# Patient Record
Sex: FEMALE | Employment: FULL TIME | URBAN - METROPOLITAN AREA
[De-identification: names, ages, dates, MRNs, and addresses within clinical notes are randomized per-mention and may not be internally consistent; named-entity substitution may affect disease eponyms.]

---

## 2017-01-04 ENCOUNTER — OFFICE VISIT (OUTPATIENT)
Dept: ORTHOPEDICS | Facility: CLINIC | Age: 42
End: 2017-01-04
Payer: COMMERCIAL

## 2017-01-04 ENCOUNTER — TELEPHONE (OUTPATIENT)
Dept: ORTHOPEDICS | Facility: CLINIC | Age: 42
End: 2017-01-04

## 2017-01-04 VITALS — BODY MASS INDEX: 30.51 KG/M2 | WEIGHT: 155.4 LBS | HEIGHT: 60 IN | RESPIRATION RATE: 16 BRPM

## 2017-01-04 DIAGNOSIS — Z98.890 S/P CARPAL TUNNEL RELEASE: Primary | ICD-10-CM

## 2017-01-04 PROCEDURE — 99024 POSTOP FOLLOW-UP VISIT: CPT | Performed by: ORTHOPAEDIC SURGERY

## 2017-01-04 ASSESSMENT — PAIN SCALES - GENERAL: PAINLEVEL: NO PAIN (1)

## 2017-01-04 NOTE — PATIENT INSTRUCTIONS
Please remember to call and schedule a follow up appointment if one was recommended at your earliest convenience.  Orthopedics CLINIC HOURS TELEPHONE NUMBER   Dr. Naman Alvarez  Certified Medical Assistant   Monday & Wednesday   8am - 5pm  Thursday 1pm - 5pm  Friday 8am -11:30am Specialty schedulers:   [phone] to schedule your surgery.  Main Clinic:   [phone] to make an appointment with any provider.    Urgent Care locations:    [facility] Monday-Friday Closed  Saturday-Sunday 9am-5pm      Monday-Friday 12pm - 8pm  Saturday-Sunday 9am-5pm [phone]     If SURGERY has been recommended, please call our Specialty Schedulers at [phone] to schedule your procedure.    If you need a medication refill, please contact your pharmacy. Please allow 3 business days for your refill to be completed.    If an MRI or CT scan has been recommended, please call [facility] Schedulers at [phone] to schedule your appointment.  Use BUILD (secure e-mail communication and access to your chart) to send a message or to make an appointment. Please ask how you can sign up for BUILD.  Your care team's suggested websites for health information:   [email or web address] : Up to date and easily searchable information on multiple topics.   [email or web address] : [place] dept of heat, public health issues in [place], N1N1

## 2017-01-04 NOTE — PROGRESS NOTES
Chief Complaint   Patient presents with     Surgical Followup     Left CTR. DOS 12/23/16, 2 week PO. Patient states her wrist/hand is feeling better. Still has a little funny feeling but no pain. Has a little N/T still in the fingers. She has remained in the splint.      SURGERY: right wrist carpal tunnel release. ( [facility] )  DATE OF SURGERY: 11/29/2016    SURGERY: left wrist carpal tunnel release. ( [facility] )  DATE OF SURGERY: 12/23/2016    HPI: Aleena Escamilla is a 41 year old female , right -hand dominant, who presents for post-surgical follow-up of a right and left wrist carpal tunnel release. It has now been 5 weeks since surgery on right, 2 weeks on the left. Overall improved since surgery with pain and numbness and tingling. Pain is rated a 1/10. She remains in the splint. There is mild tenderness over the incision. She is already pleased. Not as much swelling, pain on left side as on the right side. preop symptoms bilateral improved since surgery.    PAST MEDICAL HISTORY:   Past Medical History   Diagnosis Date     Migraine headaches      Chronic pelvic pain in female      Patient Active Problem List    Diagnosis Date Noted     Carpal tunnel syndrome 11/23/2016     Priority: Medium     BRCA gene positive 10/21/2016     Priority: Medium     Chemotherapy-induced neutropenia (H) 06/24/2016     Priority: Medium     Malignant neoplasm of overlapping sites of right female breast (H) 12/18/2015     Priority: Medium     Major depressive disorder, recurrent episode, mild (H) 07/31/2015     Priority: Medium     Generalized anxiety disorder 07/31/2015     Priority: Medium     Diagnosis updated by automated process. Provider to review and confirm.       Arthritis, multiple joint involvement 07/31/2015     Priority: Medium     Primary osteoarthritis of both hands 07/31/2015     Priority: Medium     Fibromyalgia 07/31/2015     Priority: Medium     Chronic pelvic pain in female 01/31/2011      Priority: Medium     Chronic salpingitis and oophoritis 01/31/2011     Priority: Medium     CARDIOVASCULAR SCREENING; LDL GOAL LESS THAN 160 10/31/2010     Priority: Medium         PAST SURGICAL HISTORY:   Past Surgical History   Procedure Laterality Date     Pelvis laparoscopy,dx  12/28/1998     Gyn surgery  2-     bilateral salpingectomy, left oophrectomy     Lumpectomy breast with sentinel node, combined Right 1/19/2016     Procedure: COMBINED LUMPECTOMY BREAST WITH SENTINEL NODE;  Surgeon: Adelina Demarco MD;  Location: [facility] OR     Biopsy breast needle localization Right 1/19/2016     Procedure: BIOPSY BREAST NEEDLE LOCALIZATION;  Surgeon: Adelina Demarco MD;  Location: [facility] OR     Laparoscopic salpingo-oophorectomy Right 10/26/2016     Procedure: LAPAROSCOPIC SALPINGO-OOPHORECTOMY;  Surgeon: Bouchra Mayo MD;  [facility]: [facility] OR     Release carpal tunnel Left 12/23/2016     Procedure: RELEASE CARPAL TUNNEL;  Surgeon: Sam Florence MD;  Location: [facility] OR       MEDICATIONS:    Current Outpatient Prescriptions   Medication Sig Dispense Refill     ibuprofen (ADVIL/MOTRIN) 600 MG tablet Take 1 tablet (600 mg) by mouth every 6 hours as needed for pain (mild) 60 tablet 1     letrozole (FEMARA) 2.5 MG tablet Take 1 tablet (2.5 mg) by mouth daily 90 tablet 3     venlafaxine (EFFEXOR-XR) 75 MG 24 hr capsule Take 1 capsule (75 mg) by mouth daily 30 capsule 2     acetaminophen (TYLENOL) 325 MG tablet Take 2 tablets (650 mg) by mouth every 4 hours as needed for other (mild pain) 100 tablet 0        ALLERGIES:   Allergies   Allergen Reactions     Latex Rash     PHYSICAL EXAM  GENERAL APPEARANCE: healthy, alert, no distress.   SKIN: no suspicious lesions or rashes  RESPIRATORY: No increased work of breathing.  NEURO: Normal strength and tone, mentation intact and speech normal  PSYCH:  mentation appears normal and affect normal/bright. Not anxious.    Resp 16  Ht 1.524 m (5')  Wt 70.489 kg (155 lb  6.4 oz)  BMI 30.35 kg/m2  LMP 03/01/2016     RIGHT HAND / WRIST EXAM:    Volar incision healed well  No erythema. No drainage.    There is minimal swelling in the fingers.  There is minimaltenderness in the diffusely over the wrist and hand.  There is no ecchymosis.  There is no erythema of the surrounding skin.  There is no maceration of the skin.  There is no deformity in the area.  Range of motion: full fist.    Intact sensation to light touch in the distribution of the median, radial, and ulnar nerves of the hand. Intact sensation of the radial and ulnar digital nerves of the fingers.  Brisk capillary refill to all fingers, warm and well-perfused.   Palpable radial pulse.    LEFT HAND / WRIST EXAM:    The splint was removed.  Volar incision healing well with skin edges well approximated and everted.  Sutures in place.  No erythema. No drainage.    There is mild swelling in the fingers, wrist.  There is mild tenderness in the incisional  area  There is moderate resolving ecchymosis.  There is no erythema of the surrounding skin.  There is no maceration of the skin.  There is no deformity in the area.  Range of motion: full fist, finger range of motion.    Intact sensation to light touch in the distribution of the median, radial, and ulnar nerves of the hand. Intact sensation of the radial and ulnar digital nerves of the fingers.  Brisk capillary refill to all fingers, warm and well-perfused.   Palpable radial pulse.    ASSESSMENT: 41 year old female, 5 weeks status post right carpal tunnel release, 2 weeks status post left carpal tunnel release, doing well on both accounts.      PLAN: routine postoperative of carpal tunnel release.    * Remove sutures today, soft dressing applied  * May wash hands, no aggressive scrubbing for another week  * Continue with hand and wrist exercises for motion.  * Vitamin E scar massage and desensitization discussed and demonstrated for the right. Wait another 2 weeks before  starting scar massage on the left.  * Gradual return to light use of right hands for ADLs. No aggressive hand use of left hand for another 4 weeks.  * Return to clinic as needed.  * Workability update: has been off work per post operation recommendation from 12/27/2016 to 1/6/2017. Will return to work per comfort 1/9/2017.  * It may take 6 months or longer for symptoms to resolve, and in cases of severe carpal tunnel syndrome, symptoms may not completely improve.    This document serves as a record of the services and decisions personally performed and made by Sam Florence MD. It was created on his behalf by Aliza Rothman, a trained medical scribe. The creation of this document is based the provider's statements to the medical scribe.    Scribe Aliza Rothman 11:26 AM 1/4/2017       Sam Florence M.D., M.S.  Dept. of Orthopaedic Surgery  [facility]

## 2017-01-04 NOTE — TELEPHONE ENCOUNTER
Called and confirmed bilateral carpal tunnel release, right on 11/29/16, left on 12/23/16. She thanked me for calling.  Kim Durant Certified Medical Assistant

## 2017-01-04 NOTE — TELEPHONE ENCOUNTER
Reason for Call:  Other call back    Detailed comments: Marion like to know if patient had carpal tunnel surgery on both hands. Please call.     Phone Number Patient can be reached at: Other phone number:  [phone]    Best Time: any    Can we leave a detailed message on this number? YES    Call taken on 1/4/2017 at 11:54 AM by Rain Peralta

## 2017-01-04 NOTE — NURSING NOTE
Chief Complaint   Patient presents with     Surgical Followup     Left CTR. DOS 12/23/16, 2 week PO. Patient states her wrist/hand is feeling better. Still has a little funny feeling but no pain. Has a little N/T still in the fingers. She has remained in the splint.        Initial Resp 16  Ht 1.524 m (5')  Wt 70.489 kg (155 lb 6.4 oz)  BMI 30.35 kg/m2  LMP 03/01/2016 Estimated body mass index is 30.35 kg/(m^2) as calculated from the following:    Height as of this encounter: 1.524 m (5').    Weight as of this encounter: 70.489 kg (155 lb 6.4 oz).  BP completed using cuff size: NA (Not Taken)  Kim Durant Certified Medical Assistant

## 2017-01-04 NOTE — Clinical Note
[facility]  [address]  [place] [address]  [phone]  WORKABILITY    [facility], Mikayla Leija M.D., [place]        1/4/2017      RE: Aleena Escamilla    [address] [address]        To whom it may concern:     Aleena Escamilla is under my care for s/p left carpal tunnel release.     Date of surgery: 12/23/16    Return to work date: 1/8/17   Patient was seen today for a post operative follow up. She was off work from 12/27/16-1/6/17 per our post operative recommendations. Plan return to work on 1/8/17 without restrictions.       Next appointment: As needed        Electronically Signed (as below)  Dr. Sam Florence M.D.

## 2017-01-05 ENCOUNTER — OFFICE VISIT (OUTPATIENT)
Dept: SURGERY | Facility: CLINIC | Age: 42
End: 2017-01-05
Payer: COMMERCIAL

## 2017-01-05 VITALS — HEART RATE: 75 BPM | SYSTOLIC BLOOD PRESSURE: 104 MMHG | DIASTOLIC BLOOD PRESSURE: 71 MMHG

## 2017-01-05 DIAGNOSIS — C50.411 BREAST CANCER OF UPPER-OUTER QUADRANT OF RIGHT FEMALE BREAST (H): Primary | ICD-10-CM

## 2017-01-05 PROCEDURE — 99213 OFFICE O/P EST LOW 20 MIN: CPT | Performed by: SURGERY

## 2017-01-05 NOTE — PROGRESS NOTES
CHIEF COMPLAINT:  Chief Complaint   Patient presents with     RECHECK     6 month follow up, right breast tenderness       HISTORY OF PRESENT ILLNESS:  Aleena Escamilla is a 41 year old female who is seen in follow up regarding right breast cancer for which she had a right breast partial mastectomy and right axillary SLN biopsy.  Following surgery, She had chemotherapy due to the finding of a positive lymph node, intermediate risk Oncotype and large tumor size.  After finishing chemotherapy, Aleena had right breast radiation which she completed in 9/2016.  She then began Letrozole in November 2016.  Aleena had oophorectomy in 10/2016 and had a benign serous cystadenoma identified.  No malignancy was found.  She recently had carpal tunnel released on both hands as well.  Aleena states that she is tolerating the Letrozole reasonably well though she is having some leg pain and some numbness and tingling in her feet.  She has noted no breast changes of concern.  On mammogram from 12/19/16, she was noted to have calcifications at the lumpectomy site which were thought to likely be benign.  A 6 month follow up mammogram was recommended.    REVIEW OF SYSTEMS:  Constitutional:  Negative for chills, fatigue, fever and weight change.  Eyes:  Negative for blurred vision, eye pain and photophobia.  ENT:  Negative for hearing problems, ENT pain, congestion, rhinorrhea, epistaxis, hoarseness and dental problems.  Cardiovascular:  Negative for chest pain, palpitations, tachycardia, orthopnea and edema.  Respiratory:  Negative for cough, dyspnea and hemoptysis.  Gastrointestinal:  Negative for abdominal pain, heartburn, constipation, diarrhea and stool changes.  Musculoskeletal:  Negative for arthralgias, back pain and myalgias.  Integumentary/Breast:  See HPI.    Past Medical History   Diagnosis Date     Migraine headaches      Chronic pelvic pain in female      Malignant neoplasm of right breast, stage 2 (H) 1/2016 1/4 lymph  nodes positive, Oncotype DX = 25.  Chemotherapy and radiation. Letrozole.       Past Surgical History   Procedure Laterality Date     Pelvis laparoscopy,dx  12/28/1998     Gyn surgery  2-     bilateral salpingectomy, left oophrectomy     Biopsy breast needle localization Right 1/19/2016     Procedure: BIOPSY BREAST NEEDLE LOCALIZATION;  Surgeon: Adelina Demarco MD;  Location: [facility]     Laparoscopic salpingo-oophorectomy Right 10/26/2016     Procedure: LAPAROSCOPIC SALPINGO-OOPHORECTOMY;  Surgeon: Bouchra Mayo MD;  Location: [facility] OR     Release carpal tunnel Left 12/23/2016     Procedure: RELEASE CARPAL TUNNEL;  Surgeon: Sam Florence MD;  Location: [facility] OR     Lumpectomy breast with sentinel node, combined Right 1/19/2016     Procedure: COMBINED LUMPECTOMY BREAST WITH SENTINEL NODE;  Surgeon: Adelina Demarco MD;  Location: [facility] OR       Family History   Problem Relation Age of Onset     DIABETES Mother      CANCER Maternal Grandmother      cervical CA     Hypertension Mother      Cardiovascular Father      MI     CANCER Maternal Aunt      cervical cancer       Social History   Substance Use Topics     Smoking status: Never Smoker      Smokeless tobacco: Never Used     Alcohol Use: Yes      Comment: 1 drink per week.       Patient Active Problem List   Diagnosis     CARDIOVASCULAR SCREENING; LDL GOAL LESS THAN 160     Chronic pelvic pain in female     Chronic salpingitis and oophoritis     Major depressive disorder, recurrent episode, mild (H)     Generalized anxiety disorder     Arthritis, multiple joint involvement     Primary osteoarthritis of both hands     Fibromyalgia     Malignant neoplasm of overlapping sites of right female breast (H)     Chemotherapy-induced neutropenia (H)     BRCA gene positive     Carpal tunnel syndrome     Allergies   Allergen Reactions     Latex Rash     Current Outpatient Prescriptions   Medication Sig Dispense Refill     letrozole (FEMARA) 2.5 MG tablet Take 1  tablet (2.5 mg) by mouth daily 90 tablet 3     venlafaxine (EFFEXOR-XR) 75 MG 24 hr capsule Take 1 capsule (75 mg) by mouth daily 30 capsule 2     acetaminophen (TYLENOL) 325 MG tablet Take 2 tablets (650 mg) by mouth every 4 hours as needed for other (mild pain) 100 tablet 0     Vitals: /71 mmHg  Pulse 75  LMP 03/01/2016  BMI= There is no weight on file to calculate BMI.    EXAM:  GENERAL: healthy, alert and no distress   BREAST:  The right breast is smaller than the left.  A well healed scar is seen on the upper outer right breast.  A little skin darkening and thickening from radiation is seen on the right.  The nipples are normal bilaterally.    No mass is appreciated in either breast. The tissue is a little firmer in the area of the lumpectomy.  The breast tissue is generally fairly soft and without abnormality.  There is no axillary or supraclavicular lymphadenopathy.  CARDIOVASCULAR:  No edema or JVD.  RESPIRATORY: negative findings: no chest deformities noted, no chest wall tenderness, unlabored breathing.  NECK: Neck supple. No adenopathy.  Carotids without bruits.  SKIN: No suspicious lesions or rashes  LYMPH: Normal cervical lymph nodes    ASSESSMENT:  Aleena Escamilla is doing well.  I found nothing of concern on exam.  I see the area of calcification in the right breast on mammogram and it certainly could be related to surgery and radiation and be associated with fat necrosis.  I told Aleena that I thought that a 6 month follow up mammogram was appropriate.  We also talked about the Letrozole and her leg pain.  I encouraged her to stay on it and take ibuprofen if needed.  If after another couple of months she still has the pain or it has worsened, she should discuss it with Dr. Tate and consider switching to another aromatase inhibitor.    PLAN:  Aleena will follow up with me in 1 year.  She will have the follow up right mammogram in June and will certainly follow up with me then if there is  progressive abnormality.  If the calcifications are stable, she will have annual screening mammograms in December 2017.    Adelina Demarco MD    Please route or send letter to:  Primary Care Provider (PCP)      HPI      SENG      Physical Exam

## 2017-01-05 NOTE — NURSING NOTE
Aleena Escamilla's goals for this visit include: 6 month follow up, right breast tenderness  She requests these members of her care team be copied on today's visit information: no    PCP: Concepcion Bocanegra    Referring Provider:  No referring provider defined for this encounter.    Chief Complaint   Patient presents with     RECHECK     6 month follow up, right breast tenderness       Initial LMP 03/01/2016 Estimated body mass index is 30.35 kg/(m^2) as calculated from the following:    Height as of 1/4/17: 1.524 m (5').    Weight as of 1/4/17: 70.489 kg (155 lb 6.4 oz).  BP completed using cuff size: regular    Do you need any medication refills at today's visit? No    Parul Morin LPN

## 2017-01-06 ENCOUNTER — TELEPHONE (OUTPATIENT)
Dept: ORTHOPEDICS | Facility: CLINIC | Age: 42
End: 2017-01-06

## 2017-01-06 NOTE — TELEPHONE ENCOUNTER
Reason for Call:  Other     Detailed comments: Patient request extension on her return to work dated for Monday 01/16/2017. Patient would like a call when ready for     Phone Number Patient can be reached at: Home number on file [phone] (home)    Best Time: anytime    Can we leave a detailed message on this number? YES    Call taken on 1/6/2017 at 9:36 AM by Umm Marks

## 2017-01-06 NOTE — TELEPHONE ENCOUNTER
I spoke to Aleena and she is still quite sore and uncomfortable. I wrote a new note for her to  at [facility] that she can return to work on 1/16/17 with no restrictions.    Toñito Phillips PA-C, [employer] (Ortho)  Supervising Physician: Sam Florence M.D., M.S.  Dept. of Orthopaedic Surgery  [facility]

## 2017-01-06 NOTE — Clinical Note
[facility]  [address]  [place] [address]  [phone]      WORKABILITY    [facility], Dr. Sam Florence M.D., DRAGAN [place], [place]        1/6/2017      RE: Aleena Escamilla    [address] [address]        To whom it may concern:     Aleena Escamilla is under my professional care for bilateral carpal tunnel release.     Date of Surgery: 11/29/16 and 12/23/16.    Return to work date: 1/16/17   Ms. Escamilla can return to work on 1/16/17 with no restrictions. She has been off work due to our post-operative recommendations.     Next appointment: As needed        Toñito Phillips PA-C, CAQ (Ortho)  Supervising Physician: Sam Florence M.D., M.S.  Dept. of Orthopaedic Surgery  [facility]

## 2017-01-09 ENCOUNTER — OFFICE VISIT (OUTPATIENT)
Dept: FAMILY MEDICINE | Facility: CLINIC | Age: 42
End: 2017-01-09
Payer: COMMERCIAL

## 2017-01-09 VITALS
OXYGEN SATURATION: 97 % | BODY MASS INDEX: 30.31 KG/M2 | DIASTOLIC BLOOD PRESSURE: 73 MMHG | TEMPERATURE: 98.6 F | HEIGHT: 60 IN | SYSTOLIC BLOOD PRESSURE: 107 MMHG | HEART RATE: 76 BPM | WEIGHT: 154.4 LBS

## 2017-01-09 DIAGNOSIS — F33.0 MAJOR DEPRESSIVE DISORDER, RECURRENT EPISODE, MILD (H): ICD-10-CM

## 2017-01-09 DIAGNOSIS — S29.012A RHOMBOID MUSCLE STRAIN, INITIAL ENCOUNTER: Primary | ICD-10-CM

## 2017-01-09 DIAGNOSIS — E66.1 NON MORBID DRUG-INDUCED OBESITY: ICD-10-CM

## 2017-01-09 DIAGNOSIS — M79.7 FIBROMYALGIA: ICD-10-CM

## 2017-01-09 PROCEDURE — 99214 OFFICE O/P EST MOD 30 MIN: CPT | Performed by: FAMILY MEDICINE

## 2017-01-09 ASSESSMENT — PAIN SCALES - GENERAL: PAINLEVEL: SEVERE PAIN (6)

## 2017-01-09 NOTE — MR AVS SNAPSHOT
After Visit Summary   1/9/2017    Aleena Escamilla    MRN: [id number]           Patient Information     Date Of Birth          1975        Visit Information        Provider Department      1/9/2017 11:40 AM Concepcion Bocanegra MD [facility]        Today's Diagnoses     Need for prophylactic vaccination and inoculation against influenza    -  1       Care Instructions    How to contact your care team: [phone] Pharmacy [phone]   DRAGAN DOZIER MD KATYA GEORGIEV, PA-C CHRIS JONES, PA-C NAM HO, MD JONATHAN BATES, MD ARVIN VOCAL, MD    Clinic hours M-Th 7am-7pm Fri 7am-5pm.   Urgent care M-F 11am-9pm  Sat/Sun 9am-5pm.   Pharmacy   Mon-Th:  8:00am-8pm   Fri:  8:00am-6:00pm  Sat/Sun  8:00am-5:00 pm       Muscle Spasm  A muscle spasm is a sudden tightening of the muscle you can t control. This may be caused by strain, overworking the muscle, or injury. It can also be caused by dehydration, electrolyte imbalance, diabetes, alcohol use, and certain medicines. If it goes on long enough the muscle spasm causes pain. Common areas for muscle spasm are the legs, neck, and back.  Home care    Heat, massage, and stretching will help relax muscle spasm.    When the spasm is in your arm or leg, stretch the muscle passively. To do this, have someone bend or straighten the joint above or below the muscle until you feel the stretch on the sore muscle. You can stretch the muscle actively by moving the affected body part. This will stretch the muscle that is in spasm. For example, if the spasm is in your calf, bend the ankle so your toes point upward toward your knee. This will stretch your calf muscle.    You may use over-the-counter pain medicine to control pain, unless another medicine was prescribed. If you have chronic liver or kidney disease or ever had a stomach ulcer or GI bleeding, talk with your healthcare provider before using these  medicines.  Follow-up care  Follow up with your healthcare provider, or as advised.    When to seek medical advice  Call your healthcare provider right away if any of the following occur:    Fingers or toes become swollen, cold, blue, numb, or tingly    You develop weakness in the affected arm or leg    Pain increases and is not controlled by the above measures    7265-5996 The [employer]. [address] [place] [address]. All rights reserved. This information is not intended as a substitute for professional medical care. Always follow your healthcare professional's instructions.              Follow-ups after your visit        Your next 10 appointments already scheduled     Mar 16, 2017 10:15 AM   LAB with [facility] ON[facility] ([facility])    [id number] [place] [id number] [phone]           Patient must bring picture ID.  Patient should be prepared to give a urine specimen  Please do not eat 10-12 hours before your appointment if you are coming in fasting for labs on lipids, cholesterol, or glucose (sugar).  Pregnant women should follow their Care Team instructions. Water with medications is okay. Do not drink coffee or other fluids.   If you have concerns about taking  your medications, please ask at office or if scheduling via [employer], send a message by clicking on Secure Messaging, Message Your Care Team.            Mar 16, 2017 11:00 AM   Return Visit with Bessy Tate MD   [facility] ([facility])    [address] [place] [id number] [phone]              Who to contact     If you have questions or need follow up information about today's clinic visit or your schedule please contact [facility] directly at [phone].  Normal or non-critical lab and imaging results will be communicated to you by MyChart, letter or phone within 4 business days after  the clinic has received the results. If you do not hear from us within 7 days, please contact the clinic through [employer] or phone. If you have a critical or abnormal lab result, we will notify you by phone as soon as possible.  Submit refill requests through [employer] or call your pharmacy and they will forward the refill request to us. Please allow 3 business days for your refill to be completed.          Additional Information About Your Visit        [employer] Information     [employer] gives you secure access to your electronic health record. If you see a primary care provider, you can also send messages to your care team and make appointments. If you have questions, please call your primary care clinic.  If you do not have a primary care provider, please call [phone] and they will assist you.        Care EveryWhere ID     This is your Care EveryWhere ID. This could be used by other organizations to access your [facility] medical records  [id number]        Your Vitals Were     Pulse Temperature Height BMI (Body Mass Index) Pulse Oximetry Last Period    76 98.6  F (37  C) (Oral) 5' (1.524 m) 30.15 kg/m2 97% 03/01/2016    Breastfeeding?                   No            Blood Pressure from Last 3 Encounters:   01/09/17 107/73   01/05/17 104/71   12/23/16 118/75    Weight from Last 3 Encounters:   01/09/17 154 lb 6.4 oz (70.035 kg)   01/04/17 155 lb 6.4 oz (70.489 kg)   12/19/16 151 lb 12.8 oz (68.856 kg)              Today, you had the following     No orders found for display       Primary Care Provider Office Phone # Fax #    Concepcion Bocanegra [phone]       [facility] [address]  [facility] [address]        Thank you!     Thank you for choosing [facility]  for your care. Our goal is always to provide you with excellent care. Hearing back from our patients is one way we can continue to improve our services. Please take a few minutes to complete the  written survey that you may receive in the mail after your visit with us. Thank you!             Your Updated Medication List - Protect others around you: Learn how to safely use, store and throw away your medicines at www.disposemymeds.org.          This list is accurate as of: 1/9/17 12:40 PM.  Always use your most recent med list.                   Brand Name Dispense Instructions for use    acetaminophen 325 MG tablet    TYLENOL    100 tablet    Take 2 tablets (650 mg) by mouth every 4 hours as needed for other (mild pain)       letrozole 2.5 MG tablet    FEMARA    90 tablet    Take 1 tablet (2.5 mg) by mouth daily       venlafaxine 75 MG 24 hr capsule    EFFEXOR-XR    30 capsule    Take 1 capsule (75 mg) by mouth daily

## 2017-01-09 NOTE — NURSING NOTE
Chief Complaint   Patient presents with     Shoulder Pain     Right shoulder and scapula pain       Initial /73 mmHg  Pulse 76  Temp(Src) 98.6  F (37  C) (Oral)  Ht 5' (1.524 m)  Wt 154 lb 6.4 oz (70.035 kg)  BMI 30.15 kg/m2  SpO2 97%  LMP 03/01/2016  Breastfeeding? No Estimated body mass index is 30.15 kg/(m^2) as calculated from the following:    Height as of this encounter: 5' (1.524 m).    Weight as of this encounter: 154 lb 6.4 oz (70.035 kg).  BP completed using cuff size: regular    Fernando Almaraz CMA

## 2017-01-09 NOTE — PATIENT INSTRUCTIONS
How to contact your care team: [phone] Pharmacy [phone]   DRAGAN DOZIER MD KATYA GEORGIEV, PA-C CHRIS JONES, PA-C NAM HO, MD JONATHAN BATES, MD ARVIN VOCAL, MD    Clinic hours M-Th 7am-7pm Fri 7am-5pm.   Urgent care M-F 11am-9pm  Sat/Sun 9am-5pm.   Pharmacy   Mon-Th:  8:00am-8pm   Fri:  8:00am-6:00pm  Sat/Sun  8:00am-5:00 pm       Muscle Spasm  A muscle spasm is a sudden tightening of the muscle you can t control. This may be caused by strain, overworking the muscle, or injury. It can also be caused by dehydration, electrolyte imbalance, diabetes, alcohol use, and certain medicines. If it goes on long enough the muscle spasm causes pain. Common areas for muscle spasm are the legs, neck, and back.  Home care    Heat, massage, and stretching will help relax muscle spasm.    When the spasm is in your arm or leg, stretch the muscle passively. To do this, have someone bend or straighten the joint above or below the muscle until you feel the stretch on the sore muscle. You can stretch the muscle actively by moving the affected body part. This will stretch the muscle that is in spasm. For example, if the spasm is in your calf, bend the ankle so your toes point upward toward your knee. This will stretch your calf muscle.    You may use over-the-counter pain medicine to control pain, unless another medicine was prescribed. If you have chronic liver or kidney disease or ever had a stomach ulcer or GI bleeding, talk with your healthcare provider before using these medicines.  Follow-up care  Follow up with your healthcare provider, or as advised.    When to seek medical advice  Call your healthcare provider right away if any of the following occur:    Fingers or toes become swollen, cold, blue, numb, or tingly    You develop weakness in the affected arm or leg    Pain increases and is not controlled by the above measures    6130-4663 The [employer]. [address] [place] [address],  [place] [address]. All rights reserved. This information is not intended as a substitute for professional medical care. Always follow your healthcare professional's instructions.

## 2017-01-09 NOTE — PROGRESS NOTES
SUBJECTIVE:                                                    Aleena Escamilla is a 41 year old female who presents to clinic today for the following health issues:    Joint Pain Follow-up under shoulder blade     Onset: Ongoing before right breast cancer    Description:   Location:Right shoulder and back shoulder  Character: Dull ache    Intensity: moderate, 6/10    Progression of Symptoms: waxing and waning    Accompanying Signs & Symptoms:  Other symptoms: none   History:   Previous similar pain: YES    Precipitating factors:   Trauma or overuse: no     Alleviating factors:  Improved by: Tylenol       Therapies Tried and outcome: Tylenol helps, stretching and massage makes worse        Depression and Anxiety Follow-Up    Status since last visit: No change    Other associated symptoms:None    Complicating factors:     Significant life event: Yes-  Diagnosed with breast cancer.      Current substance abuse: None    PHQ-9 SCORE 1/14/2016 11/3/2016 12/20/2016   Total Score - - -   Total Score 9 4 4     VENKAT-7 SCORE 8/3/2015 11/3/2016 12/20/2016   Total Score 7 - -   Total Score - 4 5        PHQ-9  English      PHQ-9   Any Language     GAD7         Problem list and histories reviewed & adjusted, as indicated.  Additional history: as documented    Patient Active Problem List   Diagnosis     CARDIOVASCULAR SCREENING; LDL GOAL LESS THAN 160     Chronic pelvic pain in female     Chronic salpingitis and oophoritis     Major depressive disorder, recurrent episode, mild (H)     Generalized anxiety disorder     Arthritis, multiple joint involvement     Primary osteoarthritis of both hands     Fibromyalgia     Malignant neoplasm of overlapping sites of right female breast (H)     Chemotherapy-induced neutropenia (H)     BRCA gene positive     Carpal tunnel syndrome     Past Surgical History   Procedure Laterality Date     Pelvis laparoscopy,dx  12/28/1998     Gyn surgery  2-     bilateral salpingectomy, left  oophrectomy     Biopsy breast needle localization Right 1/19/2016     Procedure: BIOPSY BREAST NEEDLE LOCALIZATION;  Surgeon: Adelina Demarco MD;  Location: [facility] OR     Laparoscopic salpingo-oophorectomy Right 10/26/2016     Procedure: LAPAROSCOPIC SALPINGO-OOPHORECTOMY;  Surgeon: Bouchra Mayo MD;  Location: [facility] OR     Release carpal tunnel Left 12/23/2016     Procedure: RELEASE CARPAL TUNNEL;  Surgeon: Sam Florence MD;  Location: [facility] OR     Lumpectomy breast with sentinel node, combined Right 1/19/2016     Procedure: COMBINED LUMPECTOMY BREAST WITH SENTINEL NODE;  Surgeon: Adelina Demarco MD;  Location: [facility] OR       Social History   Substance Use Topics     Smoking status: Never Smoker      Smokeless tobacco: Never Used     Alcohol Use: Yes      Comment: 1 drink per week.     Family History   Problem Relation Age of Onset     DIABETES Mother      CANCER Maternal Grandmother      cervical CA     Hypertension Mother      Cardiovascular Father      MI     CANCER Maternal Aunt      cervical cancer         Current Outpatient Prescriptions   Medication Sig Dispense Refill     letrozole (FEMARA) 2.5 MG tablet Take 1 tablet (2.5 mg) by mouth daily 90 tablet 3     venlafaxine (EFFEXOR-XR) 75 MG 24 hr capsule Take 1 capsule (75 mg) by mouth daily 30 capsule 2     acetaminophen (TYLENOL) 325 MG tablet Take 2 tablets (650 mg) by mouth every 4 hours as needed for other (mild pain) 100 tablet 0     Allergies   Allergen Reactions     Latex Rash     Recent Labs   Lab Test  12/19/16   1021  12/13/16   0914  11/08/16   1552  10/26/16   0753  09/27/16   1212  07/21/16   1049   11/07/11   1226  10/17/11   0809  01/07/11   0834   05/19/09   1202   A1C  5.7   --    --    --    --    --    --    --    --    --    --    --    LDL  82   --    --    --    --    --    --    --   86  116   < >   --    HDL  30*   --    --    --    --    --    --    --   33*  36*   < >   --    TRIG  301*   --    --    --    --    --    --     --   212*  127   < >   --    ALT   --   49  71*   --   70*  115*   < >   --    --    --    --    --    CR   --    --    --    --   0.55  0.54   < >   --    --    --    --    --    GFRESTIMATED   --    --    --    --   >90  Non  GFR Calc    >90  Non  GFR Calc     < >   --    --    --    --    --    GFRESTBLACK   --    --    --    --   >90   GFR Calc    >90   GFR Calc     < >   --    --    --    --    --    POTASSIUM   --    --    --   3.4  3.7  3.8   < >   --    --    --    --    --    TSH   --    --    --    --    --    --    --   0.83   --    --    --   0.90    < > = values in this interval not displayed.      BP Readings from Last 3 Encounters:   01/09/17 107/73   01/05/17 104/71   12/23/16 118/75    Wt Readings from Last 3 Encounters:   01/09/17 154 lb 6.4 oz (70.035 kg)   01/04/17 155 lb 6.4 oz (70.489 kg)   12/19/16 151 lb 12.8 oz (68.856 kg)                  Labs reviewed in EPIC  Problem list, Medication list, Allergies, and Medical/Social/Surgical histories reviewed in [facility] and updated as appropriate.    ROS:  Constitutional, HEENT, cardiovascular, pulmonary, gi and gu systems are negative, except as otherwise noted.    OBJECTIVE:                                                    /73 mmHg  Pulse 76  Temp(Src) 98.6  F (37  C) (Oral)  Ht 5' (1.524 m)  Wt 154 lb 6.4 oz (70.035 kg)  BMI 30.15 kg/m2  SpO2 97%  LMP 03/01/2016  Breastfeeding? No  Body mass index is 30.15 kg/(m^2).  GENERAL: healthy, alert, well nourished, well hydrated, no distress, obese  HENT: ear canals- normal; TMs- normal; Nose- normal; Mouth- no ulcers, no lesions, throat is clear with no erythema or exudate.   NECK: no tenderness, no adenopathy, no asymmetry, no masses, no stiffness; thyroid- normal to palpation  RESP: lungs clear to auscultation - no rales, no rhonchi, no wheezes  CV: regular rates and rhythm, normal S1 S2, no S3 or S4 and no murmur, no click or  rub -  ABDOMEN: soft, no tenderness, no  hepatosplenomegaly, no masses, normal bowel sounds  MS: extremities- no gross deformities noted, no edema, tender with spasm over right rhomboid muscle.   SKIN: no suspicious lesions, no rashes  NEURO: strength and tone- normal, sensory exam- grossly normal, mentation- intact, speech- normal, reflexes- symmetric  BACK: no CVA tenderness, no paralumbar tenderness  PSYCH: Alert and oriented times 3; speech- coherent , normal rate and volume; able to articulate logical thoughts, able to abstract reason, no tangential thoughts, no hallucinations or delusions, affect- normal     Diagnostic Test Results:  none      ASSESSMENT/PLAN:                                                        BMI:   Estimated body mass index is 30.15 kg/(m^2) as calculated from the following:    Height as of this encounter: 5' (1.524 m).    Weight as of this encounter: 154 lb 6.4 oz (70.035 kg).   Weight management plan: Discussed healthy diet and exercise guidelines and patient will follow up in 3 months in clinic to re-evaluate.        ICD-10-CM    1. Rhomboid muscle strain, initial encounter S29.012A Discussed using heat and massage for treatment, good posture and lifting.    2. Fibromyalgia M79.7 History of chronic musculoskeletal pain   3. Major depressive disorder, recurrent episode, mild (H) F33.0 Stable   4. Non morbid drug-induced obesity E66.1 Some of the chemotherapy agents have been causing weight gain per oncologist.        FUTURE APPOINTMENTS:       - Follow-up visit in 3 months or sooner if any questions or concerns.   Work on weight loss  Regular exercise  See Patient Instructions    Concepcion Bocanegra MD  [facility]

## 2017-01-10 ENCOUNTER — TELEPHONE (OUTPATIENT)
Dept: ORTHOPEDICS | Facility: CLINIC | Age: 42
End: 2017-01-10

## 2017-01-10 NOTE — TELEPHONE ENCOUNTER
Reason for Call:  Other call back    Detailed comments: [employer] calling to get a return to work date. Please call and let know.     Phone Number Patient can be reached at: Other phone number:  Number above     Best Time: any    Can we leave a detailed message on this number? YES    Call taken on 1/10/2017 at 11:29 AM by Nova Ortiz

## 2017-01-11 NOTE — TELEPHONE ENCOUNTER
Called and spoke to Marion giving her the patient's return to work date, 1/16/17. She thanked me for calling.  Kim Durant Certified Medical Assistant

## 2017-02-06 ENCOUNTER — OFFICE VISIT (OUTPATIENT)
Dept: FAMILY MEDICINE | Facility: CLINIC | Age: 42
End: 2017-02-06
Payer: COMMERCIAL

## 2017-02-06 VITALS
TEMPERATURE: 98 F | HEART RATE: 70 BPM | OXYGEN SATURATION: 96 % | WEIGHT: 153 LBS | DIASTOLIC BLOOD PRESSURE: 74 MMHG | HEIGHT: 60 IN | SYSTOLIC BLOOD PRESSURE: 103 MMHG | BODY MASS INDEX: 30.04 KG/M2

## 2017-02-06 DIAGNOSIS — C50.811 MALIGNANT NEOPLASM OF OVERLAPPING SITES OF RIGHT FEMALE BREAST (H): ICD-10-CM

## 2017-02-06 DIAGNOSIS — M79.18 RHOMBOID MUSCLE PAIN: Primary | ICD-10-CM

## 2017-02-06 DIAGNOSIS — E66.1 NON MORBID DRUG-INDUCED OBESITY: ICD-10-CM

## 2017-02-06 DIAGNOSIS — F41.1 GENERALIZED ANXIETY DISORDER: ICD-10-CM

## 2017-02-06 DIAGNOSIS — F33.0 MAJOR DEPRESSIVE DISORDER, RECURRENT EPISODE, MILD (H): ICD-10-CM

## 2017-02-06 DIAGNOSIS — Z15.09 BRCA GENE POSITIVE: ICD-10-CM

## 2017-02-06 DIAGNOSIS — M79.7 FIBROMYALGIA: ICD-10-CM

## 2017-02-06 DIAGNOSIS — Z15.01 BRCA GENE POSITIVE: ICD-10-CM

## 2017-02-06 PROCEDURE — 99214 OFFICE O/P EST MOD 30 MIN: CPT | Performed by: FAMILY MEDICINE

## 2017-02-06 ASSESSMENT — ANXIETY QUESTIONNAIRES
3. WORRYING TOO MUCH ABOUT DIFFERENT THINGS: SEVERAL DAYS
1. FEELING NERVOUS, ANXIOUS, OR ON EDGE: NOT AT ALL
6. BECOMING EASILY ANNOYED OR IRRITABLE: SEVERAL DAYS
2. NOT BEING ABLE TO STOP OR CONTROL WORRYING: SEVERAL DAYS
GAD7 TOTAL SCORE: 5
IF YOU CHECKED OFF ANY PROBLEMS ON THIS QUESTIONNAIRE, HOW DIFFICULT HAVE THESE PROBLEMS MADE IT FOR YOU TO DO YOUR WORK, TAKE CARE OF THINGS AT HOME, OR GET ALONG WITH OTHER PEOPLE: NOT DIFFICULT AT ALL
7. FEELING AFRAID AS IF SOMETHING AWFUL MIGHT HAPPEN: SEVERAL DAYS
5. BEING SO RESTLESS THAT IT IS HARD TO SIT STILL: NOT AT ALL

## 2017-02-06 ASSESSMENT — PAIN SCALES - GENERAL: PAINLEVEL: EXTREME PAIN (8)

## 2017-02-06 ASSESSMENT — PATIENT HEALTH QUESTIONNAIRE - PHQ9: 5. POOR APPETITE OR OVEREATING: SEVERAL DAYS

## 2017-02-06 NOTE — NURSING NOTE
Chief Complaint   Patient presents with     Shoulder Pain     Right rhomboid muscle pain recheck, pain same     Breast Problem     Burning left breast       Initial /74 mmHg  Pulse 70  Temp(Src) 98  F (36.7  C) (Oral)  Ht 5' (1.524 m)  Wt 153 lb (69.4 kg)  BMI 29.88 kg/m2  SpO2 96%  LMP 03/01/2016  Breastfeeding? No Estimated body mass index is 29.88 kg/(m^2) as calculated from the following:    Height as of this encounter: 5' (1.524 m).    Weight as of this encounter: 153 lb (69.4 kg).  Medication Reconciliation: complete     Fernando Almaraz CMA

## 2017-02-06 NOTE — PROGRESS NOTES
SUBJECTIVE:                                                    Aleena Escamilla is a 41 year old female who presents to clinic today for the following health issues:    Joint Pain - Patient requesting referral for chiropractor or physical therapy      Onset: Ongoing since last visit    Description:   Location: right rhomboid  Character: Dull ache    Intensity: moderate, currently 8/10    Progression of Symptoms: same    Accompanying Signs & Symptoms:  Other symptoms: none   History:   Previous similar pain: YES      Precipitating factors:   Trauma or overuse: no     Alleviating factors:  Improved by: massage, stretching and acetaminophen       Therapies Tried and outcome: Same. When came home from last visit, patient's  gave her a massage and the area felt like a bump or has swelling but after the massage it gave her relief for a while.    Concern - Left breast. Last mammogram completed 12/19/16 and seen by breast surgeon with normal exams.     Onset: 1/3/17     Description:   Left breast burning and pain in axilla    Intensity: moderate, currently 6/10    Progression of Symptoms:  same and intermittent    Accompanying Signs & Symptoms:  Radiation to left axillary       Previous history of similar problem:   yes    Precipitating factors:   Worsened by: Pressure    Alleviating factors:  Improved by: None       Therapies Tried and outcome: Ice does not help, Tylenol helps a little bit, heat pack helps a little bit        Depression and Anxiety Follow-Up    Status since last visit: No change    Other associated symptoms:None    Complicating factors:     Significant life event: Yes-  Breast cancer     Current substance abuse: None    PHQ-9 SCORE 11/3/2016 12/20/2016 2/6/2017   Total Score - - -   Total Score 4 4 2     VENKAT-7 SCORE 11/3/2016 12/20/2016 2/6/2017   Total Score - - -   Total Score 4 5 5        PHQ-9  English      PHQ-9   Any Language     GAD7         Problem list and histories reviewed & adjusted, as  indicated.  Additional history: as documented    Patient Active Problem List   Diagnosis     CARDIOVASCULAR SCREENING; LDL GOAL LESS THAN 160     Chronic pelvic pain in female     Chronic salpingitis and oophoritis     Major depressive disorder, recurrent episode, mild (H)     Generalized anxiety disorder     Arthritis, multiple joint involvement     Primary osteoarthritis of both hands     Fibromyalgia     Malignant neoplasm of overlapping sites of right female breast (H)     Chemotherapy-induced neutropenia (H)     BRCA gene positive     Carpal tunnel syndrome     Non morbid drug-induced obesity     Past Surgical History   Procedure Laterality Date     Pelvis laparoscopy,dx  12/28/1998     Gyn surgery  2-     bilateral salpingectomy, left oophrectomy     Biopsy breast needle localization Right 1/19/2016     Procedure: BIOPSY BREAST NEEDLE LOCALIZATION;  Surgeon: Adelina Demarco MD;  Location: [facility]     Laparoscopic salpingo-oophorectomy Right 10/26/2016     Procedure: LAPAROSCOPIC SALPINGO-OOPHORECTOMY;  Surgeon: Bouchra Mayo MD;  Location: [facility]     Release carpal tunnel Left 12/23/2016     Procedure: RELEASE CARPAL TUNNEL;  Surgeon: Sam Florence MD;  Location: [facility]     Lumpectomy breast with sentinel node, combined Right 1/19/2016     Procedure: COMBINED LUMPECTOMY BREAST WITH SENTINEL NODE;  Surgeon: Adelina Demarco MD;  Location: [facility] OR       Social History   Substance Use Topics     Smoking status: Never Smoker      Smokeless tobacco: Never Used     Alcohol Use: Yes      Comment: 1 drink per week.     Family History   Problem Relation Age of Onset     DIABETES Mother      CANCER Maternal Grandmother      cervical CA     Hypertension Mother      Cardiovascular Father      MI     CANCER Maternal Aunt      cervical cancer         Current Outpatient Prescriptions   Medication Sig Dispense Refill     tiZANidine (ZANAFLEX) 4 MG tablet Take 1-2 tablets (4-8 mg) by mouth 3 times daily  as needed for muscle spasms 30 tablet 1     letrozole (FEMARA) 2.5 MG tablet Take 1 tablet (2.5 mg) by mouth daily 90 tablet 3     venlafaxine (EFFEXOR-XR) 75 MG 24 hr capsule Take 1 capsule (75 mg) by mouth daily 30 capsule 2     acetaminophen (TYLENOL) 325 MG tablet Take 2 tablets (650 mg) by mouth every 4 hours as needed for other (mild pain) 100 tablet 0     Allergies   Allergen Reactions     Latex Rash     Recent Labs   Lab Test  12/19/16   1021  12/13/16   0914  11/08/16   1552  10/26/16   0753  09/27/16   1212  07/21/16   1049   11/07/11   1226  10/17/11   0809  01/07/11   0834   05/19/09   1202   A1C  5.7   --    --    --    --    --    --    --    --    --    --    --    LDL  82   --    --    --    --    --    --    --   86  116   < >   --    HDL  30*   --    --    --    --    --    --    --   33*  36*   < >   --    TRIG  301*   --    --    --    --    --    --    --   212*  127   < >   --    ALT   --   49  71*   --   70*  115*   < >   --    --    --    --    --    CR   --    --    --    --   0.55  0.54   < >   --    --    --    --    --    GFRESTIMATED   --    --    --    --   >90  Non  GFR Calc    >90  Non  GFR Calc     < >   --    --    --    --    --    GFRESTBLACK   --    --    --    --   >90   GFR Calc    >90   GFR Calc     < >   --    --    --    --    --    POTASSIUM   --    --    --   3.4  3.7  3.8   < >   --    --    --    --    --    TSH   --    --    --    --    --    --    --   0.83   --    --    --   0.90    < > = values in this interval not displayed.      BP Readings from Last 3 Encounters:   02/06/17 103/74   01/09/17 107/73   01/05/17 104/71    Wt Readings from Last 3 Encounters:   02/06/17 153 lb (69.4 kg)   01/09/17 154 lb 6.4 oz (70.035 kg)   01/04/17 155 lb 6.4 oz (70.489 kg)                  Labs reviewed in EPIC  Problem list, Medication list, Allergies, and Medical/Social/Surgical histories reviewed in EPIC and updated  as appropriate.    ROS:  Constitutional, HEENT, cardiovascular, pulmonary, gi and gu systems are negative, except as otherwise noted.    OBJECTIVE:                                                    /74 mmHg  Pulse 70  Temp(Src) 98  F (36.7  C) (Oral)  Ht 5' (1.524 m)  Wt 153 lb (69.4 kg)  BMI 29.88 kg/m2  SpO2 96%  LMP 03/01/2016  Breastfeeding? No  Body mass index is 29.88 kg/(m^2).  GENERAL: healthy, alert, well nourished, well hydrated, no distress, obese  HENT: ear canals- normal; TMs- normal; Nose- normal; Mouth- no ulcers, no lesions, throat is clear with no erythema or exudate.   NECK: no tenderness, no adenopathy, no asymmetry, no masses, no stiffness; thyroid- normal to palpation  RESP: lungs clear to auscultation - no rales, no rhonchi, no wheezes  CV: regular rates and rhythm, normal S1 S2, no S3 or S4 and no murmur, no click or rub   BREAST: Normal appearance left with no retractions, no palpable lesions or masses, normal nipples with no discharge, no axillary adenopathy, surgical changes on right.    ABDOMEN: soft, no tenderness, no  hepatosplenomegaly, no masses, normal bowel sounds  MS: extremities- no gross deformities noted, no edema  SKIN: no suspicious lesions, no rashes  NEURO: strength and tone- normal, sensory exam- grossly normal, mentation- intact, speech- normal, reflexes- symmetric  BACK: no CVA tenderness, no paralumbar tenderness, tenderness with spasm over right rhomboids.   PSYCH: Alert and oriented times 3; speech- coherent , normal rate and volume; able to articulate logical thoughts, able to abstract reason, no tangential thoughts, no hallucinations or delusions, affect- normal     Diagnostic Test Results:  Results for orders placed or performed in visit on 12/19/16   [facility] Bilateral w/Carla    Narrative    EXAMINATION: [facility] DIAGNOSTIC BILATERAL W/ CARLA, 12/19/2016 1:15 PM     COMPARISON: 11/25/2015, 12/12/2015, breast MRI 12/18/2015, wire  localization  1/19/2016    History: History of treated palpable right breast invasive lobular  carcinoma with lumpectomy, Chemotherapy,  Radiation therapy completed  in September 2016. Chemoprevention. Variant of uncertain significance  in BRCA2 gene.    BREAST DENSITY: Scattered fibroglandular densities    Findings: The right breast is smaller with diffuse skin thickening and  trabecular thickening related to post treatment changes. The surgical  scar in the superior breast is marked.    In the area of the lumpectomy site, there are groups of amorphous  calcifications. These are probably benign and may represent  posttreatment changes or developing vascular calcifications.    No significant interval change in the left breast.      Impression    IMPRESSION: BI-RADS CATEGORY: 3 - Probably Benign Finding-Short  Interval Follow-Up Suggested.    RECOMMENDED FOLLOW-UP: Short-term follow-up of right breast  calcifications in 6 months..    This finding and recommendation was discussed with the patient at time  of her evaluation.    The patient was given the results of the examination.    AN MD CARTER        ASSESSMENT/PLAN:                                                        BMI:   Estimated body mass index is 29.88 kg/(m^2) as calculated from the following:    Height as of this encounter: 5' (1.524 m).    Weight as of this encounter: 153 lb (69.4 kg).   Weight management plan: Discussed healthy diet and exercise guidelines and patient will follow up in 3 months in clinic to re-evaluate.        ICD-10-CM    1. Rhomboid muscle pain M79.1 [facility] PT, HAND, AND CHIROPRACTIC REFERRAL     tiZANidine (ZANAFLEX) 4 MG tablet- three times a day as needed but focus on taking at night.    2. Fibromyalgia M79.7 Chronic symptoms    3. Malignant neoplasm of overlapping sites of right female breast (H) C50.811 Burning in left breast area seems more like a neuropathy. Evaluated by surgery and follow up mammogram ordered in 6 months.    4. Major  depressive disorder, recurrent episode, mild (H) F33.0 Stable   5. Non morbid drug-induced obesity E66.1 Weight loss counseling done    6. BRCA gene positive Z15.01 At risk for recurrence. oophorectomy done.     Z15.02    7. Generalized anxiety disorder F41.1 Generally concerns about health and recurrent pains.        CONSULTATION/REFERRAL to physical therapy and orthopedics if not improving  FUTURE LABS:       - Schedule fasting labs in 3 months  FUTURE APPOINTMENTS:       - Follow-up visit in 3 months or sooner if any questions or concerns.   Work on weight loss  Regular exercise      Concepcion Bocanegra MD  [facility]

## 2017-02-06 NOTE — PATIENT INSTRUCTIONS
How to contact your care team: [phone] Pharmacy [phone]   DRAGAN DOZIER MD KATYA GEORGIEV, PA-C CHRIS JONES, PA-C NAM HO, MD JONATHAN BATES, MD ARVIN VOCAL, MD    Clinic hours M-Th 7am-7pm Fri 7am-5pm.   Urgent care M-F 11am-9pm  Sat/Sun 9am-5pm.   Pharmacy   Mon-Th:  8:00am-8pm   Fri:  8:00am-6:00pm  Sat/Sun  8:00am-5:00 pm       Peripheral Neuropathy  Peripheral neuropathy is a condition that affects the nerves of the arms or legs. It causes a change in physical feeling. Sometimes it causes weakness in the muscles. You may feel tingling, numbness or shooting pains. Symptoms may be more common at night). Skin may be extra sensitive to light touch or temperature changes.  Neuropathy may be a complication of a chronic disease such as diabetes. A ruptured disk with pressure on the spinal nerve may also lead to the problem. Certain vitamin deficiencies may lead to it. It may also be caused by exposure to certain drugs or chemicals   Home care    Tell the healthcare provider about all medicines you take. This includes prescription and over-the-counter medicines, vitamins, and herbs. Ask if any of the medicines may be causing your problems. Do not make any changes to prescription medicines without talking to your healthcare provider first.     You may be prescribed medicines to help relieve the tingling feeling or for pain. Take all medicines as directed.    A numb hand or foot may be more prone to injury. To help protect it:    Always use oven mitts.    Test water with an unaffected hand or foot.     Use caution when trimming nails. File sharp areas.    Wear shoes that fit well to avoid pressure points, blisters, and ulcers.    Inspect your hands and feet carefully (including the soles of your feet and between your toes) at least once a week. If you see red areas, sores, or other problems, tell your healthcare provider.  Follow-up care  Follow up with your doctor or as advised  by our staff. You may need further testing or evaluation.  When to seek medical advice  Call your healthcare provider right away if any of the following occur:    Redness, swelling, cracking, or ulcer on any numb area, especially the feet    New symptoms of numbness or muscle weakness numbness    Loss of bowel or bladder control     Slurred speech, confusion, or trouble speaking, walking, or seeing    8600-7276 [employer]. [address] [place] [address], [place] [address]. All rights reserved. This information is not intended as a substitute for professional medical care. Always follow your healthcare professional's instructions.        Muscle Spasm  A muscle spasm is a sudden tightening of the muscle you can t control. This may be caused by strain, overworking the muscle, or injury. It can also be caused by dehydration, electrolyte imbalance, diabetes, alcohol use, and certain medicines. If it goes on long enough the muscle spasm causes pain. Common areas for muscle spasm are the legs, neck, and back.  Home care    Heat, massage, and stretching will help relax muscle spasm.    When the spasm is in your arm or leg, stretch the muscle passively. To do this, have someone bend or straighten the joint above or below the muscle until you feel the stretch on the sore muscle. You can stretch the muscle actively by moving the affected body part. This will stretch the muscle that is in spasm. For example, if the spasm is in your calf, bend the ankle so your toes point upward toward your knee. This will stretch your calf muscle.    You may use over-the-counter pain medicine to control pain, unless another medicine was prescribed. If you have chronic liver or kidney disease or ever had a stomach ulcer or GI bleeding, talk with your healthcare provider before using these medicines.  Follow-up care  Follow up with your healthcare provider, or as advised.    When to seek medical advice  Call your healthcare provider right  away if any of the following occur:    Fingers or toes become swollen, cold, blue, numb, or tingly    You develop weakness in the affected arm or leg    Pain increases and is not controlled by the above measures    1255-4474 [employer]. [address] [place] [address], [place] [address]. All rights reserved. This information is not intended as a substitute for professional medical care. Always follow your healthcare professional's instructions.

## 2017-02-06 NOTE — MR AVS SNAPSHOT
After Visit Summary   2/6/2017    Aleena Escamilla    MRN: [id number]           Patient Information     Date Of Birth          1975        Visit Information        Provider Department      2/6/2017 11:20 AM Concepcion Bocanegra MD [facility]        Today's Diagnoses     Rhomboid muscle pain    -  1     Fibromyalgia           Care Instructions    How to contact your care team: [phone] Pharmacy [phone]   DRAGAN DOZIER MD KATYA GEORGIEV, PA-C CHRIS JONES, PA-C NAM HO, MD JONATHAN BATES, MD ARVIN VOCAL, MD    Clinic hours M-Th 7am-7pm Fri 7am-5pm.   Urgent care M-F 11am-9pm  Sat/Sun 9am-5pm.   Pharmacy   Mon-Th:  8:00am-8pm   Fri:  8:00am-6:00pm  Sat/Sun  8:00am-5:00 pm       Peripheral Neuropathy  Peripheral neuropathy is a condition that affects the nerves of the arms or legs. It causes a change in physical feeling. Sometimes it causes weakness in the muscles. You may feel tingling, numbness or shooting pains. Symptoms may be more common at night). Skin may be extra sensitive to light touch or temperature changes.  Neuropathy may be a complication of a chronic disease such as diabetes. A ruptured disk with pressure on the spinal nerve may also lead to the problem. Certain vitamin deficiencies may lead to it. It may also be caused by exposure to certain drugs or chemicals   Home care    Tell the healthcare provider about all medicines you take. This includes prescription and over-the-counter medicines, vitamins, and herbs. Ask if any of the medicines may be causing your problems. Do not make any changes to prescription medicines without talking to your healthcare provider first.     You may be prescribed medicines to help relieve the tingling feeling or for pain. Take all medicines as directed.    A numb hand or foot may be more prone to injury. To help protect it:    Always use oven mitts.    Test water with an unaffected hand or  foot.     Use caution when trimming nails. File sharp areas.    Wear shoes that fit well to avoid pressure points, blisters, and ulcers.    Inspect your hands and feet carefully (including the soles of your feet and between your toes) at least once a week. If you see red areas, sores, or other problems, tell your healthcare provider.  Follow-up care  Follow up with your doctor or as advised by our staff. You may need further testing or evaluation.  When to seek medical advice  Call your healthcare provider right away if any of the following occur:    Redness, swelling, cracking, or ulcer on any numb area, especially the feet    New symptoms of numbness or muscle weakness numbness    Loss of bowel or bladder control     Slurred speech, confusion, or trouble speaking, walking, or seeing    2328-1331 [employer]. [address] [place] [address]. All rights reserved. This information is not intended as a substitute for professional medical care. Always follow your healthcare professional's instructions.        Muscle Spasm  A muscle spasm is a sudden tightening of the muscle you can t control. This may be caused by strain, overworking the muscle, or injury. It can also be caused by dehydration, electrolyte imbalance, diabetes, alcohol use, and certain medicines. If it goes on long enough the muscle spasm causes pain. Common areas for muscle spasm are the legs, neck, and back.  Home care    Heat, massage, and stretching will help relax muscle spasm.    When the spasm is in your arm or leg, stretch the muscle passively. To do this, have someone bend or straighten the joint above or below the muscle until you feel the stretch on the sore muscle. You can stretch the muscle actively by moving the affected body part. This will stretch the muscle that is in spasm. For example, if the spasm is in your calf, bend the ankle so your toes point upward toward your knee. This will stretch your calf  muscle.    You may use over-the-counter pain medicine to control pain, unless another medicine was prescribed. If you have chronic liver or kidney disease or ever had a stomach ulcer or GI bleeding, talk with your healthcare provider before using these medicines.  Follow-up care  Follow up with your healthcare provider, or as advised.    When to seek medical advice  Call your healthcare provider right away if any of the following occur:    Fingers or toes become swollen, cold, blue, numb, or tingly    You develop weakness in the affected arm or leg    Pain increases and is not controlled by the above measures    1166-8843 [employer]. [address] [place] [address]. All rights reserved. This information is not intended as a substitute for professional medical care. Always follow your healthcare professional's instructions.              Follow-ups after your visit        Additional Services     [facility] PT, HAND, AND CHIROPRACTIC REFERRAL       **This order will print in the [facility] Scheduling Office**    Physical Therapy, Hand Therapy and Chiropractic Care are available through:    *[facility]  *[facility]  *[facility]    Call one number to schedule at any of the above locations: [phone].    Your provider has referred you to: Physical Therapy at [facility] or [facility]    Indication/Reason for Referral: Shoulder Pain  Onset of Illness: 6 months intermittent but worse in past 2 months  Therapy Orders: Evaluate and Treat  Special Programs: None  Special Request: Nancy Staley      Additional Comments for the Therapist or Chiropractor: may have started after radiation treatment for left breast cancer.    Please be aware that coverage of these services is subject to the terms and limitations of your health insurance plan.  Call member services at your health plan with any benefit or coverage questions.      Please bring the following to your  appointment:    *Your personal calendar for scheduling future appointments  *Comfortable clothing                  Your next 10 appointments already scheduled     Mar 16, 2017 10:15 AM   LAB with LAB ONC [facility] ([facility])    [address] [place] [address] [phone]           Patient must bring picture ID.  Patient should be prepared to give a urine specimen  Please do not eat 10-12 hours before your appointment if you are coming in fasting for labs on lipids, cholesterol, or glucose (sugar).  Pregnant women should follow their Care Team instructions. Water with medications is okay. Do not drink coffee or other fluids.   If you have concerns about taking  your medications, please ask at office or if scheduling via [employer], send a message by clicking on Secure Messaging, Message Your Care Team.            Mar 16, 2017 11:00 AM   Return Visit with Bessy Tate MD   [facility]    [address] [place] [address] [phone]              Who to contact     If you have questions or need follow up information about today's clinic visit or your schedule please contact [facility] directly at [phone].  Normal or non-critical lab and imaging results will be communicated to you by MyChart, letter or phone within 4 business days after the clinic has received the results. If you do not hear from us within 7 days, please contact the clinic through [employer] or phone. If you have a critical or abnormal lab result, we will notify you by phone as soon as possible.  Submit refill requests through [employer] or call your pharmacy and they will forward the refill request to us. Please allow 3 business days for your refill to be completed.          Additional Information About Your Visit        [employer] Information     [employer] gives you secure access to your electronic health  record. If you see a primary care provider, you can also send messages to your care team and make appointments. If you have questions, please call your primary care clinic.  If you do not have a primary care provider, please call [phone] and they will assist you.        Care EveryWhere ID     This is your Care EveryWhere ID. This could be used by other organizations to access your [facility] medical records  [id number]        Your Vitals Were     Pulse Temperature Height BMI (Body Mass Index) Pulse Oximetry Last Period    70 98  F (36.7  C) (Oral) 5' (1.524 m) 29.88 kg/m2 96% 03/01/2016    Breastfeeding?                   No            Blood Pressure from Last 3 Encounters:   02/06/17 103/74   01/09/17 107/73   01/05/17 104/71    Weight from Last 3 Encounters:   02/06/17 153 lb (69.4 kg)   01/09/17 154 lb 6.4 oz (70.035 kg)   01/04/17 155 lb 6.4 oz (70.489 kg)              We Performed the Following     [facility] PT, HAND, AND CHIROPRACTIC REFERRAL          Today's Medication Changes          These changes are accurate as of: 2/6/17 12:15 PM.  If you have any questions, ask your nurse or doctor.               Start taking these medicines.        Dose/Directions    tiZANidine 4 MG tablet   Commonly known as:  ZANAFLEX   Used for:  Rhomboid muscle pain   Started by:  Concepcion Bocanegra MD        Dose:  4-8 mg   Take 1-2 tablets (4-8 mg) by mouth 3 times daily as needed for muscle spasms   Quantity:  30 tablet   Refills:  1            Where to get your medicines      These medications were sent to [facility] - [place] - [address] [facility] [address]  [address], [employer] [place] [address]    Hours:  24-hours Phone:  [phone]    - tiZANidine 4 MG tablet             Primary Care Provider Office Phone # Fax #    Concepcion Bocanegra -[phone]       [place] [address]  [facility] [address]        Thank you!     Thank  you for choosing [facility]  for your care. Our goal is always to provide you with excellent care. Hearing back from our patients is one way we can continue to improve our services. Please take a few minutes to complete the written survey that you may receive in the mail after your visit with us. Thank you!             Your Updated Medication List - Protect others around you: Learn how to safely use, store and throw away your medicines at www.disposemymeds.org.          This list is accurate as of: 2/6/17 12:15 PM.  Always use your most recent med list.                   Brand Name Dispense Instructions for use    acetaminophen 325 MG tablet    TYLENOL    100 tablet    Take 2 tablets (650 mg) by mouth every 4 hours as needed for other (mild pain)       letrozole 2.5 MG tablet    FEMARA    90 tablet    Take 1 tablet (2.5 mg) by mouth daily       tiZANidine 4 MG tablet    ZANAFLEX    30 tablet    Take 1-2 tablets (4-8 mg) by mouth 3 times daily as needed for muscle spasms       venlafaxine 75 MG 24 hr capsule    EFFEXOR-XR    30 capsule    Take 1 capsule (75 mg) by mouth daily

## 2017-02-07 ASSESSMENT — PATIENT HEALTH QUESTIONNAIRE - PHQ9: SUM OF ALL RESPONSES TO PHQ QUESTIONS 1-9: 2

## 2017-02-07 ASSESSMENT — ANXIETY QUESTIONNAIRES: GAD7 TOTAL SCORE: 5

## 2017-02-13 ENCOUNTER — OFFICE VISIT (OUTPATIENT)
Dept: FAMILY MEDICINE | Facility: CLINIC | Age: 42
End: 2017-02-13
Payer: COMMERCIAL

## 2017-02-13 VITALS
SYSTOLIC BLOOD PRESSURE: 102 MMHG | HEART RATE: 79 BPM | TEMPERATURE: 98.5 F | WEIGHT: 153 LBS | HEIGHT: 60 IN | BODY MASS INDEX: 30.04 KG/M2 | DIASTOLIC BLOOD PRESSURE: 68 MMHG | OXYGEN SATURATION: 96 %

## 2017-02-13 DIAGNOSIS — F41.1 GENERALIZED ANXIETY DISORDER: ICD-10-CM

## 2017-02-13 DIAGNOSIS — Z15.09 BRCA GENE POSITIVE: ICD-10-CM

## 2017-02-13 DIAGNOSIS — Z15.01 BRCA GENE POSITIVE: ICD-10-CM

## 2017-02-13 DIAGNOSIS — C50.811 MALIGNANT NEOPLASM OF OVERLAPPING SITES OF RIGHT FEMALE BREAST (H): ICD-10-CM

## 2017-02-13 DIAGNOSIS — N61.1 ABSCESS OF BREAST: Primary | ICD-10-CM

## 2017-02-13 PROCEDURE — 99213 OFFICE O/P EST LOW 20 MIN: CPT | Performed by: FAMILY MEDICINE

## 2017-02-13 RX ORDER — CEPHALEXIN 500 MG/1
500 CAPSULE ORAL 3 TIMES DAILY
Qty: 30 CAPSULE | Refills: 0 | Status: SHIPPED | OUTPATIENT
Start: 2017-02-13 | End: 2017-04-11

## 2017-02-13 ASSESSMENT — PAIN SCALES - GENERAL: PAINLEVEL: MODERATE PAIN (4)

## 2017-02-13 NOTE — MR AVS SNAPSHOT
After Visit Summary   2/13/2017    Aleena Escamilla    MRN: [id number]           Patient Information     Date Of Birth          1975        Visit Information        Provider Department      2/13/2017 11:20 AM Concepcion Bocanegra MD [facility]        Today's Diagnoses     Abscess of breast    -  1      Care Instructions    How to contact your care team: [phone] Pharmacy [phone]   DRAGAN DOZIER MD KATYA GEORGIEV, PA-C CHRIS JONES, PA-C NAM HO, MD JONATHAN BATES, MD ARVIN VOCAL, MD    Clinic hours M-Th 7am-7pm Fri 7am-5pm.   Urgent care M-F 11am-9pm  Sat/Sun 9am-5pm.   Pharmacy   Mon-Th:  8:00am-8pm   Fri:  8:00am-6:00pm  Sat/Sun  8:00am-5:00 pm       * ABSCESS [Antibiotic treatment only]  An abscess (sometimes called a  boil ) occurs when bacteria get trapped under the skin and begin to grow. Pus forms inside the abscess as the body responds to the bacteria. An abscess can occur with an insect bite, ingrown hair, blocked oil gland, pimple, cyst, or puncture wound.  In the early stages, redness and tenderness are the only symptoms. Sometimes, this stage can be treated with antibiotics alone. If the abscess does not respond to antibiotic treatment, it will need to be drained with a small cut, under local anesthesia.  HOME CARE:    Soak the wound in hot water or apply hot packs (small towel soaked in hot water) to the area for 20 minutes at a time. Do this three to four times a day.    Apply antibiotic cream or ointment such as Bacitracin or Polysporin onto the skin 3-4 times a day, unless something else was prescribed.  Neosporin Plus  includes an antibiotic plus a local pain reliever.    Take all of the antibiotics until they are gone.    You may use acetaminophen (Tylenol) or ibuprofen (Motrin, Advil) to control pain, unless another pain medicine was prescribed. [ NOTE : If you have chronic liver or kidney disease or ever had a stomach  ulcer or GI bleeding, talk with your doctor before using these any of these.]  FOLLOW UP as advised by our staff. Look at your wound each day for the signs of worsening infection listed below.  GET PROMPT MEDICAL ATTENTION if any of the following occur:    An increase in redness or swelling    Red streaks in the skin leading away from the abscess    An increase in local pain or swelling    Fever of 100.4 F (38 C) or higher, or as directed by your healthcare provider    Pus or fluid coming from the abscess    2306-2401 [facility], [address] [place] [address]. All rights reserved. This information is not intended as a substitute for professional medical care. Always follow your healthcare professional's instructions.    Discharge Instructions for Cellulitis  You have been diagnosed with cellulitis. This is an infection in the deepest layer of the skin. In some cases, the infection also affects the muscle. Cellulitis is caused by bacteria. The bacteria can enter the body through broken skin. This can happen with a cut, scratch, animal bite, or an insect bite that has been scratched. You may have been treated in the hospital with antibiotics and fluids. You will likely be given a prescription for antibiotics to take at home. This sheet will help you take care of yourself at home.  Home Care  When you are home:    Take the prescribed antibiotic medication you are given as directed until it is gone. Take it even if you feel better. It treats the infection and stops it from returning. Not taking all of the medication can make future infections hard to treat.    Keep the infected area clean.    When possible, raise the infected area above the level of your heart. This helps keep swelling down.    Talk to your doctor if you are in pain. Ask what kind of over-the-counter medication you can take for pain.    Apply clean bandages as advised.    Take your temperature once a day for a week.    Wash your hands  often to prevent spreading the infection.  In the future, wash your hands before and after you touch cuts, scratches, or bandages. This will help prevent infection.   When to Call Your Doctor  Call your doctor immediately if you have any of the following:    Vomiting    Fever of100.4 F (38 C) or higher, or as directed by your health care provider    Shaking chills    Redness that gets worse in or around the infected area    Swelling of the infected area    Pain that gets worse in or around the infected area    Difficulty or pain when moving the joints above or below the infected area    Discharge or pus draining from the area     1104-6169 [employer]. [address] [place] [address]. All rights reserved. This information is not intended as a substitute for professional medical care. Always follow your healthcare professional's instructions.              Follow-ups after your visit        Your next 10 appointments already scheduled     Mar 16, 2017 10:15 AM CDT   LAB with LAB ONC [facility] ([facility])    [address] [place] [address] [phone]           Patient must bring picture ID.  Patient should be prepared to give a urine specimen  Please do not eat 10-12 hours before your appointment if you are coming in fasting for labs on lipids, cholesterol, or glucose (sugar).  Pregnant women should follow their Care Team instructions. Water with medications is okay. Do not drink coffee or other fluids.   If you have concerns about taking  your medications, please ask at office or if scheduling via [employer], send a message by clicking on Secure Messaging, Message Your Care Team.            Mar 16, 2017 11:00 AM CDT   Return Visit with Bessy Tate MD   [facility])    [address] [place] [address] [phone]              Who to contact     If you have questions  or need follow up information about today's clinic visit or your schedule please contact [facility] directly at [phone].  Normal or non-critical lab and imaging results will be communicated to you by [employer], letter or phone within 4 business days after the clinic has received the results. If you do not hear from us within 7 days, please contact the clinic through [employer] or phone. If you have a critical or abnormal lab result, we will notify you by phone as soon as possible.  Submit refill requests through [employer] or call your pharmacy and they will forward the refill request to us. Please allow 3 business days for your refill to be completed.          Additional Information About Your Visit        [employer] Information     [employer] gives you secure access to your electronic health record. If you see a primary care provider, you can also send messages to your care team and make appointments. If you have questions, please call your primary care clinic.  If you do not have a primary care provider, please call [phone] and they will assist you.        Care EveryWhere ID     This is your Care EveryWhere ID. This could be used by other organizations to access your [facility] medical records  [id number]        Your Vitals Were     Pulse Temperature Height Last Period Pulse Oximetry Breastfeeding?    79 98.5  F (36.9  C) (Oral) 5' (1.524 m) 03/01/2016 96% No    BMI (Body Mass Index)                   29.88 kg/m2            Blood Pressure from Last 3 Encounters:   02/13/17 102/68   02/06/17 103/74   01/09/17 107/73    Weight from Last 3 Encounters:   02/13/17 153 lb (69.4 kg)   02/06/17 153 lb (69.4 kg)   01/09/17 154 lb 6.4 oz (70 kg)              Today, you had the following     No orders found for display         Today's Medication Changes          These changes are accurate as of: 2/13/17 11:49 AM.  If you have any questions, ask your nurse or doctor.               Start taking these  medicines.        Dose/Directions    cephALEXin 500 MG capsule   Commonly known as:  KEFLEX   Used for:  Abscess of breast   Started by:  Concepcion Bocanegra MD        Dose:  500 mg   Take 1 capsule (500 mg) by mouth 3 times daily   Quantity:  30 capsule   Refills:  0            Where to get your medicines      These medications were sent to [facility] - [place], [place] - [address]  AT [facility] [address]  [address] [facility], [place] [address]    Hours:  24-hours Phone:  [phone]     cephALEXin 500 MG capsule                Primary Care Provider Office Phone # Fax #    Concepcion Bocanegra -[phone]       [facility] [address] [facility] [address]        Thank you!     Thank you for choosing [facility]  for your care. Our goal is always to provide you with excellent care. Hearing back from our patients is one way we can continue to improve our services. Please take a few minutes to complete the written survey that you may receive in the mail after your visit with us. Thank you!             Your Updated Medication List - Protect others around you: Learn how to safely use, store and throw away your medicines at www.disposemymeds.org.          This list is accurate as of: 2/13/17 11:49 AM.  Always use your most recent med list.                   Brand Name Dispense Instructions for use    acetaminophen 325 MG tablet    TYLENOL    100 tablet    Take 2 tablets (650 mg) by mouth every 4 hours as needed for other (mild pain)       cephALEXin 500 MG capsule    KEFLEX    30 capsule    Take 1 capsule (500 mg) by mouth 3 times daily       letrozole 2.5 MG tablet    FEMARA    90 tablet    Take 1 tablet (2.5 mg) by mouth daily       tiZANidine 4 MG tablet    ZANAFLEX    30 tablet    Take 1-2 tablets (4-8 mg) by mouth 3 times daily as needed for muscle spasms       venlafaxine 75 MG 24 hr capsule    EFFEXOR-XR    30  capsule    Take 1 capsule (75 mg) by mouth daily

## 2017-02-13 NOTE — NURSING NOTE
Chief Complaint   Patient presents with     Mass     New left breat lump       Initial /68 (BP Location: Left arm, Patient Position: Chair, Cuff Size: Adult Regular)  Pulse 79  Temp 98.5  F (36.9  C) (Oral)  Ht 5' (1.524 m)  Wt 153 lb (69.4 kg)  LMP 03/01/2016  SpO2 96%  Breastfeeding? No  BMI 29.88 kg/m2 Estimated body mass index is 29.88 kg/(m^2) as calculated from the following:    Height as of this encounter: 5' (1.524 m).    Weight as of this encounter: 153 lb (69.4 kg).  Medication Reconciliation: complete     Fernando Almaraz CMA

## 2017-02-13 NOTE — PATIENT INSTRUCTIONS
How to contact your care team: [phone] Pharmacy [phone]   DRAGAN DOZIER MD KATYA GEORGIEV, PA-C CHRIS JONES, PA-C NAM HO, MD JONATHAN BATES, MD ARVIN VOCAL, MD    Clinic hours M-Th 7am-7pm Fri 7am-5pm.   Urgent care M-F 11am-9pm  Sat/Sun 9am-5pm.   Pharmacy   Mon-Th:  8:00am-8pm   Fri:  8:00am-6:00pm  Sat/Sun  8:00am-5:00 pm       * ABSCESS [Antibiotic treatment only]  An abscess (sometimes called a  boil ) occurs when bacteria get trapped under the skin and begin to grow. Pus forms inside the abscess as the body responds to the bacteria. An abscess can occur with an insect bite, ingrown hair, blocked oil gland, pimple, cyst, or puncture wound.  In the early stages, redness and tenderness are the only symptoms. Sometimes, this stage can be treated with antibiotics alone. If the abscess does not respond to antibiotic treatment, it will need to be drained with a small cut, under local anesthesia.  HOME CARE:    Soak the wound in hot water or apply hot packs (small towel soaked in hot water) to the area for 20 minutes at a time. Do this three to four times a day.    Apply antibiotic cream or ointment such as Bacitracin or Polysporin onto the skin 3-4 times a day, unless something else was prescribed.  Neosporin Plus  includes an antibiotic plus a local pain reliever.    Take all of the antibiotics until they are gone.    You may use acetaminophen (Tylenol) or ibuprofen (Motrin, Advil) to control pain, unless another pain medicine was prescribed. [ NOTE : If you have chronic liver or kidney disease or ever had a stomach ulcer or GI bleeding, talk with your doctor before using these any of these.]  FOLLOW UP as advised by our staff. Look at your wound each day for the signs of worsening infection listed below.  GET PROMPT MEDICAL ATTENTION if any of the following occur:    An increase in redness or swelling    Red streaks in the skin leading away from the abscess    An increase  in local pain or swelling    Fever of 100.4 F (38 C) or higher, or as directed by your healthcare provider    Pus or fluid coming from the abscess    3935-0558 [facility], [address] [place] [address], [place] [address]. All rights reserved. This information is not intended as a substitute for professional medical care. Always follow your healthcare professional's instructions.    Discharge Instructions for Cellulitis  You have been diagnosed with cellulitis. This is an infection in the deepest layer of the skin. In some cases, the infection also affects the muscle. Cellulitis is caused by bacteria. The bacteria can enter the body through broken skin. This can happen with a cut, scratch, animal bite, or an insect bite that has been scratched. You may have been treated in the hospital with antibiotics and fluids. You will likely be given a prescription for antibiotics to take at home. This sheet will help you take care of yourself at home.  Home Care  When you are home:    Take the prescribed antibiotic medication you are given as directed until it is gone. Take it even if you feel better. It treats the infection and stops it from returning. Not taking all of the medication can make future infections hard to treat.    Keep the infected area clean.    When possible, raise the infected area above the level of your heart. This helps keep swelling down.    Talk to your doctor if you are in pain. Ask what kind of over-the-counter medication you can take for pain.    Apply clean bandages as advised.    Take your temperature once a day for a week.    Wash your hands often to prevent spreading the infection.  In the future, wash your hands before and after you touch cuts, scratches, or bandages. This will help prevent infection.   When to Call Your Doctor  Call your doctor immediately if you have any of the following:    Vomiting    Fever of100.4 F (38 C) or higher, or as directed by your health care provider    Shaking  chills    Redness that gets worse in or around the infected area    Swelling of the infected area    Pain that gets worse in or around the infected area    Difficulty or pain when moving the joints above or below the infected area    Discharge or pus draining from the area     4088-3388 [employer]. [address] [place] [address]. All rights reserved. This information is not intended as a substitute for professional medical care. Always follow your healthcare professional's instructions.

## 2017-02-13 NOTE — PROGRESS NOTES
SUBJECTIVE:                                                    Aleena Escamilla is a 41 year old female who presents to clinic today for the following health issues:    Concern - Left breast lump     Onset: Noticed 2/9/17    Description:   New left breast lump by nipple, noticed after last seen in the office. Patient states it looks like a pimple but wants it checked to make sure is not a concern.    Intensity: mild    Progression of Symptoms:  Improving pain    Accompanying Signs & Symptoms:  Lump red, sometimes hot to the touch, tenderness       Previous history of similar problem:   Yes    Precipitating factors:   Worsened by: Pressure    Alleviating factors:  Improved by: None       Therapies Tried and outcome: Tylenol and warm towel       Anxiety Follow-Up    Status since last visit: No change    Other associated symptoms:None    Complicating factors:   Significant life event: Yes-  Breast cancer diagnosis   Current substance abuse: None  Depression symptoms: No  VENKAT-7 SCORE 11/3/2016 12/20/2016 2/6/2017   Total Score - - -   Total Score 4 5 5        GAD7     Breast cancer with positive BRCA gene and ovarian cancer with oophorectomy. Recent mammogram normal but still has breast symptoms. Follow up with Dr. Tate next month.     Problem list and histories reviewed & adjusted, as indicated.  Additional history: as documented    Patient Active Problem List   Diagnosis     CARDIOVASCULAR SCREENING; LDL GOAL LESS THAN 160     Chronic pelvic pain in female     Chronic salpingitis and oophoritis     Major depressive disorder, recurrent episode, mild (H)     Generalized anxiety disorder     Arthritis, multiple joint involvement     Primary osteoarthritis of both hands     Fibromyalgia     Malignant neoplasm of overlapping sites of right female breast (H)     Chemotherapy-induced neutropenia (H)     BRCA gene positive     Carpal tunnel syndrome     Non morbid drug-induced obesity     Past Surgical History   Procedure  Laterality Date     Pelvis laparoscopy,dx  12/28/1998     Gyn surgery  2-     bilateral salpingectomy, left oophrectomy     Biopsy breast needle localization Right 1/19/2016     Procedure: BIOPSY BREAST NEEDLE LOCALIZATION;  Surgeon: Adelina Demarco MD;  Location: [facility] OR     Laparoscopic salpingo-oophorectomy Right 10/26/2016     Procedure: LAPAROSCOPIC SALPINGO-OOPHORECTOMY;  Surgeon: Bouchra Mayo MD;  Location: [facility] OR     Release carpal tunnel Left 12/23/2016     Procedure: RELEASE CARPAL TUNNEL;  Surgeon: Sam Florence MD;  Location: [facility] OR     Lumpectomy breast with sentinel node, combined Right 1/19/2016     Procedure: COMBINED LUMPECTOMY BREAST WITH SENTINEL NODE;  Surgeon: Adelina Demarco MD;  Location: [facility] OR       Social History   Substance Use Topics     Smoking status: Never Smoker     Smokeless tobacco: Never Used     Alcohol use Yes      Comment: 1 drink per week.     Family History   Problem Relation Age of Onset     DIABETES Mother      Hypertension Mother      CANCER Maternal Grandmother      cervical CA     Cardiovascular Father      MI     CANCER Maternal Aunt      cervical cancer         Current Outpatient Prescriptions   Medication Sig Dispense Refill     cephALEXin (KEFLEX) 500 MG capsule Take 1 capsule (500 mg) by mouth 3 times daily 30 capsule 0     tiZANidine (ZANAFLEX) 4 MG tablet Take 1-2 tablets (4-8 mg) by mouth 3 times daily as needed for muscle spasms 30 tablet 1     letrozole (FEMARA) 2.5 MG tablet Take 1 tablet (2.5 mg) by mouth daily 90 tablet 3     venlafaxine (EFFEXOR-XR) 75 MG 24 hr capsule Take 1 capsule (75 mg) by mouth daily 30 capsule 2     acetaminophen (TYLENOL) 325 MG tablet Take 2 tablets (650 mg) by mouth every 4 hours as needed for other (mild pain) 100 tablet 0     Allergies   Allergen Reactions     Latex Rash     Recent Labs   Lab Test  12/19/16   1021  12/13/16   0914  11/08/16   1552  10/26/16   0753  09/27/16   1212  07/21/16   1049    11/07/11   1226  10/17/11   0809  01/07/11   0834   05/19/09   1202   A1C  5.7   --    --    --    --    --    --    --    --    --    --    --    LDL  82   --    --    --    --    --    --    --   86  116   < >   --    HDL  30*   --    --    --    --    --    --    --   33*  36*   < >   --    TRIG  301*   --    --    --    --    --    --    --   212*  127   < >   --    ALT   --   49  71*   --   70*  115*   < >   --    --    --    --    --    CR   --    --    --    --   0.55  0.54   < >   --    --    --    --    --    GFRESTIMATED   --    --    --    --   >90  Non  GFR Calc    >90  Non  GFR Calc     < >   --    --    --    --    --    GFRESTBLACK   --    --    --    --   >90   GFR Calc    >90   GFR Calc     < >   --    --    --    --    --    POTASSIUM   --    --    --   3.4  3.7  3.8   < >   --    --    --    --    --    TSH   --    --    --    --    --    --    --   0.83   --    --    --   0.90    < > = values in this interval not displayed.      BP Readings from Last 3 Encounters:   02/13/17 102/68   02/06/17 103/74   01/09/17 107/73    Wt Readings from Last 3 Encounters:   02/13/17 153 lb (69.4 kg)   02/06/17 153 lb (69.4 kg)   01/09/17 154 lb 6.4 oz (70 kg)                  Labs reviewed in EPIC  Problem list, Medication list, Allergies, and Medical/Social/Surgical histories reviewed in Re[facility] and updated as appropriate.    ROS:  Constitutional, HEENT, cardiovascular, pulmonary, gi and gu systems are negative, except as otherwise noted.    OBJECTIVE:                                                    /68 (BP Location: Left arm, Patient Position: Chair, Cuff Size: Adult Regular)  Pulse 79  Temp 98.5  F (36.9  C) (Oral)  Ht 5' (1.524 m)  Wt 153 lb (69.4 kg)  LMP 03/01/2016  SpO2 96%  Breastfeeding? No  BMI 29.88 kg/m2  Body mass index is 29.88 kg/(m^2).  GENERAL: healthy, alert and no distress  EYES: Eyes grossly normal to inspection,  conjunctivae and sclerae normal  MS: no gross musculoskeletal defects noted, no edema  SKIN: no suspicious lesions or rashes  NEURO: Normal strength and tone, gait and speech normal  PSYCH: mentation appears normal, affect normal/bright   BREAST: Normal appearance on left except a 2 cm area of erythema with a central pustule consistent with abscess and cellulitis. Area is firm under this area and needs to be rechecked after infection has cleared.     Diagnostic Test Results:  none      ASSESSMENT/PLAN:                                                              ICD-10-CM    1. Abscess of breast N61.1 cephALEXin (KEFLEX) 500 MG capsule   2. Malignant neoplasm of overlapping sites of right female breast (H) C50.811    3. BRCA gene positive Z15.01     Z15.02    4. Generalized anxiety disorder F41.1        CONSULTATION/REFERRAL to oncology as scheduled.   FUTURE APPOINTMENTS:       - Follow-up visit in 2-3 weeks or sooner if any questions or concerns. Recheck breast exam. Referral to surgery for biopsy if any palpable lesion persists.   Work on weight loss  Regular exercise  See Patient Instructions    Concepcion Bocanegra MD  [facility]

## 2017-03-03 ENCOUNTER — TELEPHONE (OUTPATIENT)
Dept: ONCOLOGY | Facility: CLINIC | Age: 42
End: 2017-03-03

## 2017-03-03 NOTE — TELEPHONE ENCOUNTER
Patient called into clinic this afternoon, requesting appointment in clinic next week for evaluation of her breast.  Patient states that she has recently been seeing her PCP regarding some breast changes (possible abscess, per patient), and was given a prescription for some antibiotics.  Patient states that she is taking Tylenol for the discomfort, and is still having quite a bit of discomfort.  She states that she is very concerned about her breast changes and symptoms, and would like to have someone with her Oncology team evaluate her.  Patient scheduled with provider Marisol Solitario CNP, on Monday 3/6/17 for evaluation.    William Fang, RN, BSN, OCN  Care Coordinator  [facility]  [phone]

## 2017-03-06 ENCOUNTER — ONCOLOGY VISIT (OUTPATIENT)
Dept: ONCOLOGY | Facility: CLINIC | Age: 42
End: 2017-03-06
Payer: COMMERCIAL

## 2017-03-06 ENCOUNTER — THERAPY VISIT (OUTPATIENT)
Dept: PHYSICAL THERAPY | Facility: CLINIC | Age: 42
End: 2017-03-06
Payer: COMMERCIAL

## 2017-03-06 VITALS
HEART RATE: 72 BPM | TEMPERATURE: 98.1 F | SYSTOLIC BLOOD PRESSURE: 111 MMHG | RESPIRATION RATE: 15 BRPM | WEIGHT: 154 LBS | HEIGHT: 60 IN | BODY MASS INDEX: 30.23 KG/M2 | OXYGEN SATURATION: 99 % | DIASTOLIC BLOOD PRESSURE: 75 MMHG

## 2017-03-06 DIAGNOSIS — M25.511 CHRONIC RIGHT SHOULDER PAIN: Primary | ICD-10-CM

## 2017-03-06 DIAGNOSIS — N61.0 BREAST INFECTION IN FEMALE: ICD-10-CM

## 2017-03-06 DIAGNOSIS — C50.811 MALIGNANT NEOPLASM OF OVERLAPPING SITES OF RIGHT FEMALE BREAST (H): ICD-10-CM

## 2017-03-06 DIAGNOSIS — R92.8 ABNORMAL MAMMOGRAM: Primary | ICD-10-CM

## 2017-03-06 DIAGNOSIS — G89.29 CHRONIC RIGHT SHOULDER PAIN: Primary | ICD-10-CM

## 2017-03-06 PROCEDURE — 97162 PT EVAL MOD COMPLEX 30 MIN: CPT | Mod: GP | Performed by: PHYSICAL THERAPIST

## 2017-03-06 PROCEDURE — 99213 OFFICE O/P EST LOW 20 MIN: CPT | Performed by: NURSE PRACTITIONER

## 2017-03-06 PROCEDURE — 97110 THERAPEUTIC EXERCISES: CPT | Mod: GP | Performed by: PHYSICAL THERAPIST

## 2017-03-06 ASSESSMENT — PAIN SCALES - GENERAL: PAINLEVEL: NO PAIN (0)

## 2017-03-06 NOTE — LETTER
[address] [address]  [phone]      3/6/2017    Re: Aleena Escamilla      TO WHOM IT MAY CONCERN:    Aleena Escamilla  was seen on 3/6/2017.  Please excuse her today due to follow up to her health needs.    Cordially    Marisol Solitario, KYA, APRN CNP

## 2017-03-06 NOTE — MR AVS SNAPSHOT
After Visit Summary   3/6/2017    Aleena Escamilla    MRN: [id number]           Patient Information     Date Of Birth          1975        Visit Information        Provider Department      3/6/2017 11:00 AM Michelle Peace PT [facility]        Today's Diagnoses     Chronic right shoulder pain    -  1       Follow-ups after your visit        Your next 10 appointments already scheduled     Mar 06, 2017  1:15 PM CST   Return Visit with PIA Hankins CNP   [facility])    [address] [place] [id number] [phone]            Mar 13, 2017 11:00 AM CDT   JOE Extremity with Michelle Peace PT   [facility] ([facility]  )    [address] [facility] [id number] [phone]            Mar 16, 2017 10:15 AM CDT   LAB with LAB ONC [facility] ([facility])    [address] [place] [id number] [phone]           Patient must bring picture ID.  Patient should be prepared to give a urine specimen  Please do not eat 10-12 hours before your appointment if you are coming in fasting for labs on lipids, cholesterol, or glucose (sugar).  Pregnant women should follow their Care Team instructions. Water with medications is okay. Do not drink coffee or other fluids.   If you have concerns about taking  your medications, please ask at office or if scheduling via [facility], send a message by clicking on Secure Messaging, Message Your Care Team.            Mar 16, 2017 11:00 AM CDT   Return Visit with Bessy Tate MD   [facility])    [address] [place] [id number] [phone]            Mar 20, 2017 11:00 AM CDT   JOE Extremity with Michelle Peace PT   [facility] ([facility] Mikayla Ramirez  )     [address]  [place] [address]   [phone]            Mar 28, 2017 10:10 AM CDT   JOE Extremity with Michelle Peace, PT   [facility] ([facility] [place]  )    [address]  [place] [id number] [phone]              Who to contact     If you have questions or need follow up information about today's clinic visit or your schedule please contact [facility] directly at [phone].  Normal or non-critical lab and imaging results will be communicated to you by [employer], letter or phone within 4 business days after the clinic has received the results. If you do not hear from us within 7 days, please contact the clinic through [employer] or phone. If you have a critical or abnormal lab result, we will notify you by phone as soon as possible.  Submit refill requests through eblizz or call your pharmacy and they will forward the refill request to us. Please allow 3 business days for your refill to be completed.          Additional Information About Your Visit        MyChart Information     [employer]izz gives you secure access to your electronic health record. If you see a primary care provider, you can also send messages to your care team and make appointments. If you have questions, please call your primary care clinic.  If you do not have a primary care provider, please call [phone] and they will assist you.        Care EveryWhere ID     This is your Care EveryWhere ID. This could be used by other organizations to access your [place] medical records  [id number]        Your Vitals Were     Last Period                   03/01/2016            Blood Pressure from Last 3 Encounters:   02/13/17 102/68   02/06/17 103/74   01/09/17 107/73    Weight from Last 3 Encounters:   02/13/17 69.4 kg (153 lb)   02/06/17 69.4 kg (153 lb)   01/09/17 70 kg (154 lb 6.4 oz)              We Performed the Following     HC PT EVAL, MODERATE  COMPLEXITY     JOE INITIAL EVAL REPORT     THERAPEUTIC EXERCISES        Primary Care Provider Office Phone # Fax #    Concepcion Tana Bocanegra [phone]       [facility] [address]  [facility] [address]        Thank you!     Thank you for choosing [facility]  for your care. Our goal is always to provide you with excellent care. Hearing back from our patients is one way we can continue to improve our services. Please take a few minutes to complete the written survey that you may receive in the mail after your visit with us. Thank you!             Your Updated Medication List - Protect others around you: Learn how to safely use, store and throw away your medicines at www.disposemymeds.org.          This list is accurate as of: 3/6/17 11:56 AM.  Always use your most recent med list.                   Brand Name Dispense Instructions for use    acetaminophen 325 MG tablet    TYLENOL    100 tablet    Take 2 tablets (650 mg) by mouth every 4 hours as needed for other (mild pain)       cephALEXin 500 MG capsule    KEFLEX    30 capsule    Take 1 capsule (500 mg) by mouth 3 times daily       letrozole 2.5 MG tablet    FEMARA    90 tablet    Take 1 tablet (2.5 mg) by mouth daily       tiZANidine 4 MG tablet    ZANAFLEX    30 tablet    Take 1-2 tablets (4-8 mg) by mouth 3 times daily as needed for muscle spasms       venlafaxine 75 MG 24 hr capsule    EFFEXOR-XR    30 capsule    Take 1 capsule (75 mg) by mouth daily

## 2017-03-06 NOTE — NURSING NOTE
Aleena Escamilla is a 41 year old female who presents for:  Chief Complaint   Patient presents with     Oncology Clinic Visit     breast symptoms        Initial Vitals:  /75  Pulse 72  Temp 98.1  F (36.7  C)  Resp 15  Ht 1.524 m (5')  Wt 69.9 kg (154 lb)  LMP 03/01/2016  SpO2 99%  BMI 30.08 kg/m2 Estimated body mass index is 30.08 kg/(m^2) as calculated from the following:    Height as of this encounter: 1.524 m (5').    Weight as of this encounter: 69.9 kg (154 lb).. Body surface area is 1.72 meters squared. BP completed using cuff size: regular  No Pain (0) Patient's last menstrual period was 03/01/2016. Allergies and medications reviewed.     Medications: Medication refills not needed today.  Pharmacy name entered into EPIC:    [facility] - [id number] [facility] - [place], [place] - [address] [facility] [place] - [place], [place] - [address]    Comments:     5 minutes for nursing intake (face to face time)   Sandra Tello LPN

## 2017-03-06 NOTE — PROGRESS NOTES
Oncology Follow Up Visit: March 6, 2017     Oncologist: Dr. Bessy Tate  PCP: Dr Concepcion Bocanegra    Diagnosis: Stage II Right Breast Cancer here today with c/o breast pain after treatment for breast infection to left breast  Aleena Escamilla is a 40 yo female that felt she has mass to right beast in 2014 but was told she was not eligible for mammogram but in 11/2015 felt breast was swollen and received imaging that did show a 1.2 x 1.3 x 0.7 cm mass at 12 oclock to breast and later found a secondary mass behind first mass. Biopsy proved invasive lobular carcinoma at 12 oclock and 9 oclock positions. Final pathology from partial mastectomy showed multifocal invasive lobular carcinoma, grade 2 with foci, the first one 36 x 16 x 8 mm and the second one 21 x 14 x 11 mm. LCIS classical type present, lymphovascular invasion not identified. Margins negative, ER/PA positive by IHC and HER-2 not amplified performed on prior core biopsy.[id number]  Oncotype Dx score =25.  Genetics - told pt she has variant of of uncertain significance in the BRCA 2 gene.  Treatment-   1/19/2016 Partial Right  Mastectomy  2/24/2016 - 6/2016 Weekly Taxol x 12 weeks followed by Dose Dense AC x4 cycles  8-9/2016 completed right breast XRT  10/2016 began Femara  10/26/2016 right laparoscopic salpingo-oophorectomy    Interval History: Ms Escamilla comes to clinic for c/o left breast pain. Pt shares she had been found to have left breast infection with surgical follow up 2 weeks previous. She was given Keflex x 10 days which she did complete. She denies fever and feel the redness to the 7 oclock level of the left breast just outside the areola. She is stating the area is still noted with pain and has some tingling. Denies any trauma to the breast. No discharge or size changes. See also previous 12/2016 abnormal mammogram report to right breast. He confirms she continues to take her AI.   Rest of complete and comprehensive ROS is reviewed and is  negative.   Past Medical History   Diagnosis Date     Chronic pelvic pain in female      Malignant neoplasm of right breast, stage 2 (H) 1/2016     1/4 lymph nodes positive, Oncotype DX = 25.  Chemotherapy and radiation. Letrozole.     Migraine headaches      Current Outpatient Prescriptions   Medication     letrozole (FEMARA) 2.5 MG tablet     venlafaxine (EFFEXOR-XR) 75 MG 24 hr capsule     acetaminophen (TYLENOL) 325 MG tablet     cephALEXin (KEFLEX) 500 MG capsule     tiZANidine (ZANAFLEX) 4 MG tablet     No current facility-administered medications for this visit.      Allergies   Allergen Reactions     Latex Rash     Physical Exam:  /75  Pulse 72  Temp 98.1  F (36.7  C)  Resp 15  Ht 1.524 m (5')  Wt 69.9 kg (154 lb)  LMP 03/01/2016  SpO2 99%  BMI 30.08 kg/m2   Constitutional: Alert and cooperative. Moving well and appears in no pain.   Cardiac: Heart rate and rhythm is regular and strong without murmur  Respiratory: Breathing easy. Lung sounds clear to auscultation  Beast: Left breast with quarter size area of very mild pink area at 7 oclock but no masses or significant tenderness.  Mild tenderness to axilla as well. RIght breast with minimal size decrease from left but minimal tenderness and no masses.   MS: Muscle tone normal, extremities normal with no edema.    Psych: Mentation appears normal and affect normal with easy smile.    Laboratory Results:   Results for orders placed or performed in visit on 12/19/16   MA Diagnostic Bilateral w/Carla    Narrative    EXAMINATION: MA DIAGNOSTIC BILATERAL W/ CARLA, 12/19/2016 1:15 PM     COMPARISON: 11/25/2015, 12/12/2015, breast MRI 12/18/2015, wire  localization 1/19/2016    History: History of treated palpable right breast invasive lobular  carcinoma with lumpectomy, Chemotherapy,  Radiation therapy completed  in September 2016. Chemoprevention. Variant of uncertain significance  in BRCA2 gene.    BREAST DENSITY: Scattered fibroglandular  densities    Findings: The right breast is smaller with diffuse skin thickening and  trabecular thickening related to post treatment changes. The surgical  scar in the superior breast is marked.    In the area of the lumpectomy site, there are groups of amorphous  calcifications. These are probably benign and may represent  posttreatment changes or developing vascular calcifications.    No significant interval change in the left breast.      Impression    IMPRESSION: BI-RADS CATEGORY: 3 - Probably Benign Finding-Short  Interval Follow-Up Suggested.    RECOMMENDED FOLLOW-UP: Short-term follow-up of right breast  calcifications in 6 months..    This finding and recommendation was discussed with the patient at time  of her evaluation.    The patient was given the results of the examination.    AN MD CARTER     Assessment and Plan:   Left breast pain- pt is post infection and though completed 10 days of Keflex and redness is minimal though pain continues. Pt is due for follow up imaging of the breasts due to calcifications- will move up follow up mammogram with US of the breasts to within 1 week prior to scheduled follow up with Dr Tate. Question if further infection or other abnormality of the breast.   Abnormal right breast mammogram- will repeat mammogram and US to right breast due to calcifications noted in 12/2016 mammogram and due for short interval follow up.   Right breast cancer- stage II- Pt has completed treatment with right partial mastectomy, Taxol weekly ×12, AC ×4 cycles, and XRT. She more recently had laparoscopic salpingo-oophorectomy is now ready to start adjuvant hormonal therapy with Femara.   She has follow up visit with Dr Tate next week. Should have imaging completed prior to this visit for follow up.   This was a 20 min visit with > 50% in education and management of concerns.    Marisol Solitario,CNP

## 2017-03-06 NOTE — PROGRESS NOTES
[facility] Initial Evaluation      Subjective:    Aleena Escamilla is a 41 year old female with a right shoulder condition.  Condition occurred with:  Unknown cause.  Condition occurred: for unknown reasons.  This is a chronic condition  Pt notes that she started having her R shoulder and arm pain in 2015, before she was diagnosed with the R breast cancer.  She has completed the radiation and chemotherapy in Sept 2016, and she has continued to have R shoulder and arm pain.  She did consult w/her PCP on 2/6/17 and was referred to PT..    Patient reports pain:  Posterior and lateral.  Radiates to:  Upper arm and elbow.  Pain is described as aching and sharp and is constant and reported as 5/10 (9/10 at worst).  Associated symptoms:  Loss of strength, edema and loss of motion/stiffness. Pain is worse in the A.M..  Symptoms are exacerbated by using arm overhead, using arm at shoulder level, carrying, lifting, using arm behind back and lying on extremity and relieved by heat and rest.  Since onset symptoms are gradually worsening.        General health as reported by patient is good.  Pertinent medical history includes:  Cancer, fibromyalgia and osteoarthritis.  Medical allergies: yes (latex).  Other surgeries include:  Cancer surgery and other (R breast and ovaries).  Current medications:  Muscle relaxants.  Current occupation is , full-time.    Primary job tasks include:  Prolonged sitting, prolonged standing, lifting, repetitive tasks and other (pushing/pulling, computer work).    Barriers include:  None as reported by the patient.    Red flags:  None as reported by the patient.                      Objective:    Standing Alignment:    Cervical/Thoracic:  Forward head (mild to moderate)  Shoulder/UE:  Rounded shoulders (moderate)                                       Shoulder Evaluation:  ROM:  AROM:    Flexion:  Right:  133 deg    Abduction:  Right:  WNL, pain      External  Rotation:  Right:  WNL, pain            Extension/Internal Rotation:  Left:  Thumb to T9    Right:  Thumb to L1    PROM:    Flexion:  Right: 170 deg w/pain+                                Strength:    Flexion: Left:4/5    Pain: -/+    Right: 4-/5      Pain:  +    Abduction:  Left: 4-/5   Pain:+    Right: 4/5      Pain:-/+      External Rotation:   Left:5-/5      Pain:-   Right:5-/5      Pain:-        Elbow Flexion:  Left:4-/5      Pain:+    Right:4-/5      Pain:++      Special Tests:      Right shoulder positive for the following special tests:Impingement  Right shoulder negative for the following special tests:Labral; Bursal; Rotator cuff tear and Acrimioclavicular  Palpation:      Right shoulder tenderness present at: Biceps; Infraspinatus and Subscapularis  Right shoulder tenderness not present at:Acrimioclavicular; Supraspinatus; Deltoid; Levator or Upper Trap                                     General     ROS    Assessment/Plan:      Patient is a 41 year old female with right side shoulder complaints.    Patient has the following significant findings with corresponding treatment plan.                Diagnosis 1:  R shoulder impingement  Pain -  hot/cold therapy, US, self management, education and home program  Decreased ROM/flexibility - manual therapy and therapeutic exercise  Decreased strength - therapeutic exercise and therapeutic activities  Impaired muscle performance - neuro re-education  Decreased function - therapeutic activities  Impaired posture - neuro re-education and therapeutic activities    Therapy Evaluation Codes:   1) History comprised of:   Personal factors that impact the plan of care:      Past/current experiences and Social history/culture.    Comorbidity factors that impact the plan of care are:      Cancer and Fibromyalgia.     Medications impacting care: Muscle relaxant.  2) Examination of Body Systems comprised of:   Body structures and functions that impact the plan of care:       Shoulder.   Activity limitations that impact the plan of care are:      Bathing, Dressing, Lifting, Laying down and reaching.  3) Clinical presentation characteristics are:   Evolving/Changing.  4) Decision-Making    Moderate complexity using standardized patient assessment instrument and/or measureable assessment of functional outcome.  Cumulative Therapy Evaluation is: Moderate complexity.    Previous and current functional limitations:  (See Goal Flow Sheet for this information)    Short term and Long term goals: (See Goal Flow Sheet for this information)     Communication ability:  Patient appears to be able to clearly communicate and understand verbal and written communication and follow directions correctly.  Treatment Explanation - The following has been discussed with the patient:   RX ordered/plan of care  Anticipated outcomes  Possible risks and side effects  This patient would benefit from PT intervention to resume normal activities.   Rehab potential is good.    Frequency:  1 X week, once daily  Duration:  for 6 weeks  Discharge Plan:  Achieve all LTG.  Independent in home treatment program.  Reach maximal therapeutic benefit.    Please refer to the daily flowsheet for treatment today, total treatment time and time spent performing 1:1 timed codes.

## 2017-03-06 NOTE — MR AVS SNAPSHOT
After Visit Summary   3/6/2017    Aleena Escamilla    MRN: [id number]           Patient Information     Date Of Birth          1975        Visit Information        Provider Department      3/6/2017 1:15 PM Marisol Solitario APRN [facility]        Today's Diagnoses     Abnormal mammogram    -  1    Breast infection in female        Malignant neoplasm of overlapping sites of right female breast (H)           Follow-ups after your visit        Your next 10 appointments already scheduled     Mar 08, 2017  1:00 PM CST   MA DIAGNOSTIC DIGITAL BILATERAL with [email or web address], MG [facility] ([facility])    [address] [place] [id number] [phone]           Do not use any powder, lotion or deodorant under your arms or on your breast. If you do, we will ask you to remove it before your exam.  Wear comfortable, two-piece clothing.  If you have any allergies, tell your care team.  Bring any previous mammograms from other facilities or have them mailed to the breast center.            Mar 08, 2017  1:40 PM CST   US BREAST LEFT LIMITED 1-3 QUAD with [email or web address], [facility] ([facility])    [address] [place] [id number] [phone]           Please bring a list of your medicines (including vitamins, minerals and over-the-counter drugs). Also, tell your doctor about any allergies you may have. Wear comfortable clothes and leave your valuables at home.  You do not need to do anything special to prepare for your exam.  Please call the Imaging Department at your exam site with any questions.            Mar 13, 2017 11:00 AM CDT   JOE Extremity with Michelle Peace PT   [facility] ([facility]  )    [address]  [place] [address]   [phone]            Mar 16, 2017 10:15 AM CDT   LAB with LAB ONC [facility]  [facility] ([facility])    [address] [place] [id number] [phone]           Patient must bring picture ID.  Patient should be prepared to give a urine specimen  Please do not eat 10-12 hours before your appointment if you are coming in fasting for labs on lipids, cholesterol, or glucose (sugar).  Pregnant women should follow their Care Team instructions. Water with medications is okay. Do not drink coffee or other fluids.   If you have concerns about taking  your medications, please ask at office or if scheduling via [employer], send a message by clicking on Secure Messaging, Message Your Care Team.            Mar 16, 2017 11:00 AM CDT   Return Visit with Bessy Tate MD   [facility] ([facility])    [address] [place] [id number] [phone]            Mar 20, 2017 11:00 AM CDT   JOE Extremity with Michelle Peace, PT   [facility] [place] ([facility]  )    [address] [facility] [address]   [phone]            Mar 28, 2017 10:10 AM CDT   JOE Extremity with Michelle Peace, PT   [facility] ([facility]  )    [address]  [place] [address]   [phone]              Future tests that were ordered for you today     Open Future Orders        Priority Expected Expires Ordered    Hepatic panel Routine  3/6/2018 3/6/2017    *CBC with platelets differential Routine  3/6/2018 3/6/2017    US Breast Unilateral Left Routine  10/2/2017 3/6/2017    MA Diagnostic Digital Bilateral Routine  3/6/2018 3/6/2017            Who to contact     If you have questions or need follow up information about today's clinic visit or your schedule please contact [facility] directly at [phone].  Normal or non-critical lab and imaging results will be communicated to you by MyChart, letter or phone within 4  business days after the clinic has received the results. If you do not hear from us within 7 days, please contact the clinic through [email or web address] or phone. If you have a critical or abnormal lab result, we will notify you by phone as soon as possible.  Submit refill requests through [email or web address] or call your pharmacy and they will forward the refill request to us. Please allow 3 business days for your refill to be completed.          Additional Information About Your Visit        [email or web address] Information     [email or web address] gives you secure access to your electronic health record. If you see a primary care provider, you can also send messages to your care team and make appointments. If you have questions, please call your primary care clinic.  If you do not have a primary care provider, please call [phone] and they will assist you.      [email or web address] is an electronic gateway that provides easy, online access to your medical records. With [email or web address], you can request a clinic appointment, read your test results, renew a prescription or communicate with your care team.     To access your existing account, please contact your [facility] or call [phone] for assistance.        Care EveryWhere ID     This is your Care EveryWhere ID. This could be used by other organizations to access your [facility] medical records  [id number]        Your Vitals Were     Pulse Temperature Respirations Height Last Period Pulse Oximetry    72 98.1  F (36.7  C) 15 1.524 m (5') 03/01/2016 99%    BMI (Body Mass Index)                   30.08 kg/m2            Blood Pressure from Last 3 Encounters:   03/06/17 111/75   02/13/17 102/68   02/06/17 103/74    Weight from Last 3 Encounters:   03/06/17 69.9 kg (154 lb)   02/13/17 69.4 kg (153 lb)   02/06/17 69.4 kg (153 lb)               Primary Care Provider Office Phone # Fax #    Concepcion Bocanegra [phone]       [facility] [address]  [facility]  [place] [address]        Thank you!     Thank you for choosing [facility]  for your care. Our goal is always to provide you with excellent care. Hearing back from our patients is one way we can continue to improve our services. Please take a few minutes to complete the written survey that you may receive in the mail after your visit with us. Thank you!             Your Updated Medication List - Protect others around you: Learn how to safely use, store and throw away your medicines at www.disposemymeds.org.          This list is accurate as of: 3/6/17  1:59 PM.  Always use your most recent med list.                   Brand Name Dispense Instructions for use    acetaminophen 325 MG tablet    TYLENOL    100 tablet    Take 2 tablets (650 mg) by mouth every 4 hours as needed for other (mild pain)       cephALEXin 500 MG capsule    KEFLEX    30 capsule    Take 1 capsule (500 mg) by mouth 3 times daily       letrozole 2.5 MG tablet    FEMARA    90 tablet    Take 1 tablet (2.5 mg) by mouth daily       tiZANidine 4 MG tablet    ZANAFLEX    30 tablet    Take 1-2 tablets (4-8 mg) by mouth 3 times daily as needed for muscle spasms       venlafaxine 75 MG 24 hr capsule    EFFEXOR-XR    30 capsule    Take 1 capsule (75 mg) by mouth daily

## 2017-03-08 ENCOUNTER — RADIANT APPOINTMENT (OUTPATIENT)
Dept: MAMMOGRAPHY | Facility: CLINIC | Age: 42
End: 2017-03-08
Attending: NURSE PRACTITIONER
Payer: COMMERCIAL

## 2017-03-08 ENCOUNTER — RADIANT APPOINTMENT (OUTPATIENT)
Dept: ULTRASOUND IMAGING | Facility: CLINIC | Age: 42
End: 2017-03-08
Attending: NURSE PRACTITIONER
Payer: COMMERCIAL

## 2017-03-08 DIAGNOSIS — R92.8 ABNORMAL MAMMOGRAM: ICD-10-CM

## 2017-03-08 DIAGNOSIS — N61.0 BREAST INFECTION IN FEMALE: ICD-10-CM

## 2017-03-08 PROCEDURE — 76642 ULTRASOUND BREAST LIMITED: CPT | Mod: LT

## 2017-03-08 PROCEDURE — 77061 BREAST TOMOSYNTHESIS UNI: CPT

## 2017-03-08 PROCEDURE — G0206 DX MAMMO INCL CAD UNI: HCPCS | Mod: LT

## 2017-03-16 ENCOUNTER — ONCOLOGY VISIT (OUTPATIENT)
Dept: ONCOLOGY | Facility: CLINIC | Age: 42
End: 2017-03-16
Payer: COMMERCIAL

## 2017-03-16 VITALS
BODY MASS INDEX: 30.43 KG/M2 | DIASTOLIC BLOOD PRESSURE: 75 MMHG | SYSTOLIC BLOOD PRESSURE: 110 MMHG | TEMPERATURE: 98 F | WEIGHT: 155 LBS | HEART RATE: 86 BPM | HEIGHT: 60 IN | RESPIRATION RATE: 15 BRPM

## 2017-03-16 DIAGNOSIS — R91.1 LUNG NODULE: Primary | ICD-10-CM

## 2017-03-16 DIAGNOSIS — C50.811 MALIGNANT NEOPLASM OF OVERLAPPING SITES OF RIGHT FEMALE BREAST (H): ICD-10-CM

## 2017-03-16 DIAGNOSIS — N61.0 BREAST INFECTION IN FEMALE: ICD-10-CM

## 2017-03-16 LAB
ALBUMIN SERPL-MCNC: 3.9 G/DL (ref 3.4–5)
ALP SERPL-CCNC: 123 U/L (ref 40–150)
ALT SERPL W P-5'-P-CCNC: 76 U/L (ref 0–50)
AST SERPL W P-5'-P-CCNC: 32 U/L (ref 0–45)
BASOPHILS # BLD AUTO: 0 10E9/L (ref 0–0.2)
BASOPHILS NFR BLD AUTO: 0.4 %
BILIRUB DIRECT SERPL-MCNC: <0.1 MG/DL (ref 0–0.2)
BILIRUB SERPL-MCNC: 0.2 MG/DL (ref 0.2–1.3)
DIFFERENTIAL METHOD BLD: NORMAL
EOSINOPHIL # BLD AUTO: 0.1 10E9/L (ref 0–0.7)
EOSINOPHIL NFR BLD AUTO: 2.9 %
ERYTHROCYTE [DISTWIDTH] IN BLOOD BY AUTOMATED COUNT: 13.7 % (ref 10–15)
HCT VFR BLD AUTO: 41.5 % (ref 35–47)
HGB BLD-MCNC: 14.5 G/DL (ref 11.7–15.7)
LYMPHOCYTES # BLD AUTO: 1.5 10E9/L (ref 0.8–5.3)
LYMPHOCYTES NFR BLD AUTO: 31 %
MCH RBC QN AUTO: 30 PG (ref 26.5–33)
MCHC RBC AUTO-ENTMCNC: 34.9 G/DL (ref 31.5–36.5)
MCV RBC AUTO: 86 FL (ref 78–100)
MONOCYTES # BLD AUTO: 0.4 10E9/L (ref 0–1.3)
MONOCYTES NFR BLD AUTO: 8.8 %
NEUTROPHILS # BLD AUTO: 2.8 10E9/L (ref 1.6–8.3)
NEUTROPHILS NFR BLD AUTO: 56.9 %
PLATELET # BLD AUTO: 282 10E9/L (ref 150–450)
PROT SERPL-MCNC: 7.7 G/DL (ref 6.8–8.8)
RBC # BLD AUTO: 4.84 10E12/L (ref 3.8–5.2)
WBC # BLD AUTO: 4.9 10E9/L (ref 4–11)

## 2017-03-16 PROCEDURE — 99214 OFFICE O/P EST MOD 30 MIN: CPT | Performed by: INTERNAL MEDICINE

## 2017-03-16 PROCEDURE — 85025 COMPLETE CBC W/AUTO DIFF WBC: CPT | Performed by: NURSE PRACTITIONER

## 2017-03-16 PROCEDURE — 36415 COLL VENOUS BLD VENIPUNCTURE: CPT | Performed by: NURSE PRACTITIONER

## 2017-03-16 PROCEDURE — 80076 HEPATIC FUNCTION PANEL: CPT | Performed by: NURSE PRACTITIONER

## 2017-03-16 RX ORDER — VENLAFAXINE HYDROCHLORIDE 150 MG/1
150 CAPSULE, EXTENDED RELEASE ORAL DAILY
Qty: 30 CAPSULE | Refills: 2 | Status: SHIPPED | OUTPATIENT
Start: 2017-03-16 | End: 2017-07-13

## 2017-03-16 ASSESSMENT — PAIN SCALES - GENERAL: PAINLEVEL: NO PAIN (0)

## 2017-03-16 NOTE — MR AVS SNAPSHOT
After Visit Summary   3/16/2017    Aleena Escamilla    MRN: [id number]           Patient Information     Date Of Birth          1975        Visit Information        Provider Department      3/16/2017 11:00 AM Bessy Tate MD [facility]        Today's Diagnoses     Lung nodule    -  1    Malignant neoplasm of overlapping sites of right female breast (H)           Follow-ups after your visit        Your next 10 appointments already scheduled     Mar 28, 2017 10:10 AM CDT   JOE Extremity with Michelle Peace PT   [facility] ([facility]  )    [address]  [facility] [address]   [phone]            Jun 22, 2017  8:00 AM CDT   MA DIAGNOSTIC DIGITAL BILATERAL with MGMA2, MG [facility] ([facility])    [address] [place] [id number] [phone]           Do not use any powder, lotion or deodorant under your arms or on your breast. If you do, we will ask you to remove it before your exam.  Wear comfortable, two-piece clothing.  If you have any allergies, tell your care team.  Bring any previous mammograms from other facilities or have them mailed to the breast center.            Jun 22, 2017  8:20 AM CDT   US BREAST MANDEEP CMPL 4 QUAD with MGMUS1, MG [facility] ([facility])    [address] [place] [id number] [phone]           Please bring a list of your medicines (including vitamins, minerals and over-the-counter drugs). Also, tell your doctor about any allergies you may have. Wear comfortable clothes and leave your valuables at home.  You do not need to do anything special to prepare for your exam.  Please call the Imaging Department at your exam site with any questions.            Jun 22, 2017  9:00 AM CDT   CT CHEST W/O & W CONTRAST with MGCT1   [facility]  [facility])    [address] [address] [place] [id number] [phone]           Please bring any scans or X-rays taken at other hospitals, if similar tests were done. Also bring a list of your medicines, including vitamins, minerals and over-the-counter drugs. It is safest to leave personal items at home.  Be sure to tell your doctor:   If you have any allergies.   If there s any chance you are pregnant.   If you are breastfeeding.   If you have any special needs.  You will have contrast for this exam. To prepare:   Do not eat or drink for 2 hours before your exam. If you need to take medicine, you may take it with small sips of water. (We may ask you to take liquid medicine as well.)   The day before your exam, drink extra fluids at least six 8-ounce glasses (unless your doctor tells you to restrict your fluids).  Patients over 70 or patients with diabetes or kidney problems:   If you haven t had a blood test (creatinine test) within the last 30 days, go to your clinic or Diagnostic Imaging Department for this test.  If you have diabetes:   If your kidney function is normal, continue taking your metformin (Avandamet, Glucophage, Glucovance, Metaglip) on the day of your exam.   If your kidney function is abnormal, wait 48 hours before restarting this medicine.  Please wear loose clothing, such as a sweat suit or jogging clothes. Avoid snaps, zippers and other metal. We may ask you to undress and put on a hospital gown.  If you have any questions, please call the Imaging Department where you will have your exam.            Jun 22, 2017  9:40 AM CDT   LAB with LAB FIRST FLOOR [facility] ([facility])    [address] [address] [place] [id number] [phone]           Patient must bring picture ID.  Patient should be prepared to give a urine specimen  Please do not eat 10-12 hours before your appointment if you are coming in fasting for labs on lipids,  cholesterol, or glucose (sugar).  Pregnant women should follow their Care Team instructions. Water with medications is okay. Do not drink coffee or other fluids.   If you have concerns about taking  your medications, please ask at office or if scheduling via Nekst, send a message by clicking on Secure Messaging, Message Your Care Team.            Jun 27, 2017 11:00 AM CDT   Return Visit with Bessy Tate MD   [facility] ([facility])    [address] [place] [id number] [phone]              Future tests that were ordered for you today     Open Future Orders        Priority Expected Expires Ordered    US Breast Bilateral Complete 4 Quadrants Routine  3/23/2018 3/23/2017            Who to contact     If you have questions or need follow up information about today's clinic visit or your schedule please contact [facility] directly at [phone].  Normal or non-critical lab and imaging results will be communicated to you by Immediatelyhart, letter or phone within 4 business days after the clinic has received the results. If you do not hear from us within 7 days, please contact the clinic through Synchrot or phone. If you have a critical or abnormal lab result, we will notify you by phone as soon as possible.  Submit refill requests through Nekst or call your pharmacy and they will forward the refill request to us. Please allow 3 business days for your refill to be completed.          Additional Information About Your Visit        Nekst Information     Nekst gives you secure access to your electronic health record. If you see a primary care provider, you can also send messages to your care team and make appointments. If you have questions, please call your primary care clinic.  If you do not have a primary care provider, please call [phone] and they will assist you.      Nekst is an electronic gateway that provides easy, online access  to your medical records. With [employer], you can request a clinic appointment, read your test results, renew a prescription or communicate with your care team.     To access your existing account, please contact your [facility] or call [phone] for assistance.        Care EveryWhere ID     This is your Care EveryWhere ID. This could be used by other organizations to access your [facility] medical records  [id number]        Your Vitals Were     Pulse Temperature Respirations Height Last Period BMI (Body Mass Index)    86 98  F (36.7  C) 15 1.524 m (5') 03/01/2016 30.27 kg/m2       Blood Pressure from Last 3 Encounters:   03/16/17 110/75   03/06/17 111/75   02/13/17 102/68    Weight from Last 3 Encounters:   03/16/17 70.3 kg (155 lb)   03/06/17 69.9 kg (154 lb)   02/13/17 69.4 kg (153 lb)                 Today's Medication Changes          These changes are accurate as of: 3/16/17 11:59 PM.  If you have any questions, ask your nurse or doctor.               These medicines have changed or have updated prescriptions.        Dose/Directions    venlafaxine 150 MG 24 hr capsule   Commonly known as:  EFFEXOR-XR   This may have changed:    - medication strength  - how much to take   Used for:  Malignant neoplasm of overlapping sites of right female breast (H)   Changed by:  Bessy Tate MD        Dose:  150 mg   Take 1 capsule (150 mg) by mouth daily   Quantity:  30 capsule   Refills:  2            Where to get your medicines      These medications were sent to [id number] - [place], [place] - [address]  AT [facility]  [address] , [place] [address]    Hours:  24-hours Phone:  [phone]     venlafaxine 150 MG 24 hr capsule                Primary Care Provider Office Phone # Fax #    Concepcion Bocanegra [phone]       [facility] [address] [facility] [address]        Thank you!      Thank you for choosing [facility]  for your care. Our goal is always to provide you with excellent care. Hearing back from our patients is one way we can continue to improve our services. Please take a few minutes to complete the written survey that you may receive in the mail after your visit with us. Thank you!             Your Updated Medication List - Protect others around you: Learn how to safely use, store and throw away your medicines at www.disposemymeds.org.          This list is accurate as of: 3/16/17 11:59 PM.  Always use your most recent med list.                   Brand Name Dispense Instructions for use    acetaminophen 325 MG tablet    TYLENOL    100 tablet    Take 2 tablets (650 mg) by mouth every 4 hours as needed for other (mild pain)       cephALEXin 500 MG capsule    KEFLEX    30 capsule    Take 1 capsule (500 mg) by mouth 3 times daily       letrozole 2.5 MG tablet    FEMARA    90 tablet    Take 1 tablet (2.5 mg) by mouth daily       tiZANidine 4 MG tablet    ZANAFLEX    30 tablet    Take 1-2 tablets (4-8 mg) by mouth 3 times daily as needed for muscle spasms       venlafaxine 150 MG 24 hr capsule    EFFEXOR-XR    30 capsule    Take 1 capsule (150 mg) by mouth daily

## 2017-03-16 NOTE — PROGRESS NOTES
ONCOLOGY DIAGNOSIS:   1. January 2016: Diagnosed with stage II multifocal invasive lobular carcinoma of the right breast. Final pathology showed a grade 2 with the first foci measuring 36 x 16 x 8 mm and the second one 21 x 14 x 11 mm. LCIS classical type present, lymphovascular invasion not identified. Margins negative, estrogen, progesterone receptor positive by IHC and HER-2 not amplified performed on a core biopsy. 1/4 lymph nodes positive with the greatest dimension being 10 mm. No extranodal extension. Oncotype DX recurrence score 25.   2. Genetics: Variant of Uncertain Significance in BRCA2 gene.       THERAPY TO DATE:   1. January 19, 2016: Right breast partial mastectomy with wire localization and right axillary sentinel node biopsy.   2. February 2016 to June 2016. Weekly Taxol followed by dose-dense AC.    3.  August 2016 to September 2016. Radiotherapy.    4. October 26, 2016: Laparoscopic right oophorectomy, biopsy of pelvic nodule, removal of a portion of the right fallopian tube. Pathology: right pelvic nodule showed fibrous nodule with calcifications, endosalpinosis. Right ovary with serous cystadenoma, benign epithelial inclusion cyst, negative for malignancy. A portion of the right fallopian tube with no significant histologic abnormality.   5. May 2016 to Present. Letrozole.      INTERVAL HISTORY:  Aleena Escamilla is a 41-year-old female diagnosed with pT2 N1a MX multifocal invasive lobular carcinoma of the right breast in 01/2016 after self-palpating a right breast mass.  Patient presents to clinic for followup of malignancy.  She was actually seen in our clinic last week by my nurse practitioner, Marisol Solitario, for left breast pain.  Patient did undergo mammogram and ultrasound which were negative.  Patient states that she has these pains usually premenstrual and then resolve.  She does not have any pain today.  Does note some swelling in her breasts during that time as well.  She is also now  noticing myalgias with arthralgias.  She did not have this when she initially started the Femara, but is now noticing it.  It is most noticeable in the morning when she gets up and starts walking, but then improves and does not have any right now.  Her hot flashes she feels have gotten worse.  She has it every day about 3 times a day.  Currently taking Effexor 75 mg a day.  Otherwise, denies any fevers, chills, nausea, vomiting, chest pain, shortness of breath, cough.  No abdominal discomfort.  No new palpable masses and the remainder of a comprehensive review of systems is negative.      PAST MEDICAL HISTORY: (No new medical history since last visit per patient.)\  1. Multifocal right breast cancer.   2. History of migraines.   3. History of chronic pelvic pain.   4. Bilateral carpal tunnel       PAST SURGICAL HISTORY: (No new surgical history since last visit per patient.)  1. Right breast lumpectomy 01/2016.   2. Pelvic laparoscopy.   3. Bilateral salpingectomy, left oophorectomy 02/2001 for pelvic pain, uterus intact.   4. 10/2016, right oophorectomy.   5. 11/2016, right carpal tunnel release.      ALLERGIES: No known drug allergies.       SOCIAL HISTORY:  , comes in alone today.  Has 2 children.  No current tobacco use.  Continues to work in  for [employer].      MEDICATIONS:   1.  Tylenol p.r.n.   2.  Femara.   3.  Effexor.      PHYSICAL EXAMINATION:   VITAL SIGNS:  Blood pressure 110/75, pulse 86, respirations 15, temp 98, pulse ox not taken, weight 155 pounds, BMI is 30.27.   GENERAL:  Comfortable, no acute distress, pleasant.   HEENT:  Atraumatic, normocephalic.  Pupils equal, round, reactive.  Sclerae anicteric.  Oropharynx:  Moist mucous membranes.  No lesions, ulcers or exudate.   NECK:  Supple, full range of motion.  Trachea midline.   HEART:  Regular rate and rhythm, normal S1, S2, no murmurs, gallop.  No edema.   LUNGS:  Clear to auscultation bilaterally.  No crackles or wheezes.   Normal respiratory effort.   ABDOMEN:  Positive bowel sounds.  Soft, nontender, nondistended.  No hepatosplenomegaly.   EXTREMITIES:  No cyanosis, warm.   MUSCULOSKELETAL:  No point tenderness.   LYMPHATICS:  No cervical, supraclavicular, axillary nodes palpable.   SKIN:  No petechiae or rashes.   NEUROLOGIC:  Alert and oriented.   BREASTS:  Examined in the upright and supine position, no dominant masses or tenderness.      LABORATORY DATA:  Alk phos 123, ALT 76, AST 32, WBC 4.9, hemoglobin 14.5, hematocrit 41.5, platelets 282, ANC is 2.8.      Mammogram from 12/2016, in right breast, there is diffuse skin thickening with trabecular thickening related to post-treatment changes.  In the area of lumpectomy, there is a group of amorphous calcifications, probably benign, may represent post-treatment changes, with short interval followup recommended.      Left breast diagnostic mammogram from 03/08/2017, as well as ultrasound, showed 3 cm from nipple a small 7 x 2 x 5 mm interdermal area of hypoechogenicity most compatible with recent inflamed sebaceous cyst, no suspicious finding.      DEXA scan from 11/2016 showed normal bone mineral density.      ASSESSMENT AND PLAN:   1.  Stage II, pT2 N1a MX multifocal invasive lobular carcinoma, no evidence of recurrence by history, physical or most recent imagings.  Continue Femara.  Return to clinic in 3 months with LFTs.  Should have bilateral mammogram prior to her next visit.   2.  Linear nodular density in the right pulmonary apex.  CT chest prior to next visit.   3.  Hot flashes.  Worsened.  Increase Effexor to 150 mg a day.   4.  BRCA2 variant, undetermined significance.  Continue to follow with genetics once a year.  Both ovaries have been removed.   5.  Left breast pain  Appears to be premenstrual.  Recommend avoiding caffeine, alcohol.  Trial of Evening Oil of Primrose.   6.  Myalgias, arthralgias.  Secondary to Femara.  Recommend yoga, stretching exercises.  If  problematic, can consider switching to another aromatase inhibitor.         ALBERTO RENEE MD             D: 2017 11:39   T: 2017 15:27   MT: TS      Name:     KEYUR GALVAN   MRN:      [id number]        Account:      [id number]   :      1975           Visit Date:   2017      Document: [id number]       cc: Concepcion Mayo MD

## 2017-03-17 ENCOUNTER — TELEPHONE (OUTPATIENT)
Dept: FAMILY MEDICINE | Facility: CLINIC | Age: 42
End: 2017-03-17

## 2017-03-17 NOTE — TELEPHONE ENCOUNTER
Louie with [facility] [employer] management tried to contact pt but was unable to connect. Verified pt's address and phone number and they were the same as ours.  Please relay msg if possible so they can talk to pt.  Thanks.    What is the best number to contact you? Work [phone] ext [phone]  What time works best to contact you? Anytime. [facility]

## 2017-03-17 NOTE — TELEPHONE ENCOUNTER
Fernando Almaraz contacted Aleena on 03/17/17 and left a message. If patient calls back please relay message below to patient.  Fernando Almaraz CMA

## 2017-04-11 ENCOUNTER — OFFICE VISIT (OUTPATIENT)
Dept: FAMILY MEDICINE | Facility: CLINIC | Age: 42
End: 2017-04-11
Payer: COMMERCIAL

## 2017-04-11 VITALS
BODY MASS INDEX: 30.04 KG/M2 | HEIGHT: 60 IN | RESPIRATION RATE: 17 BRPM | SYSTOLIC BLOOD PRESSURE: 113 MMHG | HEART RATE: 86 BPM | DIASTOLIC BLOOD PRESSURE: 76 MMHG | WEIGHT: 153 LBS | OXYGEN SATURATION: 98 % | TEMPERATURE: 98.3 F

## 2017-04-11 DIAGNOSIS — R07.0 THROAT PAIN: Primary | ICD-10-CM

## 2017-04-11 DIAGNOSIS — J11.1 INFLUENZA-LIKE ILLNESS: ICD-10-CM

## 2017-04-11 LAB
DEPRECATED S PYO AG THROAT QL EIA: NORMAL
FLUAV+FLUBV AG SPEC QL: NEGATIVE
FLUAV+FLUBV AG SPEC QL: NORMAL
MICRO REPORT STATUS: NORMAL
SPECIMEN SOURCE: NORMAL
SPECIMEN SOURCE: NORMAL

## 2017-04-11 PROCEDURE — 99213 OFFICE O/P EST LOW 20 MIN: CPT | Performed by: FAMILY MEDICINE

## 2017-04-11 PROCEDURE — 87081 CULTURE SCREEN ONLY: CPT | Performed by: FAMILY MEDICINE

## 2017-04-11 PROCEDURE — 87880 STREP A ASSAY W/OPTIC: CPT | Performed by: FAMILY MEDICINE

## 2017-04-11 PROCEDURE — 87804 INFLUENZA ASSAY W/OPTIC: CPT | Performed by: FAMILY MEDICINE

## 2017-04-11 ASSESSMENT — ANXIETY QUESTIONNAIRES
IF YOU CHECKED OFF ANY PROBLEMS ON THIS QUESTIONNAIRE, HOW DIFFICULT HAVE THESE PROBLEMS MADE IT FOR YOU TO DO YOUR WORK, TAKE CARE OF THINGS AT HOME, OR GET ALONG WITH OTHER PEOPLE: NOT DIFFICULT AT ALL
GAD7 TOTAL SCORE: 0
1. FEELING NERVOUS, ANXIOUS, OR ON EDGE: NOT AT ALL
6. BECOMING EASILY ANNOYED OR IRRITABLE: NOT AT ALL
3. WORRYING TOO MUCH ABOUT DIFFERENT THINGS: NOT AT ALL
5. BEING SO RESTLESS THAT IT IS HARD TO SIT STILL: NOT AT ALL
2. NOT BEING ABLE TO STOP OR CONTROL WORRYING: NOT AT ALL
7. FEELING AFRAID AS IF SOMETHING AWFUL MIGHT HAPPEN: NOT AT ALL

## 2017-04-11 ASSESSMENT — PAIN SCALES - GENERAL: PAINLEVEL: EXTREME PAIN (8)

## 2017-04-11 ASSESSMENT — PATIENT HEALTH QUESTIONNAIRE - PHQ9: 5. POOR APPETITE OR OVEREATING: NOT AT ALL

## 2017-04-11 NOTE — MR AVS SNAPSHOT
After Visit Summary   4/11/2017    Aleena Escamilla    MRN: [id number]           Patient Information     Date Of Birth          1975        Visit Information        Provider Department      4/11/2017 10:00 AM Concepcion Bocanegra MD [facility]        Today's Diagnoses     Throat pain    -  1    Influenza-like illness          Care Instructions    How to contact your care team: [phone] Pharmacy [phone]   DRAGAN DOZIER MD KATYA GEORGIEV, PA-C CHRIS JONES, PA-C NAM HO, MD JONATHAN BATES, MD ARVIN VOCAL, MD    Clinic hours M-Th 7am-7pm Fri 7am-5pm.   Urgent care M-F 11am-9pm  Sat/Sun 9am-5pm.   Pharmacy   Mon-Th:  8:00am-8pm   Fri:  8:00am-6:00pm  Sat/Sun  8:00am-5:00 pm       Influenza  Influenza ( the flu ) is an infection that affects your respiratory tract (the mouth, nose, and lungs, and the passages between them). Unlike a cold, the flu can make you very ill. And it can lead to pneumonia, a serious lung infection. For some people, especially older adults, young children, and people with certain chronic conditions, the flu can have serious complications and even be fatal.  What Are the Risk Factors for the Flu?     Viruses that cause influenza spread through the air in droplets when someone who has the flu coughs, sneezes, laughs, or talks.   Anyone can get the flu. But you re more likely to become infected if you:    Have a weakened immune system.    Work in a health care setting where you may be exposed to flu germs.    Live or work with someone who has the flu.    Haven t received an annual flu shot.  How Does the Flu Spread?  The flu is caused by viruses. The viruses spread through the air in droplets when someone who has the flu coughs, sneezes, laughs, or talks. You can become infected when you inhale these viruses directly. You can also become infected when you touch a surface on which the droplets have landed and then transfer  the germs to your eyes, nose, or mouth. Touching used tissues, or sharing utensils, drinking glasses, or a toothbrush with an infected person can expose you to flu viruses, too.  What Are the Symptoms of the Flu?  Flu symptoms tend to come on quickly and may last a few days to a few weeks. They include:    Fever usually higher than 101 F  (38.3 C) and chills    Sore throat and headache    Dry cough    Runny nose    Tiredness and weakness    Muscle aches  Factors That Can Make Flu Worse  For some people, the flu can be very serious. The risk of complications is greater for:    Children under age 5.    Adults 65 years of age and older.    People with a chronic illness, such as diabetes or heart, kidney, or lung disease.    People who live in a nursing home or long-term care facility.   How Is the Flu Treated?  Influenza usually improves after 7 days or so. In some cases, your health care provider may prescribe an antiviral medication. This may help you get well sooner. For the medication to help, you need to take it as soon as possible (ideally within 48 hours) after your symptoms start. If you develop pneumonia or other serious illness, hospital care may be needed.  Easing Flu Symptoms    Drink lots of fluids such as water, juice, and warm soup. A good rule is to drink enough so that you urinate your normal amount.    Get plenty of rest.    Ask your health care provider what to take for fever and pain.    Call your provider if your fever rises over 101 F (38.3 C) or you become dizzy, lightheaded, or short of breath.  Taking Steps to Protect Others    Wash your hands often, especially after coughing or sneezing. Or, clean your hands with an alcohol-based hand  containing at least 60 percent alcohol.    Cough or sneeze into a tissue. Then throw the tissue away and wash your hands. If you don t have a tissue, cough and sneeze into the crook of your elbow.    Stay home until at least 24 hours after you no longer  have a fever or chills. Be sure the fever isn t being hidden by fever-reducing medication.    Don t share food, utensils, drinking glasses, or a toothbrush with others.    Ask your health care provider if others in your household should receive antiviral medication to help them avoid infection.  How Can the Flu Be Prevented?    One of the best ways to avoid the flu is to get a flu vaccination each year. Viruses that cause the flu change from year to year. For that reason, doctors recommend getting the flu vaccine each year, as soon as it's available in your area. The vaccine may be given as a shot or as a nasal spray. Your health care provider can tell you which vaccine is right for you.    Wash your hands often. Frequent handwashing is a proven way to help prevent infection.    Carry an alcohol-based hand gel containing at least 60 percent alcohol. Use it when you don t have access to soap and water. Then wash your hands as soon as you can.    Avoid touching your eyes, nose, and mouth.    At home and work, clean phones, computer keyboards, and toys often with disinfectant wipes.    If possible, avoid close contact with others who have the flu or symptoms of the flu.  Handwashing Tips  Handwashing is one of the best ways to prevent many common infections. If you re caring for or visiting someone with the flu, wash your hands each time you enter and leave the room. Follow these steps:    Use warm water and plenty of soap. Rub your hands together well.    Clean the whole hand, under your nails, between your fingers, and up the wrists.    Wash for at least 15 seconds.    Rinse, letting the water run down your fingers, not up your wrists.    Dry your hands well. Use a paper towel to turn off the faucet and open the door.  Using Alcohol-Based Hand   Alcohol-based hand  are also a good choice. Use them when you don t have access to soap and water. Follow these steps:    Squeeze about a tablespoon of gel  into the palm of one hand.    Rub your hands together briskly, cleaning the backs of your hands, the palms, between your fingers, and up the wrists.    Rub until the gel is gone and your hands are completely dry.  Preventing Influenza in Healthcare Settings  The flu is a special concern for people in hospitals and long-term care facilities. To help prevent the spread of flu, many hospitals and nursing homes take these steps:    Health care providers wash their hands or use an alcohol-based hand  before and after treating each patient.    People with the flu have private rooms and bathrooms or share a room with someone with the same infection.    High-risk patients who don t have the flu are encouraged to get the flu and pneumonia vaccines.    All health care workers are encouraged or required to get flu shots.        1107-9234 [employer]. [address] [place] [address]. All rights reserved. This information is not intended as a substitute for professional medical care. Always follow your healthcare professional's instructions.              Follow-ups after your visit        Follow-up notes from your care team     Return if symptoms worsen or fail to improve.      Your next 10 appointments already scheduled     Jun 22, 2017  8:00 AM CDT   MA DIAGNOSTIC DIGITAL BILATERAL with MGMA2, MG MA [employer]   [facility]    [address] [place] [id number] [phone]           Do not use any powder, lotion or deodorant under your arms or on your breast. If you do, we will ask you to remove it before your exam.  Wear comfortable, two-piece clothing.  If you have any allergies, tell your care team.  Bring any previous mammograms from other facilities or have them mailed to the breast center.            Jun 22, 2017  8:20 AM CDT   US BREAST MANDEEP CMPL 4 QUAD with MGMUS1, MG [employer]   [facility] ([facility]    [address] [place] [id number] [phone]           Please bring a list of your medicines (including vitamins, minerals and over-the-counter drugs). Also, tell your doctor about any allergies you may have. Wear comfortable clothes and leave your valuables at home.  You do not need to do anything special to prepare for your exam.  Please call the Imaging Department at your exam site with any questions.            Jun 22, 2017  9:00 AM CDT   CT CHEST W/O & W CONTRAST with MGCT1   [facility] ([facility])    [address] [place] [id number] [phone]           Please bring any scans or X-rays taken at other hospitals, if similar tests were done. Also bring a list of your medicines, including vitamins, minerals and over-the-counter drugs. It is safest to leave personal items at home.  Be sure to tell your doctor:   If you have any allergies.   If there s any chance you are pregnant.   If you are breastfeeding.   If you have any special needs.  You will have contrast for this exam. To prepare:   Do not eat or drink for 2 hours before your exam. If you need to take medicine, you may take it with small sips of water. (We may ask you to take liquid medicine as well.)   The day before your exam, drink extra fluids at least six 8-ounce glasses (unless your doctor tells you to restrict your fluids).  Patients over 70 or patients with diabetes or kidney problems:   If you haven t had a blood test (creatinine test) within the last 30 days, go to your clinic or Diagnostic Imaging Department for this test.  If you have diabetes:   If your kidney function is normal, continue taking your metformin (Avandamet, Glucophage, Glucovance, Metaglip) on the day of your exam.   If your kidney function is abnormal, wait 48 hours before restarting this medicine.  Please wear loose clothing, such as a sweat suit or jogging clothes. Avoid snaps, zippers and  other metal. We may ask you to undress and put on a hospital gown.  If you have any questions, please call the Imaging Department where you will have your exam.            Jun 22, 2017  9:40 AM CDT   LAB with LAB FIRST FLOOR [facility] ([facility])    [address] [place] [id number] [phone]           Patient must bring picture ID.  Patient should be prepared to give a urine specimen  Please do not eat 10-12 hours before your appointment if you are coming in fasting for labs on lipids, cholesterol, or glucose (sugar).  Pregnant women should follow their Care Team instructions. Water with medications is okay. Do not drink coffee or other fluids.   If you have concerns about taking  your medications, please ask at office or if scheduling via [employer] send a message by clicking on Secure Messaging, Message Your Care Team.            Jun 27, 2017 11:00 AM CDT   Return Visit with Bessy Tate MD   [facility] ([facility])    [address] [place] [id number] [phone]              Who to contact     If you have questions or need follow up information about today's clinic visit or your schedule please contact [facility] directly at [phone].  Normal or non-critical lab and imaging results will be communicated to you by [employer], letter or phone within 4 business days after the clinic has received the results. If you do not hear from us within 7 days, please contact the clinic through MyChart or phone. If you have a critical or abnormal lab result, we will notify you by phone as soon as possible.  Submit refill requests through [employer] or call your pharmacy and they will forward the refill request to us. Please allow 3 business days for your refill to be completed.          Additional Information About Your Visit        [employer] Information     [employer] gives you secure access to  your electronic health record. If you see a primary care provider, you can also send messages to your care team and make appointments. If you have questions, please call your primary care clinic.  If you do not have a primary care provider, please call [phone] and they will assist you.        Care EveryWhere ID     This is your Care EveryWhere ID. This could be used by other organizations to access your [facility] medical records  [id number]        Your Vitals Were     Pulse Temperature Respirations Height Last Period Pulse Oximetry    86 98.3  F (36.8  C) (Oral) 17 5' (1.524 m) 03/01/2016 98%    Breastfeeding? BMI (Body Mass Index)                No 29.88 kg/m2           Blood Pressure from Last 3 Encounters:   04/11/17 113/76   03/16/17 110/75   03/06/17 111/75    Weight from Last 3 Encounters:   04/11/17 153 lb (69.4 kg)   03/16/17 155 lb (70.3 kg)   03/06/17 154 lb (69.9 kg)              We Performed the Following     Beta strep group A culture     Influenza A/B antigen     Strep, Rapid Screen        Primary Care Provider Office Phone # Fax #    Concepcion Tana Bocanegra [phone]       [facility] [address]  [facility] [address]        Thank you!     Thank you for choosing [facility]  for your care. Our goal is always to provide you with excellent care. Hearing back from our patients is one way we can continue to improve our services. Please take a few minutes to complete the written survey that you may receive in the mail after your visit with us. Thank you!             Your Updated Medication List - Protect others around you: Learn how to safely use, store and throw away your medicines at www.disposemymeds.org.          This list is accurate as of: 4/11/17 10:36 AM.  Always use your most recent med list.                   Brand Name Dispense Instructions for use    acetaminophen 325 MG tablet    TYLENOL    100 tablet    Take 2 tablets (650 mg) by  mouth every 4 hours as needed for other (mild pain)       letrozole 2.5 MG tablet    FEMARA    90 tablet    Take 1 tablet (2.5 mg) by mouth daily       tiZANidine 4 MG tablet    ZANAFLEX    30 tablet    Take 1-2 tablets (4-8 mg) by mouth 3 times daily as needed for muscle spasms       venlafaxine 150 MG 24 hr capsule    EFFEXOR-XR    30 capsule    Take 1 capsule (150 mg) by mouth daily

## 2017-04-11 NOTE — PATIENT INSTRUCTIONS
How to contact your care team: [phone] Pharmacy [phone]   DRAGAN DOZIER MD KATYA GEORGIEV, PA-C CHRIS JONES, PA-C NAM HO, MD JONATHAN BATES, MD ARVIN VOCAL, MD    Clinic hours M-Th 7am-7pm Fri 7am-5pm.   Urgent care M-F 11am-9pm  Sat/Sun 9am-5pm.   Pharmacy   Mon-Th:  8:00am-8pm   Fri:  8:00am-6:00pm  Sat/Sun  8:00am-5:00 pm       Influenza  Influenza ( the flu ) is an infection that affects your respiratory tract (the mouth, nose, and lungs, and the passages between them). Unlike a cold, the flu can make you very ill. And it can lead to pneumonia, a serious lung infection. For some people, especially older adults, young children, and people with certain chronic conditions, the flu can have serious complications and even be fatal.  What Are the Risk Factors for the Flu?     Viruses that cause influenza spread through the air in droplets when someone who has the flu coughs, sneezes, laughs, or talks.   Anyone can get the flu. But you re more likely to become infected if you:    Have a weakened immune system.    Work in a health care setting where you may be exposed to flu germs.    Live or work with someone who has the flu.    Haven t received an annual flu shot.  How Does the Flu Spread?  The flu is caused by viruses. The viruses spread through the air in droplets when someone who has the flu coughs, sneezes, laughs, or talks. You can become infected when you inhale these viruses directly. You can also become infected when you touch a surface on which the droplets have landed and then transfer the germs to your eyes, nose, or mouth. Touching used tissues, or sharing utensils, drinking glasses, or a toothbrush with an infected person can expose you to flu viruses, too.  What Are the Symptoms of the Flu?  Flu symptoms tend to come on quickly and may last a few days to a few weeks. They include:    Fever usually higher than 101 F  (38.3 C) and chills    Sore throat and  headache    Dry cough    Runny nose    Tiredness and weakness    Muscle aches  Factors That Can Make Flu Worse  For some people, the flu can be very serious. The risk of complications is greater for:    Children under age 5.    Adults 65 years of age and older.    People with a chronic illness, such as diabetes or heart, kidney, or lung disease.    People who live in a nursing home or long-term care facility.   How Is the Flu Treated?  Influenza usually improves after 7 days or so. In some cases, your health care provider may prescribe an antiviral medication. This may help you get well sooner. For the medication to help, you need to take it as soon as possible (ideally within 48 hours) after your symptoms start. If you develop pneumonia or other serious illness, hospital care may be needed.  Easing Flu Symptoms    Drink lots of fluids such as water, juice, and warm soup. A good rule is to drink enough so that you urinate your normal amount.    Get plenty of rest.    Ask your health care provider what to take for fever and pain.    Call your provider if your fever rises over 101 F (38.3 C) or you become dizzy, lightheaded, or short of breath.  Taking Steps to Protect Others    Wash your hands often, especially after coughing or sneezing. Or, clean your hands with an alcohol-based hand  containing at least 60 percent alcohol.    Cough or sneeze into a tissue. Then throw the tissue away and wash your hands. If you don t have a tissue, cough and sneeze into the crook of your elbow.    Stay home until at least 24 hours after you no longer have a fever or chills. Be sure the fever isn t being hidden by fever-reducing medication.    Don t share food, utensils, drinking glasses, or a toothbrush with others.    Ask your health care provider if others in your household should receive antiviral medication to help them avoid infection.  How Can the Flu Be Prevented?    One of the best ways to avoid the flu is to get a  flu vaccination each year. Viruses that cause the flu change from year to year. For that reason, doctors recommend getting the flu vaccine each year, as soon as it's available in your area. The vaccine may be given as a shot or as a nasal spray. Your health care provider can tell you which vaccine is right for you.    Wash your hands often. Frequent handwashing is a proven way to help prevent infection.    Carry an alcohol-based hand gel containing at least 60 percent alcohol. Use it when you don t have access to soap and water. Then wash your hands as soon as you can.    Avoid touching your eyes, nose, and mouth.    At home and work, clean phones, computer keyboards, and toys often with disinfectant wipes.    If possible, avoid close contact with others who have the flu or symptoms of the flu.  Handwashing Tips  Handwashing is one of the best ways to prevent many common infections. If you re caring for or visiting someone with the flu, wash your hands each time you enter and leave the room. Follow these steps:    Use warm water and plenty of soap. Rub your hands together well.    Clean the whole hand, under your nails, between your fingers, and up the wrists.    Wash for at least 15 seconds.    Rinse, letting the water run down your fingers, not up your wrists.    Dry your hands well. Use a paper towel to turn off the faucet and open the door.  Using Alcohol-Based Hand   Alcohol-based hand  are also a good choice. Use them when you don t have access to soap and water. Follow these steps:    Squeeze about a tablespoon of gel into the palm of one hand.    Rub your hands together briskly, cleaning the backs of your hands, the palms, between your fingers, and up the wrists.    Rub until the gel is gone and your hands are completely dry.  Preventing Influenza in Healthcare Settings  The flu is a special concern for people in hospitals and long-term care facilities. To help prevent the spread of flu, many  hospitals and nursing homes take these steps:    Health care providers wash their hands or use an alcohol-based hand  before and after treating each patient.    People with the flu have private rooms and bathrooms or share a room with someone with the same infection.    High-risk patients who don t have the flu are encouraged to get the flu and pneumonia vaccines.    All health care workers are encouraged or required to get flu shots.        [id number] [employer]. [address] [place] [address], [place] [id number]. All rights reserved. This information is not intended as a substitute for professional medical care. Always follow your healthcare professional's instructions.

## 2017-04-11 NOTE — NURSING NOTE
Chief Complaint   Patient presents with     Pharyngitis       Initial /76 (BP Location: Left arm, Patient Position: Chair, Cuff Size: Adult Regular)  Pulse 86  Temp 98.3  F (36.8  C) (Oral)  Resp 17  Ht 5' (1.524 m)  Wt 153 lb (69.4 kg)  LMP 03/01/2016  SpO2 98%  Breastfeeding? No  BMI 29.88 kg/m2 Estimated body mass index is 29.88 kg/(m^2) as calculated from the following:    Height as of this encounter: 5' (1.524 m).    Weight as of this encounter: 153 lb (69.4 kg).  Medication Reconciliation: complete       Cally Orozco MA

## 2017-04-11 NOTE — PROGRESS NOTES
SUBJECTIVE:                                                    Aleena Escamilla is a 41 year old female who presents to clinic today for the following health issues:      Acute Illness   Acute illness concerns: sore throat   Onset: last night    Fever: YES    Chills/Sweats: YES    Headache (location?): YES    Sinus Pressure:no    Conjunctivitis:  no    Ear Pain: no    Rhinorrhea: YES    Congestion: no     Sore Throat: YES     Cough: no    Wheeze: no    Decreased Appetite: YES    Nausea: no    Vomiting: no    Diarrhea:  no    Dysuria/Freq.: no    Fatigue/Achiness: YES    Sick/Strep Exposure: no     Therapies Tried and outcome: tylenol    Needs note for work for todays visit          Problem list and histories reviewed & adjusted, as indicated.  Additional history: as documented    Patient Active Problem List   Diagnosis     CARDIOVASCULAR SCREENING; LDL GOAL LESS THAN 160     Chronic pelvic pain in female     Chronic salpingitis and oophoritis     Major depressive disorder, recurrent episode, mild (H)     Generalized anxiety disorder     Arthritis, multiple joint involvement     Primary osteoarthritis of both hands     Fibromyalgia     Malignant neoplasm of overlapping sites of right female breast (H)     Chemotherapy-induced neutropenia (H)     BRCA gene positive     Carpal tunnel syndrome     Non morbid drug-induced obesity     Chronic right shoulder pain     Past Surgical History:   Procedure Laterality Date     BIOPSY BREAST NEEDLE LOCALIZATION Right 1/19/2016    Procedure: BIOPSY BREAST NEEDLE LOCALIZATION;  Surgeon: Adelina Demarco MD;  Location:  OR     GYN SURGERY  2-    bilateral salpingectomy, left oophrectomy     LAPAROSCOPIC SALPINGO-OOPHORECTOMY Right 10/26/2016    Procedure: LAPAROSCOPIC SALPINGO-OOPHORECTOMY;  Surgeon: Bouchra Mayo MD;  Location: [facility] OR     LUMPECTOMY BREAST WITH SENTINEL NODE, COMBINED Right 1/19/2016    Procedure: COMBINED LUMPECTOMY BREAST WITH SENTINEL NODE;   Surgeon: Adelina Demarco MD;  Location: [facility] OR     PELVIS LAPAROSCOPY,DX  12/28/1998     RELEASE CARPAL TUNNEL Left 12/23/2016    Procedure: RELEASE CARPAL TUNNEL;  Surgeon: Sam Florence MD;  Location: [facility] OR       Social History   Substance Use Topics     Smoking status: Never Smoker     Smokeless tobacco: Never Used     Alcohol use Yes      Comment: 1 drink per week.     Family History   Problem Relation Age of Onset     DIABETES Mother      Hypertension Mother      CANCER Maternal Grandmother      cervical CA     Cardiovascular Father      MI     CANCER Maternal Aunt      cervical cancer         Current Outpatient Prescriptions   Medication Sig Dispense Refill     venlafaxine (EFFEXOR-XR) 150 MG 24 hr capsule Take 1 capsule (150 mg) by mouth daily 30 capsule 2     tiZANidine (ZANAFLEX) 4 MG tablet Take 1-2 tablets (4-8 mg) by mouth 3 times daily as needed for muscle spasms 30 tablet 1     letrozole (FEMARA) 2.5 MG tablet Take 1 tablet (2.5 mg) by mouth daily 90 tablet 3     acetaminophen (TYLENOL) 325 MG tablet Take 2 tablets (650 mg) by mouth every 4 hours as needed for other (mild pain) 100 tablet 0     Allergies   Allergen Reactions     Latex Rash     Recent Labs   Lab Test  03/16/17   1030  12/19/16   1021  12/13/16   0914  11/08/16   1552  10/26/16   0753  09/27/16   1212  07/21/16   1049   11/07/11   1226  10/17/11   0809  01/07/11   0834   05/19/09   1202   A1C   --   5.7   --    --    --    --    --    --    --    --    --    --    --    LDL   --   82   --    --    --    --    --    --    --   86  116   < >   --    HDL   --   30*   --    --    --    --    --    --    --   33*  36*   < >   --    TRIG   --   301*   --    --    --    --    --    --    --   212*  127   < >   --    ALT  76*   --   49  71*   --   70*  115*   < >   --    --    --    --    --    CR   --    --    --    --    --   0.55  0.54   < >   --    --    --    --    --    GFRESTIMATED   --    --    --    --    --   >90  Non   American GFR Calc    >90  Non  GFR Calc     < >   --    --    --    --    --    GFRESTBLACK   --    --    --    --    --   >90   GFR Calc    >90   GFR Calc     < >   --    --    --    --    --    POTASSIUM   --    --    --    --   3.4  3.7  3.8   < >   --    --    --    --    --    TSH   --    --    --    --    --    --    --    --   0.83   --    --    --   0.90    < > = values in this interval not displayed.      BP Readings from Last 3 Encounters:   04/11/17 113/76   03/16/17 110/75   03/06/17 111/75    Wt Readings from Last 3 Encounters:   04/11/17 153 lb (69.4 kg)   03/16/17 155 lb (70.3 kg)   03/06/17 154 lb (69.9 kg)                  Labs reviewed in EPIC    Reviewed and updated as needed this visit by clinical staff       Reviewed and updated as needed this visit by Provider         ROS:  Constitutional, HEENT, cardiovascular, pulmonary, gi and gu systems are negative, except as otherwise noted.    OBJECTIVE:                                                    /76 (BP Location: Left arm, Patient Position: Chair, Cuff Size: Adult Regular)  Pulse 86  Temp 98.3  F (36.8  C) (Oral)  Resp 17  Ht 5' (1.524 m)  Wt 153 lb (69.4 kg)  LMP 03/01/2016  SpO2 98%  Breastfeeding? No  BMI 29.88 kg/m2  Body mass index is 29.88 kg/(m^2).  GENERAL: mild acutely ill, alert and no distress  EYES: Eyes grossly normal to inspection, PERRL and conjunctivae and sclerae normal  HENT: ear canals and TM's normal, nose and mouth without ulcers or lesions, throat is erythematous without exudate.   NECK: anterior cervical adenopathy, no asymmetry, masses, or scars and thyroid normal to palpation  RESP: lungs clear to auscultation - no rales, rhonchi or wheezes  CV: regular rate and rhythm, normal S1 S2, no S3 or S4, no murmur, click or rub, no peripheral edema and peripheral pulses strong  ABDOMEN: soft, nontender, no hepatosplenomegaly, no masses and bowel sounds normal  MS: no  gross musculoskeletal defects noted, no edema  SKIN: no suspicious lesions or rashes  NEURO: Normal strength and tone, mentation intact and speech normal  PSYCH: mentation appears normal, affect normal/bright     Diagnostic Test Results:  Results for orders placed or performed in visit on 04/11/17 (from the past 24 hour(s))   Strep, Rapid Screen   Result Value Ref Range    Specimen Description Throat     Rapid Strep A Screen       NEGATIVE: No Group A streptococcal antigen detected by immunoassay, await   culture report.      Micro Report Status FINAL 04/11/2017         ASSESSMENT/PLAN:                                                        BMI:   Estimated body mass index is 29.88 kg/(m^2) as calculated from the following:    Height as of this encounter: 5' (1.524 m).    Weight as of this encounter: 153 lb (69.4 kg).   Weight management plan: Discussed healthy diet and exercise guidelines and patient will follow up in 6 months in clinic to re-evaluate.        ICD-10-CM    1. Throat pain R07.0 Strep, Rapid Screen- negative      Beta strep group A culture   2. Influenza-like illness R69 Influenza A/B antigen pending. Symptomatic treatment with rest, fluids and ibuprofen or tylenol as needed. No OTC cold medications recommended. Call if symptoms worse or do not improve for further evaluation and treatment. Off work for 2 days then back to work if feeling better and screening tests negative.       See Patient Instructions    Concepcion Bocanegra MD  [facility]

## 2017-04-11 NOTE — LETTER
[address] [address]  Phone: [phone]    April 11, 2017        Aleena Escamilla  [address] [facility] [address]          To whom it may concern:    RE: Aleena Escamilla    Patient may return to work 4- if feeling better, with the following:  No working or lifting restrictions. Seen for an acute infection and is contagious at this time.    Please contact me for questions or concerns.      Sincerely,        Concepcion Bocanegra MD

## 2017-04-12 ASSESSMENT — PATIENT HEALTH QUESTIONNAIRE - PHQ9: SUM OF ALL RESPONSES TO PHQ QUESTIONS 1-9: 3

## 2017-04-12 ASSESSMENT — ANXIETY QUESTIONNAIRES: GAD7 TOTAL SCORE: 0

## 2017-04-12 NOTE — PROGRESS NOTES
Dear Aleena    Your test results are attached. I am happy to let you know that they are stable.    The influenza tests were negative. Strep culture for confirmation of negative strep screen is pending.     Please contact me by [employer] if you have any questions about your labs or management.    Concepcion Bocanegra MD

## 2017-04-13 LAB
BACTERIA SPEC CULT: NORMAL
MICRO REPORT STATUS: NORMAL
SPECIMEN SOURCE: NORMAL

## 2017-05-08 ENCOUNTER — OFFICE VISIT (OUTPATIENT)
Dept: FAMILY MEDICINE | Facility: CLINIC | Age: 42
End: 2017-05-08
Payer: COMMERCIAL

## 2017-05-08 VITALS
TEMPERATURE: 98.4 F | HEART RATE: 76 BPM | SYSTOLIC BLOOD PRESSURE: 112 MMHG | OXYGEN SATURATION: 97 % | WEIGHT: 156 LBS | BODY MASS INDEX: 30.63 KG/M2 | HEIGHT: 60 IN | DIASTOLIC BLOOD PRESSURE: 79 MMHG

## 2017-05-08 DIAGNOSIS — M21.611 BUNION, RIGHT: ICD-10-CM

## 2017-05-08 DIAGNOSIS — F33.0 MAJOR DEPRESSIVE DISORDER, RECURRENT EPISODE, MILD (H): ICD-10-CM

## 2017-05-08 DIAGNOSIS — Z85.3 PERSONAL HISTORY OF MALIGNANT NEOPLASM OF BREAST: ICD-10-CM

## 2017-05-08 DIAGNOSIS — Z01.818 PREOP GENERAL PHYSICAL EXAM: Primary | ICD-10-CM

## 2017-05-08 DIAGNOSIS — H69.91 DYSFUNCTION OF EUSTACHIAN TUBE, RIGHT: ICD-10-CM

## 2017-05-08 PROCEDURE — 99214 OFFICE O/P EST MOD 30 MIN: CPT | Performed by: FAMILY MEDICINE

## 2017-05-08 PROCEDURE — 93000 ELECTROCARDIOGRAM COMPLETE: CPT | Performed by: FAMILY MEDICINE

## 2017-05-08 ASSESSMENT — PAIN SCALES - GENERAL: PAINLEVEL: MILD PAIN (3)

## 2017-05-08 NOTE — PROGRESS NOTES
[address] [address]  [phone]  Dept: [phone]    PRE-OP EVALUATION:  Today's date: 2017    Aleena Escamilla (: 1975) presents for pre-operative evaluation assessment as requested by Dr. Chele Jennings.  She requires evaluation and anesthesia risk assessment prior to undergoing surgery/procedure for treatment of Right foot .  Proposed procedure: Bunion Correction Right Foot    Date of Surgery/ Procedure: 17  Time of Surgery/ Procedure: 7:15[facility] [place]/Surgical Facility: [facility]  Fax number for surgical facility: [phone]  Primary Physician: Concepcion Bocanegra  Type of Anesthesia Anticipated: unknown    Patient has a Health Care Directive or Living Will:  NO    1. NO - Do you have a history of heart attack, stroke, stent, bypass or surgery on an artery in the head, neck, heart or legs?  2. NO - Do you ever have any pain or discomfort in your chest?  3. NO - Do you have a history of  Heart Failure?  4. NO - Are you troubled by shortness of breath when: walking on the level, up a slight hill or at night?  5. YES - Do you currently have a cold, bronchitis or other respiratory infection? Right ear pain since last cold.   6. NO - Do you have a cough, shortness of breath or wheezing?  7. NO - Do you sometimes get pains in the calves of your legs when you walk?  8. NO - Do you or anyone in your family have previous history of blood clots?  9. NO - Do you or does anyone in your family have a serious bleeding problem such as prolonged bleeding following surgeries or cuts?  10. NO - Have you ever had problems with anemia or been told to take iron pills?  11. NO - Have you had any abnormal blood loss such as black, tarry or bloody stools, or abnormal vaginal bleeding?  12. NO - Have you ever had a blood transfusion?  13. NO - Have you or any of your relatives ever had problems with anesthesia?  14. NO - Do you  have sleep apnea, excessive snoring or daytime drowsiness?  15. NO - Do you have any prosthetic heart valves?  16. NO - Do you have prosthetic joints?  17. NO - Is there any chance that you may be pregnant?      HPI:                                                      Brief HPI related to upcoming procedure: right foot bunion with pain worse after going through chemotherapy.       DEPRESSION - Patient has a long history of Depression of moderate severity requiring medication for control with recent symptoms being unchanged..Current symptoms of depression include none.                                                                                                                                                                                    .    MEDICAL HISTORY:                                                      Patient Active Problem List    Diagnosis Date Noted     Chronic right shoulder pain 03/06/2017     Priority: Medium     Non morbid drug-induced obesity 01/09/2017     Priority: Medium     Carpal tunnel syndrome 11/23/2016     Priority: Medium     BRCA gene positive 10/21/2016     Priority: Medium     Chemotherapy-induced neutropenia (H) 06/24/2016     Priority: Medium     Malignant neoplasm of overlapping sites of right female breast (H) 12/18/2015     Priority: Medium     Major depressive disorder, recurrent episode, mild (H) 07/31/2015     Priority: Medium     Generalized anxiety disorder 07/31/2015     Priority: Medium     Diagnosis updated by automated process. Provider to review and confirm.       Arthritis, multiple joint involvement 07/31/2015     Priority: Medium     Primary osteoarthritis of both hands 07/31/2015     Priority: Medium     Fibromyalgia 07/31/2015     Priority: Medium     Chronic pelvic pain in female 01/31/2011     Priority: Medium     Chronic salpingitis and oophoritis 01/31/2011     Priority: Medium     CARDIOVASCULAR SCREENING; LDL GOAL LESS THAN 160 10/31/2010     Priority:  Medium      Past Medical History:   Diagnosis Date     Chronic pelvic pain in female      Malignant neoplasm of right breast, stage 2 (H) 1/2016    1/4 lymph nodes positive, Oncotype DX = 25.  Chemotherapy and radiation. Letrozole.     Migraine headaches      Past Surgical History:   Procedure Laterality Date     BIOPSY BREAST NEEDLE LOCALIZATION Right 1/19/2016    Procedure: BIOPSY BREAST NEEDLE LOCALIZATION;  Surgeon: Adelina Demarco MD;  Location: [facility] OR     GYN SURGERY  2-    bilateral salpingectomy, left oophrectomy     LAPAROSCOPIC SALPINGO-OOPHORECTOMY Right 10/26/2016    Procedure: LAPAROSCOPIC SALPINGO-OOPHORECTOMY;  Surgeon: Bouchra Mayo MD;  Location: [facility] OR     LUMPECTOMY BREAST WITH SENTINEL NODE, COMBINED Right 1/19/2016    Procedure: COMBINED LUMPECTOMY BREAST WITH SENTINEL NODE;  Surgeon: Adelina Demarco MD;  Location: [facility] OR     PELVIS LAPAROSCOPY,DX  12/28/1998     RELEASE CARPAL TUNNEL Left 12/23/2016    Procedure: RELEASE CARPAL TUNNEL;  Surgeon: Sam Florence MD;  Location: [facility] OR     Current Outpatient Prescriptions   Medication Sig Dispense Refill     venlafaxine (EFFEXOR-XR) 150 MG 24 hr capsule Take 1 capsule (150 mg) by mouth daily 30 capsule 2     letrozole (FEMARA) 2.5 MG tablet Take 1 tablet (2.5 mg) by mouth daily 90 tablet 3     acetaminophen (TYLENOL) 325 MG tablet Take 2 tablets (650 mg) by mouth every 4 hours as needed for other (mild pain) 100 tablet 0     OTC products: None, except as noted above    Allergies   Allergen Reactions     Latex Rash      Latex Allergy: YES: Precautions to take: No latex, causes rash    Social History   Substance Use Topics     Smoking status: Never Smoker     Smokeless tobacco: Never Used     Alcohol use Yes      Comment: 1 drink per week.     History   Drug Use No       REVIEW OF SYSTEMS:                                                    Constitutional, neuro, ENT, endocrine, pulmonary, cardiac, gastrointestinal,  genitourinary, musculoskeletal, integument and psychiatric systems are negative, except as otherwise noted.    EXAM:                                                    /79 (BP Location: Left arm, Patient Position: Chair, Cuff Size: Adult Regular)  Pulse 76  Temp 98.4  F (36.9  C) (Oral)  Ht 5' (1.524 m)  Wt 156 lb (70.8 kg)  LMP 03/01/2016  SpO2 97%  Breastfeeding? No  BMI 30.47 kg/m2    GENERAL APPEARANCE: healthy, alert and no distress     EYES: EOMI, PERRL     HENT: ear canals and TM's normal and nose and mouth without ulcers or lesions     NECK: no adenopathy, no asymmetry, masses, or scars and thyroid normal to palpation     RESP: lungs clear to auscultation - no rales, rhonchi or wheezes     CV: regular rates and rhythm, normal S1 S2, no S3 or S4 and no murmur, click or rub     ABDOMEN:  soft, nontender, no HSM or masses and bowel sounds normal     MS: extremities normal- no gross deformities noted, no evidence of inflammation in joints, FROM in all extremities.     SKIN: no suspicious lesions or rashes     NEURO: Normal strength and tone, sensory exam grossly normal, mentation intact and speech normal     PSYCH: mentation appears normal. and affect normal/bright     LYMPHATICS: No axillary, cervical, or supraclavicular nodes    DIAGNOSTICS:                                                    EKG: appears normal, NSR, normal axis, normal intervals, no acute ST/T changes c/w ischemia, no LVH by voltage criteria, unchanged from previous tracings    Recent Labs   Lab Test  03/16/17   1030  12/19/16   1021  10/26/16   0753  09/27/16   1212  07/21/16   1049   HGB  14.5   --   13.8  13.7  11.8   PLT  282   --    --   290  443   NA   --    --    --   139  139   POTASSIUM   --    --   3.4  3.7  3.8   CR   --    --    --   0.55  0.54   A1C   --   5.7   --    --    --         IMPRESSION:                                                    Reason for surgery/procedure: Bilateral Bunions with worse on right  foot/ Bunionectomy Right Foot  Diagnosis/reason for consult: cardiac and anesthesia risk assessment     The proposed surgical procedure is considered INTERMEDIATE risk.    REVISED CARDIAC RISK INDEX  The patient has the following serious cardiovascular risks for perioperative complications such as (MI, PE, VFib and 3  AV Block):  No serious cardiac risks  INTERPRETATION: 0 risks: Class I (very low risk - 0.4% complication rate)    The patient has the following additional risks for perioperative complications:  No identified additional risks      ICD-10-CM    1. Preop general physical exam Z01.818 EKG 12-lead complete w/read - Clinics   2. Bunion, right M21.611 Approved for surgery and anesthesia   3. Personal history of malignant neoplasm of breast Z85.3 Recovered and follow up every 6 months   4. Major depressive disorder, recurrent episode, mild (H) F33.0 Stable        RECOMMENDATIONS:                                                          --Patient is to take all scheduled medications on the day of surgery EXCEPT for modifications listed below.    APPROVAL GIVEN to proceed with proposed procedure, without further diagnostic evaluation       Signed Electronically by: Concepcion Bocanegra MD    Copy of this evaluation report is provided to requesting physician.    [facility] Preop Guidelines

## 2017-05-08 NOTE — MR AVS SNAPSHOT
After Visit Summary   5/8/2017    Aleena Escamilla    MRN: [id number]           Patient Information     Date Of Birth          1975        Visit Information        Provider Department      5/8/2017 9:40 AM Concepcion Bocanegra MD [facility]        Today's Diagnoses     Preop general physical exam    -  1    Bunion, right        Personal history of malignant neoplasm of breast        Major depressive disorder, recurrent episode, mild (H)        Dysfunction of eustachian tube, right          Care Instructions      Before Your Surgery      Call your surgeon if there is any change in your health. This includes signs of a cold or flu (such as a sore throat, runny nose, cough, rash or fever).    Do not smoke, drink alcohol or take over the counter medicine (unless your surgeon or primary care doctor tells you to) for the 24 hours before and after surgery.    If you take prescribed drugs: Follow your doctor s orders about which medicines to take and which to stop until after surgery.    Eating and drinking prior to surgery: follow the instructions from your surgeon    Take a shower or bath the night before surgery. Use the soap your surgeon gave you to gently clean your skin. If you do not have soap from your surgeon, use your regular soap. Do not shave or scrub the surgery site.  Wear clean pajamas and have clean sheets on your bed.         Follow-ups after your visit        Additional Services     OTOLARYNGOLOGY REFERRAL       Your provider has referred you to: [facility]: [facility] [phone]   [email or web address]    Please be aware that coverage of these services is subject to the terms and limitations of your health insurance plan.  Call member services at your health plan with any benefit or coverage questions.      Please bring the following with you to your appointment:    (1) Any X-Rays, CTs or MRIs which have been  performed.  Contact the facility where they were done to arrange for  prior to your scheduled appointment.   (2) List of current medications  (3) This referral request   (4) Any documents/labs given to you for this referral                  Follow-up notes from your care team     Return in about 6 months (around 11/8/2017) for Routine Visit.      Your next 10 appointments already scheduled     May 19, 2017  9:40 AM CDT   Clau Donahue with Concepcion Bocanegra MD   [facility] ([facility])    [address] [place] [id number] [phone]            Jun 22, 2017  8:00 AM CDT   MA DIAGNOSTIC DIGITAL BILATERAL with MGMA2, MG [facility])    [address] [place] [id number] [phone]           Do not use any powder, lotion or deodorant under your arms or on your breast. If you do, we will ask you to remove it before your exam.  Wear comfortable, two-piece clothing.  If you have any allergies, tell your care team.  Bring any previous mammograms from other facilities or have them mailed to the breast center.            Jun 22, 2017  8:20 AM CDT   US BREAST MANDEEP CMPL 4 QUAD with MGMUS1, MG [facility] ([facility])    [id number] [place] [id number] [phone]           Please bring a list of your medicines (including vitamins, minerals and over-the-counter drugs). Also, tell your doctor about any allergies you may have. Wear comfortable clothes and leave your valuables at home.  You do not need to do anything special to prepare for your exam.  Please call the Imaging Department at your exam site with any questions.            Jun 22, 2017  9:00 AM CDT   CT CHEST W/O & W CONTRAST with MGCT1   [facility])    [address] [address] [place] [id number] [phone]            Please bring any scans or X-rays taken at other hospitals, if similar tests were done. Also bring a list of your medicines, including vitamins, minerals and over-the-counter drugs. It is safest to leave personal items at home.  Be sure to tell your doctor:   If you have any allergies.   If there s any chance you are pregnant.   If you are breastfeeding.   If you have any special needs.  You will have contrast for this exam. To prepare:   Do not eat or drink for 2 hours before your exam. If you need to take medicine, you may take it with small sips of water. (We may ask you to take liquid medicine as well.)   The day before your exam, drink extra fluids at least six 8-ounce glasses (unless your doctor tells you to restrict your fluids).  Patients over 70 or patients with diabetes or kidney problems:   If you haven t had a blood test (creatinine test) within the last 30 days, go to your clinic or Diagnostic Imaging Department for this test.  If you have diabetes:   If your kidney function is normal, continue taking your metformin (Avandamet, Glucophage, Glucovance, Metaglip) on the day of your exam.   If your kidney function is abnormal, wait 48 hours before restarting this medicine.  Please wear loose clothing, such as a sweat suit or jogging clothes. Avoid snaps, zippers and other metal. We may ask you to undress and put on a hospital gown.  If you have any questions, please call the Imaging Department where you will have your exam.            Jun 22, 2017  9:40 AM CDT   LAB with LAB FIRST FLOOR [facility] ([facility])    [id number] [place] [id number] [phone]           Patient must bring picture ID.  Patient should be prepared to give a urine specimen  Please do not eat 10-12 hours before your appointment if you are coming in fasting for labs on lipids, cholesterol, or glucose (sugar).  Pregnant women should follow their Care Team instructions.  Water with medications is okay. Do not drink coffee or other fluids.   If you have concerns about taking  your medications, please ask at office or if scheduling via WearYouWant, send a message by clicking on Secure Messaging, Message Your Care Team.            Jun 27, 2017 11:00 AM CDT   Return Visit with Bessy Tate MD   [facility] ([facility])    [address] [place] [id number] [phone]            Jul 21, 2017  9:40 AM CDT   MyChart Long with Concepcion Bocanegra MD   [facility] ([facility])    [address] [facility] [address] [phone]              Who to contact     If you have questions or need follow up information about today's clinic visit or your schedule please contact [facility] directly at [phone].  Normal or non-critical lab and imaging results will be communicated to you by IDbyMEhart, letter or phone within 4 business days after the clinic has received the results. If you do not hear from us within 7 days, please contact the clinic through SpiderOakt or phone. If you have a critical or abnormal lab result, we will notify you by phone as soon as possible.  Submit refill requests through WearYouWant or call your pharmacy and they will forward the refill request to us. Please allow 3 business days for your refill to be completed.          Additional Information About Your Visit        IDbyMEharMiroi Information     WearYouWant gives you secure access to your electronic health record. If you see a primary care provider, you can also send messages to your care team and make appointments. If you have questions, please call your primary care clinic.  If you do not have a primary care provider, please call [phone] and they will assist you.        Care EveryWhere ID     This is your Care EveryWhere ID. This could be used by other organizations to access your [facility]  records  [id number]        Your Vitals Were     Pulse Temperature Height Last Period Pulse Oximetry Breastfeeding?    76 98.4  F (36.9  C) (Oral) 5' (1.524 m) 03/01/2016 97% No    BMI (Body Mass Index)                   30.47 kg/m2            Blood Pressure from Last 3 Encounters:   05/08/17 112/79   04/11/17 113/76   03/16/17 110/75    Weight from Last 3 Encounters:   05/08/17 156 lb (70.8 kg)   04/11/17 153 lb (69.4 kg)   03/16/17 155 lb (70.3 kg)              We Performed the Following     EKG 12-lead complete w/read - Clinics     OTOLARYNGOLOGY REFERRAL        Primary Care Provider Office Phone # Fax #    Concepcion Bocanegra [phone]       [facility] [address]  [facility] [address]        Thank you!     Thank you for choosing [facility]  for your care. Our goal is always to provide you with excellent care. Hearing back from our patients is one way we can continue to improve our services. Please take a few minutes to complete the written survey that you may receive in the mail after your visit with us. Thank you!             Your Updated Medication List - Protect others around you: Learn how to safely use, store and throw away your medicines at www.disposemymeds.org.          This list is accurate as of: 5/8/17 10:35 AM.  Always use your most recent med list.                   Brand Name Dispense Instructions for use    acetaminophen 325 MG tablet    TYLENOL    100 tablet    Take 2 tablets (650 mg) by mouth every 4 hours as needed for other (mild pain)       letrozole 2.5 MG tablet    FEMARA    90 tablet    Take 1 tablet (2.5 mg) by mouth daily       venlafaxine 150 MG 24 hr capsule    EFFEXOR-XR    30 capsule    Take 1 capsule (150 mg) by mouth daily

## 2017-05-08 NOTE — NURSING NOTE
Chief Complaint   Patient presents with     Pre-Op Exam       Initial /79 (BP Location: Left arm, Patient Position: Chair, Cuff Size: Adult Regular)  Pulse 76  Temp 98.4  F (36.9  C) (Oral)  Ht 5' (1.524 m)  Wt 156 lb (70.8 kg)  LMP 03/01/2016  SpO2 97%  Breastfeeding? No  BMI 30.47 kg/m2 Estimated body mass index is 30.47 kg/(m^2) as calculated from the following:    Height as of this encounter: 5' (1.524 m).    Weight as of this encounter: 156 lb (70.8 kg).  Medication Reconciliation: complete     Fernando Almaraz CMA

## 2017-06-22 ENCOUNTER — RADIANT APPOINTMENT (OUTPATIENT)
Dept: CT IMAGING | Facility: CLINIC | Age: 42
End: 2017-06-22
Attending: INTERNAL MEDICINE
Payer: COMMERCIAL

## 2017-06-22 ENCOUNTER — RADIANT APPOINTMENT (OUTPATIENT)
Dept: MAMMOGRAPHY | Facility: CLINIC | Age: 42
End: 2017-06-22
Attending: INTERNAL MEDICINE
Payer: COMMERCIAL

## 2017-06-22 DIAGNOSIS — C50.811 MALIGNANT NEOPLASM OF OVERLAPPING SITES OF RIGHT FEMALE BREAST (H): ICD-10-CM

## 2017-06-22 DIAGNOSIS — R91.1 LUNG NODULE: ICD-10-CM

## 2017-06-22 LAB
ALBUMIN SERPL-MCNC: 3.6 G/DL (ref 3.4–5)
ALP SERPL-CCNC: 100 U/L (ref 40–150)
ALT SERPL W P-5'-P-CCNC: 62 U/L (ref 0–50)
AST SERPL W P-5'-P-CCNC: 28 U/L (ref 0–45)
BILIRUB DIRECT SERPL-MCNC: <0.1 MG/DL (ref 0–0.2)
BILIRUB SERPL-MCNC: 0.3 MG/DL (ref 0.2–1.3)
PROT SERPL-MCNC: 7.1 G/DL (ref 6.8–8.8)

## 2017-06-22 PROCEDURE — G0206 DX MAMMO INCL CAD UNI: HCPCS | Mod: RT | Performed by: RADIOLOGY

## 2017-06-22 PROCEDURE — 71260 CT THORAX DX C+: CPT | Performed by: RADIOLOGY

## 2017-06-22 PROCEDURE — 36415 COLL VENOUS BLD VENIPUNCTURE: CPT | Performed by: INTERNAL MEDICINE

## 2017-06-22 PROCEDURE — 80076 HEPATIC FUNCTION PANEL: CPT | Performed by: INTERNAL MEDICINE

## 2017-06-22 RX ORDER — IOPAMIDOL 755 MG/ML
77 INJECTION, SOLUTION INTRAVASCULAR ONCE
Status: COMPLETED | OUTPATIENT
Start: 2017-06-22 | End: 2017-06-22

## 2017-06-22 RX ADMIN — IOPAMIDOL 77 ML: 755 INJECTION, SOLUTION INTRAVASCULAR at 09:12

## 2017-07-13 ENCOUNTER — ONCOLOGY VISIT (OUTPATIENT)
Dept: ONCOLOGY | Facility: CLINIC | Age: 42
End: 2017-07-13
Payer: COMMERCIAL

## 2017-07-13 VITALS
WEIGHT: 156 LBS | DIASTOLIC BLOOD PRESSURE: 70 MMHG | OXYGEN SATURATION: 97 % | BODY MASS INDEX: 30.63 KG/M2 | HEART RATE: 83 BPM | RESPIRATION RATE: 15 BRPM | SYSTOLIC BLOOD PRESSURE: 96 MMHG | TEMPERATURE: 97.4 F | HEIGHT: 60 IN

## 2017-07-13 DIAGNOSIS — C50.811 MALIGNANT NEOPLASM OF OVERLAPPING SITES OF RIGHT BREAST IN FEMALE, ESTROGEN RECEPTOR POSITIVE (H): Primary | ICD-10-CM

## 2017-07-13 DIAGNOSIS — Z17.0 MALIGNANT NEOPLASM OF OVERLAPPING SITES OF RIGHT BREAST IN FEMALE, ESTROGEN RECEPTOR POSITIVE (H): Primary | ICD-10-CM

## 2017-07-13 DIAGNOSIS — R91.1 LUNG NODULE: ICD-10-CM

## 2017-07-13 DIAGNOSIS — N95.1 MENOPAUSAL SYNDROME (HOT FLASHES): ICD-10-CM

## 2017-07-13 PROCEDURE — 99214 OFFICE O/P EST MOD 30 MIN: CPT | Performed by: INTERNAL MEDICINE

## 2017-07-13 RX ORDER — VENLAFAXINE HYDROCHLORIDE 150 MG/1
150 CAPSULE, EXTENDED RELEASE ORAL DAILY
Qty: 90 CAPSULE | Refills: 1 | Status: SHIPPED | OUTPATIENT
Start: 2017-07-13 | End: 2017-11-16

## 2017-07-13 ASSESSMENT — PAIN SCALES - GENERAL: PAINLEVEL: NO PAIN (0)

## 2017-07-13 NOTE — PROGRESS NOTES
ONCOLOGY DIAGNOSIS:   1. January 2016: Diagnosed with stage II multifocal invasive lobular carcinoma of the right breast. Final pathology showed a grade 2 with the first foci measuring 36 x 16 x 8 mm and the second one 21 x 14 x 11 mm. LCIS classical type present, lymphovascular invasion not identified. Margins negative, estrogen, progesterone receptor positive by IHC and HER-2 not amplified performed on a core biopsy. 1/4 lymph nodes positive with the greatest dimension being 10 mm. No extranodal extension. Oncotype DX recurrence score 25.   2. Genetics: Variant of Uncertain Significance in BRCA2 gene.       THERAPY TO DATE:   1. January 19, 2016: Right breast partial mastectomy with wire localization and right axillary sentinel node biopsy.   2. February 2016 to June 2016. Weekly Taxol followed by dose-dense AC.    3.  August 2016 to September 2016. Radiotherapy.    4. October 26, 2016: Laparoscopic right oophorectomy, biopsy of pelvic nodule, removal of a portion of the right fallopian tube. Pathology: right pelvic nodule showed fibrous nodule with calcifications, endosalpinosis. Right ovary with serous cystadenoma, benign epithelial inclusion cyst, negative for malignancy. A portion of the right fallopian tube with no significant histologic abnormality.   5. May 2016 to Present. Letrozole.      INTERVAL HISTORY:  Aleena Escamilla is a 41-year-old female diagnosed with pT2 N1a MX multifocal invasive lobular carcinoma of the right breast in 01/2016 after self-palpating a right breast mass.  Patient presents to clinic for followup of malignancy. Since I last saw patient she had right foot bunion removed in June. Continues to have left breast pain. Evaluated in past with imaging which was negative. States pain is persistent. Nothing makes it worse or takes anything for it. Hot flashes improved with Effexor at 150 mg a day. No myalgias or arthralgias. Did have mammogram in June which showed coarse calcifications.  Recommended short interval mammogram.   Denies any fevers, chills, nausea, vomiting, chest pain, shortness of breath, cough.  No abdominal discomfort.  No new palpable masses and the remainder of a comprehensive review of systems is negative.      PAST MEDICAL HISTORY: (No new medical history since last visit per patient.)    1. Multifocal right breast cancer.   2. History of migraines.   3. History of chronic pelvic pain.   4. Bilateral carpal tunnel       PAST SURGICAL HISTORY: (Updated)  1. Right breast lumpectomy 01/2016.   2. Pelvic laparoscopy.   3. Bilateral salpingectomy, left oophorectomy 02/2001 for pelvic pain, uterus intact.   4. 10/2016, right oophorectomy.   5. 11/2016, right carpal tunnel release.   6. Right foot bunion removed.     ALLERGIES: No known drug allergies.         SOCIAL HISTORY:  , comes in alone today.  Has 2 children.  No current tobacco use.      PHYSICAL EXAMINATION:     VITAL SIGNS:  BP 96/70  Pulse 83  Temp 97.4  F (36.3  C)  Resp 15  Ht 1.524 m (5')  Wt 70.8 kg (156 lb)  LMP 03/01/2016  SpO2 97%  BMI 30.47 kg/m2   GENERAL:  Comfortable, no acute distress, pleasant.   HEENT:  Atraumatic, normocephalic.  Pupils equal, round, reactive.  Sclerae anicteric.  Oropharynx:  Moist mucous membranes.  No lesions, ulcers or exudate.   NECK:  Supple, full range of motion.  Trachea midline.   HEART:  Regular rate and rhythm, normal S1, S2, no murmurs, gallop.  No edema.   LUNGS:  Clear to auscultation bilaterally.  No crackles or wheezes.  Normal respiratory effort.   ABDOMEN:  Positive bowel sounds.  Soft, nontender, nondistended.  No hepatosplenomegaly.   EXTREMITIES:  No cyanosis, warm.   MUSCULOSKELETAL:  No point tenderness.   LYMPHATICS:  No cervical, supraclavicular, axillary nodes palpable.   SKIN:  No petechiae or rashes.   NEUROLOGIC:  Alert and oriented.   BREASTS:  Examined in the upright and supine position, no dominant masses or tenderness.        LABORATORY DATA:      6/22/2017   Albumin 3.6   Protein Total 7.1   Bilirubin Total 0.3   AlkPhos 100   ALT 62 (H)   AST 28       Mammogram from 12/2016, in right breast, there is diffuse skin thickening with trabecular thickening related to post-treatment changes.  In the area of lumpectomy, there is a group of amorphous calcifications, probably benign, may represent post-treatment changes, with short interval followup recommended.      Left breast diagnostic mammogram from 03/08/2017, as well as ultrasound, showed 3 cm from nipple a small 7 x 2 x 5 mm interdermal area of hypoechogenicity most compatible with recent inflamed sebaceous cyst, no suspicious finding.      Mammogram (6/22/17): There are grouped coarse heterogeneous calcifications in the right breast 10:00 position 7 cm from the nipple in the posterior aspect of the lumpectomy bed. The largest of these groups measures 3 mm.    No increase in size compared to prior study.  Calcifications have developed slightly more coarse appearance in the interval, supporting benign etiology such as fat necrosis.   Probably Benign Finding-Short Interval Follow-Up Suggested    DEXA scan from 11/2016 showed normal bone mineral density.   CT C/A/P (6/22/17): Progression of right apical reticulation, interlobular septal thickening and nodularity since prior may represent post radiation changes. Recommend follow-up. Significant hepatic steatosis. Asymmetric right breast cutaneous thickening, likely related to radiation therapy.     ASSESSMENT AND PLAN:     1.  Stage II, pT2 N1a MX multifocal invasive lobular carcinoma. Most recent scan shows progression of right apical reticulation thought to be related to radiation.  Most recent mammogram shows grouped course calcifications- recommend short interval follow up. Continue Femara.  Return to clinic in 4 months with LFTs. Repeat mammogram 6 months.   2.  Right Apical Reticulation. Reviewed with Dr. Cordero from Pulmonary. May be related to  radiation. Recommend repeat CT chest in 4 months.    3.  Hot flashes. Improved with higher Effexor dose of 150 mg a day.  4.  BRCA2 variant, undetermined significance.  Continue to follow with genetics once a year.  Both ovaries have been removed. (Not addressed on today's visit.)  5.  Left breast pain  Persistent. Imaging negative. Referral to Dr. Demarco. Recommend avoiding caffeine, alcohol.  Trial of Evening Oil of Primrose.    6.  Myalgias, arthralgias. Improved.      ALBERTO RENEE MD        cc: Concepcion Mayo MD

## 2017-07-13 NOTE — MR AVS SNAPSHOT
After Visit Summary   7/13/2017    Aleena Escamilla    MRN: [id number]           Patient Information     Date Of Birth          1975        Visit Information        Provider Department      7/13/2017 8:30 AM Bessy Tate MD [facility]        Today's Diagnoses     Malignant neoplasm of overlapping sites of right breast in female, estrogen receptor positive (H)    -  1    Lung nodule        Menopausal syndrome (hot flashes)           Follow-ups after your visit        Your next 10 appointments already scheduled     Jul 20, 2017 10:40 AM CDT   Pre-Op physical with Concepcion Bocanegra MD   [facility])    [address] [facility] [address]   [phone]            Nov 09, 2017 10:00 AM CST   Return Visit with Adelina Demarco MD   [facility] ([facility])    [address] [place] [id number] [phone]            Nov 14, 2017 10:45 AM CST   CT CHEST W/O & W CONTRAST with MGCT1   [facility] ([facility])    [address] [place] [address] [phone]           Please bring any scans or X-rays taken at other hospitals, if similar tests were done. Also bring a list of your medicines, including vitamins, minerals and over-the-counter drugs. It is safest to leave personal items at home.  Be sure to tell your doctor:   If you have any allergies.   If there s any chance you are pregnant.   If you are breastfeeding.   If you have any special needs.  You will have contrast for this exam. To prepare:   Do not eat or drink for 2 hours before your exam. If you need to take medicine, you may take it with small sips of water. (We may ask you to take liquid medicine as well.)   The day before your exam, drink extra fluids at least six 8-ounce glasses (unless your doctor tells you to restrict your fluids).  Patients  over 70 or patients with diabetes or kidney problems:   If you haven t had a blood test (creatinine test) within the last 30 days, go to your clinic or Diagnostic Imaging Department for this test.  If you have diabetes:   If your kidney function is normal, continue taking your metformin (Avandamet, Glucophage, Glucovance, Metaglip) on the day of your exam.   If your kidney function is abnormal, wait 48 hours before restarting this medicine.  Please wear loose clothing, such as a sweat suit or jogging clothes. Avoid snaps, zippers and other metal. We may ask you to undress and put on a hospital gown.  If you have any questions, please call the Imaging Department where you will have your exam.            Nov 16, 2017  9:45 AM CST   LAB with LAB ONC [facility])    [address] [place] [address] [phone]           Patient must bring picture ID.  Patient should be prepared to give a urine specimen  Please do not eat 10-12 hours before your appointment if you are coming in fasting for labs on lipids, cholesterol, or glucose (sugar).  Pregnant women should follow their Care Team instructions. Water with medications is okay. Do not drink coffee or other fluids.   If you have concerns about taking  your medications, please ask at office or if scheduling via One-Song, send a message by clicking on Secure Messaging, Message Your Care Team.            Nov 16, 2017 10:30 AM CST   Return Visit with Bessy Tate MD   [facility])    [address] [facility] [address] [phone]            Jan 16, 2018 10:00 AM CST   MA DIAGNOSTIC RIGHT CARLA with MGMA2, MG [facility])    [address] [facility] [address] [phone]           Do not use any powder, lotion or deodorant under your arms or on your breast. If you do, we  "will ask you to remove it before your exam.  Wear comfortable, two-piece clothing.  If you have any allergies, tell your care team.  Bring any previous mammograms from other facilities or have them mailed to the breast center.  Three-dimensional (3D) mammograms are available at [place] locations in [facility] and [place]. Benefits of 3D mammograms include: - Improved rate of cancer detection - Decreases your chance of having to go back for more tests, which means fewer: - \"False-positive\" results (This means that there is an abnormal area but it isn't cancer.) - Invasive testing procedures, such as a biopsy or surgery - Can provide clearer images of the breast if you have dense breast tissue. 3D mammography is an optional exam that anyone can have with a 2D mammogram. It doesn't replace or take the place of a 2D mammogram. 2D mammograms remain an effective screening test for all women.  Not all insurance companies cover the cost of a 3D mammogram. Check with your insurance.            Jan 16, 2018 10:20 AM CST   US BREAST MANDEEP CMPL 4 QUAD with MGMUS1, MG  [place]   [facility] ([facility])    [address] [place] [address] [phone]           Please bring a list of your medicines (including vitamins, minerals and over-the-counter drugs). Also, tell your doctor about any allergies you may have. Wear comfortable clothes and leave your valuables at home.  You do not need to do anything special to prepare for your exam.  Please call the Imaging Department at your exam site with any questions.              Future tests that were ordered for you today     Open Future Orders        Priority Expected Expires Ordered    US Breast Bilateral Complete 4 Quadrants Routine  7/13/2018 7/13/2017    MA Diagnostic Right w/Danial Routine  7/13/2018 7/13/2017    CT Chest w & w/o contrast Routine  7/13/2018 7/13/2017            Who to contact     If you have " questions or need follow up information about today's clinic visit or your schedule please contact [facility] directly at [phone].  Normal or non-critical lab and imaging results will be communicated to you by [employer]art, letter or phone within 4 business days after the clinic has received the results. If you do not hear from us within 7 days, please contact the clinic through [employer] or phone. If you have a critical or abnormal lab result, we will notify you by phone as soon as possible.  Submit refill requests through Voxeet or call your pharmacy and they will forward the refill request to us. Please allow 3 business days for your refill to be completed.          Additional Information About Your Visit        Voxeet Information     Voxeet gives you secure access to your electronic health record. If you see a primary care provider, you can also send messages to your care team and make appointments. If you have questions, please call your primary care clinic.  If you do not have a primary care provider, please call [phone] and they will assist you.      Voxeet is an electronic gateway that provides easy, online access to your medical records. With Voxeet, you can request a clinic appointment, read your test results, renew a prescription or communicate with your care team.     To access your existing account, please contact your [facility] or call [phone] for assistance.        Care EveryWhere ID     This is your Care EveryWhere ID. This could be used by other organizations to access your [place] medical records  [id number]        Your Vitals Were     Pulse Temperature Respirations Height Last Period Pulse Oximetry    83 97.4  F (36.3  C) 15 1.524 m (5') 03/01/2016 97%    BMI (Body Mass Index)                   30.47 kg/m2            Blood Pressure from Last 3 Encounters:   07/13/17 96/70   05/08/17 112/79   04/11/17 113/76    Weight from Last 3  Encounters:   07/13/17 70.8 kg (156 lb)   05/08/17 70.8 kg (156 lb)   04/11/17 69.4 kg (153 lb)                 Where to get your medicines      These medications were sent to [id number] - [place], [place] [facility] [address] [facility] [address]  [address] [facility] [address]  [address], [place] [address]    Hours:  24-hours Phone:  [phone]     venlafaxine 150 MG 24 hr capsule          Primary Care Provider Office Phone # Fax #    Concepcion Tana Bocanegra [phone]       [facility] [address]  [place] [address]        Equal Access to Services     DESHAWN BERG : Hadii jame dorsey hadasho Soomaali, waaxda luqadaha, qaybta kaalmada adeegyada, waxay idiin hayclint chirinos . So [facility] [phone].    ATENCIÓN: Si habla español, tiene a wiley disposición servicios gratuitos de asistencia lingüística. [facility] [phone].    We comply with applicable federal civil rights laws and [place] laws. We do not discriminate on the basis of race, color, national origin, age, disability sex, sexual orientation or gender identity.            Thank you!     Thank you for choosing [facility]  for your care. Our goal is always to provide you with excellent care. Hearing back from our patients is one way we can continue to improve our services. Please take a few minutes to complete the written survey that you may receive in the mail after your visit with us. Thank you!             Your Updated Medication List - Protect others around you: Learn how to safely use, store and throw away your medicines at www.disposemymeds.org.          This list is accurate as of: 7/13/17  9:15 AM.  Always use your most recent med list.                   Brand Name Dispense Instructions for use Diagnosis    acetaminophen 325 MG tablet    TYLENOL    100 tablet    Take 2 tablets (650 mg) by mouth every 4 hours as needed for other (mild pain)    Malignant neoplasm of  overlapping sites of right female breast (H)       letrozole 2.5 MG tablet    FEMARA    90 tablet    Take 1 tablet (2.5 mg) by mouth daily    Malignant neoplasm of overlapping sites of right female breast (H), Use of aromatase inhibitors       venlafaxine 150 MG 24 hr capsule    EFFEXOR-XR    90 capsule    Take 1 capsule (150 mg) by mouth daily    Malignant neoplasm of overlapping sites of right breast in female, estrogen receptor positive (H)

## 2017-09-22 ENCOUNTER — OFFICE VISIT (OUTPATIENT)
Dept: FAMILY MEDICINE | Facility: CLINIC | Age: 42
End: 2017-09-22
Payer: COMMERCIAL

## 2017-09-22 VITALS
HEIGHT: 60 IN | WEIGHT: 157 LBS | HEART RATE: 76 BPM | DIASTOLIC BLOOD PRESSURE: 76 MMHG | SYSTOLIC BLOOD PRESSURE: 109 MMHG | BODY MASS INDEX: 30.82 KG/M2 | TEMPERATURE: 98.6 F | OXYGEN SATURATION: 96 %

## 2017-09-22 DIAGNOSIS — M21.611 BUNION, RIGHT: ICD-10-CM

## 2017-09-22 DIAGNOSIS — E55.9 VITAMIN D DEFICIENCY: ICD-10-CM

## 2017-09-22 DIAGNOSIS — Z17.0 MALIGNANT NEOPLASM OF OVERLAPPING SITES OF RIGHT BREAST IN FEMALE, ESTROGEN RECEPTOR POSITIVE (H): ICD-10-CM

## 2017-09-22 DIAGNOSIS — G89.29 CHRONIC RIGHT SHOULDER PAIN: ICD-10-CM

## 2017-09-22 DIAGNOSIS — M25.511 CHRONIC RIGHT SHOULDER PAIN: ICD-10-CM

## 2017-09-22 DIAGNOSIS — F41.1 GENERALIZED ANXIETY DISORDER: ICD-10-CM

## 2017-09-22 DIAGNOSIS — C50.811 MALIGNANT NEOPLASM OF OVERLAPPING SITES OF RIGHT BREAST IN FEMALE, ESTROGEN RECEPTOR POSITIVE (H): ICD-10-CM

## 2017-09-22 DIAGNOSIS — B35.1 ONYCHOMYCOSIS: Primary | ICD-10-CM

## 2017-09-22 LAB
ALT SERPL W P-5'-P-CCNC: 121 U/L (ref 0–50)
AST SERPL W P-5'-P-CCNC: 68 U/L (ref 0–45)

## 2017-09-22 PROCEDURE — 84450 TRANSFERASE (AST) (SGOT): CPT | Performed by: FAMILY MEDICINE

## 2017-09-22 PROCEDURE — 99214 OFFICE O/P EST MOD 30 MIN: CPT | Performed by: FAMILY MEDICINE

## 2017-09-22 PROCEDURE — 82306 VITAMIN D 25 HYDROXY: CPT | Performed by: FAMILY MEDICINE

## 2017-09-22 PROCEDURE — 84460 ALANINE AMINO (ALT) (SGPT): CPT | Performed by: FAMILY MEDICINE

## 2017-09-22 PROCEDURE — 36415 COLL VENOUS BLD VENIPUNCTURE: CPT | Performed by: FAMILY MEDICINE

## 2017-09-22 RX ORDER — TERBINAFINE HYDROCHLORIDE 250 MG/1
250 TABLET ORAL DAILY
Qty: 90 TABLET | Refills: 0 | Status: SHIPPED | OUTPATIENT
Start: 2017-09-22 | End: 2017-11-16

## 2017-09-22 RX ORDER — CHOLECALCIFEROL (VITAMIN D3) 50 MCG
2000 TABLET ORAL DAILY
Qty: 100 TABLET | Refills: 3 | Status: SHIPPED | OUTPATIENT
Start: 2017-09-22 | End: 2018-11-08

## 2017-09-22 ASSESSMENT — PAIN SCALES - GENERAL: PAINLEVEL: NO PAIN (0)

## 2017-09-22 NOTE — MR AVS SNAPSHOT
After Visit Summary   9/22/2017    Aleena Escamilla    MRN: [id number]           Patient Information     Date Of Birth          1975        Visit Information        Provider Department      9/22/2017 1:20 PM Concepcion Bocanegra MD [facility]        Today's Diagnoses     Onychomycosis    -  1    Vitamin D deficiency        Malignant neoplasm of overlapping sites of right breast in female, estrogen receptor positive (H)        Generalized anxiety disorder        Chronic right shoulder pain        Bunion, right          Care Instructions    How to contact your care team: [phone] Pharmacy [phone]   MD JAZMYNE KNUTSON, PADRAGAN DRAPER MD JONATHAN BATES, MD ARVIN VOCAL, MD    Clinic hours M-Th 7am-7pm Fri 7am-5pm.   Urgent care M-F 11am-9pm  Sat/Sun 9am-5pm.   Pharmacy   Mon-Th:  8:00am-8pm   Fri:  8:00am-6:00pm  Sat/Sun  8:00am-5:00 pm               Follow-ups after your visit        Follow-up notes from your care team     Return in about 3 months (around 12/22/2017) for medication follow up, Weight check.      Your next 10 appointments already scheduled     Nov 09, 2017 10:00 AM CST   Return Visit with Adelina Demarco MD   [facility])    [address] [place] [address] [phone]            Nov 14, 2017 10:45 AM CST   CT CHEST W/O & W CONTRAST with MGCT1   [facility])    [address] [place] [address] [phone]           Please bring any scans or X-rays taken at other hospitals, if similar tests were done. Also bring a list of your medicines, including vitamins, minerals and over-the-counter drugs. It is safest to leave personal items at home.  Be sure to tell your doctor:   If you have any allergies.   If there s any chance you are pregnant.   If you are breastfeeding.   If you have any special  needs.  You will have contrast for this exam. To prepare:   Do not eat or drink for 2 hours before your exam. If you need to take medicine, you may take it with small sips of water. (We may ask you to take liquid medicine as well.)   The day before your exam, drink extra fluids at least six 8-ounce glasses (unless your doctor tells you to restrict your fluids).  Patients over 70 or patients with diabetes or kidney problems:   If you haven t had a blood test (creatinine test) within the last 30 days, go to your clinic or Diagnostic Imaging Department for this test.  If you have diabetes:   If your kidney function is normal, continue taking your metformin (Avandamet, Glucophage, Glucovance, Metaglip) on the day of your exam.   If your kidney function is abnormal, wait 48 hours before restarting this medicine.  Please wear loose clothing, such as a sweat suit or jogging clothes. Avoid snaps, zippers and other metal. We may ask you to undress and put on a hospital gown.  If you have any questions, please call the Imaging Department where you will have your exam.            Nov 16, 2017  9:45 AM CST   LAB with LAB ONC [facility] ([facility])    [address] [place] [address] [phone]           Patient must bring picture ID. Patient should be prepared to give a urine specimen  Please do not eat 10-12 hours before your appointment if you are coming in fasting for labs on lipids, cholesterol, or glucose (sugar). Pregnant women should follow their Care Team instructions. Water with medications is okay. Do not drink coffee or other fluids. If you have concerns about taking  your medications, please ask at office or if scheduling via [email or web address] send a message by clicking on Secure Messaging, Message Your Care Team.            Nov 16, 2017 10:30 AM CST   Return Visit with Bessy Tate MD   [facility]    [address]  [address] [address]   [phone]            Jan 16, 2018 10:00 AM WADE MONTENEGRO DIAGNOSTIC RIGHT CARLA with MGMA2, MG [facility])    [address] [place] [id number] [phone]           Do not use any powder, lotion or deodorant under your arms or on your breast. If you do, we will ask you to remove it before your exam.  Wear comfortable, two-piece clothing.  If you have any allergies, tell your care team.  Bring any previous mammograms from other facilities or have them mailed to the breast center.  Three-dimensional (3D) mammograms are available at [place] locations in [facility][1], and [place]. [facility] locations include [facility] in [place]. Benefits of 3D mammograms include: - Improved rate of cancer detection - Decreases your chance of having to go back for more tests, which means fewer: - \"False-positive\" results (This means that there is an abnormal area but it isn't cancer.) - Invasive testing procedures, such as a biopsy or surgery - Can provide clearer images of the breast if you have dense breast tissue. 3D mammography is an optional exam that anyone can have with a 2D mammogram. It doesn't replace or take the place of a 2D mammogram. 2D mammograms remain an effective screening test for all women.  Not all insurance companies cover the cost of a 3D mammogram. Check with your insurance.            Jan 16, 2018 10:20 AM WADE   US BREAST AMNDEEP CMPL 4 QUAD with MGMUS1, MG [facility] ([facility])    [address] [place] [id number] [phone]           Please bring a list of your medicines (including vitamins, minerals and over-the-counter drugs). Also, tell your doctor about any allergies you may have. Wear comfortable clothes and leave your valuables at home.  " You do not need to do anything special to prepare for your exam.  Please call the Imaging Department at your exam site with any questions.              Who to contact     If you have questions or need follow up information about today's clinic visit or your schedule please contact [facility] directly at [phone].  Normal or non-critical lab and imaging results will be communicated to you by MyChart, letter or phone within 4 business days after the clinic has received the results. If you do not hear from us within 7 days, please contact the clinic through AnTuT[employer] or phone. If you have a critical or abnormal lab result, we will notify you by phone as soon as possible.  Submit refill requests through [employer] or call your pharmacy and they will forward the refill request to us. Please allow 3 business days for your refill to be completed.          Additional Information About Your Visit        MyChart Information     W[employer] gives you secure access to your electronic health record. If you see a primary care provider, you can also send messages to your care team and make appointments. If you have questions, please call your primary care clinic.  If you do not have a primary care provider, please call [phone] and they will assist you.        Care EveryWhere ID     This is your Care EveryWhere ID. This could be used by other organizations to access your [place] medical records  [id number]        Your Vitals Were     Pulse Temperature Height Last Period Pulse Oximetry Breastfeeding?    76 98.6  F (37  C) (Oral) 5' (1.524 m) 03/01/2016 96% No    BMI (Body Mass Index)                   30.66 kg/m2            Blood Pressure from Last 3 Encounters:   09/22/17 109/76   07/13/17 96/70   05/08/17 112/79    Weight from Last 3 Encounters:   09/22/17 157 lb (71.2 kg)   07/13/17 156 lb (70.8 kg)   05/08/17 156 lb (70.8 kg)              We Performed the Following     ALT     AST     Vitamin D Deficiency           Today's Medication Changes          These changes are accurate as of: 9/22/17  4:03 PM.  If you have any questions, ask your nurse or doctor.               Start taking these medicines.        Dose/Directions    terbinafine 250 MG tablet   Commonly known as:  lamISIL   Used for:  Onychomycosis   Started by:  Concepcion Bocanegra MD        Dose:  250 mg   Take 1 tablet (250 mg) by mouth daily   Quantity:  90 tablet   Refills:  0       vitamin D 2000 UNITS tablet   Used for:  Vitamin D deficiency   Started by:  Concepcion Bocanegra MD        Dose:  2000 Units   Take 2,000 Units by mouth daily   Quantity:  100 tablet   Refills:  3            Where to get your medicines      These medications were sent to [facility] [address] [place] [address] [facility]  [address] [facility] [address]    Hours:  24-hours Phone:  [phone]     terbinafine 250 MG tablet    vitamin D 2000 UNITS tablet                Primary Care Provider Office Phone # Fax #    Concepcion Bocanegra -[phone]       [address]  [place] [address]        Equal Access to Services     [facility]aLETITIA AH: Hadii aad ku hadasho Soomaali, waaxda luqadaha, qaybta kaalmada adeegyada, waxay idiin hayaan adeeg kharagali la'clint ah. So [facility] [phone].    ATENCIÓN: Si habla español, tiene a wiley disposición servicios gratuitos de asistencia lingüística. [facility] [phone].    We comply with applicable federal civil rights laws and [place] laws. We do not discriminate on the basis of race, color, national origin, age, disability sex, sexual orientation or gender identity.            Thank you!     Thank you for choosing [facility]  for your care. Our goal is always to provide you with excellent care. Hearing back from our patients is one way we can continue to improve our services. Please take a few minutes to complete the written survey that you  may receive in the mail after your visit with us. Thank you!             Your Updated Medication List - Protect others around you: Learn how to safely use, store and throw away your medicines at www.disposemymeds.org.          This list is accurate as of: 9/22/17  4:03 PM.  Always use your most recent med list.                   Brand Name Dispense Instructions for use Diagnosis    acetaminophen 325 MG tablet    TYLENOL    100 tablet    Take 2 tablets (650 mg) by mouth every 4 hours as needed for other (mild pain)    Malignant neoplasm of overlapping sites of right female breast (H)       letrozole 2.5 MG tablet    FEMARA    90 tablet    Take 1 tablet (2.5 mg) by mouth daily    Malignant neoplasm of overlapping sites of right female breast (H), Use of aromatase inhibitors       terbinafine 250 MG tablet    lamISIL    90 tablet    Take 1 tablet (250 mg) by mouth daily    Onychomycosis       venlafaxine 150 MG 24 hr capsule    EFFEXOR-XR    90 capsule    Take 1 capsule (150 mg) by mouth daily    Malignant neoplasm of overlapping sites of right breast in female, estrogen receptor positive (H)       vitamin D 2000 UNITS tablet     100 tablet    Take 2,000 Units by mouth daily    Vitamin D deficiency

## 2017-09-22 NOTE — PATIENT INSTRUCTIONS
How to contact your care team: [phone] Pharmacy [phone]   MD JAZMYNE KNUTSON PA-C CHRIS JONES, PA-C NAM HO, MD JONATHAN BATES, MD ARVIN VOCAL, MD    Clinic hours M-Th 7am-7pm Fri 7am-5pm.   Urgent care M-F 11am-9pm  Sat/Sun 9am-5pm.   Pharmacy   Mon-Th:  8:00am-8pm   Fri:  8:00am-6:00pm  Sat/Sun  8:00am-5:00 pm

## 2017-09-22 NOTE — Clinical Note
Aleena's podiatrist has started her on Lamisil 250 mg once a day for 3 months to treat onychomycosis. Will this be acceptable with her current breast cancer medication? Concepcion Bocanegra MD

## 2017-09-22 NOTE — NURSING NOTE
Chief Complaint   Patient presents with     Toenail     Fungus       Initial /76 (BP Location: Right arm, Patient Position: Chair, Cuff Size: Adult Regular)  Pulse 76  Temp 98.6  F (37  C) (Oral)  Ht 5' (1.524 m)  Wt 157 lb (71.2 kg)  LMP 03/01/2016  SpO2 96%  Breastfeeding? No  BMI 30.66 kg/m2 Estimated body mass index is 30.66 kg/(m^2) as calculated from the following:    Height as of this encounter: 5' (1.524 m).    Weight as of this encounter: 157 lb (71.2 kg).  Medication Reconciliation: complete     Fernando Almaraz CMA

## 2017-09-22 NOTE — PROGRESS NOTES
SUBJECTIVE:   Aleena Escamilla is a 41 year old female who presents to clinic today for the following health issues:    Concern - Toenail FungusOnset: 1year    Description:   Patient complains of toenail fungus since foot surgery. Patient seeing a foot doctor and was prescribed Lamisil but recommends patient to further discuss with PCP prior to starting, along with bloodwork check.    Intensity: severe    Progression of Symptoms:  worsening    Accompanying Signs & Symptoms:  Fungus    Previous history of similar problem:   yes    Precipitating factors:   Worsened by: None    Alleviating factors:  Improved by: None    Therapies Tried and outcome: Clarus kit      Other concerns:  Lung problem - Patient wants to further discuss possible side effects of radiation causing lung problems. Discuss further workup such as imaging. Pain increased after starting radiation right shoulder. Seen chiropractor. CT scan completed 6-  IMPRESSION:   1. Progression of right apical reticulation, interlobular septal  thickening and nodularity since prior may represent post radiation  changes. Recommend follow-up.  2. Significant hepatic steatosis.  3. Asymmetric right breast cutaneous thickening, likely related to  radiation therapy.    Had a chronic cough during chemotherapy and pain in right upper back. Denies shortness of breath or chest pain now. No cough or lung symptoms. Chiropractor feels pain is in the scapular stabilizing muscles and has been there a long time.     Bunion surgery and is now walking OK.     Seen by oncologist last month and is doing well but with post radiation changes in breasts and lungs.     Depression and Anxiety Follow-Up    Status since last visit: No change    Other associated symptoms:None    Complicating factors:     Significant life event: No     Current substance abuse: None    PHQ-9 SCORE 12/20/2016 2/6/2017 4/11/2017   Total Score - - -   Total Score 4 2 3     VENKAT-7 SCORE 12/20/2016 2/6/2017  4/11/2017   Total Score - - -   Total Score 5 5 0       PHQ-9  English  PHQ-9   Any Language  GAD7        Problem list and histories reviewed & adjusted, as indicated.  Additional history: as documented    Patient Active Problem List   Diagnosis     CARDIOVASCULAR SCREENING; LDL GOAL LESS THAN 160     Chronic pelvic pain in female     Chronic salpingitis and oophoritis     Major depressive disorder, recurrent episode, mild (H)     Generalized anxiety disorder     Arthritis, multiple joint involvement     Primary osteoarthritis of both hands     Fibromyalgia     Malignant neoplasm of overlapping sites of right female breast (H)     BRCA gene positive     Carpal tunnel syndrome     Non morbid drug-induced obesity     Chronic right shoulder pain     Bunion, right     Onychomycosis     Past Surgical History:   Procedure Laterality Date     BIOPSY BREAST NEEDLE LOCALIZATION Right 1/19/2016    Procedure: BIOPSY BREAST NEEDLE LOCALIZATION;  Surgeon: Adelina Demarco MD;  Location:  OR     GYN SURGERY  2-    bilateral salpingectomy, left oophrectomy     LAPAROSCOPIC SALPINGO-OOPHORECTOMY Right 10/26/2016    Procedure: LAPAROSCOPIC SALPINGO-OOPHORECTOMY;  Surgeon: Bouchra Mayo MD;  Location: [facility] OR     LUMPECTOMY BREAST WITH SENTINEL NODE, COMBINED Right 1/19/2016    Procedure: COMBINED LUMPECTOMY BREAST WITH SENTINEL NODE;  Surgeon: Adelina Demarco MD;  Location:  OR     PELVIS LAPAROSCOPY,DX  12/28/1998     RELEASE CARPAL TUNNEL Left 12/23/2016    Procedure: RELEASE CARPAL TUNNEL;  Surgeon: Sam Florence MD;  Location:  OR       Social History   Substance Use Topics     Smoking status: Never Smoker     Smokeless tobacco: Never Used     Alcohol use Yes      Comment: 1 drink per week.     Family History   Problem Relation Age of Onset     DIABETES Mother      Hypertension Mother      CANCER Maternal Grandmother      cervical CA     Cardiovascular Father      MI     CANCER Maternal Aunt       cervical cancer         Current Outpatient Prescriptions   Medication Sig Dispense Refill     terbinafine (LAMISIL) 250 MG tablet Take 1 tablet (250 mg) by mouth daily 90 tablet 0     Cholecalciferol (VITAMIN D) 2000 UNITS tablet Take 2,000 Units by mouth daily 100 tablet 3     venlafaxine (EFFEXOR-XR) 150 MG 24 hr capsule Take 1 capsule (150 mg) by mouth daily 90 capsule 1     letrozole (FEMARA) 2.5 MG tablet Take 1 tablet (2.5 mg) by mouth daily 90 tablet 3     acetaminophen (TYLENOL) 325 MG tablet Take 2 tablets (650 mg) by mouth every 4 hours as needed for other (mild pain) 100 tablet 0     Allergies   Allergen Reactions     Latex Rash     Recent Labs   Lab Test  06/22/17   0918  03/16/17   1030  12/19/16   1021  12/13/16   0914   10/26/16   0753  09/27/16   1212  07/21/16   1049   11/07/11   1226  10/17/11   0809  01/07/11   0834   A1C   --    --   5.7   --    --    --    --    --    --    --    --    --    LDL   --    --   82   --    --    --    --    --    --    --   86  116   HDL   --    --   30*   --    --    --    --    --    --    --   33*  36*   TRIG   --    --   301*   --    --    --    --    --    --    --   212*  127   ALT  62*  76*   --   49   < >   --   70*  115*   < >   --    --    --    CR   --    --    --    --    --    --   0.55  0.54   < >   --    --    --    GFRESTIMATED   --    --    --    --    --    --   >90  Non  GFR Calc    >90  Non  GFR Calc     < >   --    --    --    GFRESTBLACK   --    --    --    --    --    --   >90   GFR Calc    >90   GFR Calc     < >   --    --    --    POTASSIUM   --    --    --    --    --   3.4  3.7  3.8   < >   --    --    --    TSH   --    --    --    --    --    --    --    --    --   0.83   --    --     < > = values in this interval not displayed.      BP Readings from Last 3 Encounters:   09/22/17 109/76   07/13/17 96/70   05/08/17 112/79    Wt Readings from Last 3 Encounters:   09/22/17 157  lb (71.2 kg)   07/13/17 156 lb (70.8 kg)   05/08/17 156 lb (70.8 kg)                  Labs reviewed in EPIC          Reviewed and updated as needed this visit by clinical staffTobacco  Allergies  Meds       Reviewed and updated as needed this visit by Provider         ROS:  Constitutional, HEENT, cardiovascular, pulmonary, gi and gu systems are negative, except as otherwise noted.      OBJECTIVE:   /76 (BP Location: Right arm, Patient Position: Chair, Cuff Size: Adult Regular)  Pulse 76  Temp 98.6  F (37  C) (Oral)  Ht 5' (1.524 m)  Wt 157 lb (71.2 kg)  LMP 03/01/2016  SpO2 96%  Breastfeeding? No  BMI 30.66 kg/m2  Body mass index is 30.66 kg/(m^2).  GENERAL: healthy, alert, well nourished, well hydrated, no distress, obese  HENT: ear canals- normal; TMs- normal; Nose- normal; Mouth- no ulcers, no lesions, throat is clear with no erythema or exudate.   NECK: no tenderness, no adenopathy, no asymmetry, no masses, no stiffness; thyroid- normal to palpation  RESP: lungs clear to auscultation - no rales, no rhonchi, no wheezes  CV: regular rates and rhythm, normal S1 S2, no S3 or S4 and no murmur, no click or rub -  ABDOMEN: soft, no tenderness, no  hepatosplenomegaly, no masses, normal bowel sounds  MS: extremities- no gross deformities noted, no edema, tender in upper back over left rhomboid muscles with spasm.   SKIN: no suspicious lesions, no rashes  NEURO: strength and tone- normal, sensory exam- grossly normal, mentation- intact, speech- normal, reflexes- symmetric  BACK: no CVA tenderness, no paralumbar tenderness  PSYCH: Alert and oriented times 3; speech- coherent , normal rate and volume; able to articulate logical thoughts, able to abstract reason, no tangential thoughts, no hallucinations or delusions, affect- normal     Diagnostic Test Results:  No results found for this or any previous visit (from the past 24 hour(s)).    ASSESSMENT/PLAN:         Tobacco Cessation:   reports that she has  never smoked. She has never used smokeless tobacco.      BMI:   Estimated body mass index is 30.66 kg/(m^2) as calculated from the following:    Height as of this encounter: 5' (1.524 m).    Weight as of this encounter: 157 lb (71.2 kg).   Weight management plan: Discussed healthy diet and exercise guidelines and patient will follow up in 6 months in clinic to re-evaluate.            ICD-10-CM    1. Onychomycosis B35.1 AST     ALT     terbinafine (LAMISIL) 250 MG tablet daily for 3 months per podiatry. Will send to oncology to make sure that this is compatible with her current medication.    2. Vitamin D deficiency E55.9 Vitamin D Deficiency     Cholecalciferol (VITAMIN D) 2000 UNITS tablet   3. Malignant neoplasm of overlapping sites of right breast in female, estrogen receptor positive (H) C50.811 History of chemotherapy and radiation therapy.     Z17.0    4. Generalized anxiety disorder F41.1 Stable but very concerned about radiation changes in the lung. We discussed symptoms that require follow up like shortness of breath, cp and cough.    5. Chronic right shoulder pain M25.511 Seems more related to a rhomboid strain at this point. Follow up as scheduled.     G89.29    6. Bunion, right M21.611 Walking on foot better now and less pain.        CONSULTATION/REFERRAL to podiatry and oncology for follow up as scheduled.   FUTURE LABS:       - Schedule fasting labs in 3 months  FUTURE APPOINTMENTS:       - Follow-up visit in 3 months or sooner if any questions or concerns.   Work on weight loss  Regular exercise  See Patient Instructions    Concepcion Bocanegra MD  [facility]

## 2017-09-24 NOTE — PROGRESS NOTES
Dear Aleena    Your test results are attached.     The liver tests are a little high and you may not be able to tolerate the medication for toenail fungus. I would recommend not taking it at this time.     Please contact me by [email or web address] if you have any questions about your labs or management.    Concepcion Bocanegra MD

## 2017-09-25 ENCOUNTER — TELEPHONE (OUTPATIENT)
Dept: FAMILY MEDICINE | Facility: CLINIC | Age: 42
End: 2017-09-25

## 2017-09-25 LAB — DEPRECATED CALCIDIOL+CALCIFEROL SERPL-MC: 22 UG/L (ref 20–75)

## 2017-09-25 NOTE — TELEPHONE ENCOUNTER
Prescription is on hold due to elevated liver function tests. Will check with podiatry and oncologist to see if she should be taking this or not.  Concepcion Bocanegra MD

## 2017-09-25 NOTE — TELEPHONE ENCOUNTER
Message for provider from [employer]:    Insurance limits is 84 tablets for terbinafine (LAMISIL) 250 MG tablet.    Please let us know to change to 84 tablets to 84 days.    Thanks

## 2017-09-25 NOTE — TELEPHONE ENCOUNTER
Per Dr. Bocanegra, fax pt's labs dated 9/22/17 for ALT, AST, DX:B35.1 [facility] [phone].  Their office phone is [phone].  Asher Chandler,  [facility]    Labs faxed.  Asher Chandler,  [facility]

## 2017-09-26 NOTE — TELEPHONE ENCOUNTER
Verbally per Dr Bocanegra, Okay to cancel Friday's appointment.  Fernando Almaraz contacted [facility] on 09/26/17 and left a detailed message. If patient calls back please contact care team.    Fernando Almaraz CMA

## 2017-09-26 NOTE — TELEPHONE ENCOUNTER
Patient contacted and informed. She states that this was a new Rx and has never taken it before. Because of the insurance issue, has not picked it up either.  She talked to a nurse recently and recommended to follow up with Dr Bocanegra. She scheduled her appointment for this Friday 9/29/17 with Dr Bocanegra.  Okay to leave a detailed message on patient's voicemail.    Please advise if needs to keep appointment or okay to cancel.  Fernando Almaraz CMA

## 2017-09-28 NOTE — PROGRESS NOTES
Dear Aleena    Your test results are attached.     The vitamin D is on the low side, so I think the 2,000 units a day will help you stay healthier.     Please contact me by [facility] if you have any questions about your labs or management.    Concepcion Bocanegra MD

## 2017-11-16 ENCOUNTER — OFFICE VISIT (OUTPATIENT)
Dept: SURGERY | Facility: CLINIC | Age: 42
End: 2017-11-16
Payer: COMMERCIAL

## 2017-11-16 ENCOUNTER — ONCOLOGY VISIT (OUTPATIENT)
Dept: ONCOLOGY | Facility: CLINIC | Age: 42
End: 2017-11-16
Payer: COMMERCIAL

## 2017-11-16 VITALS
WEIGHT: 159 LBS | OXYGEN SATURATION: 97 % | BODY MASS INDEX: 31.22 KG/M2 | HEART RATE: 64 BPM | TEMPERATURE: 98.5 F | RESPIRATION RATE: 20 BRPM | HEIGHT: 60 IN | DIASTOLIC BLOOD PRESSURE: 74 MMHG | SYSTOLIC BLOOD PRESSURE: 109 MMHG

## 2017-11-16 VITALS — SYSTOLIC BLOOD PRESSURE: 113 MMHG | HEART RATE: 81 BPM | DIASTOLIC BLOOD PRESSURE: 77 MMHG

## 2017-11-16 DIAGNOSIS — N95.1 MENOPAUSAL SYNDROME (HOT FLASHES): Primary | ICD-10-CM

## 2017-11-16 DIAGNOSIS — R79.89 ELEVATED LFTS: ICD-10-CM

## 2017-11-16 DIAGNOSIS — C50.411 MALIGNANT NEOPLASM OF UPPER-OUTER QUADRANT OF RIGHT BREAST IN FEMALE, ESTROGEN RECEPTOR POSITIVE (H): Primary | ICD-10-CM

## 2017-11-16 DIAGNOSIS — Z17.0 MALIGNANT NEOPLASM OF OVERLAPPING SITES OF RIGHT BREAST IN FEMALE, ESTROGEN RECEPTOR POSITIVE (H): ICD-10-CM

## 2017-11-16 DIAGNOSIS — Z17.0 MALIGNANT NEOPLASM OF UPPER-OUTER QUADRANT OF RIGHT BREAST IN FEMALE, ESTROGEN RECEPTOR POSITIVE (H): Primary | ICD-10-CM

## 2017-11-16 DIAGNOSIS — C50.811 MALIGNANT NEOPLASM OF OVERLAPPING SITES OF RIGHT FEMALE BREAST (H): ICD-10-CM

## 2017-11-16 DIAGNOSIS — C50.811 MALIGNANT NEOPLASM OF OVERLAPPING SITES OF RIGHT BREAST IN FEMALE, ESTROGEN RECEPTOR POSITIVE (H): ICD-10-CM

## 2017-11-16 LAB
ALBUMIN SERPL-MCNC: 4.1 G/DL (ref 3.4–5)
ALP SERPL-CCNC: 183 U/L (ref 40–150)
ALT SERPL W P-5'-P-CCNC: 103 U/L (ref 0–50)
AST SERPL W P-5'-P-CCNC: 50 U/L (ref 0–45)
BILIRUB DIRECT SERPL-MCNC: <0.1 MG/DL (ref 0–0.2)
BILIRUB SERPL-MCNC: 0.2 MG/DL (ref 0.2–1.3)
PROT SERPL-MCNC: 7.7 G/DL (ref 6.8–8.8)

## 2017-11-16 PROCEDURE — 99213 OFFICE O/P EST LOW 20 MIN: CPT | Performed by: SURGERY

## 2017-11-16 PROCEDURE — 36415 COLL VENOUS BLD VENIPUNCTURE: CPT | Performed by: INTERNAL MEDICINE

## 2017-11-16 PROCEDURE — 80076 HEPATIC FUNCTION PANEL: CPT | Performed by: INTERNAL MEDICINE

## 2017-11-16 PROCEDURE — 99214 OFFICE O/P EST MOD 30 MIN: CPT | Performed by: INTERNAL MEDICINE

## 2017-11-16 RX ORDER — VENLAFAXINE HYDROCHLORIDE 150 MG/1
150 CAPSULE, EXTENDED RELEASE ORAL DAILY
Qty: 90 CAPSULE | Refills: 1 | Status: SHIPPED | OUTPATIENT
Start: 2017-11-16 | End: 2018-04-10

## 2017-11-16 ASSESSMENT — PAIN SCALES - GENERAL: PAINLEVEL: NO PAIN (0)

## 2017-11-16 NOTE — NURSING NOTE
Aleena Escamilla's goals for this visit include: return    She requests these members of her care team be copied on today's visit information:     PCP: Concepcion Bocanegra    Referring Provider:  ESTABLISHED PATIENT  No address on file    Chief Complaint   Patient presents with     RECHECK       Initial /77 (BP Location: Left arm, Patient Position: Chair, Cuff Size: Adult Regular)  Pulse 81  LMP 03/01/2016 Estimated body mass index is 30.66 kg/(m^2) as calculated from the following:    Height as of 9/22/17: 1.524 m (5').    Weight as of 9/22/17: 71.2 kg (157 lb).  Medication Reconciliation: complete    Do you need any medication refills at today's visit? N/a.Estefania Celis RN

## 2017-11-16 NOTE — PROGRESS NOTES
CHIEF COMPLAINT:  Chief Complaint   Patient presents with     RECHECK       HISTORY OF PRESENT ILLNESS:  Aleena Escamilla is a 42 year old female who is seen in follow up regarding right breast cancer for which she had a right breast partial mastectomy and right axillary SLN biopsy.  Following surgery, She had chemotherapy due to the finding of a positive lymph node, intermediate risk Oncotype and large tumor size.  After finishing chemotherapy, Alenea had right breast radiation which she completed in 9/2016.  She then began Letrozole in November 2016.  She had some breast pain on the left for which she has had negative imaging.  Calcifications were noted in the right breast on screening mammogram and she had a short term follow up right mammogram in June which showed the calcifications to be stable.  Aleena states that she is feeling very well and continues to tolerate the Letrozole.    REVIEW OF SYSTEMS:  Constitutional:  Negative for chills, fatigue, fever and weight change.  Eyes:  Negative for blurred vision, eye pain and photophobia.  ENT:  Negative for hearing problems, ENT pain, congestion, rhinorrhea, epistaxis, hoarseness and dental problems.  Cardiovascular:  Negative for chest pain, palpitations, tachycardia, orthopnea and edema.  Respiratory:  Negative for cough, dyspnea and hemoptysis.  Gastrointestinal:  Negative for abdominal pain, heartburn, constipation, diarrhea and stool changes.  Musculoskeletal:  Negative for arthralgias, back pain and myalgias.  Integumentary/Breast:  See HPI.    Past Medical History:   Diagnosis Date     Chronic pelvic pain in female      Malignant neoplasm of right breast, stage 2 (H) 1/2016    1/4 lymph nodes positive, Oncotype DX = 25.  Chemotherapy and radiation. Letrozole.     Migraine headaches        Past Surgical History:   Procedure Laterality Date     BIOPSY BREAST NEEDLE LOCALIZATION Right 1/19/2016    Procedure: BIOPSY BREAST NEEDLE LOCALIZATION;  Surgeon: Dav  Adelina GUADARRAMA MD;  Location: [facility] OR     GYN SURGERY  2-    bilateral salpingectomy, left oophrectomy     LAPAROSCOPIC SALPINGO-OOPHORECTOMY Right 10/26/2016    Procedure: LAPAROSCOPIC SALPINGO-OOPHORECTOMY;  Surgeon: Bouchra Mayo MD;  Location: [facility] OR     LUMPECTOMY BREAST WITH SENTINEL NODE, COMBINED Right 1/19/2016    Procedure: COMBINED LUMPECTOMY BREAST WITH SENTINEL NODE;  Surgeon: Adelina Demarco MD;  Location: [facility] OR     PELVIS LAPAROSCOPY,DX  12/28/1998     RELEASE CARPAL TUNNEL Left 12/23/2016    Procedure: RELEASE CARPAL TUNNEL;  Surgeon: Sam Florence MD;  Location: [facility] OR       Family History   Problem Relation Age of Onset     DIABETES Mother      Hypertension Mother      CANCER Maternal Grandmother      cervical CA     Cardiovascular Father      MI     CANCER Maternal Aunt      cervical cancer       Social History   Substance Use Topics     Smoking status: Never Smoker     Smokeless tobacco: Never Used     Alcohol use Yes      Comment: 1 drink per week.       Patient Active Problem List   Diagnosis     CARDIOVASCULAR SCREENING; LDL GOAL LESS THAN 160     Chronic pelvic pain in female     Chronic salpingitis and oophoritis     Major depressive disorder, recurrent episode, mild (H)     Generalized anxiety disorder     Arthritis, multiple joint involvement     Primary osteoarthritis of both hands     Fibromyalgia     Malignant neoplasm of overlapping sites of right female breast (H)     BRCA gene positive     Carpal tunnel syndrome     Non morbid drug-induced obesity     Chronic right shoulder pain     Bunion, right     Onychomycosis     Allergies   Allergen Reactions     Latex Rash     Current Outpatient Prescriptions   Medication Sig Dispense Refill     Cholecalciferol (VITAMIN D) 2000 UNITS tablet Take 2,000 Units by mouth daily 100 tablet 3     venlafaxine (EFFEXOR-XR) 150 MG 24 hr capsule Take 1 capsule (150 mg) by mouth daily 90 capsule 1     letrozole (FEMARA) 2.5 MG tablet Take  1 tablet (2.5 mg) by mouth daily 90 tablet 3     acetaminophen (TYLENOL) 325 MG tablet Take 2 tablets (650 mg) by mouth every 4 hours as needed for other (mild pain) 100 tablet 0     Vitals: /77 (BP Location: Left arm, Patient Position: Chair, Cuff Size: Adult Regular)  Pulse 81  LMP 03/01/2016  BMI= There is no height or weight on file to calculate BMI.    EXAM:  GENERAL: healthy, alert and no distress   BREAST:  The left breast is larger than the right with no overlying skin changes.  The nipples are normal bilaterally.  There is no dimpling or thickening of the skin.  Some radiation change is seen on the right.  The right upper outer quadrant scar is well healed.  No mass is appreciated in either breast.  The breast tissue is generally soft with some fibrosis at the lumpectomy site.  No abnormality is identified on palpation.  There is no axillary or supraclavicular lymphadenopathy.  CARDIOVASCULAR:  No edema or JVD.  RESPIRATORY: negative findings: no chest deformities noted, no chest wall tenderness, breathing is unlabored.  NECK: Neck supple. No adenopathy.   SKIN: No suspicious lesions or rashes  LYMPH: Normal cervical lymph nodes    ASSESSMENT:  Aleena Escamilla appears to be doing very well.  She continues to tolerate the Letrozole and has no evidence of breast cancer recurrence on clinical breast exam.  She is scheduled for her bilateral screening mammograms in early January.  The left breast is larger than the right after treatment and she is considering a left breast reduction at some point.  The left breast pain is likely completely benign and probably hormonal or mechanical.  I suggested that she could try taking daily fish oil to help with the pain.  Aleena was reassured that breast pain like she is having is almost never an indication of breast cancer.    PLAN:  Aleena will continue to follow up with Dr. Tate as instructed.  She will follow up here as needed.    Adelina Demarco,  MD    Please route or send letter to:  Primary Care Provider (PCP)

## 2017-11-16 NOTE — MR AVS SNAPSHOT
After Visit Summary   11/16/2017    Aleena Escamilla    MRN: [id number]           Patient Information     Date Of Birth          1975        Visit Information        Provider Department      11/16/2017 10:30 AM Bessy Tate MD [facility]        Today's Diagnoses     Menopausal syndrome (hot flashes)    -  1    Malignant neoplasm of overlapping sites of right breast in female, estrogen receptor positive (H)        Elevated LFTs           Follow-ups after your visit        Your next 10 appointments already scheduled     Nov 21, 2017 10:00 AM CST   CT CHEST ABDOMEN PELVIS W/O & W CONTRAST with MGCT1   [facility] ([facility])    [address] [place] [id number] [phone]           Please bring any scans or X-rays taken at other hospitals, if similar tests were done. Also bring a list of your medicines, including vitamins, minerals and over-the-counter drugs. It is safest to leave personal items at home.  Be sure to tell your doctor:   If you have any allergies.   If there s any chance you are pregnant.   If you are breastfeeding.   If you have any special needs.  You may have contrast for this exam. To prepare:   Do not eat or drink for 2 hours before your exam. If you need to take medicine, you may take it with small sips of water. (We may ask you to take liquid medicine as well.)   The day before your exam, drink extra fluids at least six 8-ounce glasses (unless your doctor tells you to restrict your fluids).  Patients over 70 or patients with diabetes or kidney problems:   If you haven t had a blood test (creatinine test) within the last 30 days, go to your clinic or Diagnostic Imaging Department for this test.  If you have diabetes:   If your kidney function is normal, continue taking your metformin (Avandamet, Glucophage, Glucovance, Metaglip) on the day of your exam.   If your kidney function is abnormal, wait 48  "hours before restarting this medicine.  You will have oral contrast for this exam:   You will drink the contrast at home. Get this from your clinic or Diagnostic Imaging Department. Please follow the directions given.  Please wear loose clothing, such as a sweat suit or jogging clothes. Avoid snaps, zippers and other metal. We may ask you to undress and put on a hospital gown.  If you have any questions, please call the Imaging Department where you will have your exam.            Jan 16, 2018 10:00 AM CST   MA DIAGNOSTIC RIGHT CARLA with MGMA2, MG MA TECH   [facility] ([facility])    [address] [address] [place] [id number] [phone]           Do not use any powder, lotion or deodorant under your arms or on your breast. If you do, we will ask you to remove it before your exam.  Wear comfortable, two-piece clothing.  If you have any allergies, tell your care team.  Bring any previous mammograms from other facilities or have them mailed to the breast center.  Three-dimensional (3D) mammograms are available at [facility] locations in [facility], [facility], [facility], and [place]. [facility] locations include [place] [facility] in [place]. Benefits of 3D mammograms include: - Improved rate of cancer detection - Decreases your chance of having to go back for more tests, which means fewer: - \"False-positive\" results (This means that there is an abnormal area but it isn't cancer.) - Invasive testing procedures, such as a biopsy or surgery - Can provide clearer images of the breast if you have dense breast tissue. 3D mammography is an optional exam that anyone can have with a 2D mammogram. It doesn't replace or take the place of a 2D mammogram. 2D mammograms remain an effective screening test for all women.  Not all insurance companies cover the cost of a 3D mammogram. Check with your insurance.            Jan 16, " 2018 10:20 AM CST   US BREAST MANDEEP CMPL 4 QUAD with MGMUS1, MG US TECH   [facility] ([facility])    [address] [place] [id number] [phone]           Please bring a list of your medicines (including vitamins, minerals and over-the-counter drugs). Also, tell your doctor about any allergies you may have. Wear comfortable clothes and leave your valuables at home.  You do not need to do anything special to prepare for your exam.  Please call the Imaging Department at your exam site with any questions.            Mar 15, 2018 10:15 AM CDT   LAB with LAB ONC [facility] ([facility])    [address] [place] [phone]           Please do not eat 10-12 hours before your appointment if you are coming in fasting for labs on lipids, cholesterol, or glucose (sugar). This does not apply to pregnant women. Water, hot tea and black coffee (with nothing added) are okay. Do not drink other fluids, diet soda or chew gum.            Mar 15, 2018 11:00 AM CDT   Return Visit with Bessy Tate MD   [facility] ([facility])    [address] [place] [phone]              Future tests that were ordered for you today     Open Future Orders        Priority Expected Expires Ordered    CT Chest abdomen pelvis w & w/o contrast Routine  11/16/2018 11/16/2017            Who to contact     If you have questions or need follow up information about today's clinic visit or your schedule please contact [facility] directly at [phone].  Normal or non-critical lab and imaging results will be communicated to you by MyChart, letter or phone within 4 business days after the clinic has received the results. If you do not hear from us within 7 days, please contact the clinic through MyChart or phone. If you have a critical or abnormal lab  result, we will notify you by phone as soon as possible.  Submit refill requests through [email or web address] or call your pharmacy and they will forward the refill request to us. Please allow 3 business days for your refill to be completed.          Additional Information About Your Visit        [employer] Information     [email or web address] gives you secure access to your electronic health record. If you see a primary care provider, you can also send messages to your care team and make appointments. If you have questions, please call your primary care clinic.  If you do not have a primary care provider, please call [phone] and they will assist you.      [email or web address] is an electronic gateway that provides easy, online access to your medical records. With [email or web address], you can request a clinic appointment, read your test results, renew a prescription or communicate with your care team.     To access your existing account, please contact your [facility] or call [phone] for assistance.        Care EveryWhere ID     This is your Care EveryWhere ID. This could be used by other organizations to access your [facility] medical records  [id number]        Your Vitals Were     Pulse Temperature Respirations Height Last Period Pulse Oximetry    64 98.5  F (36.9  C) (Oral) 20 1.524 m (5') 03/01/2016 97%    BMI (Body Mass Index)                   31.05 kg/m2            Blood Pressure from Last 3 Encounters:   11/16/17 109/74   11/16/17 113/77   09/22/17 109/76    Weight from Last 3 Encounters:   11/16/17 72.1 kg (159 lb)   09/22/17 71.2 kg (157 lb)   07/13/17 70.8 kg (156 lb)                 Where to get your medicines      These medications were sent to [id number] - [place], [place] - [address] [address]  [address] [employer] [employer] [address]    Hours:  24-hours Phone:  [phone]     venlafaxine 150 MG 24 hr capsule          Primary Care Provider Office Phone #  Fax #    Concepcion Tana Bocanegra [phone]       [address]  [facility] [address]        Equal Access to Services     DESHAWN BERG : Hadzita aad ku hadramanagisel Evelin, waaxda luqadaha, qaybta kaalmada teo, azam street jose miguelvasile rodriguezgali bernal. So [facility] [phone].    ATENCIÓN: Si habla español, tiene a wiley disposición servicios gratuitos de asistencia lingüística. Llame al [phone].    We comply with applicable federal civil rights laws and [place] laws. We do not discriminate on the basis of race, color, national origin, age, disability, sex, sexual orientation, or gender identity.            Thank you!     Thank you for choosing [facility]  for your care. Our goal is always to provide you with excellent care. Hearing back from our patients is one way we can continue to improve our services. Please take a few minutes to complete the written survey that you may receive in the mail after your visit with us. Thank you!             Your Updated Medication List - Protect others around you: Learn how to safely use, store and throw away your medicines at www.disposemymeds.org.          This list is accurate as of: 11/16/17 11:00 AM.  Always use your most recent med list.                   Brand Name Dispense Instructions for use Diagnosis    acetaminophen 325 MG tablet    TYLENOL    100 tablet    Take 2 tablets (650 mg) by mouth every 4 hours as needed for other (mild pain)    Malignant neoplasm of overlapping sites of right female breast (H)       letrozole 2.5 MG tablet    FEMARA    90 tablet    Take 1 tablet (2.5 mg) by mouth daily    Malignant neoplasm of overlapping sites of right female breast (H), Use of aromatase inhibitors       venlafaxine 150 MG 24 hr capsule    EFFEXOR-XR    90 capsule    Take 1 capsule (150 mg) by mouth daily    Malignant neoplasm of overlapping sites of right breast in female, estrogen receptor positive (H)       vitamin D 2000 UNITS  tablet     100 tablet    Take 2,000 Units by mouth daily    Vitamin D deficiency

## 2017-11-16 NOTE — PROGRESS NOTES
ONCOLOGY DIAGNOSIS:   1. January 2016: Diagnosed with stage II multifocal invasive lobular carcinoma of the right breast. Final pathology showed a grade 2 with the first foci measuring 36 x 16 x 8 mm and the second one 21 x 14 x 11 mm. LCIS classical type present, lymphovascular invasion not identified. Margins negative, estrogen, progesterone receptor positive by IHC and HER-2 not amplified performed on a core biopsy. 1/4 lymph nodes positive with the greatest dimension being 10 mm. No extranodal extension. Oncotype DX recurrence score 25.   2. Genetics: Variant of Uncertain Significance in BRCA2 gene.       THERAPY TO DATE:   1. January 19, 2016: Right breast partial mastectomy with wire localization and right axillary sentinel node biopsy.   2. February 2016 to June 2016. Weekly Taxol followed by dose-dense AC.    3.  August 2016 to September 2016. Radiotherapy.    4. October 26, 2016: Laparoscopic right oophorectomy, biopsy of pelvic nodule, removal of a portion of the right fallopian tube. Pathology: right pelvic nodule showed fibrous nodule with calcifications, endosalpinosis. Right ovary with serous cystadenoma, benign epithelial inclusion cyst, negative for malignancy. A portion of the right fallopian tube with no significant histologic abnormality.   5. May 2016 to Present. Letrozole.      INTERVAL HISTORY:  Aleena Escamilla is a 42-year-old female diagnosed with pT2 N1a MX multifocal invasive lobular carcinoma of the right breast in 01/2016 after self-palpating a right breast mass.  Patient presents to clinic for followup of malignancy. Patient states she has been doing well. Her hot flashes were controlled with Effexor but then read somewhere it is hormonal therapy and stopped for the last few weeks. Her hot flashes recurred. She is tolerating Femara. Mylagias/arthrlagias improved. Continues to have left breast pain. Saw Dr. Demarco today. Imaging to date negative. Was to have CT chest yesterday but  forgot.  Denies any fevers, chills, nausea, vomiting, chest pain, shortness of breath, cough.  No abdominal discomfort.  No new palpable masses and the remainder of a comprehensive review of systems is negative.      PAST MEDICAL HISTORY:(Not addressed on today's visit.)  1. Multifocal right breast cancer.   2. History of migraines.   3. History of chronic pelvic pain.   4. Bilateral carpal tunnel       PAST SURGICAL HISTORY: (Not addressed on today's visit.)  1. Right breast lumpectomy 01/2016.   2. Pelvic laparoscopy.   3. Bilateral salpingectomy, left oophorectomy 02/2001 for pelvic pain, uterus intact.   4. 10/2016, right oophorectomy.   5. 11/2016, right carpal tunnel release.   6. Right foot bunion removed.     ALLERGIES: No known drug allergies.         SOCIAL HISTORY:  , comes in alone today.  Has 2 children.  No current tobacco use. Works for [employer]. Currently scheduled to work for [employer].     PHYSICAL EXAMINATION:     VITAL SIGNS:  /74  Pulse 64  Temp 98.5  F (36.9  C) (Oral)  Resp 20  Ht 1.524 m (5')  Wt 72.1 kg (159 lb)  LMP 03/01/2016  SpO2 97%  BMI 31.05 kg/m2   GENERAL:  Comfortable, no acute distress, pleasant.   HEENT:  Atraumatic, normocephalic.  Pupils equal, round, reactive.  Sclerae anicteric.  Oropharynx:  Moist mucous membranes.  No lesions, ulcers or exudate.   NECK:  Supple, full range of motion.  Trachea midline.   HEART:  Regular rate and rhythm, normal S1, S2, no murmurs, gallop.  No edema.   LUNGS:  Clear to auscultation bilaterally.  No crackles or wheezes.  Normal respiratory effort.   GASTROINTESTINAL:  Positive bowel sounds.  Soft, nontender, nondistended.  No hepatosplenomegaly.   EXTREMITIES:  No cyanosis, warm.   MUSCULOSKELETAL:  No point tenderness.   LYMPHATICS:  No cervical, supraclavicular, axillary nodes palpable.   SKIN:  No petechiae or rashes.   NEUROLOGIC:  Alert and oriented.   BREASTS:  Examined in the upright and supine position. Right  breast no dominant masses. Left breast no dominant masses. Diffuse tenderness on deep palpation.     LABORATORY DATA:     Most Recent Value  11/16/2016 - Today   Hemoglobin 11.7 - 15.7 g/dL 14.5  3/16/2017   Hematocrit 35.0 - 47.0 % 41.5  3/16/2017   Platelet Count 150 - 450 10e9/L 282  3/16/2017   Absolute Neutrophil 1.6 - 8.3 10e9/L 2.8  3/16/2017   Bilirubin Total 0.2 - 1.3 mg/dL 0.2  11/16/2017   ALT 0 - 50 U/L 103 (A)  11/16/2017   AST 0 - 45 U/L 50 (A)  11/16/2017   Alkaline Phosphatase 40 - 150 U/L 183 (A)  11/16/2017   Albumin 3.4 - 5.0 g/dL 4.1  11/16/2017   Protein Total 6.8 - 8.8 g/dL 7.7  11/16/2017   Triglycerides <150 mg/dL 301 (A)    WBC 4.0 - 11.0 10e9/L 4.9  3/16/2017      Mammogram from 12/2016, in right breast, there is diffuse skin thickening with trabecular thickening related to post-treatment changes.  In the area of lumpectomy, there is a group of amorphous calcifications, probably benign, may represent post-treatment changes, with short interval followup recommended.      Left breast diagnostic mammogram from 03/08/2017, as well as ultrasound, showed 3 cm from nipple a small 7 x 2 x 5 mm interdermal area of hypoechogenicity most compatible with recent inflamed sebaceous cyst, no suspicious finding.      Mammogram (6/22/17): There are grouped coarse heterogeneous calcifications in the right breast 10:00 position 7 cm from the nipple in the posterior aspect of the lumpectomy bed. The largest of these groups measures 3 mm.    No increase in size compared to prior study.  Calcifications have developed slightly more coarse appearance in the interval, supporting benign etiology such as fat necrosis.   Probably Benign Finding-Short Interval Follow-Up Suggested    DEXA scan from 11/2016 showed normal bone mineral density.     CT C/A/P (6/22/17): Progression of right apical reticulation, interlobular septal thickening and nodularity since prior may represent post radiation changes. Recommend  follow-up. Significant hepatic steatosis. Asymmetric right breast cutaneous thickening, likely related to radiation therapy.     ASSESSMENT AND PLAN:     1.  Stage II, pT2 N1a MX multifocal invasive lobular carcinoma. Most recent scan shows progression of right apical reticulation thought to be related to radiation.  Mammogram from 6/2017 showed grouped course calcifications. Repeat mammogram 12/17 with ultrasound if needed. RTC - recommend short interval follow up. Continue Femara.  Return to clinic in 4 months with labs. Continue Femara.  2.  Right Apical Reticulation. Reviewed with Dr. Cordero from Pulmonary. May be related to radiation. Recommend repeat CT chest in 4 months which patient forgot about. Repeat request placed.  3.  Hot flashes. Improved with higher Effexor dose of 150 mg a day however patient had temporarily stopped because of fear it was hormones. Reassured patient and she will restart since feels it does help with hot flashes.  4.  BRCA2 variant, undetermined significance.  Continue to follow with genetics once a year.  Both ovaries have been removed. (Not addressed on today's visit.)  5.  Left breast pain  Persistent. Imaging negative. Seen by Dr. Demarco. Recommend avoiding caffeine, alcohol. Is using Evening Oil of Primrose. Repeat mammogram in 12/17.  6.  Myalgias, arthralgias. Improved.   7. Elevated LFT. Check CT C/A/P. If negative will refer back to primary for work up.     ALBERTO RENEE MD        cc: Concepcion Mayo MD

## 2017-11-16 NOTE — LETTER
2017    Re: Aleena Escamilla - 1975    HISTORY OF PRESENT ILLNESS:  Aleena Escamilla is a 42 year old female who is seen in follow up regarding right breast cancer for which she had a right breast partial mastectomy and right axillary SLN biopsy.  Following surgery, She had chemotherapy due to the finding of a positive lymph node, intermediate risk Oncotype and large tumor size.  After finishing chemotherapy, Aleena had right breast radiation which she completed in 2016.  She then began Letrozole in 2016.  She had some breast pain on the left for which she has had negative imaging.  Calcifications were noted in the right breast on screening mammogram and she had a short term follow up right mammogram in  which showed the calcifications to be stable.  Aleena states that she is feeling very well and continues to tolerate the Letrozole.     REVIEW OF SYSTEMS:  Constitutional:  Negative for chills, fatigue, fever and weight change.  Eyes:  Negative for blurred vision, eye pain and photophobia.  ENT:  Negative for hearing problems, ENT pain, congestion, rhinorrhea, epistaxis, hoarseness and dental problems.  Cardiovascular:  Negative for chest pain, palpitations, tachycardia, orthopnea and edema.  Respiratory:  Negative for cough, dyspnea and hemoptysis.  Gastrointestinal:  Negative for abdominal pain, heartburn, constipation, diarrhea and stool changes.  Musculoskeletal:  Negative for arthralgias, back pain and myalgias.  Integumentary/Breast:  See HPI.      Vitals: /77 (BP Location: Left arm, Patient Position: Chair, Cuff Size: Adult Regular)  Pulse 81  LMP 2016  BMI= There is no height or weight on file to calculate BMI.     EXAM:  GENERAL: healthy, alert and no distress   BREAST:  The left breast is larger than the right with no overlying skin changes.  The nipples are normal bilaterally.  There is no dimpling or thickening of the skin.  Some radiation change is seen  on the right.  The right upper outer quadrant scar is well healed.  No mass is appreciated in either breast.  The breast tissue is generally soft with some fibrosis at the lumpectomy site.  No abnormality is identified on palpation.  There is no axillary or supraclavicular lymphadenopathy.  CARDIOVASCULAR:  No edema or JVD.  RESPIRATORY: negative findings: no chest deformities noted, no chest wall tenderness, breathing is unlabored.  NECK: Neck supple. No adenopathy.   SKIN: No suspicious lesions or rashes  LYMPH: Normal cervical lymph nodes     ASSESSMENT:  Aleena Escamilla appears to be doing very well.  She continues to tolerate the Letrozole and has no evidence of breast cancer recurrence on clinical breast exam.  She is scheduled for her bilateral screening mammograms in early January.  The left breast is larger than the right after treatment and she is considering a left breast reduction at some point.  The left breast pain is likely completely benign and probably hormonal or mechanical.  I suggested that she could try taking daily fish oil to help with the pain.  Aleena was reassured that breast pain like she is having is almost never an indication of breast cancer.     PLAN:  Aleena will continue to follow up with Dr. Tate as instructed.  She will follow up here as needed.     Adelina Demarco MD

## 2017-11-16 NOTE — LETTER
11/16/2017         RE: Aleena Escamilla  [address] [facility] [address]        Dear Colleague,    Thank you for referring your patient, Aleena Escamilla, to the [facility]. Please see a copy of my visit note below.    ONCOLOGY DIAGNOSIS:   1. January 2016: Diagnosed with stage II multifocal invasive lobular carcinoma of the right breast. Final pathology showed a grade 2 with the first foci measuring 36 x 16 x 8 mm and the second one 21 x 14 x 11 mm. LCIS classical type present, lymphovascular invasion not identified. Margins negative, estrogen, progesterone receptor positive by IHC and HER-2 not amplified performed on a core biopsy. 1/4 lymph nodes positive with the greatest dimension being 10 mm. No extranodal extension. Oncotype DX recurrence score 25.   2. Genetics: Variant of Uncertain Significance in BRCA2 gene.       THERAPY TO DATE:   1. January 19, 2016: Right breast partial mastectomy with wire localization and right axillary sentinel node biopsy.   2. February 2016 to June 2016. Weekly Taxol followed by dose-dense AC.    3.  August 2016 to September 2016. Radiotherapy.    4. October 26, 2016: Laparoscopic right oophorectomy, biopsy of pelvic nodule, removal of a portion of the right fallopian tube. Pathology: right pelvic nodule showed fibrous nodule with calcifications, endosalpinosis. Right ovary with serous cystadenoma, benign epithelial inclusion cyst, negative for malignancy. A portion of the right fallopian tube with no significant histologic abnormality.   5. May 2016 to Present. Letrozole.      INTERVAL HISTORY:  Aleena Escamilla is a 42-year-old female diagnosed with pT2 N1a MX multifocal invasive lobular carcinoma of the right breast in 01/2016 after self-palpating a right breast mass.  Patient presents to clinic for followup of malignancy. Patient states she has been doing well. Her hot flashes were controlled with Effexor but then read somewhere it is hormonal  therapy and stopped for the last few weeks. Her hot flashes recurred. She is tolerating Femara. Mylagias/arthrlagias improved. Continues to have left breast pain. Saw Dr. Demarco today. Imaging to date negative. Was to have CT chest yesterday but forgot.  Denies any fevers, chills, nausea, vomiting, chest pain, shortness of breath, cough.  No abdominal discomfort.  No new palpable masses and the remainder of a comprehensive review of systems is negative.      PAST MEDICAL HISTORY:(Not addressed on today's visit.)  1. Multifocal right breast cancer.   2. History of migraines.   3. History of chronic pelvic pain.   4. Bilateral carpal tunnel       PAST SURGICAL HISTORY: (Not addressed on today's visit.)  1. Right breast lumpectomy 01/2016.   2. Pelvic laparoscopy.   3. Bilateral salpingectomy, left oophorectomy 02/2001 for pelvic pain, uterus intact.   4. 10/2016, right oophorectomy.   5. 11/2016, right carpal tunnel release.   6. Right foot bunion removed.     ALLERGIES: No known drug allergies.         SOCIAL HISTORY:  , comes in alone today.  Has 2 children.  No current tobacco use. Works for [employer]. Currently scheduled to work for [employer].     PHYSICAL EXAMINATION:     VITAL SIGNS:  /74  Pulse 64  Temp 98.5  F (36.9  C) (Oral)  Resp 20  Ht 1.524 m (5')  Wt 72.1 kg (159 lb)  LMP 03/01/2016  SpO2 97%  BMI 31.05 kg/m2   GENERAL:  Comfortable, no acute distress, pleasant.   HEENT:  Atraumatic, normocephalic.  Pupils equal, round, reactive.  Sclerae anicteric.  Oropharynx:  Moist mucous membranes.  No lesions, ulcers or exudate.   NECK:  Supple, full range of motion.  Trachea midline.   HEART:  Regular rate and rhythm, normal S1, S2, no murmurs, gallop.  No edema.   LUNGS:  Clear to auscultation bilaterally.  No crackles or wheezes.  Normal respiratory effort.   GASTROINTESTINAL:  Positive bowel sounds.  Soft, nontender, nondistended.  No hepatosplenomegaly.   EXTREMITIES:  No cyanosis, warm.    MUSCULOSKELETAL:  No point tenderness.   LYMPHATICS:  No cervical, supraclavicular, axillary nodes palpable.   SKIN:  No petechiae or rashes.   NEUROLOGIC:  Alert and oriented.   BREASTS:  Examined in the upright and supine position. Right breast no dominant masses. Left breast no dominant masses. Diffuse tenderness on deep palpation.     LABORATORY DATA:     Most Recent Value  11/16/2016 - Today   Hemoglobin 11.7 - 15.7 g/dL 14.5  3/16/2017   Hematocrit 35.0 - 47.0 % 41.5  3/16/2017   Platelet Count 150 - 450 10e9/L 282  3/16/2017   Absolute Neutrophil 1.6 - 8.3 10e9/L 2.8  3/16/2017   Bilirubin Total 0.2 - 1.3 mg/dL 0.2  11/16/2017   ALT 0 - 50 U/L 103 (A)  11/16/2017   AST 0 - 45 U/L 50 (A)  11/16/2017   Alkaline Phosphatase 40 - 150 U/L 183 (A)  11/16/2017   Albumin 3.4 - 5.0 g/dL 4.1  11/16/2017   Protein Total 6.8 - 8.8 g/dL 7.7  11/16/2017   Triglycerides <150 mg/dL 301 (A)    WBC 4.0 - 11.0 10e9/L 4.9  3/16/2017      Mammogram from 12/2016, in right breast, there is diffuse skin thickening with trabecular thickening related to post-treatment changes.  In the area of lumpectomy, there is a group of amorphous calcifications, probably benign, may represent post-treatment changes, with short interval followup recommended.      Left breast diagnostic mammogram from 03/08/2017, as well as ultrasound, showed 3 cm from nipple a small 7 x 2 x 5 mm interdermal area of hypoechogenicity most compatible with recent inflamed sebaceous cyst, no suspicious finding.      Mammogram (6/22/17): There are grouped coarse heterogeneous calcifications in the right breast 10:00 position 7 cm from the nipple in the posterior aspect of the lumpectomy bed. The largest of these groups measures 3 mm.    No increase in size compared to prior study.  Calcifications have developed slightly more coarse appearance in the interval, supporting benign etiology such as fat necrosis.   Probably Benign Finding-Short Interval Follow-Up  Suggested    DEXA scan from 11/2016 showed normal bone mineral density.     CT C/A/P (6/22/17): Progression of right apical reticulation, interlobular septal thickening and nodularity since prior may represent post radiation changes. Recommend follow-up. Significant hepatic steatosis. Asymmetric right breast cutaneous thickening, likely related to radiation therapy.     ASSESSMENT AND PLAN:     1.  Stage II, pT2 N1a MX multifocal invasive lobular carcinoma. Most recent scan shows progression of right apical reticulation thought to be related to radiation.  Mammogram from 6/2017 showed grouped course calcifications. Repeat mammogram 12/17 with ultrasound if needed. RTC - recommend short interval follow up. Continue Femara.  Return to clinic in 4 months with labs. Continue Femara.  2.  Right Apical Reticulation. Reviewed with Dr. Cordero from Pulmonary. May be related to radiation. Recommend repeat CT chest in 4 months which patient forgot about. Repeat request placed.  3.  Hot flashes. Improved with higher Effexor dose of 150 mg a day however patient had temporarily stopped because of fear it was hormones. Reassured patient and she will restart since feels it does help with hot flashes.  4.  BRCA2 variant, undetermined significance.  Continue to follow with genetics once a year.  Both ovaries have been removed. (Not addressed on today's visit.)  5.  Left breast pain  Persistent. Imaging negative. Seen by Dr. Demarco. Recommend avoiding caffeine, alcohol. Is using Evening Oil of Primrose. Repeat mammogram in 12/17.  6.  Myalgias, arthralgias. Improved.   7. Elevated LFT. Check CT C/A/P. If negative will refer back to primary for work up.     ALBERTO TATE MD        cc: Concepcion Mayo MD       Again, thank you for allowing me to participate in the care of your patient.        Sincerely,        Alberto Tate MD

## 2017-11-16 NOTE — NURSING NOTE
Oncology Rooming Note    November 16, 2017 10:34 AM   Aleena Escamilla is a 42 year old female who presents for:    Chief Complaint   Patient presents with     Oncology Clinic Visit     4 mo f/u     Initial Vitals: LMP 03/01/2016 Estimated body mass index is 30.66 kg/(m^2) as calculated from the following:    Height as of 9/22/17: 1.524 m (5').    Weight as of 9/22/17: 71.2 kg (157 lb). There is no height or weight on file to calculate BSA.  Data Unavailable Comment: Data Unavailable   Patient's last menstrual period was 03/01/2016.  Allergies reviewed: Yes  Medications reviewed: Yes    Medications: Medication refills not needed today.  Pharmacy name entered into [facility]:    [facility] - [address] [id number] - [place], [place] - [address] AT [facility] - [place], [place] - [address], [id number]    Clinical concerns:     8 minutes for nursing intake (face to face time)     MESSI HERR LPN

## 2017-11-21 ENCOUNTER — RADIANT APPOINTMENT (OUTPATIENT)
Dept: CT IMAGING | Facility: CLINIC | Age: 42
End: 2017-11-21
Attending: INTERNAL MEDICINE
Payer: COMMERCIAL

## 2017-11-21 DIAGNOSIS — C50.811 MALIGNANT NEOPLASM OF OVERLAPPING SITES OF RIGHT BREAST IN FEMALE, ESTROGEN RECEPTOR POSITIVE (H): ICD-10-CM

## 2017-11-21 DIAGNOSIS — R79.89 ELEVATED LFTS: ICD-10-CM

## 2017-11-21 DIAGNOSIS — Z17.0 MALIGNANT NEOPLASM OF OVERLAPPING SITES OF RIGHT BREAST IN FEMALE, ESTROGEN RECEPTOR POSITIVE (H): ICD-10-CM

## 2017-11-21 PROCEDURE — 71260 CT THORAX DX C+: CPT | Performed by: RADIOLOGY

## 2017-11-21 PROCEDURE — 74177 CT ABD & PELVIS W/CONTRAST: CPT | Performed by: RADIOLOGY

## 2017-11-21 RX ORDER — IOPAMIDOL 755 MG/ML
117 INJECTION, SOLUTION INTRAVASCULAR ONCE
Status: COMPLETED | OUTPATIENT
Start: 2017-11-21 | End: 2017-11-21

## 2017-11-21 RX ADMIN — IOPAMIDOL 117 ML: 755 INJECTION, SOLUTION INTRAVASCULAR at 10:31

## 2017-12-21 ENCOUNTER — RADIANT APPOINTMENT (OUTPATIENT)
Dept: ULTRASOUND IMAGING | Facility: CLINIC | Age: 42
End: 2017-12-21
Attending: INTERNAL MEDICINE
Payer: COMMERCIAL

## 2017-12-21 ENCOUNTER — RADIANT APPOINTMENT (OUTPATIENT)
Dept: MAMMOGRAPHY | Facility: CLINIC | Age: 42
End: 2017-12-21
Attending: INTERNAL MEDICINE
Payer: COMMERCIAL

## 2017-12-21 DIAGNOSIS — C50.811 MALIGNANT NEOPLASM OF OVERLAPPING SITES OF RIGHT BREAST IN FEMALE, ESTROGEN RECEPTOR POSITIVE (H): ICD-10-CM

## 2017-12-21 DIAGNOSIS — Z17.0 MALIGNANT NEOPLASM OF OVERLAPPING SITES OF RIGHT BREAST IN FEMALE, ESTROGEN RECEPTOR POSITIVE (H): ICD-10-CM

## 2017-12-21 PROCEDURE — 76642 ULTRASOUND BREAST LIMITED: CPT | Mod: 50 | Performed by: RADIOLOGY

## 2017-12-21 PROCEDURE — G0279 TOMOSYNTHESIS, MAMMO: HCPCS | Performed by: RADIOLOGY

## 2017-12-21 PROCEDURE — G0204 DX MAMMO INCL CAD BI: HCPCS | Performed by: RADIOLOGY

## 2017-12-29 ENCOUNTER — OFFICE VISIT (OUTPATIENT)
Dept: FAMILY MEDICINE | Facility: CLINIC | Age: 42
End: 2017-12-29
Payer: COMMERCIAL

## 2017-12-29 VITALS
RESPIRATION RATE: 16 BRPM | TEMPERATURE: 98.4 F | SYSTOLIC BLOOD PRESSURE: 117 MMHG | DIASTOLIC BLOOD PRESSURE: 80 MMHG | WEIGHT: 154 LBS | HEIGHT: 60 IN | BODY MASS INDEX: 30.23 KG/M2 | OXYGEN SATURATION: 100 % | HEART RATE: 79 BPM

## 2017-12-29 DIAGNOSIS — G58.7 MONONEUROPATHY MULTIPLEX: Primary | ICD-10-CM

## 2017-12-29 DIAGNOSIS — C50.811 MALIGNANT NEOPLASM OF OVERLAPPING SITES OF RIGHT BREAST IN FEMALE, ESTROGEN RECEPTOR POSITIVE (H): ICD-10-CM

## 2017-12-29 DIAGNOSIS — Z17.0 MALIGNANT NEOPLASM OF OVERLAPPING SITES OF RIGHT BREAST IN FEMALE, ESTROGEN RECEPTOR POSITIVE (H): ICD-10-CM

## 2017-12-29 DIAGNOSIS — R79.89 ELEVATED LFTS: ICD-10-CM

## 2017-12-29 PROCEDURE — 99214 OFFICE O/P EST MOD 30 MIN: CPT | Performed by: FAMILY MEDICINE

## 2017-12-29 RX ORDER — GABAPENTIN 300 MG/1
CAPSULE ORAL
Qty: 90 CAPSULE | Refills: 0 | Status: SHIPPED | OUTPATIENT
Start: 2017-12-29 | End: 2018-03-26

## 2017-12-29 RX ORDER — VALACYCLOVIR HYDROCHLORIDE 1 G/1
1000 TABLET, FILM COATED ORAL
COMMUNITY
Start: 2017-12-24 | End: 2017-12-31

## 2017-12-29 ASSESSMENT — PAIN SCALES - GENERAL: PAINLEVEL: EXTREME PAIN (8)

## 2017-12-29 NOTE — PROGRESS NOTES
SUBJECTIVE:   Aleena Escamilla is a 42 year old female who presents to clinic today for the following health issues:    ED/UC Followup:    Facility:  [facility]  Date of visit: 12/24/17  Reason for visit: Pain abdomen and back  Current Status: Same         Abdominal Pain and right flank/back pain      Duration: 2-3 days then resolved after this went to right side and felt pain and burning like her shingles. In a dermatome from mid back to abdomen and with hyperesthesia.     Description (location/character/radiation): left upper quadrant        Associated flank pain: None except on skin    Intensity:  moderate    Accompanying signs and symptoms:        Fever/Chills: no        Gas/Bloating: no        Nausea/vomitting: no        Diarrhea: no        Dysuria or Hematuria: no     History (previous similar pain/trauma/previous testing): breast cancer and shingles    Precipitating or alleviating factors:       Pain worse with eating/BM/urination: no       Pain relieved by BM: no     Therapies tried and outcome: None    LMP:  not applicable        Problem list and histories reviewed & adjusted, as indicated.  Additional history: as documented    Patient Active Problem List   Diagnosis     CARDIOVASCULAR SCREENING; LDL GOAL LESS THAN 160     Chronic pelvic pain in female     Chronic salpingitis and oophoritis     Major depressive disorder, recurrent episode, mild (H)     Generalized anxiety disorder     Arthritis, multiple joint involvement     Primary osteoarthritis of both hands     Fibromyalgia     Malignant neoplasm of overlapping sites of right female breast (H)     BRCA gene positive     Carpal tunnel syndrome     Non morbid drug-induced obesity     Chronic right shoulder pain     Bunion, right     Onychomycosis     Past Surgical History:   Procedure Laterality Date     BIOPSY BREAST NEEDLE LOCALIZATION Right 1/19/2016    Procedure: BIOPSY BREAST NEEDLE LOCALIZATION;  Surgeon: Dav  Adelina GUADARRAMA MD;  Location: [facility] OR     GYN SURGERY  2-    bilateral salpingectomy, left oophrectomy     LAPAROSCOPIC SALPINGO-OOPHORECTOMY Right 10/26/2016    Procedure: LAPAROSCOPIC SALPINGO-OOPHORECTOMY;  Surgeon: Bouchra Mayo MD;  Location: [facility] OR     LUMPECTOMY BREAST WITH SENTINEL NODE, COMBINED Right 1/19/2016    Procedure: COMBINED LUMPECTOMY BREAST WITH SENTINEL NODE;  Surgeon: Adelina Demarco MD;  Location: [facility] OR     PELVIS LAPAROSCOPY,DX  12/28/1998     RELEASE CARPAL TUNNEL Left 12/23/2016    Procedure: RELEASE CARPAL TUNNEL;  Surgeon: Sam Florence MD;  Location: [facility] OR       Social History   Substance Use Topics     Smoking status: Never Smoker     Smokeless tobacco: Never Used     Alcohol use Yes      Comment: 1 drink per week.     Family History   Problem Relation Age of Onset     DIABETES Mother      Hypertension Mother      CANCER Maternal Grandmother      cervical CA     Cardiovascular Father      MI     CANCER Maternal Aunt      cervical cancer         Current Outpatient Prescriptions   Medication Sig Dispense Refill     valACYclovir (VALTREX) 1000 mg tablet Take 1,000 mg by mouth       gabapentin (NEURONTIN) 300 MG capsule Take 1 tablet (300 mg) every night for 1-3 days, then 1 tablet twice daily for 1-3 days, then 1 tablet three times daily 90 capsule 0     venlafaxine (EFFEXOR-XR) 150 MG 24 hr capsule Take 1 capsule (150 mg) by mouth daily 90 capsule 1     Cholecalciferol (VITAMIN D) 2000 UNITS tablet Take 2,000 Units by mouth daily 100 tablet 3     letrozole (FEMARA) 2.5 MG tablet Take 1 tablet (2.5 mg) by mouth daily 90 tablet 3     acetaminophen (TYLENOL) 325 MG tablet Take 2 tablets (650 mg) by mouth every 4 hours as needed for other (mild pain) 100 tablet 0     Allergies   Allergen Reactions     Latex Rash     Recent Labs   Lab Test  11/16/17   0947  09/22/17   1354  06/22/17   0918   12/19/16   1021   10/26/16   0753  09/27/16   1212  07/21/16   1049   11/07/11   1226   10/17/11   0809  01/07/11   0834   A1C   --    --    --    --   5.7   --    --    --    --    --    --    --    --    LDL   --    --    --    --   82   --    --    --    --    --    --   86  116   HDL   --    --    --    --   30*   --    --    --    --    --    --   33*  36*   TRIG   --    --    --    --   301*   --    --    --    --    --    --   212*  127   ALT  103*  121*  62*   < >   --    < >   --   70*  115*   < >   --    --    --    CR   --    --    --    --    --    --    --   0.55  0.54   < >   --    --    --    GFRESTIMATED   --    --    --    --    --    --    --   >90  Non  GFR Calc    >90  Non  GFR Calc     < >   --    --    --    GFRESTBLACK   --    --    --    --    --    --    --   >90   GFR Calc    >90   GFR Calc     < >   --    --    --    POTASSIUM   --    --    --    --    --    --   3.4  3.7  3.8   < >   --    --    --    TSH   --    --    --    --    --    --    --    --    --    --   0.83   --    --     < > = values in this interval not displayed.      BP Readings from Last 3 Encounters:   12/29/17 117/80   11/16/17 109/74   11/16/17 113/77    Wt Readings from Last 3 Encounters:   12/29/17 154 lb (69.9 kg)   11/16/17 159 lb (72.1 kg)   09/22/17 157 lb (71.2 kg)                  Labs reviewed in EPIC          Reviewed and updated as needed this visit by clinical staffTobacco  Allergies  Meds  Med Hx  Surg Hx  Fam Hx  Soc Hx      Reviewed and updated as needed this visit by Provider         ROS:  Constitutional, HEENT, cardiovascular, pulmonary, gi and gu systems are negative, except as otherwise noted.      OBJECTIVE:   /80 (BP Location: Right arm, Patient Position: Chair, Cuff Size: Adult Regular)  Pulse 79  Temp 98.4  F (36.9  C) (Oral)  Resp 16  Ht 5' (1.524 m)  Wt 154 lb (69.9 kg)  LMP 03/01/2016  SpO2 100%  Breastfeeding? No  BMI 30.08 kg/m2  Body mass index is 30.08 kg/(m^2).  GENERAL: healthy, alert,  well nourished, well hydrated, no distress, obese  HENT: ear canals- normal; TMs- normal; Nose- normal; Mouth- no ulcers, no lesions, throat is clear with no erythema or exudate.   NECK: no tenderness, no adenopathy, no asymmetry, no masses, no stiffness; thyroid- normal to palpation  RESP: lungs clear to auscultation - no rales, no rhonchi, no wheezes  CV: regular rates and rhythm, normal S1 S2, no S3 or S4 and no murmur, no click or rub -  ABDOMEN: soft, no tenderness, no  hepatosplenomegaly, no masses, normal bowel sounds  MS: extremities- no gross deformities noted, no edema  SKIN: no suspicious lesions, grayish patch of dry skin in right lower back area where patient is having hyperesthesia. No blisters or erythema.   NEURO: strength and tone- normal, sensory exam- grossly normal except hyperesthesia over dermatome right back to abdomen around T-10 to T-12, mentation- intact, speech- normal, reflexes- symmetric  BACK: no CVA tenderness,  paralumbar tenderness on right, decreased ROM due to pain on right. No tenderness or deformity over spine itself.   PSYCH: Alert and oriented times 3; speech- coherent , normal rate and volume; able to articulate logical thoughts, able to abstract reason, no tangential thoughts, no hallucinations or delusions, affect- normal     Diagnostic Test Results:  Results for orders placed or performed in visit on 12/21/17   US Breast Bilateral Complete 4 Quadrants    Narrative    Examinations: MA DIAGNOSTIC BILATERAL W/ CARLA, US BREAST BILATERAL  COMPLETE 4 QUADRANTS, 12/21/2017 10:45 AM    Comparisons: 6/22/2017, 3/8/2017, 12/19/2016, 12/1/2015    History: Follow-up right breast calcifications. Right breast pain in  the region of the lumpectomy scar which comes and goes. Left upper  outer breast pain extending into the axilla. History of multifocal  invasive lobular carcinoma of the right breast status post  conservation therapy in 2016    TECHNIQUE: Tomosynthesis and  ultrasound    Breast Density: Heterogeneously dense    Findings:     Bilateral tomosynthesis demonstrates no significant change on the  left. Additional spot magnification views of the right breast  calcifications demonstrate in the upper outer right breast now appear  dystrophic and are typical for fat necrosis.    Targeted ultrasound of the both breasts performed by the radiologist  and technologist at the area of pain. In the region of the surgical  scar on the right, no suspicious findings. In the upper outer left  breast and left axilla, no suspicious findings.      Impression    Impression: BI-RADS CATEGORY: 2 - Benign Finding(s)    Recommended Follow-up: Annual risk based breast cancer screening.    Findings and recommendations discussed with the patient.    I have personally reviewed the examination and initial interpretation  and I agree with the findings.    RICHARD LEVINE MD       ASSESSMENT/PLAN:         Tobacco Cessation:   reports that she has never smoked. She has never used smokeless tobacco.      BMI:   Estimated body mass index is 30.08 kg/(m^2) as calculated from the following:    Height as of this encounter: 5' (1.524 m).    Weight as of this encounter: 154 lb (69.9 kg).   Weight management plan: Discussed healthy diet and exercise guidelines and patient will follow up in 3 months in clinic to re-evaluate.          1. Mononeuropathy multiplex  Does not seem consistent with shingles given no rash and persistent hyperesthesia in dermatome. Recurrent episodes in different location all with similar patterns with erythema, grayish dry skin at end and hyperesthesia. This is lasting longer at 9 days.   - gabapentin (NEURONTIN) 300 MG capsule; Take 1 tablet (300 mg) every night for 1-3 days, then 1 tablet twice daily for 1-3 days, then 1 tablet three times daily  Dispense: 90 capsule; Refill: 0  - NEUROLOGY ADULT REFERRAL    2. Malignant neoplasm of overlapping sites of right breast in female, estrogen  receptor positive (H)  Mammogram completed with no signs of recurrence. Follow up with oncology as scheduled.     3. Elevated LFTs  Stable and improving with weight loss.       CONSULTATION/REFERRAL to neurology for further evaluation and treatment   FUTURE LABS:       - Schedule fasting labs in 3 months  FUTURE APPOINTMENTS:       - Follow-up visit in 3 months or sooner if any questions or concerns.   Work on weight loss  Regular exercise  See Patient Instructions    Concepcion Bocanegra MD  [facility]

## 2017-12-29 NOTE — LETTER
[address] [address]  Phone: [phone]    December 29, 2017        Aleena Escamilla  [address] [facility] [address]          To whom it may concern:    RE: Aleena Escamilla    Patient was seen and treated today at our clinic. Please excuse her from work today. 12/29/2017     Please contact me for questions or concerns.      Sincerely,        Concepcion Bocanegra MD

## 2017-12-29 NOTE — MR AVS SNAPSHOT
After Visit Summary   12/29/2017    Aleena Escamilla    MRN: [id number]           Patient Information     Date Of Birth          1975        Visit Information        Provider Department      12/29/2017 8:00 AM Concepcion Bocanegra MD [facility]        Today's Diagnoses     Need for prophylactic vaccination and inoculation against influenza    -  1    Mononeuropathy multiplex          Care Instructions    At [facility], we strive to deliver an exceptional experience to you, every time we see you.  If you receive a survey in the mail, please send us back your thoughts. We really do value your feedback.    Based on your medical history, these are the current health maintenance/preventive care services that you are due for (some may have been done at this visit.)  Health Maintenance Due   Topic Date Due     URINE DRUG SCREEN Q1 YR  10/17/1991     INFLUENZA VACCINE (SYSTEM ASSIGNED)  09/01/2017     PHQ-9 Q6 MONTHS  10/11/2017     DEPRESSION ACTION PLAN Q1 YR  10/21/2017         Suggested websites for health information:  Www.[facility].org : Up to date and easily searchable information on multiple topics.  Www.medlineplus.gov : medication info, interactive tutorials, watch real surgeries online  Www.familydoctor.org : good info from the Academy of Family Physicians  Www.cdc.gov : public health info, travel advisories, epidemics (H1N1)  Www.aap.org : children's health info, normal development, vaccinations  Www.health.state[place].us : [place] dept of health, public health issues in [place], N1N1    Your care team:                            Family Medicine Internal Medicine   MD Antonio Mistry MD Shantel Branch-Fleming, MD Katya Georgiev PA-C Nam Ho, MD Pediatrics   DRAGAN Mancera, MD Haydee Shaw CNP, MD Deborah Mielke, MD Kim Thein, APRN CNP      Clinic hours: Monday - Thursday 7  am-7 pm; Fridays 7 am-5 pm.   Urgent care: Monday - Friday 11 am-9 pm; Saturday and Sunday 9 am-5 pm.  Pharmacy : Monday -Thursday 8 am-8 pm; Friday 8 am-6 pm; Saturday and Sunday 9 am-5 pm.     Clinic: [phone]   Pharmacy: [phone]    Trigeminal Neuralgia  You have trigeminal neuralgia. This is pain caused by irritation of the trigeminal nerve on your face. Symptoms include sudden, sharp pain in your head or face. It may feel like an electric shock. It can last for several seconds or minutes. It usually happens on only 1 side of your face. Pain may be triggered by things like moving your jaw or a touch on the skin of your face. The pain may be caused by something irritating the trigeminal nerve, such as a blood vessel pressing against it. But the exact cause of this problem often isn t known. Although it can be quite painful, the condition isn t dangerous.  Trigeminal neuralgia is often treated with medicines. These include anti-seizure medicines or antidepressants. Certain other treatments may also help. In some cases, you may need surgery.    Home care  Your healthcare provider may prescribe medicines to help relieve and prevent pain. Take all medicines as directed. Please note that it may take several changes in dose and medicines before the right combination is found that controls the pain.  General care:    Plan to rest at home today.    Avoid any specific activities that seem to trigger the pain.    Over the next few weeks, keep a pain diary. Write down when your symptoms happen and how they feel. Certain activities such as touching your face, chewing, talking, or brushing your teeth may bring on the pain. Cold air can also trigger the pain. Make sure you write down any triggers and discuss these with your healthcare provider. This will help guide treatment.  Follow-up care  Follow up with your healthcare provider, or as advised. If you were referred to a neurologist, be sure to make an  appointment.  For more information on your condition, visit:    Facial Pain Association www.fpa-support.org  When to seek medical advice  Call your healthcare provider right away if any of these occur:    Fever of 100.4 F (38 C) or higher, or as advised    Headache with very stiff neck    You aren t able to keep liquids down (repeated vomiting)    Extreme drowsiness or confusion    Dizziness or fainting    A new feeling of weakness or numbness or tingling in your arm, leg, or face    Difficulty speaking or seeing  Date Last Reviewed: 8/1/2016 2000-2017 [employer]. [address] [place] [address]. All rights reserved. This information is not intended as a substitute for professional medical care. Always follow your healthcare professional's instructions.                Follow-ups after your visit        Additional Services     NEUROLOGY ADULT REFERRAL       Your provider has referred you for the following:   Consult at [facility]: [facility] [phone]   [email or web address]  N: [facility] [phone]   [email or web address]    Please be aware that coverage of these services is subject to the terms and limitations of your health insurance plan.  Call member services at your health plan with any benefit or coverage questions.      Please bring the following with you to your appointment:    (1) Any X-Rays, CTs or MRIs which have been performed.  Contact the facility where they were done to arrange for  prior to your scheduled appointment.    (2) List of current medications  (3) This referral request   (4) Any documents/labs given to you for this referral                  Your next 10 appointments already scheduled     Mar 15, 2018 10:15 AM CDT   LAB with LAB ONC [facility] ([facility])    [address] [address] [place] [address]  [id number] [phone]           Please do not eat 10-12 hours before your appointment if you are coming in fasting for labs on lipids, cholesterol, or glucose (sugar). This does not apply to pregnant women. Water, hot tea and black coffee (with nothing added) are okay. Do not drink other fluids, diet soda or chew gum.            Mar 15, 2018 11:00 AM CDT   [facility] with Bessy Tate MD   [facility]    [address] [place] [address] [id number] [phone]              Who to contact     If you have questions or need follow up information about today's clinic visit or your schedule please contact [facility] directly at [phone].  Normal or non-critical lab and imaging results will be communicated to you by DooBophart, letter or phone within 4 business days after the clinic has received the results. If you do not hear from us within 7 days, please contact the clinic through DooBophart or phone. If you have a critical or abnormal lab result, we will notify you by phone as soon as possible.  Submit refill requests through OnetoOnetext or call your pharmacy and they will forward the refill request to us. Please allow 3 business days for your refill to be completed.          Additional Information About Your Visit        OnetoOnetext Information     OnetoOnetext gives you secure access to your electronic health record. If you see a primary care provider, you can also send messages to your care team and make appointments. If you have questions, please call your primary care clinic.  If you do not have a primary care provider, please call [phone] and they will assist you.        Care EveryWhere ID     This is your Care EveryWhere ID. This could be used by other organizations to access your [facility] [place] medical records  [id number]        Your Vitals Were     Pulse Temperature Respirations Height Last Period Pulse Oximetry    79 98.4  F (36.9  C)  (Oral) 16 5' (1.524 m) 03/01/2016 100%    Breastfeeding? BMI (Body Mass Index)                No 30.08 kg/m2           Blood Pressure from Last 3 Encounters:   12/29/17 117/80   11/16/17 109/74   11/16/17 113/77    Weight from Last 3 Encounters:   12/29/17 154 lb (69.9 kg)   11/16/17 159 lb (72.1 kg)   09/22/17 157 lb (71.2 kg)              We Performed the Following     NEUROLOGY ADULT REFERRAL          Today's Medication Changes          These changes are accurate as of: 12/29/17  8:48 AM.  If you have any questions, ask your nurse or doctor.               Start taking these medicines.        Dose/Directions    gabapentin 300 MG capsule   Commonly known as:  NEURONTIN   Used for:  Mononeuropathy multiplex   Started by:  Concepcion Bocanegra MD        Take 1 tablet (300 mg) every night for 1-3 days, then 1 tablet twice daily for 1-3 days, then 1 tablet three times daily   Quantity:  90 capsule   Refills:  0            Where to get your medicines      These medications were sent to [facility] [address] [place] [address] [id number] [place] [address] & [facility]  [address] [facility] [id number]    Hours:  24-hours Phone:  [phone]     gabapentin 300 MG capsule                Primary Care Provider Office Phone # Fax #    Concepcion Bocanegra [phone]       [address]  [place] [address]        Equal Access to Services     [facility] AH: Hadii aad ku hadasho Soomaali, waaxda luqadaha, qaybta kaalmada adeegyada, waxay yolanda hayclint bernal. So [facility] [phone].    ATENCIÓN: Si habla español, tiene a wiley disposición servicios gratuitos de asistencia lingüística. Llame al [phone].    We comply with applicable federal civil rights laws and [place] laws. We do not discriminate on the basis of race, color, national origin, age, disability, sex, sexual orientation, or gender identity.            Thank you!     Thank you for choosing  [facility]  for your care. Our goal is always to provide you with excellent care. Hearing back from our patients is one way we can continue to improve our services. Please take a few minutes to complete the written survey that you may receive in the mail after your visit with us. Thank you!             Your Updated Medication List - Protect others around you: Learn how to safely use, store and throw away your medicines at www.disposemymeds.org.          This list is accurate as of: 12/29/17  8:48 AM.  Always use your most recent med list.                   Brand Name Dispense Instructions for use Diagnosis    acetaminophen 325 MG tablet    TYLENOL    100 tablet    Take 2 tablets (650 mg) by mouth every 4 hours as needed for other (mild pain)    Malignant neoplasm of overlapping sites of right female breast (H)       gabapentin 300 MG capsule    NEURONTIN    90 capsule    Take 1 tablet (300 mg) every night for 1-3 days, then 1 tablet twice daily for 1-3 days, then 1 tablet three times daily    Mononeuropathy multiplex       letrozole 2.5 MG tablet    FEMARA    90 tablet    Take 1 tablet (2.5 mg) by mouth daily    Malignant neoplasm of overlapping sites of right female breast (H), Use of aromatase inhibitors       valACYclovir 1000 mg tablet    VALTREX     Take 1,000 mg by mouth        venlafaxine 150 MG 24 hr capsule    EFFEXOR-XR    90 capsule    Take 1 capsule (150 mg) by mouth daily    Malignant neoplasm of overlapping sites of right breast in female, estrogen receptor positive (H)       vitamin D 2000 UNITS tablet     100 tablet    Take 2,000 Units by mouth daily    Vitamin D deficiency

## 2017-12-29 NOTE — PATIENT INSTRUCTIONS
At [facility], we strive to deliver an exceptional experience to you, every time we see you.  If you receive a survey in the mail, please send us back your thoughts. We really do value your feedback.    Based on your medical history, these are the current health maintenance/preventive care services that you are due for (some may have been done at this visit.)  Health Maintenance Due   Topic Date Due     URINE DRUG SCREEN Q1 YR  10/17/1991     INFLUENZA VACCINE (SYSTEM ASSIGNED)  09/01/2017     PHQ-9 Q6 MONTHS  10/11/2017     DEPRESSION ACTION PLAN Q1 YR  10/21/2017         Suggested websites for health information:  Www.[facility].org : Up to date and easily searchable information on multiple topics.  Www.medlineplus.gov : medication info, interactive tutorials, watch real surgeries online  Www.familydoctor.org : good info from the Academy of Family Physicians  Www.cdc.gov : public health info, travel advisories, epidemics (H1N1)  Www.aap.org : children's health info, normal development, vaccinations  Www.health.[facility].[place].us : [place] dept of health, public health issues in [place], N1N1    Your care team:                            Family Medicine Internal Medicine   MD Antonio Mistry MD Shantel Branch-Fleming, MD Katya Georgiev PA-C Nam Ho, MD Pediatrics   DRAGAN Mancera, MARCELLUS Fields APRMD Haydee Graves CNP, MD Deborah Mielke, MD Kim Thein, APRN [facility]      Clinic hours: Monday - Thursday 7 am-7 pm; Fridays 7 am-5 pm.   Urgent care: Monday - Friday 11 am-9 pm; Saturday and Sunday 9 am-5 pm.  Pharmacy : Monday -Thursday 8 am-8 pm; Friday 8 am-6 pm; Saturday and Sunday 9 am-5 pm.     Clinic: [phone]   Pharmacy: [phone]    Trigeminal Neuralgia  You have trigeminal neuralgia. This is pain caused by irritation of the trigeminal nerve on your face. Symptoms include sudden, sharp pain in your head or face. It may feel like an  electric shock. It can last for several seconds or minutes. It usually happens on only 1 side of your face. Pain may be triggered by things like moving your jaw or a touch on the skin of your face. The pain may be caused by something irritating the trigeminal nerve, such as a blood vessel pressing against it. But the exact cause of this problem often isn t known. Although it can be quite painful, the condition isn t dangerous.  Trigeminal neuralgia is often treated with medicines. These include anti-seizure medicines or antidepressants. Certain other treatments may also help. In some cases, you may need surgery.    Home care  Your healthcare provider may prescribe medicines to help relieve and prevent pain. Take all medicines as directed. Please note that it may take several changes in dose and medicines before the right combination is found that controls the pain.  General care:    Plan to rest at home today.    Avoid any specific activities that seem to trigger the pain.    Over the next few weeks, keep a pain diary. Write down when your symptoms happen and how they feel. Certain activities such as touching your face, chewing, talking, or brushing your teeth may bring on the pain. Cold air can also trigger the pain. Make sure you write down any triggers and discuss these with your healthcare provider. This will help guide treatment.  Follow-up care  Follow up with your healthcare provider, or as advised. If you were referred to a neurologist, be sure to make an appointment.  For more information on your condition, visit:    Facial Pain Association www.fpa-support.org  When to seek medical advice  Call your healthcare provider right away if any of these occur:    Fever of 100.4 F (38 C) or higher, or as advised    Headache with very stiff neck    You aren t able to keep liquids down (repeated vomiting)    Extreme drowsiness or confusion    Dizziness or fainting    A new feeling of weakness or numbness or tingling  in your arm, leg, or face    Difficulty speaking or seeing  Date Last Reviewed: 8/1/2016 2000-2017 [employer]. [address] [place] [address], [place] [address]. All rights reserved. This information is not intended as a substitute for professional medical care. Always follow your healthcare professional's instructions.

## 2018-01-17 DIAGNOSIS — Z79.811 USE OF AROMATASE INHIBITORS: ICD-10-CM

## 2018-01-17 DIAGNOSIS — Z17.0 MALIGNANT NEOPLASM OF RIGHT BREAST IN FEMALE, ESTROGEN RECEPTOR POSITIVE, UNSPECIFIED SITE OF BREAST (H): Primary | ICD-10-CM

## 2018-01-17 DIAGNOSIS — C50.811 MALIGNANT NEOPLASM OF OVERLAPPING SITES OF RIGHT FEMALE BREAST (H): ICD-10-CM

## 2018-01-17 DIAGNOSIS — C50.911 MALIGNANT NEOPLASM OF RIGHT BREAST IN FEMALE, ESTROGEN RECEPTOR POSITIVE, UNSPECIFIED SITE OF BREAST (H): Primary | ICD-10-CM

## 2018-01-17 RX ORDER — LETROZOLE 2.5 MG/1
2.5 TABLET, FILM COATED ORAL DAILY
Qty: 90 TABLET | Refills: 3 | Status: SHIPPED | OUTPATIENT
Start: 2018-01-17 | End: 2018-07-19

## 2018-02-05 ENCOUNTER — OFFICE VISIT (OUTPATIENT)
Dept: FAMILY MEDICINE | Facility: CLINIC | Age: 43
End: 2018-02-05
Payer: COMMERCIAL

## 2018-02-05 VITALS
OXYGEN SATURATION: 99 % | WEIGHT: 157 LBS | DIASTOLIC BLOOD PRESSURE: 72 MMHG | BODY MASS INDEX: 30.82 KG/M2 | RESPIRATION RATE: 16 BRPM | HEIGHT: 60 IN | TEMPERATURE: 97.7 F | SYSTOLIC BLOOD PRESSURE: 103 MMHG | HEART RATE: 73 BPM

## 2018-02-05 DIAGNOSIS — Z17.0 MALIGNANT NEOPLASM OF OVERLAPPING SITES OF RIGHT BREAST IN FEMALE, ESTROGEN RECEPTOR POSITIVE (H): ICD-10-CM

## 2018-02-05 DIAGNOSIS — E55.9 VITAMIN D DEFICIENCY: ICD-10-CM

## 2018-02-05 DIAGNOSIS — C50.811 MALIGNANT NEOPLASM OF OVERLAPPING SITES OF RIGHT BREAST IN FEMALE, ESTROGEN RECEPTOR POSITIVE (H): ICD-10-CM

## 2018-02-05 DIAGNOSIS — R79.89 ELEVATED LFTS: ICD-10-CM

## 2018-02-05 DIAGNOSIS — Z01.818 PREOP GENERAL PHYSICAL EXAM: Primary | ICD-10-CM

## 2018-02-05 DIAGNOSIS — M21.612 BUNION, LEFT: ICD-10-CM

## 2018-02-05 LAB
ALBUMIN SERPL-MCNC: 4.2 G/DL (ref 3.4–5)
ALP SERPL-CCNC: 141 U/L (ref 40–150)
ALT SERPL W P-5'-P-CCNC: 112 U/L (ref 0–50)
ANION GAP SERPL CALCULATED.3IONS-SCNC: 10 MMOL/L (ref 3–14)
AST SERPL W P-5'-P-CCNC: 58 U/L (ref 0–45)
BILIRUB SERPL-MCNC: 0.3 MG/DL (ref 0.2–1.3)
BUN SERPL-MCNC: 11 MG/DL (ref 7–30)
CALCIUM SERPL-MCNC: 9.6 MG/DL (ref 8.5–10.1)
CHLORIDE SERPL-SCNC: 103 MMOL/L (ref 94–109)
CO2 SERPL-SCNC: 25 MMOL/L (ref 20–32)
CREAT SERPL-MCNC: 0.6 MG/DL (ref 0.52–1.04)
ERYTHROCYTE [DISTWIDTH] IN BLOOD BY AUTOMATED COUNT: 14.4 % (ref 10–15)
GFR SERPL CREATININE-BSD FRML MDRD: >90 ML/MIN/1.7M2
GLUCOSE SERPL-MCNC: 89 MG/DL (ref 70–99)
HCT VFR BLD AUTO: 43.1 % (ref 35–47)
HGB BLD-MCNC: 14.7 G/DL (ref 11.7–15.7)
MCH RBC QN AUTO: 29.6 PG (ref 26.5–33)
MCHC RBC AUTO-ENTMCNC: 34.1 G/DL (ref 31.5–36.5)
MCV RBC AUTO: 87 FL (ref 78–100)
PLATELET # BLD AUTO: 293 10E9/L (ref 150–450)
POTASSIUM SERPL-SCNC: 3.5 MMOL/L (ref 3.4–5.3)
PROT SERPL-MCNC: 8.3 G/DL (ref 6.8–8.8)
RBC # BLD AUTO: 4.96 10E12/L (ref 3.8–5.2)
SODIUM SERPL-SCNC: 138 MMOL/L (ref 133–144)
WBC # BLD AUTO: 6.3 10E9/L (ref 4–11)

## 2018-02-05 PROCEDURE — 82306 VITAMIN D 25 HYDROXY: CPT | Performed by: FAMILY MEDICINE

## 2018-02-05 PROCEDURE — 36415 COLL VENOUS BLD VENIPUNCTURE: CPT | Performed by: FAMILY MEDICINE

## 2018-02-05 PROCEDURE — 99214 OFFICE O/P EST MOD 30 MIN: CPT | Performed by: FAMILY MEDICINE

## 2018-02-05 PROCEDURE — 85027 COMPLETE CBC AUTOMATED: CPT | Performed by: FAMILY MEDICINE

## 2018-02-05 PROCEDURE — 80053 COMPREHEN METABOLIC PANEL: CPT | Performed by: FAMILY MEDICINE

## 2018-02-05 ASSESSMENT — PAIN SCALES - GENERAL: PAINLEVEL: NO PAIN (0)

## 2018-02-05 NOTE — PATIENT INSTRUCTIONS
At [facility], we strive to deliver an exceptional experience to you, every time we see you.  If you receive a survey in the mail, please send us back your thoughts. We really do value your feedback.    Based on your medical history, these are the current health maintenance/preventive care services that you are due for (some may have been done at this visit.)  Health Maintenance Due   Topic Date Due     URINE DRUG SCREEN Q1 YR  10/17/1991     INFLUENZA VACCINE (SYSTEM ASSIGNED)  09/01/2017     PHQ-9 Q6 MONTHS  10/11/2017     DEPRESSION ACTION PLAN Q1 YR  10/21/2017         Suggested websites for health information:  Www.[facility].org : Up to date and easily searchable information on multiple topics.  Www.medlineplus.gov : medication info, interactive tutorials, watch real surgeries online  Www.familydoctor.org : good info from the Academy of Family Physicians  Www.cdc.gov : public health info, travel advisories, epidemics (H1N1)  Www.aap.org : children's health info, normal development, vaccinations  Www.health.[facility].[place].us : [place] dept of health, public health issues in [place], N1N1    Your care team:                            Family Medicine Internal Medicine   MD Antonio Mistry MD Shantel Branch-Fleming, MD Katya Georgiev PA-C Nam Ho, MD Pediatrics   DRAGAN Mancera, MD Haydee Shaw CNP, MD Deborah Mielke, MD Kim Thein, APRN [facility]      Clinic hours: Monday - Thursday 7 am-7 pm; Fridays 7 am-5 pm.   Urgent care: Monday - Friday 11 am-9 pm; Saturday and Sunday 9 am-5 pm.  Pharmacy : Monday -Thursday 8 am-8 pm; Friday 8 am-6 pm; Saturday and Sunday 9 am-5 pm.     Clinic: [phone]   Pharmacy: [phone]      Before Your Surgery      Call your surgeon if there is any change in your health. This includes signs of a cold or flu (such as a sore throat, runny nose, cough, rash or fever).    Do not smoke, drink  alcohol or take over the counter medicine (unless your surgeon or primary care doctor tells you to) for the 24 hours before and after surgery.    If you take prescribed drugs: Follow your doctor s orders about which medicines to take and which to stop until after surgery.    Eating and drinking prior to surgery: follow the instructions from your surgeon    Take a shower or bath the night before surgery. Use the soap your surgeon gave you to gently clean your skin. If you do not have soap from your surgeon, use your regular soap. Do not shave or scrub the surgery site.  Wear clean pajamas and have clean sheets on your bed.

## 2018-02-05 NOTE — PROGRESS NOTES
[address] [address]  [phone]  Dept: [phone]    PRE-OP EVALUATION:  Today's date: 2018    Aleena Escamilla (: 1975) presents for pre-operative evaluation assessment as requested by Dr. Chele Jennings DPM.  She requires evaluation and anesthesia risk assessment prior to undergoing surgery/procedure for treatment of bunion left foot .  Proposed procedure: Bunionectomy Left Foot    Date of Surgery/ Procedure: 18  Time of Surgery/ Procedure: 6am arrival  Hospital/Surgical Facility: [facility]  Fax number for surgical facility: [phone]  Primary Physician: Concepcion Bocanegra  Type of Anesthesia Anticipated: Local with IV sedation    Patient has a Health Care Directive or Living Will:  NO    1. NO - Do you have a history of heart attack, stroke, stent, bypass or surgery on an artery in the head, neck, heart or legs?  2. NO - Do you ever have any pain or discomfort in your chest?  3. NO - Do you have a history of  Heart Failure?  4. NO - Are you troubled by shortness of breath when: walking on the level, up a slight hill or at night?  5. NO - Do you currently have a cold, bronchitis or other respiratory infection?  6. NO - Do you have a cough, shortness of breath or wheezing?  7. NO - Do you sometimes get pains in the calves of your legs when you walk?  8. NO - Do you or anyone in your family have previous history of blood clots?  9. NO - Do you or does anyone in your family have a serious bleeding problem such as prolonged bleeding following surgeries or cuts?  10. NO - Have you ever had problems with anemia or been told to take iron pills?  11. NO - Have you had any abnormal blood loss such as black, tarry or bloody stools, or abnormal vaginal bleeding?  12. NO - Have you ever had a blood transfusion?  13. NO - Have you or any of your relatives ever had problems with anesthesia?  14. NO - Do you have sleep  apnea, excessive snoring or daytime drowsiness?  15. NO - Do you have any prosthetic heart valves?  16. NO - Do you have prosthetic joints?  17. NO - Is there any chance that you may be pregnant?      HPI:                                                      Brief HPI related to upcoming procedure: left foot bunion that is getting worse and is painful when standing and walking. Not responding to shoe inserts or padding.       See problem list for active medical problems.  Problems all longstanding and stable, except as noted/documented.  See ROS for pertinent symptoms related to these conditions.                                                                                                  .    MEDICAL HISTORY:                                                      Patient Active Problem List    Diagnosis Date Noted     Elevated LFTs 12/29/2017     Priority: Medium     Onychomycosis 09/22/2017     Priority: Medium     Bunion, right 05/08/2017     Priority: Medium     Chronic right shoulder pain 03/06/2017     Priority: Medium     Non morbid drug-induced obesity 01/09/2017     Priority: Medium     Carpal tunnel syndrome 11/23/2016     Priority: Medium     BRCA gene positive 10/21/2016     Priority: Medium     Malignant neoplasm of overlapping sites of right female breast (H) 12/18/2015     Priority: Medium     Major depressive disorder, recurrent episode, mild (H) 07/31/2015     Priority: Medium     Generalized anxiety disorder 07/31/2015     Priority: Medium     Diagnosis updated by automated process. Provider to review and confirm.       Arthritis, multiple joint involvement 07/31/2015     Priority: Medium     Primary osteoarthritis of both hands 07/31/2015     Priority: Medium     Fibromyalgia 07/31/2015     Priority: Medium     Chronic pelvic pain in female 01/31/2011     Priority: Medium     Chronic salpingitis and oophoritis 01/31/2011     Priority: Medium     CARDIOVASCULAR SCREENING; LDL GOAL LESS THAN 160  10/31/2010     Priority: Medium      Past Medical History:   Diagnosis Date     Chronic pelvic pain in female      Malignant neoplasm of right breast, stage 2 (H) 1/2016 1/4 lymph nodes positive, Oncotype DX = 25.  Chemotherapy and radiation. Letrozole.     Migraine headaches      Past Surgical History:   Procedure Laterality Date     BIOPSY BREAST NEEDLE LOCALIZATION Right 1/19/2016    Procedure: BIOPSY BREAST NEEDLE LOCALIZATION;  Surgeon: Adelina Demarco MD;  Location: [facility] OR     GYN SURGERY  2-    bilateral salpingectomy, left oophrectomy     LAPAROSCOPIC SALPINGO-OOPHORECTOMY Right 10/26/2016    Procedure: LAPAROSCOPIC SALPINGO-OOPHORECTOMY;  Surgeon: Bouchra Mayo MD;  Location: [facility] OR     LUMPECTOMY BREAST WITH SENTINEL NODE, COMBINED Right 1/19/2016    Procedure: COMBINED LUMPECTOMY BREAST WITH SENTINEL NODE;  Surgeon: Adelina Demarco MD;  Location: [facility] OR     PELVIS LAPAROSCOPY,DX  12/28/1998     RELEASE CARPAL TUNNEL Left 12/23/2016    Procedure: RELEASE CARPAL TUNNEL;  Surgeon: Sam Florence MD;  Location: [facility] OR     Current Outpatient Prescriptions   Medication Sig Dispense Refill     letrozole (FEMARA) 2.5 MG tablet Take 1 tablet (2.5 mg) by mouth daily 90 tablet 3     gabapentin (NEURONTIN) 300 MG capsule Take 1 tablet (300 mg) every night for 1-3 days, then 1 tablet twice daily for 1-3 days, then 1 tablet three times daily 90 capsule 0     venlafaxine (EFFEXOR-XR) 150 MG 24 hr capsule Take 1 capsule (150 mg) by mouth daily 90 capsule 1     Cholecalciferol (VITAMIN D) 2000 UNITS tablet Take 2,000 Units by mouth daily 100 tablet 3     acetaminophen (TYLENOL) 325 MG tablet Take 2 tablets (650 mg) by mouth every 4 hours as needed for other (mild pain) 100 tablet 0     OTC products: None, except as noted above    Allergies   Allergen Reactions     Latex Rash      Latex Allergy: YES: Precautions to take: avoid due to itchy red rash with bumps    Social History   Substance Use  Topics     Smoking status: Never Smoker     Smokeless tobacco: Never Used     Alcohol use Yes      Comment: 1 drink per week.     History   Drug Use No       REVIEW OF SYSTEMS:                                                    Constitutional, neuro, ENT, endocrine, pulmonary, cardiac, gastrointestinal, genitourinary, musculoskeletal, integument and psychiatric systems are negative, except as otherwise noted.    EXAM:                                                    /72 (BP Location: Right arm, Patient Position: Chair, Cuff Size: Adult Regular)  Pulse 73  Temp 97.7  F (36.5  C) (Oral)  Resp 16  Ht 5' (1.524 m)  Wt 157 lb (71.2 kg)  LMP 03/01/2016  SpO2 99%  Breastfeeding? No  BMI 30.66 kg/m2    GENERAL APPEARANCE: healthy, alert and no distress     EYES: EOMI, PERRL     HENT: ear canals and TM's normal and nose and mouth without ulcers or lesions     NECK: no adenopathy, no asymmetry, masses, or scars and thyroid normal to palpation     RESP: lungs clear to auscultation - no rales, rhonchi or wheezes     CV: regular rates and rhythm, normal S1 S2, no S3 or S4 and no murmur, click or rub     ABDOMEN:  soft, nontender, no HSM or masses and bowel sounds normal     MS: extremities normal- no gross deformities noted, no evidence of inflammation in joints, FROM in all extremities.     SKIN: no suspicious lesions or rashes     NEURO: Normal strength and tone, sensory exam grossly normal, mentation intact and speech normal     PSYCH: mentation appears normal. and affect normal/bright     LYMPHATICS: No axillary, cervical, or supraclavicular nodes    DIAGNOSTICS:                                                    No labs or EKG required for low risk surgery (cataract, skin procedure, breast biopsy, etc)    Recent Labs   Lab Test  03/16/17   1030  12/19/16   1021  10/26/16   0753  09/27/16   1212  07/21/16   1049   HGB  14.5   --   13.8  13.7  11.8   PLT  282   --    --   290  443   NA   --    --    --   139   139   POTASSIUM   --    --   3.4  3.7  3.8   CR   --    --    --   0.55  0.54   A1C   --   5.7   --    --    --         IMPRESSION:                                                    Reason for surgery/procedure: bunion left foot .  Proposed procedure: Bunionectomy Left Foot  Diagnosis/reason for consult: cardiac and anesthesia risk assessment     The proposed surgical procedure is considered INTERMEDIATE risk.    REVISED CARDIAC RISK INDEX  The patient has the following serious cardiovascular risks for perioperative complications such as (MI, PE, VFib and 3  AV Block):  No serious cardiac risks  INTERPRETATION: 0 risks: Class I (very low risk - 0.4% complication rate)    The patient has the following additional risks for perioperative complications:  No identified additional risks      ICD-10-CM    1. Preop general physical exam Z01.818 Approved for surgery and anesthesia.    2. Bunion, left M21.612 Bunionectomy    3. Malignant neoplasm of overlapping sites of right breast in female, estrogen receptor positive (H) C50.811 Recovered from surgery and chemotherapy, taking Femara only now. Follow up with oncology as needed. No recurrences.    Z17.0        RECOMMENDATIONS:                                                          --Patient is to take all scheduled medications on the day of surgery EXCEPT for modifications listed below.    APPROVAL GIVEN to proceed with proposed procedure, without further diagnostic evaluation       Signed Electronically by: Concepcion Bocanegra MD    Copy of this evaluation report is provided to requesting physician.    [facility] Preop Guidelines

## 2018-02-05 NOTE — MR AVS SNAPSHOT
After Visit Summary   2/5/2018    Aleena Escamilla    MRN: [id number]           Patient Information     Date Of Birth          1975        Visit Information        Provider Department      2/5/2018 4:40 PM Concepcion Bocanegra MD [facility]        Today's Diagnoses     Preop general physical exam    -  1    Bunion, left        Malignant neoplasm of overlapping sites of right breast in female, estrogen receptor positive (H)        Elevated LFTs        Vitamin D deficiency          Care Instructions    At [facility], we strive to deliver an exceptional experience to you, every time we see you.  If you receive a survey in the mail, please send us back your thoughts. We really do value your feedback.    Based on your medical history, these are the current health maintenance/preventive care services that you are due for (some may have been done at this visit.)  Health Maintenance Due   Topic Date Due     URINE DRUG SCREEN Q1 YR  10/17/1991     INFLUENZA VACCINE (SYSTEM ASSIGNED)  09/01/2017     PHQ-9 Q6 MONTHS  10/11/2017     DEPRESSION ACTION PLAN Q1 YR  10/21/2017         Suggested websites for health information:  Www.[facility].org : Up to date and easily searchable information on multiple topics.  Www.medlineplus.gov : medication info, interactive tutorials, watch real surgeries online  Www.familydoctor.org : good info from the Academy of Family Physicians  Www.cdc.gov : public health info, travel advisories, epidemics (H1N1)  Www.aap.org : children's health info, normal development, vaccinations  Www.health.[facility].[place].us : [place] dept of health, public health issues in [place], N1N1    Your care team:                            Family Medicine Internal Medicine   MD Antonio Mistry MD Shantel Branch-Fleming, MD Katya Georgiev PA-C Nam Ho, MD Pediatrics   DRAGAN Mancera, MD Haydee Shaw CNP  MD Concepcion Morales MD Kim Thein, APRN [facility]      Clinic hours: Monday - Thursday 7 am-7 pm; Fridays 7 am-5 pm.   Urgent care: Monday - Friday 11 am-9 pm; Saturday and Sunday 9 am-5 pm.  Pharmacy : Monday -Thursday 8 am-8 pm; Friday 8 am-6 pm; Saturday and Sunday 9 am-5 pm.     Clinic: [phone]   Pharmacy: [phone]      Before Your Surgery      Call your surgeon if there is any change in your health. This includes signs of a cold or flu (such as a sore throat, runny nose, cough, rash or fever).    Do not smoke, drink alcohol or take over the counter medicine (unless your surgeon or primary care doctor tells you to) for the 24 hours before and after surgery.    If you take prescribed drugs: Follow your doctor s orders about which medicines to take and which to stop until after surgery.    Eating and drinking prior to surgery: follow the instructions from your surgeon    Take a shower or bath the night before surgery. Use the soap your surgeon gave you to gently clean your skin. If you do not have soap from your surgeon, use your regular soap. Do not shave or scrub the surgery site.  Wear clean pajamas and have clean sheets on your bed.           Follow-ups after your visit        Follow-up notes from your care team     Return in about 6 months (around 8/5/2018) for Physical Exam, Lab Work, medication follow up.      Your next 10 appointments already scheduled     Mar 15, 2018 10:45 AM CDT   LAB with LAB ONC [facility] ([facility]    [address] [place] [address] [phone]           Please do not eat 10-12 hours before your appointment if you are coming in fasting for labs on lipids, cholesterol, or glucose (sugar). This does not apply to pregnant women. Water, hot tea and black coffee (with nothing added) are okay. Do not drink other fluids, diet soda or chew gum.            Mar 15, 2018 11:15 AM CDT   Return Visit with Marisol WEEMS  PIA Solitario CNP   [facility]    [address] [address] [place] [address] [phone]              Who to contact     If you have questions or need follow up information about today's clinic visit or your schedule please contact [facility] directly at [phone].  Normal or non-critical lab and imaging results will be communicated to you by MyChart, letter or phone within 4 business days after the clinic has received the results. If you do not hear from us within 7 days, please contact the clinic through [facility] or phone. If you have a critical or abnormal lab result, we will notify you by phone as soon as possible.  Submit refill requests through [employer] or call your pharmacy and they will forward the refill request to us. Please allow 3 business days for your refill to be completed.          Additional Information About Your Visit        [facility] Information     [employer] gives you secure access to your electronic health record. If you see a primary care provider, you can also send messages to your care team and make appointments. If you have questions, please call your primary care clinic.  If you do not have a primary care provider, please call [phone] and they will assist you.        Care EveryWhere ID     This is your Care EveryWhere ID. This could be used by other organizations to access your [place] medical records  [id number]        Your Vitals Were     Pulse Temperature Respirations Height Last Period Pulse Oximetry    73 97.7  F (36.5  C) (Oral) 16 5' (1.524 m) 03/01/2016 99%    Breastfeeding? BMI (Body Mass Index)                No 30.66 kg/m2           Blood Pressure from Last 3 Encounters:   02/05/18 103/72   12/29/17 117/80   11/16/17 109/74    Weight from Last 3 Encounters:   02/05/18 157 lb (71.2 kg)   12/29/17 154 lb (69.9 kg)   11/16/17 159 lb (72.1 kg)              We Performed the Following     CBC with  platelets     Comprehensive metabolic panel     Vitamin D Deficiency        Primary Care Provider Office Phone # Fax #    Concepcion Tana Bocanegra [phone]       [address]  [facility] [address]        Equal Access to Services     DESHAWN BERG : Hadii jame ku hadramanao Soomaali, waaxda luqadaha, qaybta kaalmada adeegyada, waxkate romainin haydenzeln jose miguelvasile rodriguezgali chirinos . So [facility] [phone].    ATENCIÓN: Si habla español, tiene a wiley disposición servicios gratuitos de asistencia lingüística. Llame al [phone].    We comply with applicable federal civil rights laws and [place] laws. We do not discriminate on the basis of race, color, national origin, age, disability, sex, sexual orientation, or gender identity.            Thank you!     Thank you for choosing [facility]  for your care. Our goal is always to provide you with excellent care. Hearing back from our patients is one way we can continue to improve our services. Please take a few minutes to complete the written survey that you may receive in the mail after your visit with us. Thank you!             Your Updated Medication List - Protect others around you: Learn how to safely use, store and throw away your medicines at www.disposemymeds.org.          This list is accurate as of 2/5/18  4:55 PM.  Always use your most recent med list.                   Brand Name Dispense Instructions for use Diagnosis    acetaminophen 325 MG tablet    TYLENOL    100 tablet    Take 2 tablets (650 mg) by mouth every 4 hours as needed for other (mild pain)    Malignant neoplasm of overlapping sites of right female breast (H)       gabapentin 300 MG capsule    NEURONTIN    90 capsule    Take 1 tablet (300 mg) every night for 1-3 days, then 1 tablet twice daily for 1-3 days, then 1 tablet three times daily    Mononeuropathy multiplex       letrozole 2.5 MG tablet    FEMARA    90 tablet    Take 1 tablet (2.5 mg) by mouth daily    Malignant  neoplasm of right breast in female, estrogen receptor positive, unspecified site of breast (H)       venlafaxine 150 MG 24 hr capsule    EFFEXOR-XR    90 capsule    Take 1 capsule (150 mg) by mouth daily    Malignant neoplasm of overlapping sites of right breast in female, estrogen receptor positive (H)       vitamin D 2000 UNITS tablet     100 tablet    Take 2,000 Units by mouth daily    Vitamin D deficiency

## 2018-02-06 LAB — DEPRECATED CALCIDIOL+CALCIFEROL SERPL-MC: 25 UG/L (ref 20–75)

## 2018-02-06 NOTE — PROGRESS NOTES
Dear Aleena    Your test results are attached. I am happy to let you know that they are stable.    The vitamin D test is getting better. The blood sugar is normal and you do not have diabetes. The kidneys are healthy. The liver test is still elevated and should be followed up with the gastroenterologist as scheduled.     Please contact me by Photos to [employer] if you have any questions about your labs or management.    Concepcion Bocanegra MD

## 2018-03-13 ENCOUNTER — OFFICE VISIT (OUTPATIENT)
Dept: AUDIOLOGY | Facility: CLINIC | Age: 43
End: 2018-03-13
Payer: COMMERCIAL

## 2018-03-13 ENCOUNTER — OFFICE VISIT (OUTPATIENT)
Dept: OTOLARYNGOLOGY | Facility: CLINIC | Age: 43
End: 2018-03-13
Payer: COMMERCIAL

## 2018-03-13 VITALS — TEMPERATURE: 98.7 F | RESPIRATION RATE: 16 BRPM

## 2018-03-13 DIAGNOSIS — H90.6 MIXED CONDUCTIVE AND SENSORINEURAL HEARING LOSS OF BOTH EARS: Primary | ICD-10-CM

## 2018-03-13 DIAGNOSIS — H69.93 DISORDER OF BOTH EUSTACHIAN TUBES: ICD-10-CM

## 2018-03-13 DIAGNOSIS — H69.93 DYSFUNCTION OF BOTH EUSTACHIAN TUBES: Primary | ICD-10-CM

## 2018-03-13 DIAGNOSIS — H92.01 OTALGIA, RIGHT: ICD-10-CM

## 2018-03-13 PROCEDURE — 92567 TYMPANOMETRY: CPT | Performed by: AUDIOLOGIST

## 2018-03-13 PROCEDURE — 92557 COMPREHENSIVE HEARING TEST: CPT | Performed by: AUDIOLOGIST

## 2018-03-13 PROCEDURE — 99203 OFFICE O/P NEW LOW 30 MIN: CPT | Performed by: OTOLARYNGOLOGY

## 2018-03-13 PROCEDURE — 99207 ZZC NO CHARGE LOS: CPT | Performed by: AUDIOLOGIST

## 2018-03-13 NOTE — MR AVS SNAPSHOT
After Visit Summary   3/13/2018    Aleena Escamilla    MRN: [id number]           Patient Information     Date Of Birth          1975        Visit Information        Provider Department      3/13/2018 2:30 PM Roberto Brown MD [facility]        Today's Diagnoses     Dysfunction of both eustachian tubes    -  1    Otalgia, right          Care Instructions    General Scheduling Information  To schedule your CT/MRI scan, please contact [place] Caicedo at [phone] [address] [place] [address]    To schedule your Surgery, please contact our Specialty Schedulers at [phone]    ENT Clinic Locations Clinic Hours Telephone Number     [facility]  [address]  [place] [address]   Tuesday:       8:00am -- 4:00pm    Wednesday:  8:00am - 4:00pm   To schedule an appointment with   Dr. Brown,   please contact our   Specialty Scheduling Department at:     [phone]       [facility] Jones  [address] [place] [address]   Friday:          8:00am - 4:00pm         Urgent Care Locations Clinic Hours Telephone Numbers     [facility]  [address] [place] [address]     Monday-Friday:     11:00pm - 9:00pm    Saturday-Sunday:  9:00am - 5:00pm   [phone]     [facility] Jones  [address] [place] [address]     Monday-Friday:      5:00pm - 9:00pm     Saturday-Sunday:  9:00am - 5:00pm   [phone]       \          Follow-ups after your visit        Additional Services     AUDIOLOGY ADULT REFERRAL       Your provider has referred you to: [facility]: [facility] [phone]   [email or web address]    Treatment:  Evaluation & Treatment  Specialty Testing:  Audiogram w/Tymps and Reflexes    Please be aware that coverage of these services is subject to the terms and limitations of your health insurance plan.  Call member services at your health plan with any benefit or coverage  questions.      Please bring the following to your appointment:  >>   Any x-rays, CTs or MRIs which have been performed.  Contact the facility where they were done to arrange for  prior to your scheduled appointment.   >>   List of current medications  >>   This referral request   >>   Any documents/labs given to you for this referral                  Your next 10 appointments already scheduled     Mar 15, 2018 10:45 AM CDT   LAB with LAB ONC [facility]    [address] [place] [phone]           Please do not eat 10-12 hours before your appointment if you are coming in fasting for labs on lipids, cholesterol, or glucose (sugar). This does not apply to pregnant women. Water, hot tea and black coffee (with nothing added) are okay. Do not drink other fluids, diet soda or chew gum.            Mar 15, 2018 11:15 AM CDT   Return Visit with PIA Hankins [facility] ([facility])    [address] [place] [phone]              Who to contact     If you have questions or need follow up information about today's clinic visit or your schedule please contact [facility] directly at [phone].  Normal or non-critical lab and imaging results will be communicated to you by MyChart, letter or phone within 4 business days after the clinic has received the results. If you do not hear from us within 7 days, please contact the clinic through MyChart or phone. If you have a critical or abnormal lab result, we will notify you by phone as soon as possible.  Submit refill requests through [employer] or call your pharmacy and they will forward the refill request to us. Please allow 3 business days for your refill to be completed.          Additional Information About Your Visit        [employer] Information     [employer] gives you secure access to your  electronic health record. If you see a primary care provider, you can also send messages to your care team and make appointments. If you have questions, please call your primary care clinic.  If you do not have a primary care provider, please call [phone] and they will assist you.        Care EveryWhere ID     This is your Care EveryWhere ID. This could be used by other organizations to access your [place] medical records  [id number]        Your Vitals Were     Temperature Respirations Last Period             98.7  F (37.1  C) (Tympanic) 16 03/01/2016          Blood Pressure from Last 3 Encounters:   02/05/18 103/72   12/29/17 117/80   11/16/17 109/74    Weight from Last 3 Encounters:   02/05/18 71.2 kg (157 lb)   12/29/17 69.9 kg (154 lb)   11/16/17 72.1 kg (159 lb)              We Performed the Following     AUDIOLOGY ADULT REFERRAL        Primary Care Provider Office Phone # Fax #    Concepcion Tana Bocanegra [phone]       [address] [facility] [address]        Equal Access to Services     [facility]: Hadii aad ku hadasho Soomaali, waaxda luqadaha, qaybta kaalmada adeegyada, azam chirinos . So [facility] [phone].    ATENCIÓN: Si habla español, tiene a wiley disposición servicios gratuitos de asistencia lingüística. [facility] [phone].    We comply with applicable federal civil rights laws and [place] laws. We do not discriminate on the basis of race, color, national origin, age, disability, sex, sexual orientation, or gender identity.            Thank you!     Thank you for choosing [facility]  for your care. Our goal is always to provide you with excellent care. Hearing back from our patients is one way we can continue to improve our services. Please take a few minutes to complete the written survey that you may receive in the mail after your visit with us. Thank you!             Your Updated Medication List - Protect others around you:  Learn how to safely use, store and throw away your medicines at www.disposemymeds.org.          This list is accurate as of 3/13/18  5:24 PM.  Always use your most recent med list.                   Brand Name Dispense Instructions for use Diagnosis    acetaminophen 325 MG tablet    TYLENOL    100 tablet    Take 2 tablets (650 mg) by mouth every 4 hours as needed for other (mild pain)    Malignant neoplasm of overlapping sites of right female breast (H)       gabapentin 300 MG capsule    NEURONTIN    90 capsule    Take 1 tablet (300 mg) every night for 1-3 days, then 1 tablet twice daily for 1-3 days, then 1 tablet three times daily    Mononeuropathy multiplex       letrozole 2.5 MG tablet    FEMARA    90 tablet    Take 1 tablet (2.5 mg) by mouth daily    Malignant neoplasm of right breast in female, estrogen receptor positive, unspecified site of breast (H)       venlafaxine 150 MG 24 hr capsule    EFFEXOR-XR    90 capsule    Take 1 capsule (150 mg) by mouth daily    Malignant neoplasm of overlapping sites of right breast in female, estrogen receptor positive (H)       vitamin D 2000 UNITS tablet     100 tablet    Take 2,000 Units by mouth daily    Vitamin D deficiency

## 2018-03-13 NOTE — PROGRESS NOTES
AUDIOLOGY REPORT: HEARING EXAM    SUBJECTIVE:  Aleena Escamilla is a 42 year old female referred to audiology from ENT by Dr. Brown for a hearing examination. Patient reports longstanding occasional pain, aural fullness, and decreased hearing in her right ear since childhood that has become more severe following chemotherapy treatment she underwent in 2015. She also reports no concerns with her left ear.    OBJECTIVE:    Otoscopy:   RIGHT: clear ear canal   LEFT:  clear ear canal    Tympanometry:   RIGHT:  negative pressure    LEFT:   negative pressure     Thresholds:   Pure Tone Thresholds assessed using standard techniques with good  reliability from 250-8000 Hz bilaterally using circumaural headphones.   RIGHT:  mild mixed hearing loss   LEFT:   mild mixed hearing loss    Speech Reception Threshold:   RIGHT:  30 dB HL   LEFT:    30 dB HL    Word Recognition Score:    RIGHT:  100% at 70 dB HL using NU-6 recorded word list   LEFT:    100% at 70 dB HL using NU-6 recorded word list    ASSESSMENT:  Bilateral mixed hearing loss  Disorder of both eustachian tubes    Discussed results with the patient.     PLAN:  Patient was returned to ENT for follow up.     Daniel Sun.  Licensed Audiologist, [place] #[id number]  [facility]  3/13/2018

## 2018-03-13 NOTE — PATIENT INSTRUCTIONS
General Scheduling Information  To schedule your CT/MRI scan, please contact [place] Caicedo at [phone]   [address] [place] [address]    To schedule your Surgery, please contact our Specialty Schedulers at [phone]    ENT Clinic Locations Clinic Hours Telephone Number     [facility] [place]  [address] [place] [address]   Tuesday:       8:00am -- 4:00pm    Wednesday:  8:00am - 4:00pm   To schedule an appointment with   Dr. Brown,   please contact our   Specialty Scheduling Department at:     [phone]       [facility] Goldman  [address] [place] [address]   Friday:          8:00am - 4:00pm         Urgent Care Locations Clinic Hours Telephone Numbers     [facility] Ramirez  [address] [place] [address]     Monday-Friday:     11:00pm - 9:00pm    Saturday-Sunday:  9:00am - 5:00pm   [phone]     Artie Goldman  [address] [place] [address]     Monday-Friday:      5:00pm - 9:00pm     Saturday-Sunday:  9:00am - 5:00pm   [phone]       \

## 2018-03-13 NOTE — PROGRESS NOTES
Chief Complaint - plugged ear    History of Present Illness - Aleena Escamilla is a 42 year old female who presents to me today with pressure or a plugged feeling in the right ear. Has pain and is worsening. It has been present and noticeable for approximately years, but worsening lately. worsened after chemo. Feels she had a right ear infection in 20s, pain since. There is no history of chronic ear disease or ear surgery. No ear problems in family. Has poor sense of smell and taste. The patient has tried nothing.     Past Medical History -   Patient Active Problem List   Diagnosis     CARDIOVASCULAR SCREENING; LDL GOAL LESS THAN 160     Chronic pelvic pain in female     Chronic salpingitis and oophoritis     Major depressive disorder, recurrent episode, mild (H)     Generalized anxiety disorder     Arthritis, multiple joint involvement     Primary osteoarthritis of both hands     Fibromyalgia     Malignant neoplasm of overlapping sites of right female breast (H)     BRCA gene positive     Carpal tunnel syndrome     Non morbid drug-induced obesity     Chronic right shoulder pain     Bunion, right     Onychomycosis     Elevated LFTs       Current Medications -   Current Outpatient Prescriptions:      letrozole (FEMARA) 2.5 MG tablet, Take 1 tablet (2.5 mg) by mouth daily, Disp: 90 tablet, Rfl: 3     gabapentin (NEURONTIN) 300 MG capsule, Take 1 tablet (300 mg) every night for 1-3 days, then 1 tablet twice daily for 1-3 days, then 1 tablet three times daily, Disp: 90 capsule, Rfl: 0     venlafaxine (EFFEXOR-XR) 150 MG 24 hr capsule, Take 1 capsule (150 mg) by mouth daily, Disp: 90 capsule, Rfl: 1     Cholecalciferol (VITAMIN D) 2000 UNITS tablet, Take 2,000 Units by mouth daily, Disp: 100 tablet, Rfl: 3     acetaminophen (TYLENOL) 325 MG tablet, Take 2 tablets (650 mg) by mouth every 4 hours as needed for other (mild pain), Disp: 100 tablet, Rfl: 0    Allergies -   Allergies   Allergen Reactions     Latex Rash        Social History -   Social History     Social History     Marital status:      Spouse name: N/A     Number of children: N/A     Years of education: N/A     Social History Main Topics     Smoking status: Never Smoker     Smokeless tobacco: Never Used     Alcohol use Yes      Comment: 1 drink per week.     Drug use: No     Sexual activity: Yes     Partners: Male     Birth control/ protection: None, Female Surgical     Other Topics Concern      Service No     Blood Transfusions No     Caffeine Concern No     coffee 2 cups per day     Occupational Exposure No     Hobby Hazards No     Sleep Concern No     Stress Concern No     Weight Concern No     Special Diet No     Back Care No     Exercise No     Bike Helmet No     Seat Belt Yes     100%     Self-Exams No     Social History Narrative       Family History -   Family History   Problem Relation Age of Onset     DIABETES Mother      Hypertension Mother      CANCER Maternal Grandmother      cervical CA     Cardiovascular Father      MI     CANCER Maternal Aunt      cervical cancer       Review of Systems - As per HPI and PMHx, otherwise 7 system review of the head and neck negative.    Physical Exam  Temp 98.7  F (37.1  C) (Tympanic)  Resp 16  LMP 03/01/2016  General - The patient is nontoxic, in no distress.  Alert and oriented to person and place, answers questions and cooperates with examination appropriately.   Voice and Breathing - The patient was breathing comfortably without the use of accessory muscles. There was no wheezing, stridor, or stertor.  The patients voice was clear and strong.  Ears - The tympanic membrane on the right is intact, but appears retracted, no obvious middle ear effusion. No acute infection.  No fluid or purulence was seen in the external canal. The tympanic membrane on the left is intact, some retraction, no middle ear effusion. No acute infection.  No fluid or purulence was seen in the external canal.   Nose - Septum  midline. Turbinates normal size. Airway patent bilaterally. No polyps, masses, or pus.   Eyes - Extraocular movements intact. Sclera were not icteric or injected.  Mouth - Examination of the oral cavity showed pink, healthy mucosa. No lesions or ulcerations noted.  The tongue was mobile and midline.  Throat - The walls of the oropharynx were smooth, symmetric, and had no lesions or ulcerations.  The uvula was midline on elevation.    Neck - some right TMJ pain. Palpation of the occipital, submental, submandibular, internal jugular chain, and supraclavicular nodes did not demonstrateany abnormal lymph nodes or masses. No parotid masses. Palpation of the thyroid was soft and smooth, with no nodules or goiter appreciated.  The trachea was mobile and midline.  Neurological - Cranial nerves 2 through 12 were grossly intact. House-Brackmann grade 1 out of 6 bilaterally.    Audiologic Studies - An audiogram and tympanogram were performed today as part of the evaluation and personally reviewed. The tympanogram shows a type C, low volume on both sides.  The audiogram showed low frequency sensorineural hearing loss both sides, some mild mid to high frequecny mixed loss right side.      Assessment and Plan - Aleena Escamilla is a 42 year old female who presents to me today with right ear pain. This could be ETD, but she has this on both ears, only the left ear hurts. She also easily insufflates her ears without pain. I think this is TMJ. She has been chewing lots of gum. I advised stopping gum and hard food chewing. Use hot packs, massage, may need a mouth guard. Recheck ears in 6-12 months with audiogram, sooner if things change.     Roberto rBown MD  Otolaryngology  [facility]

## 2018-03-13 NOTE — MR AVS SNAPSHOT
After Visit Summary   3/13/2018    Aleena Escamilla    MRN: [id number]           Patient Information     Date Of Birth          1975        Visit Information        Provider Department      3/13/2018 2:00 PM Rohit Portillo, [facility]        Today's Diagnoses     Mixed conductive and sensorineural hearing loss of both ears    -  1    Disorder of both eustachian tubes           Follow-ups after your visit        Your next 10 appointments already scheduled     Mar 15, 2018 10:45 AM CDT   LAB with LAB ONC [facility])    [address] [place] [phone]           Please do not eat 10-12 hours before your appointment if you are coming in fasting for labs on lipids, cholesterol, or glucose (sugar). This does not apply to pregnant women. Water, hot tea and black coffee (with nothing added) are okay. Do not drink other fluids, diet soda or chew gum.            Mar 15, 2018 11:15 AM CDT   Return Visit with PIA Hankins [facility] ([facility])    [address] [place] [phone]              Who to contact     If you have questions or need follow up information about today's clinic visit or your schedule please contact [facility] directly at [phone].  Normal or non-critical lab and imaging results will be communicated to you by MyChart, letter or phone within 4 business days after the clinic has received the results. If you do not hear from us within 7 days, please contact the clinic through MyChart or phone. If you have a critical or abnormal lab result, we will notify you by phone as soon as possible.  Submit refill requests through [employer] or call your pharmacy and they will forward the refill request to us. Please allow 3 business days for your refill to be completed.          Additional Information  About Your Visit        [employer] Information     [employer] gives you secure access to your electronic health record. If you see a primary care provider, you can also send messages to your care team and make appointments. If you have questions, please call your primary care clinic.  If you do not have a primary care provider, please call [phone] and they will assist you.        Care EveryWhere ID     This is your Care EveryWhere ID. This could be used by other organizations to access your [place] medical records  [id number]        Your Vitals Were     Last Period                   03/01/2016            Blood Pressure from Last 3 Encounters:   02/05/18 103/72   12/29/17 117/80   11/16/17 109/74    Weight from Last 3 Encounters:   02/05/18 157 lb (71.2 kg)   12/29/17 154 lb (69.9 kg)   11/16/17 159 lb (72.1 kg)              We Performed the Following     AUDIOGRAM/TYMPANOGRAM - INTERFACE     COMPREHENSIVE HEARING TEST     TYMPANOMETRY        Primary Care Provider Office Phone # Fax #    Concepcion Tana Bocanegra -[phone]       [address] [facility] [address]        Equal Access to Services     [facility]: Hadii aad ku hadasho Soomaali, waaxda luqadaha, qaybta kaalmada adeegyada, azam guerrero haydenzeln carmen chirinos . So [facility] [phone].    ATENCIÓN: Si habla español, tiene a wiley disposición servicios gratuitos de asistencia lingüística. Llame al [phone].    We comply with applicable federal civil rights laws and [place] laws. We do not discriminate on the basis of race, color, national origin, age, disability, sex, sexual orientation, or gender identity.            Thank you!     Thank you for choosing [facility]  for your care. Our goal is always to provide you with excellent care. Hearing back from our patients is one way we can continue to improve our services. Please take a few minutes to complete the written survey that you may receive in the mail after your  visit with us. Thank you!             Your Updated Medication List - Protect others around you: Learn how to safely use, store and throw away your medicines at www.disposemymeds.org.          This list is accurate as of 3/13/18  3:18 PM.  Always use your most recent med list.                   Brand Name Dispense Instructions for use Diagnosis    acetaminophen 325 MG tablet    TYLENOL    100 tablet    Take 2 tablets (650 mg) by mouth every 4 hours as needed for other (mild pain)    Malignant neoplasm of overlapping sites of right female breast (H)       gabapentin 300 MG capsule    NEURONTIN    90 capsule    Take 1 tablet (300 mg) every night for 1-3 days, then 1 tablet twice daily for 1-3 days, then 1 tablet three times daily    Mononeuropathy multiplex       letrozole 2.5 MG tablet    FEMARA    90 tablet    Take 1 tablet (2.5 mg) by mouth daily    Malignant neoplasm of right breast in female, estrogen receptor positive, unspecified site of breast (H)       venlafaxine 150 MG 24 hr capsule    EFFEXOR-XR    90 capsule    Take 1 capsule (150 mg) by mouth daily    Malignant neoplasm of overlapping sites of right breast in female, estrogen receptor positive (H)       vitamin D 2000 UNITS tablet     100 tablet    Take 2,000 Units by mouth daily    Vitamin D deficiency

## 2018-03-13 NOTE — LETTER
3/13/2018         RE: Aleena Escamilla  [address] [facility] [address]        Dear Colleague,    Thank you for referring your patient, Aleena Escamilla, to the [facility]. Please see a copy of my visit note below.    Chief Complaint - plugged ear    History of Present Illness - Aleena Escamilla is a 42 year old female who presents to me today with pressure or a plugged feeling in the right ear. Has pain and is worsening. It has been present and noticeable for approximately years, but worsening lately. worsened after chemo. Feels she had a right ear infection in 20s, pain since. There is no history of chronic ear disease or ear surgery. No ear problems in family. Has poor sense of smell and taste. The patient has tried nothing.     Past Medical History -   Patient Active Problem List   Diagnosis     CARDIOVASCULAR SCREENING; LDL GOAL LESS THAN 160     Chronic pelvic pain in female     Chronic salpingitis and oophoritis     Major depressive disorder, recurrent episode, mild (H)     Generalized anxiety disorder     Arthritis, multiple joint involvement     Primary osteoarthritis of both hands     Fibromyalgia     Malignant neoplasm of overlapping sites of right female breast (H)     BRCA gene positive     Carpal tunnel syndrome     Non morbid drug-induced obesity     Chronic right shoulder pain     Bunion, right     Onychomycosis     Elevated LFTs       Current Medications -   Current Outpatient Prescriptions:      letrozole (FEMARA) 2.5 MG tablet, Take 1 tablet (2.5 mg) by mouth daily, Disp: 90 tablet, Rfl: 3     gabapentin (NEURONTIN) 300 MG capsule, Take 1 tablet (300 mg) every night for 1-3 days, then 1 tablet twice daily for 1-3 days, then 1 tablet three times daily, Disp: 90 capsule, Rfl: 0     venlafaxine (EFFEXOR-XR) 150 MG 24 hr capsule, Take 1 capsule (150 mg) by mouth daily, Disp: 90 capsule, Rfl: 1     Cholecalciferol (VITAMIN D) 2000 UNITS tablet, Take 2,000 Units by mouth daily,  Disp: 100 tablet, Rfl: 3     acetaminophen (TYLENOL) 325 MG tablet, Take 2 tablets (650 mg) by mouth every 4 hours as needed for other (mild pain), Disp: 100 tablet, Rfl: 0    Allergies -   Allergies   Allergen Reactions     Latex Rash       Social History -   Social History     Social History     Marital status:      Spouse name: N/A     Number of children: N/A     Years of education: N/A     Social History Main Topics     Smoking status: Never Smoker     Smokeless tobacco: Never Used     Alcohol use Yes      Comment: 1 drink per week.     Drug use: No     Sexual activity: Yes     Partners: Male     Birth control/ protection: None, Female Surgical     Other Topics Concern      Service No     Blood Transfusions No     Caffeine Concern No     coffee 2 cups per day     Occupational Exposure No     Hobby Hazards No     Sleep Concern No     Stress Concern No     Weight Concern No     Special Diet No     Back Care No     Exercise No     Bike Helmet No     Seat Belt Yes     100%     Self-Exams No     Social History Narrative       Family History -   Family History   Problem Relation Age of Onset     DIABETES Mother      Hypertension Mother      CANCER Maternal Grandmother      cervical CA     Cardiovascular Father      MI     CANCER Maternal Aunt      cervical cancer       Review of Systems - As per HPI and PMHx, otherwise 7 system review of the head and neck negative.    Physical Exam  Temp 98.7  F (37.1  C) (Tympanic)  Resp 16  LMP 03/01/2016  General - The patient is nontoxic, in no distress.  Alert and oriented to person and place, answers questions and cooperates with examination appropriately.   Voice and Breathing - The patient was breathing comfortably without the use of accessory muscles. There was no wheezing, stridor, or stertor.  The patients voice was clear and strong.  Ears - The tympanic membrane on the right is intact, but appears retracted, no obvious middle ear effusion. No acute  infection.  No fluid or purulence was seen in the external canal. The tympanic membrane on the left is intact, some retraction, no middle ear effusion. No acute infection.  No fluid or purulence was seen in the external canal.   Nose - Septum midline. Turbinates normal size. Airway patent bilaterally. No polyps, masses, or pus.   Eyes - Extraocular movements intact. Sclera were not icteric or injected.  Mouth - Examination of the oral cavity showed pink, healthy mucosa. No lesions or ulcerations noted.  The tongue was mobile and midline.  Throat - The walls of the oropharynx were smooth, symmetric, and had no lesions or ulcerations.  The uvula was midline on elevation.    Neck - some right TMJ pain. Palpation of the occipital, submental, submandibular, internal jugular chain, and supraclavicular nodes did not demonstrateany abnormal lymph nodes or masses. No parotid masses. Palpation of the thyroid was soft and smooth, with no nodules or goiter appreciated.  The trachea was mobile and midline.  Neurological - Cranial nerves 2 through 12 were grossly intact. House-Brackmann grade 1 out of 6 bilaterally.    Audiologic Studies - An audiogram and tympanogram were performed today as part of the evaluation and personally reviewed. The tympanogram shows a type C, low volume on both sides.  The audiogram showed low frequency sensorineural hearing loss both sides, some mild mid to high frequecny mixed loss right side.      Assessment and Plan - Aleena Escamilla is a 42 year old female who presents to me today with right ear pain. This could be ETD, but she has this on both ears, only the left ear hurts. She also easily insufflates her ears without pain. I think this is TMJ. She has been chewing lots of gum. I advised stopping gum and hard food chewing. Use hot packs, massage, may need a mouth guard. Recheck ears in 6-12 months with audiogram, sooner if things change.     Roberto Brown MD  Otolaryngology  [facility]  [facility]      Again, thank you for allowing me to participate in the care of your patient.        Sincerely,        Roberto Brown MD

## 2018-03-15 ENCOUNTER — ONCOLOGY VISIT (OUTPATIENT)
Dept: ONCOLOGY | Facility: CLINIC | Age: 43
End: 2018-03-15
Payer: COMMERCIAL

## 2018-03-15 VITALS
TEMPERATURE: 98.3 F | BODY MASS INDEX: 31.41 KG/M2 | WEIGHT: 160 LBS | HEART RATE: 77 BPM | SYSTOLIC BLOOD PRESSURE: 97 MMHG | DIASTOLIC BLOOD PRESSURE: 67 MMHG | OXYGEN SATURATION: 96 % | HEIGHT: 60 IN

## 2018-03-15 DIAGNOSIS — Z17.0 MALIGNANT NEOPLASM OF OVERLAPPING SITES OF RIGHT BREAST IN FEMALE, ESTROGEN RECEPTOR POSITIVE (H): ICD-10-CM

## 2018-03-15 DIAGNOSIS — N64.4 BREAST PAIN, LEFT: ICD-10-CM

## 2018-03-15 DIAGNOSIS — T45.1X5A HOT FLASHES RELATED TO AROMATASE INHIBITOR THERAPY: Primary | ICD-10-CM

## 2018-03-15 DIAGNOSIS — C50.811 MALIGNANT NEOPLASM OF OVERLAPPING SITES OF RIGHT FEMALE BREAST (H): ICD-10-CM

## 2018-03-15 DIAGNOSIS — C50.811 MALIGNANT NEOPLASM OF OVERLAPPING SITES OF RIGHT BREAST IN FEMALE, ESTROGEN RECEPTOR POSITIVE (H): ICD-10-CM

## 2018-03-15 DIAGNOSIS — R23.2 HOT FLASHES RELATED TO AROMATASE INHIBITOR THERAPY: Primary | ICD-10-CM

## 2018-03-15 LAB
ALBUMIN SERPL-MCNC: 3.9 G/DL (ref 3.4–5)
ALP SERPL-CCNC: 129 U/L (ref 40–150)
ALT SERPL W P-5'-P-CCNC: 102 U/L (ref 0–50)
AST SERPL W P-5'-P-CCNC: 41 U/L (ref 0–45)
BILIRUB DIRECT SERPL-MCNC: <0.1 MG/DL (ref 0–0.2)
BILIRUB SERPL-MCNC: 0.3 MG/DL (ref 0.2–1.3)
PROT SERPL-MCNC: 8 G/DL (ref 6.8–8.8)

## 2018-03-15 PROCEDURE — 36415 COLL VENOUS BLD VENIPUNCTURE: CPT | Performed by: INTERNAL MEDICINE

## 2018-03-15 PROCEDURE — 99214 OFFICE O/P EST MOD 30 MIN: CPT | Performed by: NURSE PRACTITIONER

## 2018-03-15 PROCEDURE — 80076 HEPATIC FUNCTION PANEL: CPT | Performed by: INTERNAL MEDICINE

## 2018-03-15 RX ORDER — VENLAFAXINE HYDROCHLORIDE 75 MG/1
75 CAPSULE, EXTENDED RELEASE ORAL DAILY
Qty: 90 CAPSULE | Refills: 1 | Status: SHIPPED | OUTPATIENT
Start: 2018-03-15 | End: 2019-01-29

## 2018-03-15 ASSESSMENT — PAIN SCALES - GENERAL: PAINLEVEL: NO PAIN (0)

## 2018-03-15 NOTE — LETTER
3/15/2018         RE: Aleena Escamilla  [address] [facility] [address]        Dear Colleague,    Thank you for referring your patient, Aleena Escamilla, to the [facility]. Please see a copy of my visit note below.    Oncology Follow Up Visit: March 15, 2018     Oncologist: Dr. Bessy Tate  PCP: Dr Concepcion Bocanegra    Diagnosis: Stage II Right Breast Cancer here today with c/o breast pain after treatment for breast infection to left breast  Aleena Escamilla is a 43 yo female that felt she has mass to right beast in 2014 but was told she was not eligible for mammogram but in 11/2015 felt breast was swollen and received imaging that did show a 1.2 x 1.3 x 0.7 cm mass at 12 oclock to breast and later found a secondary mass behind first mass. Biopsy proved invasive lobular carcinoma at 12 oclock and 9 oclock positions. Final pathology from partial mastectomy showed multifocal invasive lobular carcinoma, grade 2 with foci, the first one 36 x 16 x 8 mm and the second one 21 x 14 x 11 mm. LCIS classical type present, lymphovascular invasion not identified. Margins negative, ER/MI positive by IHC and HER-2 not amplified performed on prior core biopsy.[id number]  Oncotype Dx score =25.  Genetics - told pt she has variant of of uncertain significance in the BRCA 2 gene.  Treatment-   1/19/2016 Partial Right  Mastectomy  2/24/2016 - 6/2016 Weekly Taxol x 12 weeks followed by Dose Dense AC x4 cycles  8-9/2016 completed right breast XRT  10/2016 began Femara  10/26/2016 right laparoscopic salpingo-oophorectomy    Interval History: Ms Escamilla comes to clinic for follow up to her right breast cancer.  Pt continues with her Femara daily and shares she has hot flashes related to the medication- tried going up to 150 mg of effexor to help with this concern but could not tolerate this dosing and is requesting to decreased back to the 75 mg as she feels this is helpful. She denies muscle aches or joint pains  but has just completed bunion surgery to left foot in February and is sill in boot and has wheeled scooter. Left breast pain is still noted at times but addressed with surgeon and felt this was normal breast pain since imaging found no new issues.   Rest of complete and comprehensive ROS is reviewed and is negative.   Past Medical History:   Diagnosis Date     Chronic pelvic pain in female      Malignant neoplasm of right breast, stage 2 (H) 1/2016 1/4 lymph nodes positive, Oncotype DX = 25.  Chemotherapy and radiation. Letrozole.     Migraine headaches      Current Outpatient Prescriptions   Medication     venlafaxine (EFFEXOR-XR) 75 MG 24 hr capsule     letrozole (FEMARA) 2.5 MG tablet     venlafaxine (EFFEXOR-XR) 150 MG 24 hr capsule     Cholecalciferol (VITAMIN D) 2000 UNITS tablet     acetaminophen (TYLENOL) 325 MG tablet     gabapentin (NEURONTIN) 300 MG capsule     No current facility-administered medications for this visit.      Allergies   Allergen Reactions     Latex Rash     Physical Exam:  BP 97/67  Pulse 77  Temp 98.3  F (36.8  C)  Ht 1.524 m (5')  Wt 72.6 kg (160 lb)  LMP 03/01/2016  SpO2 96%  BMI 31.25 kg/m2   Constitutional: Alert and cooperative. Appears in no pain.  ENT: eyes bright with no mouth sores.    Cardiac: Heart rate and rhythm is regular and strong without murmur  Respiratory: Breathing easy. Lung sounds clear to auscultation  Beast: Left breast without abnormalities with palpation or inspection and no pain today RIght breast with minimal  tenderness and no masses, discharge or and minimal deformity from surgery.   MS: Muscle tone normal, extremities normal with no edema.    Psych: Mentation appears normal and affect normal with easy smile.    Laboratory Results:   Results for orders placed or performed in visit on 03/15/18   Hepatic panel   Result Value Ref Range    Bilirubin Direct <0.1 0.0 - 0.2 mg/dL    Bilirubin Total 0.3 0.2 - 1.3 mg/dL    Albumin 3.9 3.4 - 5.0 g/dL     Protein Total 8.0 6.8 - 8.8 g/dL    Alkaline Phosphatase 129 40 - 150 U/L     (H) 0 - 50 U/L    AST 41 0 - 45 U/L     Assessment and Plan:   Right breast cancer- stage II- Pt has completed treatment with right partial mastectomy, Taxol weekly ×12, AC ×4 cycles, and XRTas well as laparoscopic salpingo-oophorectomy. She has been taking the Femara with few hot flashes that are bothersome but is committed to staying on the medication at this time.she did ask to decrease Effexor back to 75 mg from 150 since causing further problems so new prescription was given. .   She will have follow up with Dr Tate in 4 months with CBC and Hepatic panel due.   Next mammogram due 12/2018  Bone health- DEXA scan from 11/2016 found normal bone mass. Reviewed need for calcium and vitamin D and weight exercise to keep bones strong. Repeat DEXA in 11/2018.   Left breast pain- no further investigation to this as the pain , though recurrent is not correlate with any finding on imaging. Pt is using cold and tylenol if needed.   Health maintenance- reminded of recommendation for 150 mg of exercise each week along with working toward BMI of 20-25.   This was a 25 min visit with > 50% in education and management of concerns.    Marisol Solitario CNP      Again, thank you for allowing me to participate in the care of your patient.        Sincerely,        Marisol Solitario, KYA, APRN CNP

## 2018-03-15 NOTE — PROGRESS NOTES
Oncology Follow Up Visit: March 15, 2018     Oncologist: Dr. Bessy Tate  PCP: Dr Concepcion Bocanegra    Diagnosis: Stage II Right Breast Cancer here today with c/o breast pain after treatment for breast infection to left breast  Aleena Escamilla is a 41 yo female that felt she has mass to right beast in 2014 but was told she was not eligible for mammogram but in 11/2015 felt breast was swollen and received imaging that did show a 1.2 x 1.3 x 0.7 cm mass at 12 oclock to breast and later found a secondary mass behind first mass. Biopsy proved invasive lobular carcinoma at 12 oclock and 9 oclock positions. Final pathology from partial mastectomy showed multifocal invasive lobular carcinoma, grade 2 with foci, the first one 36 x 16 x 8 mm and the second one 21 x 14 x 11 mm. LCIS classical type present, lymphovascular invasion not identified. Margins negative, ER/VT positive by IHC and HER-2 not amplified performed on prior core biopsy.[id number]  Oncotype Dx score =25.  Genetics - told pt she has variant of of uncertain significance in the BRCA 2 gene.  Treatment-   1/19/2016 Partial Right  Mastectomy  2/24/2016 - 6/2016 Weekly Taxol x 12 weeks followed by Dose Dense AC x4 cycles  8-9/2016 completed right breast XRT  10/2016 began Femara  10/26/2016 right laparoscopic salpingo-oophorectomy    Interval History: Ms Escamilla comes to clinic for follow up to her right breast cancer.  Pt continues with her Femara daily and shares she has hot flashes related to the medication- tried going up to 150 mg of effexor to help with this concern but could not tolerate this dosing and is requesting to decreased back to the 75 mg as she feels this is helpful. She denies muscle aches or joint pains but has just completed bunion surgery to left foot in February and is sill in boot and has wheeled scooter. Left breast pain is still noted at times but addressed with surgeon and felt this was normal breast pain since imaging found no new issues.    Rest of complete and comprehensive ROS is reviewed and is negative.   Past Medical History:   Diagnosis Date     Chronic pelvic pain in female      Malignant neoplasm of right breast, stage 2 (H) 1/2016    1/4 lymph nodes positive, Oncotype DX = 25.  Chemotherapy and radiation. Letrozole.     Migraine headaches      Current Outpatient Prescriptions   Medication     venlafaxine (EFFEXOR-XR) 75 MG 24 hr capsule     letrozole (FEMARA) 2.5 MG tablet     venlafaxine (EFFEXOR-XR) 150 MG 24 hr capsule     Cholecalciferol (VITAMIN D) 2000 UNITS tablet     acetaminophen (TYLENOL) 325 MG tablet     gabapentin (NEURONTIN) 300 MG capsule     No current facility-administered medications for this visit.      Allergies   Allergen Reactions     Latex Rash     Physical Exam:  BP 97/67  Pulse 77  Temp 98.3  F (36.8  C)  Ht 1.524 m (5')  Wt 72.6 kg (160 lb)  LMP 03/01/2016  SpO2 96%  BMI 31.25 kg/m2   Constitutional: Alert and cooperative. Appears in no pain.  ENT: eyes bright with no mouth sores.    Cardiac: Heart rate and rhythm is regular and strong without murmur  Respiratory: Breathing easy. Lung sounds clear to auscultation  Beast: Left breast without abnormalities with palpation or inspection and no pain today RIght breast with minimal  tenderness and no masses, discharge or and minimal deformity from surgery.   MS: Muscle tone normal, extremities normal with no edema.    Psych: Mentation appears normal and affect normal with easy smile.    Laboratory Results:   Results for orders placed or performed in visit on 03/15/18   Hepatic panel   Result Value Ref Range    Bilirubin Direct <0.1 0.0 - 0.2 mg/dL    Bilirubin Total 0.3 0.2 - 1.3 mg/dL    Albumin 3.9 3.4 - 5.0 g/dL    Protein Total 8.0 6.8 - 8.8 g/dL    Alkaline Phosphatase 129 40 - 150 U/L     (H) 0 - 50 U/L    AST 41 0 - 45 U/L     Assessment and Plan:   Right breast cancer- stage II- Pt has completed treatment with right partial mastectomy, Taxol weekly  ×12, AC ×4 cycles, and XRTas well as laparoscopic salpingo-oophorectomy. She has been taking the Femara with few hot flashes that are bothersome but is committed to staying on the medication at this time.she did ask to decrease Effexor back to 75 mg from 150 since causing further problems so new prescription was given. .   She will have follow up with Dr Tate in 4 months with CBC and Hepatic panel due.   Next mammogram due 12/2018  Bone health- DEXA scan from 11/2016 found normal bone mass. Reviewed need for calcium and vitamin D and weight exercise to keep bones strong. Repeat DEXA in 11/2018.   Left breast pain- no further investigation to this as the pain , though recurrent is not correlate with any finding on imaging. Pt is using cold and tylenol if needed.   Health maintenance- reminded of recommendation for 150 mg of exercise each week along with working toward BMI of 20-25.   This was a 25 min visit with > 50% in education and management of concerns.    Marisol Solitario,CNP

## 2018-03-15 NOTE — MR AVS SNAPSHOT
After Visit Summary   3/15/2018    Aleena Escamilla    MRN: [id number]           Patient Information     Date Of Birth          1975        Visit Information        Provider Department      3/15/2018 11:15 AM Marisol Solitario APRN CNP [facility]        Today's Diagnoses     Hot flashes related to aromatase inhibitor therapy    -  1    Malignant neoplasm of overlapping sites of right breast in female, estrogen receptor positive (H)           Follow-ups after your visit        Your next 10 appointments already scheduled     Jul 19, 2018 10:15 AM CDT   LAB with [facility] ([facility])    [address] [place] [address] [id number] [phone]           Please do not eat 10-12 hours before your appointment if you are coming in fasting for labs on lipids, cholesterol, or glucose (sugar). This does not apply to pregnant women. Water, hot tea and black coffee (with nothing added) are okay. Do not drink other fluids, diet soda or chew gum.            Jul 19, 2018 11:00 AM CDT   Return Visit with Bessy Tate MD   [facility] ([facility])    [address] [place] [address] [id number] [phone]              Who to contact     If you have questions or need follow up information about today's clinic visit or your schedule please contact [facility] directly at [phone].  Normal or non-critical lab and imaging results will be communicated to you by MyChart, letter or phone within 4 business days after the clinic has received the results. If you do not hear from us within 7 days, please contact the clinic through MyChart or phone. If you have a critical or abnormal lab result, we will notify you by phone as soon as possible.  Submit refill requests through [employer] or call your pharmacy and they will forward the refill request to us. Please allow 3 business days  for your refill to be completed.          Additional Information About Your Visit        [employer] Information     Flasma gives you secure access to your electronic health record. If you see a primary care provider, you can also send messages to your care team and make appointments. If you have questions, please call your primary care clinic.  If you do not have a primary care provider, please call [phone] and they will assist you.      Flasma is an electronic gateway that provides easy, online access to your medical records. With Flasma, you can request a clinic appointment, read your test results, renew a prescription or communicate with your care team.     To access your existing account, please contact your [facility] or call [phone] for assistance.        Care EveryWhere ID     This is your Care EveryWhere ID. This could be used by other organizations to access your [facility] medical records  [id number]        Your Vitals Were     Pulse Temperature Height Last Period Pulse Oximetry BMI (Body Mass Index)    77 98.3  F (36.8  C) 1.524 m (5') 03/01/2016 96% 31.25 kg/m2       Blood Pressure from Last 3 Encounters:   03/15/18 97/67   02/05/18 103/72   12/29/17 117/80    Weight from Last 3 Encounters:   03/15/18 72.6 kg (160 lb)   02/05/18 71.2 kg (157 lb)   12/29/17 69.9 kg (154 lb)              Today, you had the following     No orders found for display         Today's Medication Changes          These changes are accurate as of 3/15/18 11:50 AM.  If you have any questions, ask your nurse or doctor.               These medicines have changed or have updated prescriptions.        Dose/Directions    * venlafaxine 150 MG 24 hr capsule   Commonly known as:  EFFEXOR-XR   This may have changed:  Another medication with the same name was added. Make sure you understand how and when to take each.   Used for:  Malignant neoplasm of overlapping sites of right breast in female,  estrogen receptor positive (H)   Changed by:  Marisol Solitario APRN CNP        Dose:  150 mg   Take 1 capsule (150 mg) by mouth daily   Quantity:  90 capsule   Refills:  1       * venlafaxine 75 MG 24 hr capsule   Commonly known as:  EFFEXOR-XR   This may have changed:  You were already taking a medication with the same name, and this prescription was added. Make sure you understand how and when to take each.   Used for:  Hot flashes related to aromatase inhibitor therapy, Malignant neoplasm of overlapping sites of right breast in female, estrogen receptor positive (H)   Changed by:  Marisol Solitario APRN CNP        Dose:  75 mg   Take 1 capsule (75 mg) by mouth daily   Quantity:  90 capsule   Refills:  1       * Notice:  This list has 2 medication(s) that are the same as other medications prescribed for you. Read the directions carefully, and ask your doctor or other care provider to review them with you.         Where to get your medicines      These medications were sent to [facility] [address] [place] [address] [facility]  [address] [facility] [id number]    Hours:  24-hours Phone:  [phone]     venlafaxine 75 MG 24 hr capsule                Primary Care Provider Office Phone # Fax #    Concepcion Tana Bocanegra [phone] [address]  [place] [address]        Equal Access to Services     [facility] AH: Hadii jame ku hadasho Soomaali, waaxda luqadaha, qaybta kaalmada adeegyada, azam chirinos . So [facility] [phone].    ATENCIÓN: Si habla español, tiene a wiley disposición servicios gratuitos de asistencia lingüística. Llame al [phone].    We comply with applicable federal civil rights laws and [place] laws. We do not discriminate on the basis of race, color, national origin, age, disability, sex, sexual orientation, or gender identity.            Thank you!     Thank you for  [facility]  for your care. Our goal is always to provide you with excellent care. Hearing back from our patients is one way we can continue to improve our services. Please take a few minutes to complete the written survey that you may receive in the mail after your visit with us. Thank you!             Your Updated Medication List - Protect others around you: Learn how to safely use, store and throw away your medicines at www.disposemymeds.org.          This list is accurate as of 3/15/18 11:50 AM.  Always use your most recent med list.                   Brand Name Dispense Instructions for use Diagnosis    acetaminophen 325 MG tablet    TYLENOL    100 tablet    Take 2 tablets (650 mg) by mouth every 4 hours as needed for other (mild pain)    Malignant neoplasm of overlapping sites of right female breast (H)       gabapentin 300 MG capsule    NEURONTIN    90 capsule    Take 1 tablet (300 mg) every night for 1-3 days, then 1 tablet twice daily for 1-3 days, then 1 tablet three times daily    Mononeuropathy multiplex       letrozole 2.5 MG tablet    FEMARA    90 tablet    Take 1 tablet (2.5 mg) by mouth daily    Malignant neoplasm of right breast in female, estrogen receptor positive, unspecified site of breast (H)       * venlafaxine 150 MG 24 hr capsule    EFFEXOR-XR    90 capsule    Take 1 capsule (150 mg) by mouth daily    Malignant neoplasm of overlapping sites of right breast in female, estrogen receptor positive (H)       * venlafaxine 75 MG 24 hr capsule    EFFEXOR-XR    90 capsule    Take 1 capsule (75 mg) by mouth daily    Hot flashes related to aromatase inhibitor therapy, Malignant neoplasm of overlapping sites of right breast in female, estrogen receptor positive (H)       vitamin D 2000 UNITS tablet     100 tablet    Take 2,000 Units by mouth daily    Vitamin D deficiency       * Notice:  This list has 2 medication(s) that are the same as other medications prescribed for you.  Read the directions carefully, and ask your doctor or other care provider to review them with you.

## 2018-03-15 NOTE — NURSING NOTE
Oncology Rooming Note    March 15, 2018 11:12 AM   Aleena Escamilla is a 42 year old female who presents for:    Chief Complaint   Patient presents with     Oncology Clinic Visit     4 month follow up     Initial Vitals: BP 97/67  Pulse 77  Temp 98.3  F (36.8  C)  Ht 1.524 m (5')  Wt 72.6 kg (160 lb)  LMP 03/01/2016  SpO2 96%  BMI 31.25 kg/m2 Estimated body mass index is 31.25 kg/(m^2) as calculated from the following:    Height as of this encounter: 1.524 m (5').    Weight as of this encounter: 72.6 kg (160 lb). Body surface area is 1.75 meters squared.  No Pain (0) Comment: Data Unavailable   Patient's last menstrual period was 03/01/2016.  Allergies reviewed: Yes  Medications reviewed: Yes    Medications: Medication refills not needed today.  Pharmacy name entered into Bu[employer]In:    [facility]  [place] [address] [facility] [place] [place] [facility] [place] - [address] AT [facility] - [place], [place] - [address], [address]        5 minutes for nursing intake (face to face time)     Susana De La Rosa LPN

## 2018-03-26 DIAGNOSIS — G58.7 MONONEUROPATHY MULTIPLEX: ICD-10-CM

## 2018-03-26 RX ORDER — GABAPENTIN 300 MG/1
300 CAPSULE ORAL 3 TIMES DAILY
Qty: 90 CAPSULE | Refills: 3 | Status: SHIPPED | OUTPATIENT
Start: 2018-03-26 | End: 2018-04-10

## 2018-04-10 ENCOUNTER — OFFICE VISIT (OUTPATIENT)
Dept: FAMILY MEDICINE | Facility: CLINIC | Age: 43
End: 2018-04-10
Payer: COMMERCIAL

## 2018-04-10 VITALS
HEIGHT: 60 IN | HEART RATE: 92 BPM | OXYGEN SATURATION: 98 % | DIASTOLIC BLOOD PRESSURE: 77 MMHG | SYSTOLIC BLOOD PRESSURE: 113 MMHG | TEMPERATURE: 98.3 F

## 2018-04-10 DIAGNOSIS — R82.90 NONSPECIFIC FINDING ON EXAMINATION OF URINE: ICD-10-CM

## 2018-04-10 DIAGNOSIS — N30.00 ACUTE CYSTITIS WITHOUT HEMATURIA: Primary | ICD-10-CM

## 2018-04-10 DIAGNOSIS — R35.0 URINARY FREQUENCY: ICD-10-CM

## 2018-04-10 LAB
ALBUMIN UR-MCNC: NEGATIVE MG/DL
APPEARANCE UR: ABNORMAL
BACTERIA #/AREA URNS HPF: ABNORMAL /HPF
BILIRUB UR QL STRIP: NEGATIVE
COLOR UR AUTO: YELLOW
GLUCOSE UR STRIP-MCNC: NEGATIVE MG/DL
HGB UR QL STRIP: ABNORMAL
KETONES UR STRIP-MCNC: ABNORMAL MG/DL
LEUKOCYTE ESTERASE UR QL STRIP: ABNORMAL
MUCOUS THREADS #/AREA URNS LPF: PRESENT /LPF
NITRATE UR QL: NEGATIVE
NON-SQ EPI CELLS #/AREA URNS LPF: ABNORMAL /LPF
PH UR STRIP: 5.5 PH (ref 5–7)
RBC #/AREA URNS AUTO: ABNORMAL /HPF
SOURCE: ABNORMAL
SP GR UR STRIP: >1.03 (ref 1–1.03)
UROBILINOGEN UR STRIP-ACNC: 0.2 EU/DL (ref 0.2–1)
WBC #/AREA URNS AUTO: ABNORMAL /HPF

## 2018-04-10 PROCEDURE — 87086 URINE CULTURE/COLONY COUNT: CPT | Performed by: FAMILY MEDICINE

## 2018-04-10 PROCEDURE — 81001 URINALYSIS AUTO W/SCOPE: CPT | Performed by: FAMILY MEDICINE

## 2018-04-10 PROCEDURE — 99213 OFFICE O/P EST LOW 20 MIN: CPT | Performed by: FAMILY MEDICINE

## 2018-04-10 RX ORDER — SULFAMETHOXAZOLE/TRIMETHOPRIM 800-160 MG
1 TABLET ORAL 2 TIMES DAILY
Qty: 6 TABLET | Refills: 0 | Status: SHIPPED | OUTPATIENT
Start: 2018-04-10 | End: 2019-02-22

## 2018-04-10 ASSESSMENT — ANXIETY QUESTIONNAIRES
2. NOT BEING ABLE TO STOP OR CONTROL WORRYING: NOT AT ALL
7. FEELING AFRAID AS IF SOMETHING AWFUL MIGHT HAPPEN: NOT AT ALL
6. BECOMING EASILY ANNOYED OR IRRITABLE: NOT AT ALL
1. FEELING NERVOUS, ANXIOUS, OR ON EDGE: SEVERAL DAYS
3. WORRYING TOO MUCH ABOUT DIFFERENT THINGS: SEVERAL DAYS
GAD7 TOTAL SCORE: 2
IF YOU CHECKED OFF ANY PROBLEMS ON THIS QUESTIONNAIRE, HOW DIFFICULT HAVE THESE PROBLEMS MADE IT FOR YOU TO DO YOUR WORK, TAKE CARE OF THINGS AT HOME, OR GET ALONG WITH OTHER PEOPLE: NOT DIFFICULT AT ALL
5. BEING SO RESTLESS THAT IT IS HARD TO SIT STILL: NOT AT ALL

## 2018-04-10 ASSESSMENT — PAIN SCALES - GENERAL: PAINLEVEL: NO PAIN (0)

## 2018-04-10 ASSESSMENT — PATIENT HEALTH QUESTIONNAIRE - PHQ9: 5. POOR APPETITE OR OVEREATING: NOT AT ALL

## 2018-04-10 NOTE — PROGRESS NOTES
SUBJECTIVE:   Aleena Escamilla is a 42 year old female who presents to clinic today for the following health issues:    Concern - Urinary Concern  Onset: 2 weeks    Description:   Patient thinks she may have overactive bladder condition and wants to further discuss. Wakes up during sleep. Goes to the bathroom about 15 days per day. Urine has a bad smell. Some dysuria    Intensity: severe    Progression of Symptoms:  worsening    Accompanying Signs & Symptoms:  Urinary frequency, urgency, irritation with sexual intercourse    Previous history of similar problem:   none    Precipitating factors:   Worsened by: None    Alleviating factors:  Improved by: None    Therapies Tried and outcome: None        Problem list and histories reviewed & adjusted, as indicated.  Additional history: as documented    Patient Active Problem List   Diagnosis     CARDIOVASCULAR SCREENING; LDL GOAL LESS THAN 160     Chronic pelvic pain in female     Chronic salpingitis and oophoritis     Major depressive disorder, recurrent episode, mild (H)     Generalized anxiety disorder     Arthritis, multiple joint involvement     Primary osteoarthritis of both hands     Fibromyalgia     Malignant neoplasm of overlapping sites of right female breast (H)     BRCA gene positive     Carpal tunnel syndrome     Non morbid drug-induced obesity     Chronic right shoulder pain     Bunion, right     Onychomycosis     Elevated LFTs     Past Surgical History:   Procedure Laterality Date     BIOPSY BREAST NEEDLE LOCALIZATION Right 1/19/2016    Procedure: BIOPSY BREAST NEEDLE LOCALIZATION;  Surgeon: Adelina Demarco MD;  Location: [facility] OR     GYN SURGERY  2-    bilateral salpingectomy, left oophrectomy     LAPAROSCOPIC SALPINGO-OOPHORECTOMY Right 10/26/2016    Procedure: LAPAROSCOPIC SALPINGO-OOPHORECTOMY;  Surgeon: Bouchra Mayo MD;  Location: [facility] OR     LUMPECTOMY BREAST WITH SENTINEL NODE, COMBINED Right 1/19/2016    Procedure: COMBINED  LUMPECTOMY BREAST WITH SENTINEL NODE;  Surgeon: Adelina Demarco MD;  Location: [facility] OR     PELVIS LAPAROSCOPY,DX  12/28/1998     RELEASE CARPAL TUNNEL Left 12/23/2016    Procedure: RELEASE CARPAL TUNNEL;  Surgeon: Sam Florence MD;  Location: [facility] OR       Social History   Substance Use Topics     Smoking status: Never Smoker     Smokeless tobacco: Never Used     Alcohol use Yes      Comment: 1 drink per week.     Family History   Problem Relation Age of Onset     DIABETES Mother      Hypertension Mother      CANCER Maternal Grandmother      cervical CA     Cardiovascular Father      MI     CANCER Maternal Aunt      cervical cancer         Current Outpatient Prescriptions   Medication Sig Dispense Refill     sulfamethoxazole-trimethoprim (BACTRIM DS/SEPTRA DS) 800-160 MG per tablet Take 1 tablet by mouth 2 times daily for 3 days 6 tablet 0     phenazopyridine (AZO) 97.5 MG tablet Take 2 tablets (195 mg) by mouth 3 times daily as needed for urinary tract discomfort 12 tablet 0     venlafaxine (EFFEXOR-XR) 75 MG 24 hr capsule Take 1 capsule (75 mg) by mouth daily 90 capsule 1     letrozole (FEMARA) 2.5 MG tablet Take 1 tablet (2.5 mg) by mouth daily 90 tablet 3     Cholecalciferol (VITAMIN D) 2000 UNITS tablet Take 2,000 Units by mouth daily 100 tablet 3     acetaminophen (TYLENOL) 325 MG tablet Take 2 tablets (650 mg) by mouth every 4 hours as needed for other (mild pain) 100 tablet 0     Allergies   Allergen Reactions     Latex Rash     Recent Labs   Lab Test  03/15/18   1101  02/05/18   1658  11/16/17   0947   12/19/16   1021   10/26/16   0753  09/27/16   1212   11/07/11   1226  10/17/11   0809  01/07/11   0834   A1C   --    --    --    --   5.7   --    --    --    --    --    --    --    LDL   --    --    --    --   82   --    --    --    --    --   86  116   HDL   --    --    --    --   30*   --    --    --    --    --   33*  36*   TRIG   --    --    --    --   301*   --    --    --    --    --   212*  127    ALT  102*  112*  103*   < >   --    < >   --   70*   < >   --    --    --    CR   --   0.60   --    --    --    --    --   0.55   < >   --    --    --    GFRESTIMATED   --   >90   --    --    --    --    --   >90  Non  GFR Calc     < >   --    --    --    GFRESTBLACK   --   >90   --    --    --    --    --   >90   GFR Calc     < >   --    --    --    POTASSIUM   --   3.5   --    --    --    --   3.4  3.7   < >   --    --    --    TSH   --    --    --    --    --    --    --    --    --   0.83   --    --     < > = values in this interval not displayed.      BP Readings from Last 3 Encounters:   04/10/18 113/77   03/15/18 97/67   02/05/18 103/72    Wt Readings from Last 3 Encounters:   03/15/18 160 lb (72.6 kg)   02/05/18 157 lb (71.2 kg)   12/29/17 154 lb (69.9 kg)                  Labs reviewed in EPIC    Reviewed and updated as needed this visit by clinical staff  Tobacco  Allergies  Meds  Med Hx  Surg Hx  Fam Hx  Soc Hx      Reviewed and updated as needed this visit by Provider         ROS:  Constitutional, HEENT, cardiovascular, pulmonary, gi and gu systems are negative, except as otherwise noted.    OBJECTIVE:     /77 (BP Location: Left arm, Patient Position: Chair, Cuff Size: Adult Regular)  Pulse 92  Temp 98.3  F (36.8  C) (Oral)  Ht 5' (1.524 m)  LMP 03/01/2016  SpO2 98%  There is no height or weight on file to calculate BMI.  GENERAL: healthy, alert and no distress  EYES: Eyes grossly normal to inspection, conjunctivae and sclerae normal  MS: no gross musculoskeletal defects noted, no edema  SKIN: no suspicious lesions or rashes  NEURO: Normal strength and tone, gait and speech normal  PSYCH: mentation appears normal, affect normal/bright     Diagnostic Test Results:  Results for orders placed or performed in visit on 04/10/18   UA reflex to Microscopic and Culture   Result Value Ref Range    Color Urine Yellow     Appearance Urine Slightly Cloudy      Glucose Urine Negative NEG^Negative mg/dL    Bilirubin Urine Negative NEG^Negative    Ketones Urine Trace (A) NEG^Negative mg/dL    Specific Gravity Urine >1.030 1.003 - 1.035    Blood Urine Small (A) NEG^Negative    pH Urine 5.5 5.0 - 7.0 pH    Protein Albumin Urine Negative NEG^Negative mg/dL    Urobilinogen Urine 0.2 0.2 - 1.0 EU/dL    Nitrite Urine Negative NEG^Negative    Leukocyte Esterase Urine Large (A) NEG^Negative    Source Midstream Urine    Urine Microscopic   Result Value Ref Range    WBC Urine 25-50 (A) OTO5^0 - 5 /HPF    RBC Urine 2-5 (A) OTO2^O - 2 /HPF    Squamous Epithelial /LPF Urine Moderate (A) FEW^Few /LPF    Bacteria Urine Many (A) NEG^Negative /HPF    Mucous Urine Present (A) NEG^Negative /LPF       ASSESSMENT/PLAN:             1. Acute cystitis without hematuria  Symptoms and lab consistent with acute cystitis and will start antibiotics pending culture results. Also AZO to help relieve symptoms of frequency. Call if not improving within the next 2 days or if symptoms worse with fever or flank pain.  - sulfamethoxazole-trimethoprim (BACTRIM DS/SEPTRA DS) 800-160 MG per tablet; Take 1 tablet by mouth 2 times daily for 3 days  Dispense: 6 tablet; Refill: 0  - phenazopyridine (AZO) 97.5 MG tablet; Take 2 tablets (195 mg) by mouth 3 times daily as needed for urinary tract discomfort  Dispense: 12 tablet; Refill: 0    2. Urinary frequency  Urinary tract infection   - UA reflex to Microscopic and Culture  - Urine Microscopic    3. Nonspecific finding on examination of urine    - Urine Culture Aerobic Bacterial    FUTURE APPOINTMENTS:       - as needed  See Patient Instructions    Concepcion Bocanegra MD  [facility]

## 2018-04-10 NOTE — MR AVS SNAPSHOT
After Visit Summary   4/10/2018    Aleena Escamilla    MRN: [id number]           Patient Information     Date Of Birth          1975        Visit Information        Provider Department      4/10/2018 5:40 PM Concepcion Bocanegra MD [facility]        Today's Diagnoses     Acute cystitis without hematuria    -  1    Urinary frequency        Nonspecific finding on examination of urine          Care Instructions    At [facility], we strive to deliver an exceptional experience to you, every time we see you.  If you receive a survey in the mail, please send us back your thoughts. We really do value your feedback.    Based on your medical history, these are the current health maintenance/preventive care services that you are due for (some may have been done at this visit.)  Health Maintenance Due   Topic Date Due     URINE DRUG SCREEN Q1 YR  10/17/1991     PHQ-9 Q6 MONTHS  10/11/2017     DEPRESSION ACTION PLAN Q1 YR  10/21/2017     VENKAT QUESTIONNAIRE 1 YEAR  04/11/2018         Suggested websites for health information:  Www.[facility] Fl[employer] : Up to date and easily searchable information on multiple topics.  Www.medlineplus.gov : medication info, interactive tutorials, watch real surgeries online  Www.familydoctor.org : good info from the Academy of Family Physicians  Www.cdc.gov : public health info, travel advisories, epidemics (H1N1)  Www.aap.org : children's health info, normal development, vaccinations  Www.health.state[place]us : [place] dept of health, public health issues in [place], N1N1    Your care team:                            Family Medicine Internal Medicine   MD Antonio Mistry MD Shantel Branch-Fleming, MD Katya Georgiev PA-C Nam Ho, MD Pediatrics   DRAGAN Mancera CNP Amelia Massimini APRN CNP Shaista Malik, MD Bethany Templen, MD Deborah Mielke, MD Kim Thein, APRJEFFERY CNP      Clinic hours: Monday - Thursday 7 am-7  pm; Fridays 7 am-5 pm.   Urgent care: Monday - Friday 11 am-9 pm; Saturday and Sunday 9 am-5 pm.  Pharmacy : Monday -Thursday 8 am-8 pm; Friday 8 am-6 pm; Saturday and Sunday 9 am-5 pm.     Clinic: [phone]   Pharmacy: [phone]    Urinary Tract Infections in Women    Urinary tract infections (UTIs) are most often caused by bacteria (germs). These bacteria enter the urinary tract. The bacteria may come from outside the body. Or they may travel from the skin outside the rectum or vagina into the urethra. Female anatomy makes it easy for bacteria from the bowel to enter a woman s urinary tract, which is the most common source of UTI. This means women develop UTIs more often than men. Pain in or around the urinary tract is a common UTI symptom. But the only way to know for sure if you have a UTI for the healthcare provider to test your urine. The two tests that may be done are the urinalysis and urine culture.  Types of UTIs    Cystitis: A bladder infection (cystitis) is the most common UTI in women. You may have urgent or frequent urination. You may also have pain, burning when you urinate, and bloody urine.    Urethritis: This is an inflamed urethra, which is the tube that carries urine from the bladder to outside the body. You may have lower stomach or back pain. You may also have urgent or frequent urination.    Pyelonephritis: This is a kidney infection. If not treated, it can be serious and damage your kidneys. In severe cases, you may be hospitalized. You may have a fever and lower back pain.  Medicines to treat a UTI  Most UTIs are treated with antibiotics. These kill the bacteria. The length of time you need to take them depends on the type of infection. It may be as short as 3 days. If you have repeated UTIs, a low-dose antibiotic may be needed for several months. Take antibiotics exactly as directed. Don t stop taking them until all of the medicine is gone. If you stop taking the antibiotic too  soon, the infection may not go away, and you may develop a resistance to the antibiotic. This can make it much harder to treat.  Lifestyle changes to treat and prevent UTIs  The lifestyle changes below will help get rid of your UTI. They may also help prevent future UTIs.    Drink plenty of fluids. This includes water, juice, or other caffeine-free drinks. Fluids help flush bacteria out of your body.    Empty your bladder. Always empty your bladder when you feel the urge to urinate. And always urinate before going to sleep. Urine that stays in your bladder can lead to infection. Try to urinate before and after sex as well.    Practice good personal hygiene. Wipe yourself from front to back after using the toilet. This helps keep bacteria from getting into the urethra.    Use condoms during sex. These help prevent UTIs caused by sexually transmitted bacteria. Also, avoid using spermicides during sex. These can increase the risk of UTIs. Choose other forms of birth control instead. For women who tend to get UTIs after sex, a low-dose of a preventive antibiotic may be used. Be sure to discuss this option with your healthcare provider.    Follow up with your healthcare provider as directed. He or she may test to make sure the infection has cleared. If needed, more treatment may be started.  Date Last Reviewed: 1/1/2017 2000-2017 [employer]. [address] [place], [place] [address]. All rights reserved. This information is not intended as a substitute for professional medical care. Always follow your healthcare professional's instructions.                Follow-ups after your visit        Follow-up notes from your care team     Return if symptoms worsen or fail to improve.      Your next 10 appointments already scheduled     Jul 19, 2018 10:15 AM CDT   LAB with LAB ONC [facility] ([facility])    [address] [address] [place] [address] [phone]            Please do not eat 10-12 hours before your appointment if you are coming in fasting for labs on lipids, cholesterol, or glucose (sugar). This does not apply to pregnant women. Water, hot tea and black coffee (with nothing added) are okay. Do not drink other fluids, diet soda or chew gum.            Jul 19, 2018 11:00 AM CDT   Return Visit with Bessy Tate MD   [facility]    [id number] [place] [address] [phone]              Who to contact     If you have questions or need follow up information about today's clinic visit or your schedule please contact [facility] directly at [phone].  Normal or non-critical lab and imaging results will be communicated to you by MyChart, letter or phone within 4 business days after the clinic has received the results. If you do not hear from us within 7 days, please contact the clinic through MyChart or phone. If you have a critical or abnormal lab result, we will notify you by phone as soon as possible.  Submit refill requests through [employer]My[employer] or call your pharmacy and they will forward the refill request to us. Please allow 3 business days for your refill to be completed.          Additional Information About Your Visit        OrderMy[employer] Information     [employer] gives you secure access to your electronic health record. If you see a primary care provider, you can also send messages to your care team and make appointments. If you have questions, please call your primary care clinic.  If you do not have a primary care provider, please call [phone] and they will assist you.        Care EveryWhere ID     This is your Care EveryWhere ID. This could be used by other organizations to access your [facility] medical records  [id number]        Your Vitals Were     Pulse Temperature Height Last Period Pulse Oximetry       92 98.3  F (36.8  C) (Oral) 5' (1.524 m) 03/01/2016 98%         Blood Pressure from Last 3 Encounters:   04/10/18 113/77   03/15/18 97/67   02/05/18 103/72    Weight from Last 3 Encounters:   03/15/18 160 lb (72.6 kg)   02/05/18 157 lb (71.2 kg)   12/29/17 154 lb (69.9 kg)              We Performed the Following     UA reflex to Microscopic and Culture     Urine Culture Aerobic Bacterial     Urine Microscopic          Today's Medication Changes          These changes are accurate as of 4/10/18 11:59 PM.  If you have any questions, ask your nurse or doctor.               Start taking these medicines.        Dose/Directions    phenazopyridine 97.5 MG tablet   Commonly known as:  AZO   Used for:  Acute cystitis without hematuria   Started by:  Concepcion Bocanegra MD        Dose:  195 mg   Take 2 tablets (195 mg) by mouth 3 times daily as needed for urinary tract discomfort   Quantity:  12 tablet   Refills:  0       sulfamethoxazole-trimethoprim 800-160 MG per tablet   Commonly known as:  BACTRIM DS/SEPTRA DS   Used for:  Acute cystitis without hematuria   Started by:  Concepcion Bocanegra MD        Dose:  1 tablet   Take 1 tablet by mouth 2 times daily for 3 days   Quantity:  6 tablet   Refills:  0            Where to get your medicines      These medications were sent to [facility] [address] [place] [address] & [facility]  [address] [facility] [address]    Hours:  24-hours Phone:  [phone]     phenazopyridine 97.5 MG tablet    sulfamethoxazole-trimethoprim 800-160 MG per tablet                Primary Care Provider Office Phone # Fax #    Concepcion Bocanegra [phone]       [address]  [place] [address]        Equal Access to Services     [facility] AH: Hadii jame mckinney Somelvin, waaxda luqadaha, qaybta kaalmada adeegyada, azam landinn carmen bernal. So [facility] [phone].    ATENCIÓN: Si habla español, tiene a wiley disposición servicios gratuitos de asistencia  lingüística. Cam al [phone].    We comply with applicable federal civil rights laws and [place] laws. We do not discriminate on the basis of race, color, national origin, age, disability, sex, sexual orientation, or gender identity.            Thank you!     Thank you for choosing [facility]  for your care. Our goal is always to provide you with excellent care. Hearing back from our patients is one way we can continue to improve our services. Please take a few minutes to complete the written survey that you may receive in the mail after your visit with us. Thank you!             Your Updated Medication List - Protect others around you: Learn how to safely use, store and throw away your medicines at www.disposemymeds.org.          This list is accurate as of 4/10/18 11:59 PM.  Always use your most recent med list.                   Brand Name Dispense Instructions for use Diagnosis    acetaminophen 325 MG tablet    TYLENOL    100 tablet    Take 2 tablets (650 mg) by mouth every 4 hours as needed for other (mild pain)    Malignant neoplasm of overlapping sites of right female breast (H)       letrozole 2.5 MG tablet    FEMARA    90 tablet    Take 1 tablet (2.5 mg) by mouth daily    Malignant neoplasm of right breast in female, estrogen receptor positive, unspecified site of breast (H)       phenazopyridine 97.5 MG tablet    AZO    12 tablet    Take 2 tablets (195 mg) by mouth 3 times daily as needed for urinary tract discomfort    Acute cystitis without hematuria       sulfamethoxazole-trimethoprim 800-160 MG per tablet    BACTRIM DS/SEPTRA DS    6 tablet    Take 1 tablet by mouth 2 times daily for 3 days    Acute cystitis without hematuria       venlafaxine 75 MG 24 hr capsule    EFFEXOR-XR    90 capsule    Take 1 capsule (75 mg) by mouth daily    Hot flashes related to aromatase inhibitor therapy, Malignant neoplasm of overlapping sites of right breast in female, estrogen receptor  positive (H)       vitamin D 2000 units tablet     100 tablet    Take 2,000 Units by mouth daily    Vitamin D deficiency

## 2018-04-10 NOTE — PATIENT INSTRUCTIONS
At [facility], we strive to deliver an exceptional experience to you, every time we see you.  If you receive a survey in the mail, please send us back your thoughts. We really do value your feedback.    Based on your medical history, these are the current health maintenance/preventive care services that you are due for (some may have been done at this visit.)  Health Maintenance Due   Topic Date Due     URINE DRUG SCREEN Q1 YR  10/17/1991     PHQ-9 Q6 MONTHS  10/11/2017     DEPRESSION ACTION PLAN Q1 YR  10/21/2017     VENKAT QUESTIONNAIRE 1 YEAR  04/11/2018         Suggested websites for health information:  Www.[facility].org : Up to date and easily searchable information on multiple topics.  Www.[employer].gov : medication info, interactive tutorials, watch real surgeries online  Www.familydoctor.org : good info from the Academy of Family Physicians  Www.cdc.gov : public health info, travel advisories, epidemics (H1N1)  Www.aap.org : children's health info, normal development, vaccinations  Www.health.[facility].[place].us : [place] dept of health, public health issues in [place], N1N1    Your care team:                            Family Medicine Internal Medicine   MD Antonio Mistry MD Shantel Branch-Fleming, MD Katya Georgiev PA-C Nam Ho, MD Pediatrics   DRAGAN Mancera, MARCELLUS Fields APRJEFFERY CNP   MD Haydee Sherman MD Deborah Mielke, MD Kim Thein, APRN [facility]      Clinic hours: Monday - Thursday 7 am-7 pm; Fridays 7 am-5 pm.   Urgent care: Monday - Friday 11 am-9 pm; Saturday and Sunday 9 am-5 pm.  Pharmacy : Monday -Thursday 8 am-8 pm; Friday 8 am-6 pm; Saturday and Sunday 9 am-5 pm.     Clinic: [phone]   Pharmacy: [phone]    Urinary Tract Infections in Women    Urinary tract infections (UTIs) are most often caused by bacteria (germs). These bacteria enter the urinary tract. The bacteria may come from outside the body. Or they may  travel from the skin outside the rectum or vagina into the urethra. Female anatomy makes it easy for bacteria from the bowel to enter a woman s urinary tract, which is the most common source of UTI. This means women develop UTIs more often than men. Pain in or around the urinary tract is a common UTI symptom. But the only way to know for sure if you have a UTI for the healthcare provider to test your urine. The two tests that may be done are the urinalysis and urine culture.  Types of UTIs    Cystitis: A bladder infection (cystitis) is the most common UTI in women. You may have urgent or frequent urination. You may also have pain, burning when you urinate, and bloody urine.    Urethritis: This is an inflamed urethra, which is the tube that carries urine from the bladder to outside the body. You may have lower stomach or back pain. You may also have urgent or frequent urination.    Pyelonephritis: This is a kidney infection. If not treated, it can be serious and damage your kidneys. In severe cases, you may be hospitalized. You may have a fever and lower back pain.  Medicines to treat a UTI  Most UTIs are treated with antibiotics. These kill the bacteria. The length of time you need to take them depends on the type of infection. It may be as short as 3 days. If you have repeated UTIs, a low-dose antibiotic may be needed for several months. Take antibiotics exactly as directed. Don t stop taking them until all of the medicine is gone. If you stop taking the antibiotic too soon, the infection may not go away, and you may develop a resistance to the antibiotic. This can make it much harder to treat.  Lifestyle changes to treat and prevent UTIs  The lifestyle changes below will help get rid of your UTI. They may also help prevent future UTIs.    Drink plenty of fluids. This includes water, juice, or other caffeine-free drinks. Fluids help flush bacteria out of your body.    Empty your bladder. Always empty your bladder  when you feel the urge to urinate. And always urinate before going to sleep. Urine that stays in your bladder can lead to infection. Try to urinate before and after sex as well.    Practice good personal hygiene. Wipe yourself from front to back after using the toilet. This helps keep bacteria from getting into the urethra.    Use condoms during sex. These help prevent UTIs caused by sexually transmitted bacteria. Also, avoid using spermicides during sex. These can increase the risk of UTIs. Choose other forms of birth control instead. For women who tend to get UTIs after sex, a low-dose of a preventive antibiotic may be used. Be sure to discuss this option with your healthcare provider.    Follow up with your healthcare provider as directed. He or she may test to make sure the infection has cleared. If needed, more treatment may be started.  Date Last Reviewed: 1/1/2017 2000-2017 [employer]. [address] [place] [address], [place] [address]. All rights reserved. This information is not intended as a substitute for professional medical care. Always follow your healthcare professional's instructions.

## 2018-04-11 LAB
BACTERIA SPEC CULT: NORMAL
SPECIMEN SOURCE: NORMAL

## 2018-04-11 ASSESSMENT — PATIENT HEALTH QUESTIONNAIRE - PHQ9: SUM OF ALL RESPONSES TO PHQ QUESTIONS 1-9: 4

## 2018-04-11 ASSESSMENT — ANXIETY QUESTIONNAIRES: GAD7 TOTAL SCORE: 2

## 2018-04-11 NOTE — PROGRESS NOTES
Dear Aleena    Your test results are attached.    The urine shows a urinary tract infection and hopefully the medication is working. The culture results should be back in 2 days. I will let you know the results at that time.     Please contact me by SnapLogichart if you have any questions about your labs or management.    Concepcion Bocanegra MD

## 2018-04-12 NOTE — PROGRESS NOTES
Dear Aleena    Your test results are attached. I am happy to let you know that they are stable.    The culture did not come back too bad. If you continue to have pain and frequent urine, we may need to do more testing.     Please contact me by [employer] Log[employer]art if you have any questions about your labs or management.    Concepcion Bocanegra MD

## 2018-04-17 ENCOUNTER — MYC MEDICAL ADVICE (OUTPATIENT)
Dept: FAMILY MEDICINE | Facility: CLINIC | Age: 43
End: 2018-04-17

## 2018-04-17 DIAGNOSIS — R30.0 DYSURIA: Primary | ICD-10-CM

## 2018-04-17 RX ORDER — AZITHROMYCIN 500 MG/1
1000 TABLET, FILM COATED ORAL ONCE
Qty: 2 TABLET | Refills: 0 | Status: SHIPPED | OUTPATIENT
Start: 2018-04-17 | End: 2019-02-22

## 2018-04-17 NOTE — TELEPHONE ENCOUNTER
Please have patient come in to leave a dirty urine catch for chlamydia screen. I sent a prescription for azithromycin to the pharmacy for her to take pending lab results.  Concepcion Bocanegra MD

## 2018-04-18 ENCOUNTER — OFFICE VISIT (OUTPATIENT)
Dept: FAMILY MEDICINE | Facility: CLINIC | Age: 43
End: 2018-04-18
Payer: COMMERCIAL

## 2018-04-18 VITALS
BODY MASS INDEX: 31.88 KG/M2 | SYSTOLIC BLOOD PRESSURE: 125 MMHG | TEMPERATURE: 98.4 F | WEIGHT: 162.4 LBS | HEART RATE: 85 BPM | DIASTOLIC BLOOD PRESSURE: 83 MMHG | HEIGHT: 60 IN | OXYGEN SATURATION: 95 %

## 2018-04-18 DIAGNOSIS — R30.0 DYSURIA: Primary | ICD-10-CM

## 2018-04-18 DIAGNOSIS — F33.0 MAJOR DEPRESSIVE DISORDER, RECURRENT EPISODE, MILD (H): ICD-10-CM

## 2018-04-18 DIAGNOSIS — G43.009 NONINTRACTABLE MIGRAINE, UNSPECIFIED MIGRAINE TYPE: ICD-10-CM

## 2018-04-18 DIAGNOSIS — R35.0 URINARY FREQUENCY: ICD-10-CM

## 2018-04-18 LAB
ALBUMIN UR-MCNC: ABNORMAL MG/DL
APPEARANCE UR: CLEAR
BACTERIA #/AREA URNS HPF: ABNORMAL /HPF
BILIRUB UR QL STRIP: ABNORMAL
COLOR UR AUTO: YELLOW
GLUCOSE SERPL-MCNC: 100 MG/DL (ref 70–99)
GLUCOSE UR STRIP-MCNC: NEGATIVE MG/DL
HGB UR QL STRIP: ABNORMAL
KETONES UR STRIP-MCNC: NEGATIVE MG/DL
LEUKOCYTE ESTERASE UR QL STRIP: NEGATIVE
NITRATE UR QL: NEGATIVE
PH UR STRIP: 5.5 PH (ref 5–7)
RBC #/AREA URNS AUTO: ABNORMAL /HPF
SOURCE: ABNORMAL
SP GR UR STRIP: >1.03 (ref 1–1.03)
SPECIMEN SOURCE: NORMAL
URNS CMNT MICRO: ABNORMAL
UROBILINOGEN UR STRIP-ACNC: 1 EU/DL (ref 0.2–1)
WBC #/AREA URNS AUTO: ABNORMAL /HPF
WET PREP SPEC: NORMAL

## 2018-04-18 PROCEDURE — 36415 COLL VENOUS BLD VENIPUNCTURE: CPT | Performed by: NURSE PRACTITIONER

## 2018-04-18 PROCEDURE — 99214 OFFICE O/P EST MOD 30 MIN: CPT | Performed by: NURSE PRACTITIONER

## 2018-04-18 PROCEDURE — 81001 URINALYSIS AUTO W/SCOPE: CPT | Performed by: NURSE PRACTITIONER

## 2018-04-18 PROCEDURE — 87210 SMEAR WET MOUNT SALINE/INK: CPT | Performed by: NURSE PRACTITIONER

## 2018-04-18 PROCEDURE — 82947 ASSAY GLUCOSE BLOOD QUANT: CPT | Performed by: NURSE PRACTITIONER

## 2018-04-18 NOTE — PROGRESS NOTES
"  SUBJECTIVE:   Aleena Escamilla is a 42 year old female who presents to clinic today for the following health issues:    RESPIRATORY SYMPTOMS      Duration: ***    Description  { :736174}    Severity: {MILD, MODERATE, SEVERE LOW:205714}    Accompanying signs and symptoms: {NONE DEFAULTED:304036::\"None\"}    History (predisposing factors):  { :359175::\"none\"}    Precipitating or alleviating factors: {NONE DEFAULTED:773301::\"None\"}    Therapies tried and outcome:  { :305421::\"none\"}      {additional problems for provider to add:967417}    Problem list and histories reviewed & adjusted, as indicated.  Additional history: {NONE - AS DOCUMENTED:329723::\"as documented\"}    {HIST REVIEW/ LINKS 2:085293}    Reviewed and updated as needed this visit by clinical staff       Reviewed and updated as needed this visit by Provider         {PROVIDER CHARTING PREFERENCE:296776}  "

## 2018-04-18 NOTE — LETTER
My Depression Action Plan  Name: Aleena Escamilla   Date of Birth 1975  Date: 4/18/2018    My doctor: Concepcion Bocanegra   My clinic: [address] [id number] [phone]          GREEN    ZONE   Good Control    What it looks like:     Things are going generally well. You have normal up s and down s. You may even feel depressed from time to time, but bad moods usually last less than a day.   What you need to do:  1. Continue to care for yourself (see self care plan)  2. Check your depression survival kit and update it as needed  3. Follow your physician s recommendations including any medication.  4. Do not stop taking medication unless you consult with your physician first.           YELLOW         ZONE Getting Worse    What it looks like:     Depression is starting to interfere with your life.     It may be hard to get out of bed; you may be starting to isolate yourself from others.    Symptoms of depression are starting to last most all day and this has happened for several days.     You may have suicidal thoughts but they are not constant.   What you need to do:     1. Call your care team, your response to treatment will improve if you keep your care team informed of your progress. Yellow periods are signs an adjustment may need to be made.     2. Continue your self-care, even if you have to fake it!    3. Talk to someone in your support network    4. Open up your depression survival kit           RED    ZONE Medical Alert - Get Help    What it looks like:     Depression is seriously interfering with your life.     You may experience these or other symptoms: You can t get out of bed most days, can t work or engage in other necessary activities, you have trouble taking care of basic hygiene, or basic responsibilities, thoughts of suicide or death that will not go away, self-injurious behavior.     What you need to do:  1. Call your  care team and request a same-day appointment. If they are not available (weekends or after hours) call your local crisis line, emergency room or 911.            Depression Self Care Plan / Survival Kit    Self-Care for Depression  Here s the deal. Your body and mind are really not as separate as most people think.  What you do and think affects how you feel and how you feel influences what you do and think. This means if you do things that people who feel good do, it will help you feel better.  Sometimes this is all it takes.  There is also a place for medication and therapy depending on how severe your depression is, so be sure to consult with your medical provider and/ or Behavioral Health Consultant if your symptoms are worsening or not improving.     In order to better manage my stress, I will:    Exercise  Get some form of exercise, every day. This will help reduce pain and release endorphins, the  feel good  chemicals in your brain. This is almost as good as taking antidepressants!  This is not the same as joining a gym and then never going! (they count on that by the way ) It can be as simple as just going for a walk or doing some gardening, anything that will get you moving.      Hygiene   Maintain good hygiene (Get out of bed in the morning, Make your bed, Brush your teeth, Take a shower, and Get dressed like you were going to work, even if you are unemployed).  If your clothes don't fit try to get ones that do.    Diet  I will strive to eat foods that are good for me, drink plenty of water, and avoid excessive sugar, caffeine, alcohol, and other mood-altering substances.  Some foods that are helpful in depression are: complex carbohydrates, B vitamins, flaxseed, fish or fish oil, fresh fruits and vegetables.    Psychotherapy  I agree to participate in Individual Therapy (if recommended).    Medication  If prescribed medications, I agree to take them.  Missing doses can result in serious side effects.  I  understand that drinking alcohol, or other illicit drug use, may cause potential side effects.  I will not stop my medication abruptly without first discussing it with my provider.    Staying Connected With Others  I will stay in touch with my friends, family members, and my primary care provider/team.    Use your imagination  Be creative.  We all have a creative side; it doesn t matter if it s oil painting, sand castles, or mud pies! This will also kick up the endorphins.    Witness Beauty  (AKA stop and smell the roses) Take a look outside, even in mid-winter. Notice colors, textures. Watch the squirrels and birds.     Service to others  Be of service to others.  There is always someone else in need.  By helping others we can  get out of ourselves  and remember the really important things.  This also provides opportunities for practicing all the other parts of the program.    Humor  Laugh and be silly!  Adjust your TV habits for less news and crime-drama and more comedy.    Control your stress  Try breathing deep, massage therapy, biofeedback, and meditation. Find time to relax each day.     My support system    Clinic Contact:  Phone number:    Contact 1:  Phone number:    Contact 2:  Phone number:    Worship/:  Phone number:    Therapist:  Phone number:    Local crisis center:    Phone number:    Other community support:  Phone number:

## 2018-04-18 NOTE — MR AVS SNAPSHOT
After Visit Summary   4/18/2018    Aleena Escamilla    MRN: [id number]           Patient Information     Date Of Birth          1975        Visit Information        Provider Department      4/18/2018 3:00 PM Doris Luna APRN CNP [facility]        Today's Diagnoses     Dysuria    -  1    Major depressive disorder, recurrent episode, mild (H)        Urinary frequency          Care Instructions    At [facility], we strive to deliver an exceptional experience to you, every time we see you.  If you receive a survey in the mail, please send us back your thoughts. We really do value your feedback.    Based on your medical history, these are the current health maintenance/preventive care services that you are due for (some may have been done at this visit.)  Health Maintenance Due   Topic Date Due     URINE DRUG SCREEN Q1 YR  10/17/1991     HIV SCREEN (SYSTEM ASSIGNED)  10/17/1994     INFLUENZA VACCINE (1) 09/01/2017     DEPRESSION ACTION PLAN Q1 YR  10/21/2017         Suggested websites for health information:  Www.[facility].org : Up to date and easily searchable information on multiple topics.  Www.medlineplus.gov : medication info, interactive tutorials, watch real surgeries online  Www.familydoctor.org : good info from the Academy of Family Physicians  Www.cdc.gov : public health info, travel advisories, epidemics (H1N1)  Www.aap.org : children's health info, normal development, vaccinations  Www.health.state.[place].us : [place] dept of health, public health issues in [place], N1N1    Your care team:                            Family Medicine Internal Medicine   MD Antonio Mistry MD Shantel Branch-Fleming, MD Katya Georgiev PA-C Nam Ho, MD Pediatrics   DRAGAN Mancera CNP Amelia Massimini APRN CNP Shaista Malik, MD Bethany Templen, MD Deborah Mielke, MD Kim Thein, APRN CNP      Clinic hours: Monday - Thursday 7 am-7 pm;  "Fridays 7 am-5 pm.   Urgent care: Monday - Friday 11 am-9 pm; Saturday and Sunday 9 am-5 pm.  Pharmacy : Monday -Thursday 8 am-8 pm; Friday 8 am-6 pm; Saturday and Sunday 9 am-5 pm.     Clinic: [phone]   Pharmacy: [phone]    Disuria (Adultos, Causa Incierta) [Dysuria, Adult, Uncertain Cause]    La \"uretra\" es el canal que permite a la orina salir del cuerpo. Disuria es la sensación de dolor o ardor en la uretra cody el orinado. En khang armaan, la uretra es la abertura sobre la vagina. En los hombres, la uretra es la abertura sobre la punta del pene.  La disuria puede ser causada por algo que irrite o inflame la uretra, brielle khang infección o irritación química. La causa de wiley disuria no es cierta. La causa más común de disuria en los adultos es la infección de vejiga. Ésta se diagnostica con un análisis de orina y requiere tratamiento con antibióticos.  La irritación química puede ser causada por jabones, lociones, colonias, productos para la higiene femenina, gelatinas anticonceptivas, cremas y espumas. Se va 1-3 días después de dejar la exposición al producto químico.  La infección de la uretra por transmisión sexual (clamidia o gonorrea) puede causar disuria. Si wiley médico sospecha esto, le pueden hacer un cultivo del espécimen. Tomará unos dos días para saber el resultado. En las mujeres posmenopáusicas, la disuria puede venir brielle resultado de khang disminución de la cantidad de líquido lubricante producido por el cuerpo. La disuria se vuelve \"crónica\" cuando dura semanas o meses, o se va y retorna de nuevo. Para diagnosticar y tratar la disuria crónica puede ser necesaria la derivación a un especialista (urólogo).  Cuidado En [place]:    Si le tomaron khang muestra de orina para cultivo puede llamar en dos días por el resultado.    Si se hizo un cultivo para khang enfermedad de transmisión sexual, evite la actividad sexual hasta que wiley médico le diga que no hay ninguna infección. Puede llamar en dos días " por los resultados.    Si le diagnosticaron khang infección transmitida sexualmente:    Wiley alan sexual necesita ser tratada, aún si no tiene síntomas.    Wiley alan debe contactar a wiley médico o acudir al [place]art[place] de [place] para que le examinen y traten.    Evite la actividad sexual hasta que usted y wiley alan hayan completado todo el medicamento y les digan que ya no son contagiosos.    Evite cualquier agente químico que usted sospecha puede causar roddy síntomas.    Si le dieron un medicamento recetado, tómelo según le indicaron.  Seguimiento  con wiley médico brielle se le indicó, o si no empieza a mejorar después de ernst días.  Busque Prontamente Atención Médica  si algo de lo siguiente ocurre:    Fiebre de 100.4 F (38 C) o más francine, o brielle le haya indicado wiley proveedor de atención médica    Dolor creciente en la espalda o el abdomen    Imposibilidad de orinar debido al dolor    Khang nueva descarga desde la uretra, vagina o pene    Articulaciones hinchadas o doloridas    Sarpullido  Date Last Reviewed: 3/10/2014    8477-9507 [employer]. [address] [place] [address]. Todos los derechos reservados. Esta información no pretende sustituir la atención médica profesional. Sólo wiley médico puede diagnosticar y tratar un problema de [place].        Self-Care for Headaches  Most headaches aren't serious and can be relieved with self-care. But some headaches may be a sign of another health problem like eye trouble or high blood pressure. To find the best treatment, learn what kind of headaches you get. For tension headaches, self-care will usually help. To treat migraines, ask your healthcare provider for advice. It is also possible to get both tension and migraine headaches. Self-care involves relieving the pain and avoiding headache  triggers  if you can.    Ways to reduce pain and tension  Try these steps:    Apply a cold compress or ice pack to the pain site.    Drink fluids. If nausea makes it  "hard to drink, try sucking on ice.    Rest. Protect yourself from bright light and loud noises.    Calm your emotions by imagining a peaceful scene.    Massage tight neck, shoulder, and head muscles.    To relax muscles, soak in a hot bath or use a hot shower.  Use medicines  Aspirin or aspirin substitutes, such as ibuprofen and acetaminophen, can relieve headache. Remember: Never give aspirin to anyone 18 years old or younger because of the risk of developing Reye syndrome. Use pain medicines only when necessary.  Track your headaches  Keeping a headache diary can help you and your healthcare provider identify what's causing your headaches:    Note when each headache happens.    Identify your activities and the foods you've eaten 6 to 8 hours before the headache began.    Look for any trends or \"triggers.\"  Signs of tension headache  Any of the following can be signs:    Dull pain or feeling of pressure in a tight band around your head    Pain in your neck or shoulders    Headache without a definite beginning or end    Headache after an activity such as driving or working on a computer  Signs of migraine  Any of the following can be signs:    Throbbing pain on one or both sides of your head    Nausea or vomiting    Extreme sensitivity to light, sound, and smells    Bright spots, flashes, or other visual changes    Pain or nausea so severe that you can't continue your daily activities  Call your healthcare provider   If you have any of the following symptoms, contact your healthcare provider:    A headache that lingers after a recent injury or bump to the head.    A fever with a stiff neck or pain when you bend your head toward your chest.    A headache along with slurred speech, changes in your vision, or numbness or weakness in your arms or legs.    A headache for longer than 3 days.    Frequent headaches, especially in the morning.    Headaches with seizures     Seek immediate medical attention if you have a " "headache that you would call \"the worst headache you have ever had.\"   Date Last Reviewed: 10/4/2015    0127-0011 [employer]. [address] [place] [address], [place] [address]. All rights reserved. This information is not intended as a substitute for professional medical care. Always follow your healthcare professional's instructions.                Follow-ups after your visit        Your next 10 appointments already scheduled     Jul 19, 2018 10:15 AM CDT   LAB with LAB ONC Counts include 234 beds at the [facility] ([facility])    [address] [place] [id number] [phone]           Please do not eat 10-12 hours before your appointment if you are coming in fasting for labs on lipids, cholesterol, or glucose (sugar). This does not apply to pregnant women. Water, hot tea and black coffee (with nothing added) are okay. Do not drink other fluids, diet soda or chew gum.            Jul 19, 2018 11:00 AM CDT   Return Visit with Bessy Tate MD   [facility] ([facility]    [address] [place] [id number] [phone]              Future tests that were ordered for you today     Open Future Orders        Priority Expected Expires Ordered    NEISSERIA GONORRHOEA PCR Routine 4/18/2018 7/17/2018 4/17/2018    CHLAMYDIA TRACHOMATIS PCR Routine 4/18/2018 7/17/2018 4/17/2018            Who to contact     If you have questions or need follow up information about today's clinic visit or your schedule please contact [facility] directly at [phone].  Normal or non-critical lab and imaging results will be communicated to you by MyChart, letter or phone within 4 business days after the clinic has received the results. If you do not hear from us within 7 days, please contact the clinic through MyChart or phone. If you have a critical or abnormal lab result, we will notify you by phone as soon as possible.  Submit " refill requests through [employer] or call your pharmacy and they will forward the refill request to us. Please allow 3 business days for your refill to be completed.          Additional Information About Your Visit        [employer] Information     [employer] gives you secure access to your electronic health record. If you see a primary care provider, you can also send messages to your care team and make appointments. If you have questions, please call your primary care clinic.  If you do not have a primary care provider, please call [phone] and they will assist you.        Care EveryWhere ID     This is your Care EveryWhere ID. This could be used by other organizations to access your [place] medical records  [id number]        Your Vitals Were     Pulse Temperature Height Last Period Pulse Oximetry BMI (Body Mass Index)    85 98.4  F (36.9  C) (Oral) 5' (1.524 m) 03/01/2016 95% 31.72 kg/m2       Blood Pressure from Last 3 Encounters:   04/18/18 125/83   04/10/18 113/77   03/15/18 97/67    Weight from Last 3 Encounters:   04/18/18 162 lb 6.4 oz (73.7 kg)   03/15/18 160 lb (72.6 kg)   02/05/18 157 lb (71.2 kg)              We Performed the Following     Glucose     JUST IN CASE     UA reflex to Microscopic and Culture     Urine Microscopic     Wet prep        Primary Care Provider Office Phone # Fax #    Concepcion Tana Bocanegra [phone]       [id number] [facility] [address]        Equal Access to Services     [facility]: Hadii aad ku hadasho Soomaali, waaxda luqadaha, qaybta kaalmada adeegyada, waxay yolanda schneider'clint . So [facility] [phone].    ATENCIÓN: Si habla español, tiene a wiley disposición servicios gratuitos de asistencia lingüística. Llame al [phone].    We comply with applicable federal civil rights laws and [place] laws. We do not discriminate on the basis of race, color, national origin, age, disability, sex, sexual orientation, or gender identity.             Thank you!     Thank you for choosing [facility]  for your care. Our goal is always to provide you with excellent care. Hearing back from our patients is one way we can continue to improve our services. Please take a few minutes to complete the written survey that you may receive in the mail after your visit with us. Thank you!             Your Updated Medication List - Protect others around you: Learn how to safely use, store and throw away your medicines at www.disposemymeds.org.          This list is accurate as of 4/18/18  4:08 PM.  Always use your most recent med list.                   Brand Name Dispense Instructions for use Diagnosis    acetaminophen 325 MG tablet    TYLENOL    100 tablet    Take 2 tablets (650 mg) by mouth every 4 hours as needed for other (mild pain)    Malignant neoplasm of overlapping sites of right female breast (H)       letrozole 2.5 MG tablet    FEMARA    90 tablet    Take 1 tablet (2.5 mg) by mouth daily    Malignant neoplasm of right breast in female, estrogen receptor positive, unspecified site of breast (H)       venlafaxine 75 MG 24 hr capsule    EFFEXOR-XR    90 capsule    Take 1 capsule (75 mg) by mouth daily    Hot flashes related to aromatase inhibitor therapy, Malignant neoplasm of overlapping sites of right breast in female, estrogen receptor positive (H)       vitamin D 2000 units tablet     100 tablet    Take 2,000 Units by mouth daily    Vitamin D deficiency

## 2018-04-18 NOTE — PATIENT INSTRUCTIONS
"At [facility], we strive to deliver an exceptional experience to you, every time we see you.  If you receive a survey in the mail, please send us back your thoughts. We really do value your feedback.    Based on your medical history, these are the current health maintenance/preventive care services that you are due for (some may have been done at this visit.)  Health Maintenance Due   Topic Date Due     URINE DRUG SCREEN Q1 YR  10/17/1991     HIV SCREEN (SYSTEM ASSIGNED)  10/17/1994     INFLUENZA VACCINE (1) 09/01/2017     DEPRESSION ACTION PLAN Q1 YR  10/21/2017         Suggested websites for health information:  Www.[facility].org : Up to date and easily searchable information on multiple topics.  Www.medlineplus.gov : medication info, interactive tutorials, watch real surgeries online  Www.familydoctor.org : good info from the Academy of Family Physicians  Www.cdc.gov : public health info, travel advisories, epidemics (H1N1)  Www.aap.org : children's health info, normal development, vaccinations  Www.health.[facility].[place].us : [place] dept of health, public health issues in [place], N1N1    Your care team:                            Family Medicine Internal Medicine   MD Antonio Mistry MD Shantel Branch-Fleming, MD Katya Georgiev PA-C Nam Ho, MD Pediatrics   DRAGAN Mancera, MARCELLUS Fields APRN CNP   MD Haydee Sherman MD Deborah Mielke, MD Kim Thein, APRN [facility]      Clinic hours: Monday - Thursday 7 am-7 pm; Fridays 7 am-5 pm.   Urgent care: Monday - Friday 11 am-9 pm; Saturday and Sunday 9 am-5 pm.  Pharmacy : Monday -Thursday 8 am-8 pm; Friday 8 am-6 pm; Saturday and Sunday 9 am-5 pm.     Clinic: [phone]   Pharmacy: [phone]    Disuria (Adultos, Causa Incierta) [Dysuria, Adult, Uncertain Cause]    La \"uretra\" es el canal que permite a la orina salir del cuerpo. Disuria es la sensación de dolor o ardor en la uretra cody el " "orinado. En khang armaan, la uretra es la abertura sobre la vagina. En los hombres, la uretra es la abertura sobre la punta del pene.  La disuria puede ser causada por algo que irrite o inflame la uretra, brielle khang infección o irritación química. La causa de iwley disuria no es cierta. La causa más común de disuria en los adultos es la infección de vejiga. Ésta se diagnostica con un análisis de orina y requiere tratamiento con antibióticos.  La irritación química puede ser causada por jabones, lociones, colonias, productos para la higiene femenina, gelatinas anticonceptivas, cremas y espumas. Se va 1-3 días después de dejar la exposición al producto químico.  La infección de la uretra por transmisión sexual (clamidia o gonorrea) puede causar disuria. Si wiley médico sospecha esto, le pueden hacer un cultivo del espécimen. Tomará unos dos días para saber el resultado. En las mujeres posmenopáusicas, la disuria puede venir brielle resultado de khang disminución de la cantidad de líquido lubricante producido por el cuerpo. La disuria se vuelve \"crónica\" cuando dura semanas o meses, o se va y retorna de nuevo. Para diagnosticar y tratar la disuria crónica puede ser necesaria la derivación a un especialista (urólogo).  Cuidado En [place]:    Si le tomaron khang muestra de orina para cultivo puede llamar en dos días por el resultado.    Si se hizo un cultivo para khang enfermedad de transmisión sexual, evite la actividad sexual hasta que wiley médico le diga que no hay ninguna infección. Puede llamar en dos días por los resultados.    Si le diagnosticaron khang infección transmitida sexualmente:    Wiley alan sexual necesita ser tratada, aún si no tiene síntomas.    Wiley alan debe contactar a wiley médico o acudir al Departamento de [place] Pública para que le examinen y traten.    Evite la actividad sexual hasta que usted y wiley alan hayan completado todo el medicamento y les digan que ya no son contagiosos.    Evite cualquier agente químico que usted " sospecha puede causar roddy síntomas.    Si le dieron un medicamento recetado, tómelo según le indicaron.  Seguimiento  con wiley médico brielle se le indicó, o si no empieza a mejorar después de ernst días.  Busque Prontamente Atención Médica  si algo de lo siguiente ocurre:    Fiebre de 100.4 F (38 C) o más francine, o brielle le haya indicado wiley proveedor de atención médica    Dolor creciente en la espalda o el abdomen    Imposibilidad de orinar debido al dolor    Madison nueva descarga desde la uretra, vagina o pene    Articulaciones hinchadas o doloridas    Sarpullido  Date Last Reviewed: 3/10/2014    7694-0822 [employer]. [address] [place] [address]. Todos los derechos reservados. Esta información no pretende sustituir la atención médica profesional. Sólo wiley médico puede diagnosticar y tratar un problema de orlando.        Self-Care for Headaches  Most headaches aren't serious and can be relieved with self-care. But some headaches may be a sign of another health problem like eye trouble or high blood pressure. To find the best treatment, learn what kind of headaches you get. For tension headaches, self-care will usually help. To treat migraines, ask your healthcare provider for advice. It is also possible to get both tension and migraine headaches. Self-care involves relieving the pain and avoiding headache  triggers  if you can.    Ways to reduce pain and tension  Try these steps:    Apply a cold compress or ice pack to the pain site.    Drink fluids. If nausea makes it hard to drink, try sucking on ice.    Rest. Protect yourself from bright light and loud noises.    Calm your emotions by imagining a peaceful scene.    Massage tight neck, shoulder, and head muscles.    To relax muscles, soak in a hot bath or use a hot shower.  Use medicines  Aspirin or aspirin substitutes, such as ibuprofen and acetaminophen, can relieve headache. Remember: Never give aspirin to anyone 18 years old or younger because  "of the risk of developing Reye syndrome. Use pain medicines only when necessary.  Track your headaches  Keeping a headache diary can help you and your healthcare provider identify what's causing your headaches:    Note when each headache happens.    Identify your activities and the foods you've eaten 6 to 8 hours before the headache began.    Look for any trends or \"triggers.\"  Signs of tension headache  Any of the following can be signs:    Dull pain or feeling of pressure in a tight band around your head    Pain in your neck or shoulders    Headache without a definite beginning or end    Headache after an activity such as driving or working on a computer  Signs of migraine  Any of the following can be signs:    Throbbing pain on one or both sides of your head    Nausea or vomiting    Extreme sensitivity to light, sound, and smells    Bright spots, flashes, or other visual changes    Pain or nausea so severe that you can't continue your daily activities  Call your healthcare provider   If you have any of the following symptoms, contact your healthcare provider:    A headache that lingers after a recent injury or bump to the head.    A fever with a stiff neck or pain when you bend your head toward your chest.    A headache along with slurred speech, changes in your vision, or numbness or weakness in your arms or legs.    A headache for longer than 3 days.    Frequent headaches, especially in the morning.    Headaches with seizures     Seek immediate medical attention if you have a headache that you would call \"the worst headache you have ever had.\"   Date Last Reviewed: 10/4/2015    8506-5561 [employer]. [address] [place] [address]. All rights reserved. This information is not intended as a substitute for professional medical care. Always follow your healthcare professional's instructions.        "

## 2018-05-21 ENCOUNTER — OFFICE VISIT (OUTPATIENT)
Dept: FAMILY MEDICINE | Facility: CLINIC | Age: 43
End: 2018-05-21
Payer: COMMERCIAL

## 2018-05-21 VITALS
HEART RATE: 74 BPM | TEMPERATURE: 98.4 F | HEIGHT: 60 IN | RESPIRATION RATE: 11 BRPM | SYSTOLIC BLOOD PRESSURE: 106 MMHG | OXYGEN SATURATION: 96 % | WEIGHT: 165.7 LBS | BODY MASS INDEX: 32.53 KG/M2 | DIASTOLIC BLOOD PRESSURE: 80 MMHG

## 2018-05-21 DIAGNOSIS — M12.9 ARTHRITIS, MULTIPLE JOINT INVOLVEMENT: ICD-10-CM

## 2018-05-21 DIAGNOSIS — Z17.0 MALIGNANT NEOPLASM OF OVERLAPPING SITES OF RIGHT BREAST IN FEMALE, ESTROGEN RECEPTOR POSITIVE (H): ICD-10-CM

## 2018-05-21 DIAGNOSIS — M25.572 PAIN IN JOINT INVOLVING ANKLE AND FOOT, LEFT: Primary | ICD-10-CM

## 2018-05-21 DIAGNOSIS — F41.1 GENERALIZED ANXIETY DISORDER: ICD-10-CM

## 2018-05-21 DIAGNOSIS — C50.811 MALIGNANT NEOPLASM OF OVERLAPPING SITES OF RIGHT BREAST IN FEMALE, ESTROGEN RECEPTOR POSITIVE (H): ICD-10-CM

## 2018-05-21 DIAGNOSIS — F33.0 MAJOR DEPRESSIVE DISORDER, RECURRENT EPISODE, MILD (H): ICD-10-CM

## 2018-05-21 PROCEDURE — 99214 OFFICE O/P EST MOD 30 MIN: CPT | Performed by: FAMILY MEDICINE

## 2018-05-21 RX ORDER — TRAMADOL HYDROCHLORIDE 50 MG/1
50 TABLET ORAL EVERY 6 HOURS PRN
Qty: 20 TABLET | Refills: 1 | Status: SHIPPED | OUTPATIENT
Start: 2018-05-21 | End: 2018-08-28

## 2018-05-21 ASSESSMENT — PAIN SCALES - GENERAL: PAINLEVEL: MODERATE PAIN (5)

## 2018-05-21 NOTE — PATIENT INSTRUCTIONS
At [facility], we strive to deliver an exceptional experience to you, every time we see you.  If you receive a survey in the mail, please send us back your thoughts. We really do value your feedback.    Based on your medical history, these are the current health maintenance/preventive care services that you are due for (some may have been done at this visit.)  Health Maintenance Due   Topic Date Due     URINE DRUG SCREEN Q1 YR  10/17/1991         Suggested websites for health information:  Www.[facility].org : Up to date and easily searchable information on multiple topics.  Www.medlineplus.gov : medication info, interactive tutorials, watch real surgeries online  Www.familydoctor.org : good info from the Academy of Family Physicians  Www.cdc.gov : public health info, travel advisories, epidemics (H1N1)  Www.aap.org : children's health info, normal development, vaccinations  Www.health.[facility].[place].us : [place] dept of health, public health issues in [place], N1N1    Your care team:                            Family Medicine Internal Medicine   MD Antonio Mistry MD Shantel Branch-Fleming, MD Katya Georgiev PA-C Nam Ho, MD Pediatrics   DRAGAN Mancera, MD Haydee Shaw CNP, MD Deborah Mielke, MD Kim Thein, APRN CNP      Clinic hours: Monday - Thursday 7 am-7 pm; Fridays 7 am-5 pm.   Urgent care: Monday - Friday 11 am-9 pm; Saturday and Sunday 9 am-5 pm.  Pharmacy : Monday -Thursday 8 am-8 pm; Friday 8 am-6 pm; Saturday and Sunday 9 am-5 pm.     Clinic: [phone]   Pharmacy: [phone]

## 2018-05-21 NOTE — LETTER
[address] [address]  Phone: [phone]    May 21, 2018        Aleena Escamilla  [address] [facility] [address]          To whom it may concern:    RE: Aleena Escamilla    Patient may return to work 5/21/2018  with the following:  Light duty- Walking up to 15 minutes every hour. Allow to sit for 45 minutes every hour with foot up due to swelling from recent surgery for the next 2 weeks or until 6-4-2018.    Please contact me for questions or concerns.      Sincerely,        Concepcion Bocanegra MD

## 2018-05-21 NOTE — MR AVS SNAPSHOT
After Visit Summary   5/21/2018    Aleena Escamilla    MRN: [id number]           Patient Information     Date Of Birth          1975        Visit Information        Provider Department      5/21/2018 11:20 AM Concepcion Bocanegra MD [facility]        Today's Diagnoses     Pain in joint involving ankle and foot, left    -  1    Major depressive disorder, recurrent episode, mild (H)        Malignant neoplasm of overlapping sites of right breast in female, estrogen receptor positive (H)        Arthritis, multiple joint involvement        Generalized anxiety disorder          Care Instructions    At [facility], we strive to deliver an exceptional experience to you, every time we see you.  If you receive a survey in the mail, please send us back your thoughts. We really do value your feedback.    Based on your medical history, these are the current health maintenance/preventive care services that you are due for (some may have been done at this visit.)  Health Maintenance Due   Topic Date Due     URINE DRUG SCREEN Q1 YR  10/17/1991         Suggested websites for health information:  Www.[employer].org : Up to date and easily searchable information on multiple topics.  Www.medlineplus.gov : medication info, interactive tutorials, watch real surgeries online  Www.familydoctor.org : good info from the Academy of Family Physicians  Www.cdc.gov : public health info, travel advisories, epidemics (H1N1)  Www.aap.org : children's health info, normal development, vaccinations  Www.health.state[place].us : [place] dept of health, public health issues in [place], N1N1    Your care team:                            Family Medicine Internal Medicine   MD Antonio Mistry MD Shantel Branch-Fleming, MD Katya Georgiev PA-C Nam Ho, MD Pediatrics   DRAGAN Mancera, MARCELLUS Fields APRN CNP   MD Haydee Sherman MD Deborah Mielke, MD     PIA Matta CNP      Clinic hours: Monday - Thursday 7 am-7 pm; Fridays 7 am-5 pm.   Urgent care: Monday - Friday 11 am-9 pm; Saturday and Sunday 9 am-5 pm.  Pharmacy : Monday -Thursday 8 am-8 pm; Friday 8 am-6 pm; Saturday and Sunday 9 am-5 pm.     Clinic: [phone]   Pharmacy: [phone]            Follow-ups after your visit        Follow-up notes from your care team     Return in about 4 weeks (around 6/18/2018) for medication follow up, recheck.      Your next 10 appointments already scheduled     Jul 19, 2018 10:15 AM CDT   LAB with LAB ONC [facility]    [address] [place] [address] [id number] [phone]           Please do not eat 10-12 hours before your appointment if you are coming in fasting for labs on lipids, cholesterol, or glucose (sugar). This does not apply to pregnant women. Water, hot tea and black coffee (with nothing added) are okay. Do not drink other fluids, diet soda or chew gum.            Jul 19, 2018 11:00 AM CDT   [facility]   [facility]    [address] [place] [address] [id number] [phone]              Who to contact     If you have questions or need follow up information about today's clinic visit or your schedule please contact [facility] directly at [phone].  Normal or non-critical lab and imaging results will be communicated to you by MyChart, letter or phone within 4 business days after the clinic has received the results. If you do not hear from us within 7 days, please contact the clinic through MyChart or phone. If you have a critical or abnormal lab result, we will notify you by phone as soon as possible.  Submit refill requests through [facility] or call your pharmacy and they will forward the refill request to us. Please allow 3 business days for your refill to be completed.           Additional Information About Your Visit        [facility] Information     [employer] gives you secure access to your electronic health record. If you see a primary care provider, you can also send messages to your care team and make appointments. If you have questions, please call your primary care clinic.  If you do not have a primary care provider, please call [phone] and they will assist you.        Care EveryWhere ID     This is your Care EveryWhere ID. This could be used by other organizations to access your [facility] medical records  [id number]        Your Vitals Were     Pulse Temperature Respirations Height Last Period Pulse Oximetry    74 98.4  F (36.9  C) (Oral) 11 5' (1.524 m) 03/01/2016 96%    BMI (Body Mass Index)                   32.36 kg/m2            Blood Pressure from Last 3 Encounters:   05/21/18 106/80   04/18/18 125/83   04/10/18 113/77    Weight from Last 3 Encounters:   05/21/18 165 lb 11.2 oz (75.2 kg)   04/18/18 162 lb 6.4 oz (73.7 kg)   03/15/18 160 lb (72.6 kg)              Today, you had the following     No orders found for display         Today's Medication Changes          These changes are accurate as of 5/21/18  1:24 PM.  If you have any questions, ask your nurse or doctor.               Start taking these medicines.        Dose/Directions    piroxicam 20 MG capsule   Commonly known as:  FELDENE   Used for:  Pain in joint involving ankle and foot, left   Started by:  Concepcion Bocanegra MD        Dose:  20 mg   Take 1 capsule (20 mg) by mouth daily (with breakfast)   Quantity:  30 capsule   Refills:  1       traMADol 50 MG tablet   Commonly known as:  ULTRAM   Used for:  Pain in joint involving ankle and foot, left   Started by:  Concepcion Bocanegra MD        Dose:  50 mg   Take 1 tablet (50 mg) by mouth every 6 hours as needed for severe pain   Quantity:  20 tablet   Refills:  1            Where to get your medicines      These medications were sent to [facility] [id number]  - [place], [place] - [address] [address] [facility] [address]  [address] [facility] [facility] [address], [place] [address]    Hours:  24-hours Phone:  [phone]     piroxicam 20 MG capsule         Some of these will need a paper prescription and others can be bought over the counter.  Ask your nurse if you have questions.     Bring a paper prescription for each of these medications     traMADol 50 MG tablet               Information about OPIOIDS     PRESCRIPTION OPIOIDS: WHAT YOU NEED TO KNOW   You have a prescription for an opioid (narcotic) pain medicine. Opioids can cause addiction. If you have a history of chemical dependency of any type, you are at a higher risk of becoming addicted to opioids. Only take this medicine after all other options have been tried. Take it for as short a time and as few doses as possible.     Do not:    Drive. If you drive while taking these medicines, you could be arrested for driving under the influence (DUI).    Operate heavy machinery    Do any other dangerous activities while taking these medicines.     Drink any alcohol while taking these medicines.      Take with any other medicines that contain acetaminophen. Read all labels carefully. Look for the word  acetaminophen  or  Tylenol.  Ask your pharmacist if you have questions or are unsure.    Store your pills in a secure place, locked if possible. We will not replace any lost or stolen medicine. If you don t finish your medicine, please throw away (dispose) as directed by your pharmacist. The [place] Pollution Control Agency has more information about safe disposal: https://www.pca.[facility].[place].us/living-green/managing-unwanted-medications    All opioids tend to cause constipation. Drink plenty of water and eat foods that have a lot of fiber, such as fruits, vegetables, prune juice, apple juice and high-fiber cereal. Take a laxative (Miralax, milk of magnesia, Colace, Senna) if you don t move your bowels at least every  other day.          Primary Care Provider Office Phone # Fax #    Concepcion Tana Bocanegra [phone]       [address]  [facility] [address]        Equal Access to Services     DESHAWN BERG : Hadzita jame dorsey guillerminao Soloali, waaxda luqadaha, qaybta kaalmada adeegyada, waxkate landinn carmen loredo candis bernal. So [facility] [phone].    ATENCIÓN: Si habla español, tiene a wiley disposición servicios gratuitos de asistencia lingüística. Llame al [phone].    We comply with applicable federal civil rights laws and [place] laws. We do not discriminate on the basis of race, color, national origin, age, disability, sex, sexual orientation, or gender identity.            Thank you!     Thank you for choosing [facility]  for your care. Our goal is always to provide you with excellent care. Hearing back from our patients is one way we can continue to improve our services. Please take a few minutes to complete the written survey that you may receive in the mail after your visit with us. Thank you!             Your Updated Medication List - Protect others around you: Learn how to safely use, store and throw away your medicines at www.disposemymeds.org.          This list is accurate as of 5/21/18  1:24 PM.  Always use your most recent med list.                   Brand Name Dispense Instructions for use Diagnosis    acetaminophen 325 MG tablet    TYLENOL    100 tablet    Take 2 tablets (650 mg) by mouth every 4 hours as needed for other (mild pain)    Malignant neoplasm of overlapping sites of right female breast (H)       letrozole 2.5 MG tablet    FEMARA    90 tablet    Take 1 tablet (2.5 mg) by mouth daily    Malignant neoplasm of right breast in female, estrogen receptor positive, unspecified site of breast (H)       piroxicam 20 MG capsule    FELDENE    30 capsule    Take 1 capsule (20 mg) by mouth daily (with breakfast)    Pain in joint involving ankle and foot, left       traMADol  50 MG tablet    ULTRAM    20 tablet    Take 1 tablet (50 mg) by mouth every 6 hours as needed for severe pain    Pain in joint involving ankle and foot, left       venlafaxine 75 MG 24 hr capsule    EFFEXOR-XR    90 capsule    Take 1 capsule (75 mg) by mouth daily    Hot flashes related to aromatase inhibitor therapy, Malignant neoplasm of overlapping sites of right breast in female, estrogen receptor positive (H)       vitamin D 2000 units tablet     100 tablet    Take 2,000 Units by mouth daily    Vitamin D deficiency

## 2018-05-21 NOTE — PROGRESS NOTES
SUBJECTIVE:   Aleena Escamilla is a 42 year old female who presents to clinic today for the following health issues:    Concern - Surgery Follow up  Onset: Persisent    Description:   Right foot and ankle swelling. Patient was given Piroxicam by Dr Jennings and helps with the swelling but not sharp pain she has on bottom on foot and in foot joints. She was told by Dr Jennings to see PCP about recommendation for pain medication. Most of the swelling and pain are in the left lateral ankle and not a part of the area of surgery.     Intensity: moderate    Progression of Symptoms:  same    Accompanying Signs & Symptoms:  swelling    Previous history of similar problem:   yes    Precipitating factors:   Worsened by: prolong walking/standing pressure    Alleviating factors:  Improved by: Piroxicam helps with swelling    Therapies Tried and outcome: Piroxicam, Advil does not help enough with the pain    Depression and Anxiety Follow-Up    Status since last visit: No change    Other associated symptoms:None    Complicating factors:     Significant life event: No     Current substance abuse: None    PHQ-9 2/6/2017 4/11/2017 4/10/2018   Total Score 2 3 4   Q9: Suicide Ideation Not at all Not at all Not at all     VENKAT-7 SCORE 2/6/2017 4/11/2017 4/10/2018   Total Score - - -   Total Score 5 0 2       PHQ-9  English  PHQ-9   Any Language  VENKAT-7  Suicide Assessment Five-step Evaluation and Treatment (SAFE-T)    History of multiple joint pains and swelling intermittently without history of inflammatory arthritis or gout.     Problem list and histories reviewed & adjusted, as indicated.  Additional history: as documented    Patient Active Problem List   Diagnosis     CARDIOVASCULAR SCREENING; LDL GOAL LESS THAN 160     Chronic pelvic pain in female     Chronic salpingitis and oophoritis     Major depressive disorder, recurrent episode, mild (H)     Generalized anxiety disorder     Arthritis, multiple joint involvement     Primary  osteoarthritis of both hands     Fibromyalgia     Malignant neoplasm of overlapping sites of right female breast (H)     BRCA gene positive     Carpal tunnel syndrome     Non morbid drug-induced obesity     Chronic right shoulder pain     Bunion, right     Onychomycosis     Elevated LFTs     Past Surgical History:   Procedure Laterality Date     BIOPSY BREAST NEEDLE LOCALIZATION Right 1/19/2016    Procedure: BIOPSY BREAST NEEDLE LOCALIZATION;  Surgeon: Adelina Demarco MD;  Location:  OR     GYN SURGERY  2-    bilateral salpingectomy, left oophrectomy     LAPAROSCOPIC SALPINGO-OOPHORECTOMY Right 10/26/2016    Procedure: LAPAROSCOPIC SALPINGO-OOPHORECTOMY;  Surgeon: Bouchra Mayo MD;  Location: [facility] OR     LUMPECTOMY BREAST WITH SENTINEL NODE, COMBINED Right 1/19/2016    Procedure: COMBINED LUMPECTOMY BREAST WITH SENTINEL NODE;  Surgeon: Adelina Demarco MD;  Location:  OR     PELVIS LAPAROSCOPY,DX  12/28/1998     RELEASE CARPAL TUNNEL Left 12/23/2016    Procedure: RELEASE CARPAL TUNNEL;  Surgeon: Sam Florence MD;  Location:  OR       Social History   Substance Use Topics     Smoking status: Never Smoker     Smokeless tobacco: Never Used     Alcohol use Yes      Comment: 1 drink per week.     Family History   Problem Relation Age of Onset     DIABETES Mother      Hypertension Mother      CANCER Maternal Grandmother      cervical CA     Cardiovascular Father      MI     CANCER Maternal Aunt      cervical cancer     Pancreatic Cancer Maternal Aunt      COD         Current Outpatient Prescriptions   Medication Sig Dispense Refill     acetaminophen (TYLENOL) 325 MG tablet Take 2 tablets (650 mg) by mouth every 4 hours as needed for other (mild pain) 100 tablet 0     Cholecalciferol (VITAMIN D) 2000 UNITS tablet Take 2,000 Units by mouth daily 100 tablet 3     letrozole (FEMARA) 2.5 MG tablet Take 1 tablet (2.5 mg) by mouth daily 90 tablet 3     piroxicam (FELDENE) 20 MG capsule Take 1  capsule (20 mg) by mouth daily (with breakfast) 30 capsule 1     traMADol (ULTRAM) 50 MG tablet Take 1 tablet (50 mg) by mouth every 6 hours as needed for severe pain 20 tablet 1     venlafaxine (EFFEXOR-XR) 75 MG 24 hr capsule Take 1 capsule (75 mg) by mouth daily 90 capsule 1     Allergies   Allergen Reactions     Latex Rash     Recent Labs   Lab Test  03/15/18   1101  02/05/18   1658  11/16/17   0947   12/19/16   1021   10/26/16   0753  09/27/16   1212   11/07/11   1226  10/17/11   0809  01/07/11   0834   A1C   --    --    --    --   5.7   --    --    --    --    --    --    --    LDL   --    --    --    --   82   --    --    --    --    --   86  116   HDL   --    --    --    --   30*   --    --    --    --    --   33*  36*   TRIG   --    --    --    --   301*   --    --    --    --    --   212*  127   ALT  102*  112*  103*   < >   --    < >   --   70*   < >   --    --    --    CR   --   0.60   --    --    --    --    --   0.55   < >   --    --    --    GFRESTIMATED   --   >90   --    --    --    --    --   >90  Non  GFR Calc     < >   --    --    --    GFRESTBLACK   --   >90   --    --    --    --    --   >90   GFR Calc     < >   --    --    --    POTASSIUM   --   3.5   --    --    --    --   3.4  3.7   < >   --    --    --    TSH   --    --    --    --    --    --    --    --    --   0.83   --    --     < > = values in this interval not displayed.      BP Readings from Last 3 Encounters:   05/21/18 106/80   04/18/18 125/83   04/10/18 113/77    Wt Readings from Last 3 Encounters:   05/21/18 165 lb 11.2 oz (75.2 kg)   04/18/18 162 lb 6.4 oz (73.7 kg)   03/15/18 160 lb (72.6 kg)                  Labs reviewed in EPIC    Reviewed and updated as needed this visit by clinical staff  Tobacco  Allergies  Meds  Med Hx  Surg Hx  Fam Hx  Soc Hx      Reviewed and updated as needed this visit by Provider         ROS:  Constitutional, HEENT, cardiovascular, pulmonary, gi and gu systems  are negative, except as otherwise noted.    OBJECTIVE:     /80 (BP Location: Right arm, Patient Position: Chair, Cuff Size: Adult Regular)  Pulse 74  Temp 98.4  F (36.9  C) (Oral)  Resp 11  Ht 5' (1.524 m)  Wt 165 lb 11.2 oz (75.2 kg)  LMP 03/01/2016  SpO2 96%  BMI 32.36 kg/m2  Body mass index is 32.36 kg/(m^2).  GENERAL: healthy, alert and no distress  EYES: Eyes grossly normal to inspection, conjunctivae and sclerae normal  MS: no gross musculoskeletal defects noted, no edema, left bunion surgery looks well healed with no swelling or pain/tenderness. Mild joint effusion left lateral ankle without tenderness. Full ROM.   SKIN: no suspicious lesions or rashes  NEURO: Normal strength and tone, gait and speech normal  PSYCH: mentation appears normal, affect normal/bright     Diagnostic Test Results:  none     ASSESSMENT/PLAN:         Tobacco Cessation:   reports that she has never smoked. She has never used smokeless tobacco.      BMI:   Estimated body mass index is 32.36 kg/(m^2) as calculated from the following:    Height as of this encounter: 5' (1.524 m).    Weight as of this encounter: 165 lb 11.2 oz (75.2 kg).   Weight management plan: Discussed healthy diet and exercise guidelines and patient will follow up in 3 months in clinic to re-evaluate.        ICD-10-CM    1. Pain in joint involving ankle and foot, left- elevate foot and rest as needed.  M25.572 traMADol (ULTRAM) 50 MG tablet every 6-8 hours as needed if pain is severe.      piroxicam (FELDENE) 20 MG capsule- daily as needed for pain and swelling in ankle or foot   2. Major depressive disorder, recurrent episode, mild (H) F33.0 Stable    3. Malignant neoplasm of overlapping sites of right breast in female, estrogen receptor positive (H) C50.811 OK to take biotin. Check with oncologist on all medications.     Z17.0    4. Arthritis, multiple joint involvement M12.9 History of recurrent joint pains in different locations with evidence of  osteoarthritis    5. Generalized anxiety disorder F41.1 Doing better now.       CONSULTATION/REFERRAL to podiatry for follow up as needed.   FUTURE LABS:       - Schedule fasting labs in 6 months  FUTURE APPOINTMENTS:       - Follow-up visit in 2-3 weeks by phone if unable to go back to full work duties.   Work on weight loss  Regular exercise  See Patient Instructions    Concepcion Bocanegra MD  [facility]

## 2018-06-07 ENCOUNTER — TELEPHONE (OUTPATIENT)
Dept: FAMILY MEDICINE | Facility: CLINIC | Age: 43
End: 2018-06-07

## 2018-06-07 NOTE — TELEPHONE ENCOUNTER
....Reason for Call:  Other     Detailed comments: Patient would like a note stating she can return to work without restrictions. Please fax to [phone] Annita Mcbride     Phone Number Patient can be reached at: Home number on file [phone] (home)    Best Time: anytime    Can we leave a detailed message on this number? YES    Call taken on 6/7/2018 at 12:27 PM by Geovani Yuen

## 2018-06-07 NOTE — LETTER
[address] [address]  Phone: [phone]    June 7, 2018        Aleena Escamilla  [address] [facility] [address]          To whom it may concern:    RE: Aleena Escamilla    Patient may return to work 6/7/2018  with the following:  No working or lifting restrictions.    Please contact me for questions or concerns.      Sincerely,        Concepcion Bocanegra MD

## 2018-07-19 ENCOUNTER — ONCOLOGY VISIT (OUTPATIENT)
Dept: ONCOLOGY | Facility: CLINIC | Age: 43
End: 2018-07-19
Payer: COMMERCIAL

## 2018-07-19 VITALS
TEMPERATURE: 98.1 F | DIASTOLIC BLOOD PRESSURE: 75 MMHG | WEIGHT: 162.6 LBS | RESPIRATION RATE: 16 BRPM | HEART RATE: 77 BPM | OXYGEN SATURATION: 99 % | BODY MASS INDEX: 31.76 KG/M2 | SYSTOLIC BLOOD PRESSURE: 108 MMHG

## 2018-07-19 DIAGNOSIS — Z17.0 MALIGNANT NEOPLASM OF RIGHT BREAST IN FEMALE, ESTROGEN RECEPTOR POSITIVE, UNSPECIFIED SITE OF BREAST (H): ICD-10-CM

## 2018-07-19 DIAGNOSIS — C50.911 MALIGNANT NEOPLASM OF RIGHT BREAST IN FEMALE, ESTROGEN RECEPTOR POSITIVE, UNSPECIFIED SITE OF BREAST (H): ICD-10-CM

## 2018-07-19 DIAGNOSIS — C50.811 MALIGNANT NEOPLASM OF OVERLAPPING SITES OF RIGHT FEMALE BREAST (H): ICD-10-CM

## 2018-07-19 LAB
ALBUMIN SERPL-MCNC: 4.1 G/DL (ref 3.4–5)
ALP SERPL-CCNC: 149 U/L (ref 40–150)
ALT SERPL W P-5'-P-CCNC: 200 U/L (ref 0–50)
AST SERPL W P-5'-P-CCNC: 112 U/L (ref 0–45)
BILIRUB DIRECT SERPL-MCNC: 0.1 MG/DL (ref 0–0.2)
BILIRUB SERPL-MCNC: 0.4 MG/DL (ref 0.2–1.3)
CANCER AG27-29 SERPL-ACNC: 19 U/ML (ref 0–39)
PROT SERPL-MCNC: 8.2 G/DL (ref 6.8–8.8)

## 2018-07-19 PROCEDURE — 86300 IMMUNOASSAY TUMOR CA 15-3: CPT | Performed by: INTERNAL MEDICINE

## 2018-07-19 PROCEDURE — 99214 OFFICE O/P EST MOD 30 MIN: CPT | Performed by: INTERNAL MEDICINE

## 2018-07-19 PROCEDURE — 80076 HEPATIC FUNCTION PANEL: CPT | Performed by: INTERNAL MEDICINE

## 2018-07-19 PROCEDURE — 36415 COLL VENOUS BLD VENIPUNCTURE: CPT | Performed by: INTERNAL MEDICINE

## 2018-07-19 RX ORDER — LETROZOLE 2.5 MG/1
2.5 TABLET, FILM COATED ORAL DAILY
Qty: 90 TABLET | Refills: 3 | Status: SHIPPED | OUTPATIENT
Start: 2018-07-19 | End: 2019-06-24

## 2018-07-19 ASSESSMENT — PAIN SCALES - GENERAL: PAINLEVEL: SEVERE PAIN (6)

## 2018-07-19 NOTE — NURSING NOTE
Oncology Rooming Note    July 19, 2018 11:00 AM   Aleena Escamilla is a 42 year old female who presents for:    Chief Complaint   Patient presents with     Oncology Clinic Visit     4 month f/u     Initial Vitals: /75  Pulse 77  Temp 98.1  F (36.7  C) (Oral)  Resp 16  Wt 73.8 kg (162 lb 9.6 oz)  LMP 03/01/2016  SpO2 99%  BMI 31.76 kg/m2 Estimated body mass index is 31.76 kg/(m^2) as calculated from the following:    Height as of 5/21/18: 1.524 m (5').    Weight as of this encounter: 73.8 kg (162 lb 9.6 oz). Body surface area is 1.77 meters squared.  Severe Pain (6) Comment: Takes advil occasionally.   Patient's last menstrual period was 03/01/2016.  Allergies reviewed: Yes  Medications reviewed: Yes    Medications: MEDICATION REFILLS NEEDED TODAY. Provider was notified.  Pharmacy name entered into [facility]:    [facility] [place] [place] - [address] [facility] [address] [place] [address] - [address] [facility] [place] - [address], [address]    Clinical concerns: Hot flashes. Effexor side effects untolerable. Dr Tate was notified.    6 minutes for nursing intake (face to face time)     ONUR ARDON RN

## 2018-07-19 NOTE — PROGRESS NOTES
Visit Date:   07/19/2018      ONCOLOGY DIAGNOSIS:   1. January 2016: Diagnosed with stage II multifocal invasive lobular carcinoma of the right breast. Final pathology showed a grade 2 with the first foci measuring 36 x 16 x 8 mm and the second one 21 x 14 x 11 mm. LCIS classical type present, lymphovascular invasion not identified. Margins negative, estrogen, progesterone receptor positive by IHC and HER-2 not amplified performed on a core biopsy. 1/4 lymph nodes positive with the greatest dimension being 10 mm. No extranodal extension. Oncotype DX recurrence score 25.   2. Genetics: Variant of Uncertain Significance in BRCA2 gene.        THERAPY TO DATE:   1. January 19, 2016: Right breast partial mastectomy with wire localization and right axillary sentinel node biopsy.   2. February 2016 to June 2016. Weekly Taxol followed by dose-dense AC.    3.  August 2016 to September 2016. Radiotherapy.    4. October 26, 2016: Laparoscopic right oophorectomy, biopsy of pelvic nodule, removal of a portion of the right fallopian tube. Pathology: right pelvic nodule showed fibrous nodule with calcifications, endosalpinosis. Right ovary with serous cystadenoma, benign epithelial inclusion cyst, negative for malignancy. A portion of the right fallopian tube with no significant histologic abnormality.   5. May 2016 to Present. Letrozole.      INTERVAL HISTORY:  Aleena is a 42-year-old female diagnosed with pT2 N1a MX multifocal invasive lobular carcinoma of the right breast in 01/2016 after self-palpating a right breast mass.  The patient presents to clinic for followup of her malignancy.  On today's visit, the patient states her biggest issue is headaches with the Effexor.  She was hoping to come off of it.  She does admit to having terrible hot flashes.  In the past she had come off the Effexor and her flashes significantly worsened.  Continues to have left breast pain, but this is off and on and is actually improved.  She did  undergo full work-up and there was no evidence of abnormality.  No fevers, chills, nausea, vomiting, chest pain, shortness of breath, cough.  No abdominal discomfort.  No new palpable masses.  Remainder of comprehensive review of systems is negative.      PAST MEDICAL HISTORY:(Not addressed on today's visit.)  1. Multifocal right breast cancer.   2. History of migraines.   3. History of chronic pelvic pain.   4. Bilateral carpal tunnel       PAST SURGICAL HISTORY: (Not addressed on today's visit.)  1. Right breast lumpectomy 01/2016.   2. Pelvic laparoscopy.   3. Bilateral salpingectomy, left oophorectomy 02/2001 for pelvic pain, uterus intact.   4. 10/2016, right oophorectomy.   5. 11/2016, right carpal tunnel release.   6. Right foot bunion removed.     ALLERGIES:  LATEX causes rash.      SOCIAL HISTORY:  , comes in alone.  Has 2 children.  No current tobacco use.  She has 1-2 alcoholic drinks a month.  Does admit to using caffeine, hard for her to come off of this.  Works for [employer].      PHYSICAL EXAMINATION:   VITAL SIGNS:  Blood pressure 108/75, pulse 77, respirations 16, temperature 98.1, pulse ox 99% on room air, weight 162 pounds, BMI 31.76.   GENERAL:  Comfortable, no acute distress, pleasant.   HEENT:  Atraumatic, normocephalic.  Pupils equal, round, reactive.  Sclerae anicteric.  Oropharynx:  Moist mucous membranes.  No lesions, ulcers or exudate.   NECK:  Supple, full range of motion.  Trachea midline.   HEART:  Regular rate and rhythm, normal S1, S2, no murmurs, gallop.  No edema.   LUNGS:  Clear to auscultation bilaterally.  No crackles or wheezes.  Normal respiratory effort.   ABDOMEN:  Positive bowel sounds.  Soft, nontender, nondistended.  No hepatosplenomegaly.   EXTREMITIES:  No cyanosis, warm.   MUSCULOSKELETAL:  No point tenderness.   LYMPHATICS:  No cervical, supraclavicular or axillary nodes palpable.   SKIN:  No petechiae or rashes.   NEUROLOGIC:  Alert and oriented.   BREASTS:   Examined in the upright and supine position.  Right breast, no dominant masses.  Nipple everted without discharge.  Left breast, no dominant masses, nipple everted without discharge.      LABORATORY DATA:  Alkaline phosphatase 149, , , total bilirubin 0.1.    Diagnostic mammogram from 12/2017 with targeted ultrasound shows no suspicious findings.    CT of chest, abdomen and pelvis from 11/2017, no evidence of metastatic disease in chest, abdomen or pelvis.  Hepatic steatosis.  No suspicious nodules in the chest.    DEXA scan from 11/2016 showed normal bone mineral density.      ASSESSMENT AND PLAN:   1.  Stage II, pT2 N1a MX multifocal invasive lobular carcinoma.  No evidence of recurrence by history, physical, or CT from 11/2017.  However, it should be noted the patient's LFTs are going up.  At this point, we have no reason to believe that her cancer has come back.  Will check a CA 27:29.  If abnormal, will see back sooner. If normal, will have the patient return to see us in 6 months.  Next mammogram is due 12/2018.  Will check LFTs and CA 27-29 at next visit.   2.  Right apical reticulation.  Noted in the past.  Most recent CT does not show any evidence of pulmonary abnormalities.   3.  Hot flashes.  Initially the patient considered decreasing Effexor down to 37.5 mg.  However, we do have concerns that her hot flashes will worsen since this has happened in the past.  At this point, patient would like to continue on the current dose of Effexor 75 mg a day.  She will try taking her dose at night to see if this will make her headaches better. Avoid caffeine, alcohol. Encouraged healthy lifestyle and exercise. We will reassess in 6 months.   4.  Headaches.  Secondary to Effexor.  The patient would like to continue current dose of Effexor but will take it at night.   5.  BRCA2 variant, undetermined significance.  Advised the patient to follow up with genetics team once a year.   6.  Left breast pain.   Improving.  Workup negative.  Advised decreasing her caffeine intake and avoid sweets, sugars, alcohol.    7. Elevated LFTs.  Has doubled since the last time we saw her.  CT of the abdomen was negative.  This may be hepatosteatosis but requires further workup.  Recommend followup with primary.  I did write down my concerns as well as current lab values for her to discuss with Dr. Bocanegra.   7.  Health care maintenance.  Discussed the importance of healthy lifestyle with diet and exercise, recommend 150 minutes of moderate exercise a week with diet rich in fruits, vegetables, fiber and avoiding processed meats.      ALBERTO RENEE MD             D: 2018   T: 2018   MT: VASILE      Name:     KEYUR GALVAN   MRN:      [id number]        Account:      [id number]   :      1975           Visit Date:   2018      Document: [id number]       cc: Concepcion Bocanegra MD

## 2018-07-19 NOTE — LETTER
7/19/2018         RE: Aleena Escamilla  [address] [facility] [address]        Dear Colleague,    Thank you for referring your patient, Aleena Escamilla, to the [facility]. Please see a copy of my visit note below.    Visit Date:   07/19/2018      ONCOLOGY DIAGNOSIS:   1. January 2016: Diagnosed with stage II multifocal invasive lobular carcinoma of the right breast. Final pathology showed a grade 2 with the first foci measuring 36 x 16 x 8 mm and the second one 21 x 14 x 11 mm. LCIS classical type present, lymphovascular invasion not identified. Margins negative, estrogen, progesterone receptor positive by IHC and HER-2 not amplified performed on a core biopsy. 1/4 lymph nodes positive with the greatest dimension being 10 mm. No extranodal extension. Oncotype DX recurrence score 25.   2. Genetics: Variant of Uncertain Significance in BRCA2 gene.        THERAPY TO DATE:   1. January 19, 2016: Right breast partial mastectomy with wire localization and right axillary sentinel node biopsy.   2. February 2016 to June 2016. Weekly Taxol followed by dose-dense AC.    3.  August 2016 to September 2016. Radiotherapy.    4. October 26, 2016: Laparoscopic right oophorectomy, biopsy of pelvic nodule, removal of a portion of the right fallopian tube. Pathology: right pelvic nodule showed fibrous nodule with calcifications, endosalpinosis. Right ovary with serous cystadenoma, benign epithelial inclusion cyst, negative for malignancy. A portion of the right fallopian tube with no significant histologic abnormality.   5. May 2016 to Present. Letrozole.      INTERVAL HISTORY:  Aleena is a 42-year-old female diagnosed with pT2 N1a MX multifocal invasive lobular carcinoma of the right breast in 01/2016 after self-palpating a right breast mass.  The patient presents to clinic for followup of her malignancy.  On today's visit, the patient states her biggest issue is headaches with the Effexor.  She was  hoping to come off of it.  She does admit to having terrible hot flashes.  In the past she had come off the Effexor and her flashes significantly worsened.  Continues to have left breast pain, but this is off and on and is actually improved.  She did undergo full work-up and there was no evidence of abnormality.  No fevers, chills, nausea, vomiting, chest pain, shortness of breath, cough.  No abdominal discomfort.  No new palpable masses.  Remainder of comprehensive review of systems is negative.      PAST MEDICAL HISTORY:(Not addressed on today's visit.)  1. Multifocal right breast cancer.   2. History of migraines.   3. History of chronic pelvic pain.   4. Bilateral carpal tunnel       PAST SURGICAL HISTORY: (Not addressed on today's visit.)  1. Right breast lumpectomy 01/2016.   2. Pelvic laparoscopy.   3. Bilateral salpingectomy, left oophorectomy 02/2001 for pelvic pain, uterus intact.   4. 10/2016, right oophorectomy.   5. 11/2016, right carpal tunnel release.   6. Right foot bunion removed.     ALLERGIES:  LATEX causes rash.      SOCIAL HISTORY:  , comes in alone.  Has 2 children.  No current tobacco use.  She has 1-2 alcoholic drinks a month.  Does admit to using caffeine, hard for her to come off of this.  Works for [employer].      PHYSICAL EXAMINATION:   VITAL SIGNS:  Blood pressure 108/75, pulse 77, respirations 16, temperature 98.1, pulse ox 99% on room air, weight 162 pounds, BMI 31.76.   GENERAL:  Comfortable, no acute distress, pleasant.   HEENT:  Atraumatic, normocephalic.  Pupils equal, round, reactive.  Sclerae anicteric.  Oropharynx:  Moist mucous membranes.  No lesions, ulcers or exudate.   NECK:  Supple, full range of motion.  Trachea midline.   HEART:  Regular rate and rhythm, normal S1, S2, no murmurs, gallop.  No edema.   LUNGS:  Clear to auscultation bilaterally.  No crackles or wheezes.  Normal respiratory effort.   ABDOMEN:  Positive bowel sounds.  Soft, nontender, nondistended.  No  hepatosplenomegaly.   EXTREMITIES:  No cyanosis, warm.   MUSCULOSKELETAL:  No point tenderness.   LYMPHATICS:  No cervical, supraclavicular or axillary nodes palpable.   SKIN:  No petechiae or rashes.   NEUROLOGIC:  Alert and oriented.   BREASTS:  Examined in the upright and supine position.  Right breast, no dominant masses.  Nipple everted without discharge.  Left breast, no dominant masses, nipple everted without discharge.      LABORATORY DATA:  Alkaline phosphatase 149, , , total bilirubin 0.1.    Diagnostic mammogram from 12/2017 with targeted ultrasound shows no suspicious findings.    CT of chest, abdomen and pelvis from 11/2017, no evidence of metastatic disease in chest, abdomen or pelvis.  Hepatic steatosis.  No suspicious nodules in the chest.    DEXA scan from 11/2016 showed normal bone mineral density.      ASSESSMENT AND PLAN:   1.  Stage II, pT2 N1a MX multifocal invasive lobular carcinoma.  No evidence of recurrence by history, physical, or CT from 11/2017.  However, it should be noted the patient's LFTs are going up.  At this point, we have no reason to believe that her cancer has come back.  Will check a CA 27:29.  If abnormal, will see back sooner. If normal, will have the patient return to see us in 6 months.  Next mammogram is due 12/2018.  Will check LFTs and CA 27-29 at next visit.   2.  Right apical reticulation.  Noted in the past.  Most recent CT does not show any evidence of pulmonary abnormalities.   3.  Hot flashes.  Initially the patient considered decreasing Effexor down to 37.5 mg.  However, we do have concerns that her hot flashes will worsen since this has happened in the past.  At this point, patient would like to continue on the current dose of Effexor 75 mg a day.  She will try taking her dose at night to see if this will make her headaches better. Avoid caffeine, alcohol. Encouraged healthy lifestyle and exercise. We will reassess in 6 months.   4.  Headaches.   Secondary to Effexor.  The patient would like to continue current dose of Effexor but will take it at night.   5.  BRCA2 variant, undetermined significance.  Advised the patient to follow up with genetics team once a year.   6.  Left breast pain.  Improving.  Workup negative.  Advised decreasing her caffeine intake and avoid sweets, sugars, alcohol.    7. Elevated LFTs.  Has doubled since the last time we saw her.  CT of the abdomen was negative.  This may be hepatosteatosis but requires further workup.  Recommend followup with primary.  I did write down my concerns as well as current lab values for her to discuss with Dr. Bocanegra.   7.  Health care maintenance.  Discussed the importance of healthy lifestyle with diet and exercise, recommend 150 minutes of moderate exercise a week with diet rich in fruits, vegetables, fiber and avoiding processed meats.      ALBERTO TATE MD             D: 2018   T: 2018   MT: VASILE      Name:     KEYUR GALVAN   MRN:      [id number]        Account:      [id number]   :      1975           Visit Date:   2018      Document: [id number]       cc: Concepcion Bocanegra MD       Again, thank you for allowing me to participate in the care of your patient.        Sincerely,        Alberto Tate MD

## 2018-07-19 NOTE — MR AVS SNAPSHOT
After Visit Summary   7/19/2018    Aleena Escamilla    MRN: [id number]           Patient Information     Date Of Birth          1975        Visit Information        Provider Department      7/19/2018 11:00 AM Bessy Tate MD [facility]        Today's Diagnoses     Malignant neoplasm of right breast in female, estrogen receptor positive, unspecified site of breast (H)           Follow-ups after your visit        Your next 10 appointments already scheduled     Jan 22, 2019 10:15 AM CST   LAB with LAB ON[facility] ([facility])    [address] [address] [place] [id number] [phone]           Please do not eat 10-12 hours before your appointment if you are coming in fasting for labs on lipids, cholesterol, or glucose (sugar). This does not apply to pregnant women. Water, hot tea and black coffee (with nothing added) are okay. Do not drink other fluids, diet soda or chew gum.            Jan 22, 2019 11:00 AM CST   Return Visit with Bessy Tate MD   [facility] ([facility])    [address] [address] [place] [address] [id number] [phone]              Future tests that were ordered for you today     Open Standing Orders        Priority Remaining Interval Expires Ordered    Ca27.29  breast tumor marker Routine 2/3  7/19/2019 7/19/2018            Who to contact     If you have questions or need follow up information about today's clinic visit or your schedule please contact [facility] directly at [phone].  Normal or non-critical lab and imaging results will be communicated to you by MyChart, letter or phone within 4 business days after the clinic has received the results. If you do not hear from us within 7 days, please contact the clinic through MyChart or phone. If you have a critical or abnormal lab result, we will notify you by phone as soon as  possible.  Submit refill requests through [employer] or call your pharmacy and they will forward the refill request to us. Please allow 3 business days for your refill to be completed.          Additional Information About Your Visit        [employer] Information     [employer] gives you secure access to your electronic health record. If you see a primary care provider, you can also send messages to your care team and make appointments. If you have questions, please call your primary care clinic.  If you do not have a primary care provider, please call [phone] and they will assist you.      [employer] is an electronic gateway that provides easy, online access to your medical records. With [employer], you can request a clinic appointment, read your test results, renew a prescription or communicate with your care team.     To access your existing account, please contact your [facility] or call [phone] for assistance.        Care EveryWhere ID     This is your Care EveryWhere ID. This could be used by other organizations to access your [place] medical records  [id number]        Your Vitals Were     Pulse Temperature Respirations Last Period Pulse Oximetry BMI (Body Mass Index)    77 98.1  F (36.7  C) (Oral) 16 03/01/2016 99% 31.76 kg/m2       Blood Pressure from Last 3 Encounters:   07/19/18 108/75   05/21/18 106/80   04/18/18 125/83    Weight from Last 3 Encounters:   07/19/18 73.8 kg (162 lb 9.6 oz)   05/21/18 75.2 kg (165 lb 11.2 oz)   04/18/18 73.7 kg (162 lb 6.4 oz)              We Performed the Following     Ca27.29  breast tumor marker          Where to get your medicines      These medications were sent to [id number] - [place], [place] - [address] [address] [facility] [address]  [address] [place] [address]    Hours:  24-hours Phone:  [phone]     letrozole 2.5 MG tablet          Primary Care Provider Office Phone # Fax #     Concepcion Bocanegra [phone]       [address]  [place] [facility] [address]        Equal Access to Services     EMIGDIORIVERA MORENA : Lina jame dorsey faye Somelvin, waalokda luqadaha, qabrianneta kaalmada teo, azam loredo lajosecheyanne bernal. So [facility] [phone].    ATENCIÓN: Si habla español, tiene a wiley disposición servicios gratuitos de asistencia lingüística. Llame al [phone].    We comply with applicable federal civil rights laws and [place] laws. We do not discriminate on the basis of race, color, national origin, age, disability, sex, sexual orientation, or gender identity.            Thank you!     Thank you for choosing [facility]  for your care. Our goal is always to provide you with excellent care. Hearing back from our patients is one way we can continue to improve our services. Please take a few minutes to complete the written survey that you may receive in the mail after your visit with us. Thank you!             Your Updated Medication List - Protect others around you: Learn how to safely use, store and throw away your medicines at www.disposemymeds.org.          This list is accurate as of 7/19/18 11:25 AM.  Always use your most recent med list.                   Brand Name Dispense Instructions for use Diagnosis    IBUPROFEN PO      Take 400 mg by mouth every 6 hours as needed for moderate pain        letrozole 2.5 MG tablet    FEMARA    90 tablet    Take 1 tablet (2.5 mg) by mouth daily    Malignant neoplasm of right breast in female, estrogen receptor positive, unspecified site of breast (H)       traMADol 50 MG tablet    ULTRAM    20 tablet    Take 1 tablet (50 mg) by mouth every 6 hours as needed for severe pain    Pain in joint involving ankle and foot, left       venlafaxine 75 MG 24 hr capsule    EFFEXOR-XR    90 capsule    Take 1 capsule (75 mg) by mouth daily    Hot flashes related to aromatase inhibitor therapy, Malignant neoplasm of  overlapping sites of right breast in female, estrogen receptor positive (H)       vitamin D 2000 units tablet     100 tablet    Take 2,000 Units by mouth daily    Vitamin D deficiency

## 2018-07-23 ENCOUNTER — OFFICE VISIT (OUTPATIENT)
Dept: FAMILY MEDICINE | Facility: CLINIC | Age: 43
End: 2018-07-23
Payer: COMMERCIAL

## 2018-07-23 VITALS
DIASTOLIC BLOOD PRESSURE: 64 MMHG | HEART RATE: 79 BPM | HEIGHT: 60 IN | RESPIRATION RATE: 16 BRPM | WEIGHT: 162 LBS | SYSTOLIC BLOOD PRESSURE: 100 MMHG | OXYGEN SATURATION: 99 % | BODY MASS INDEX: 31.8 KG/M2 | TEMPERATURE: 98.1 F

## 2018-07-23 DIAGNOSIS — E66.1 NON MORBID DRUG-INDUCED OBESITY: ICD-10-CM

## 2018-07-23 DIAGNOSIS — K76.0 FATTY LIVER: ICD-10-CM

## 2018-07-23 DIAGNOSIS — F33.0 MAJOR DEPRESSIVE DISORDER, RECURRENT EPISODE, MILD (H): ICD-10-CM

## 2018-07-23 DIAGNOSIS — R79.89 ELEVATED LFTS: Primary | ICD-10-CM

## 2018-07-23 DIAGNOSIS — F41.1 GENERALIZED ANXIETY DISORDER: ICD-10-CM

## 2018-07-23 PROCEDURE — 99214 OFFICE O/P EST MOD 30 MIN: CPT | Performed by: FAMILY MEDICINE

## 2018-07-23 ASSESSMENT — PAIN SCALES - GENERAL: PAINLEVEL: NO PAIN (0)

## 2018-07-23 NOTE — PATIENT INSTRUCTIONS
At [facility], we strive to deliver an exceptional experience to you, every time we see you.  If you receive a survey in the mail, please send us back your thoughts. We really do value your feedback.    Based on your medical history, these are the current health maintenance/preventive care services that you are due for (some may have been done at this visit.)  Health Maintenance Due   Topic Date Due     URINE DRUG SCREEN Q1 YR  [birth year]1991         Suggested websites for health information:  [email or web address].[email or web address] : Up to date and easily searchable information on multiple topics.  Www.[employer].com.gov : medication info, interactive tutorials, watch real surgeries online  Www.familydoctor.org : good info from the Academy of Family Physicians  Www.cdc.gov : public health info, travel advisories, epidemics (H1N1)  Www.aap.org : children's health info, normal development, vaccinations  Www.health.[email or web address] : [place] dept of health, public health issues in [place], N1N1    Your care team:                            Family Medicine Internal Medicine   MD Antonio Mistry MD Shantel Branch-Fleming, MD Katya Georgiev PA-C Nam Ho, MD Pediatrics   DRAGAN Mancera, CNP MD Haydee Ivory CNP, MD Deborah Mielke, MD Kim Thein, APRN CNP      Clinic hours: Monday - Thursday 7 am-7 pm; Fridays 7 am-5 pm.   Urgent care: Monday - Friday 11 am-9 pm; Saturday and Sunday 9 am-5 pm.  Pharmacy : Monday -Thursday 8 am-8 pm; Friday 8 am-6 pm; Saturday and Sunday 9 am-5 pm.     Clinic: [phone]   Pharmacy: [phone]    Eating Heart-Healthy Foods  Eating has a big impact on your heart health. In fact, eating healthier can improve several of your heart risks at once. For instance, it helps you manage weight, cholesterol, and blood pressure. Here are ideas to help you make heart-healthy changes without giving up all the foods and flavors you  love.  Getting started    Talk with your healthcare provider about eating plans, such as the DASH or Mediterranean diet. You may also be referred to a dietitian.    Change a few things at a time. Give yourself time to get used to a few eating changes before adding more.    Work to create a tasty, healthy eating plan that you can stick to for the rest of your life.    Goals for healthy eating  Below are some tips to improve your eating habits:    Limit saturated fats and trans fats. Saturated fats raise your levels of cholesterol, so keep these fats to a minimum. They are found in foods such as fatty meats, whole milk, cheese, and palm and coconut oils. Avoid trans fats because they lower good cholesterol as well as raise bad cholesterol. Trans fats are most often found in processed foods.    Reduce sodium (salt) intake. Eating too much salt may increase your blood pressure. Limit your sodium intake to 2,300 milligrams (mg) per day (the amount in 1 teaspoon of salt), or less if your healthcare provider recommends it. Dining out less often and eating fewer processed foods are two great ways to decrease the amount of salt you consume.    Managing calories. A calorie is a unit of energy. Your body burns calories for fuel, but if you eat more calories than your body burns, the extras are stored as fat. Your healthcare provider can help you create a diet plan to manage your calories. This will likely include eating healthier foods as well as exercising regularly. To help you track your progress, keep a diary to record what you eat and how often you exercise.  Choose the right foods  Aim to make these foods staples of your diet. If you have diabetes, you may have different recommendations than what is listed here:    Fruits and vegetables provide plenty of nutrients without a lot of calories. At meals, fill half your plate with these foods. Split the other half of your plate between whole grains and lean protein.    Whole  grains are high in fiber and rich in vitamins and nutrients. Good choices include whole-wheat bread, pasta, and brown rice.    Lean proteins give you nutrition with less fat. Good choices include fish, skinless chicken, and beans.    Low-fat or nonfat dairy provides nutrients without a lot of fat. Try low-fat or nonfat milk, cheese, or yogurt.    Healthy fats can be good for you in small amounts. These are unsaturated fats, such as olive oil, nuts, and fish. Try to have at least 2 servings per week of fatty fish, such as salmon, sardines, mackerel, rainbow trout, and albacore tuna. These contain omega-3 fatty acids, which are good for your heart. Flaxseed is another source of a heart-healthy fat.  More on heart-healthy eating  Read food labels  Healthy eating starts at the grocery store. Be sure to pay attention to food labels on packaged foods. Look for products that are high in fiber and protein, and low in saturated fat, cholesterol, and sodium. Avoid products that contain trans fat. And pay close attention to serving size. For instance, if you plan to eat two servings, double all the numbers on the label.  Prepare food right  A key part of healthy cooking is cutting down on added fat and salt. Look on the internet for lower-fat, lower-sodium recipes. Also, try these tips:    Remove fat from meat and skin from poultry before cooking.    Skim fat from the surface of soups and sauces.    Broil, boil, bake, steam, grill, and microwave food without added fats.    Choose ingredients that spice up your food without adding calories, fat, or sodium. Try these items: horseradish, hot sauce, lemon, mustard, nonfat salad dressings, and vinegar. For salt-free herbs and spices, try basil, cilantro, cinnamon, pepper, and rosemary.  Date Last Reviewed: 10/1/2017    5501-6596 [employer]. [address] [place] [address], [place] [address]. All rights reserved. This information is not intended as a substitute for  professional medical care. Always follow your healthcare professional's instructions.        Understanding Carbohydrates, Fats, and Protein  Food is a source of fuel and nourishment for your body. It s also a source of pleasure. Having diabetes doesn t mean you have to eat special foods or give up desserts. Instead, your dietitian can show you how to plan meals to suit your body. To start, learn how different foods affect blood sugar.  Carbohydrates  Carbohydrates are the main source of fuel for the body. Carbohydrates raise blood sugar. Many people think carbohydrates are only found in pasta or bread. But carbohydrates are actually in many kinds of foods:    Sugars occur naturally in foods such as fruit, milk, honey, and molasses. Sugars can also be added to many foods, from cereals and yogurt to candy and desserts. Sugars raise blood sugar.    Starches are found in bread, cereals, pasta, and dried beans. They re also found in corn, peas, potatoes, yam, acorn squash, and butternut squash. Starches also raise blood sugar.     Fiber is found in foods such as vegetables, fruits, beans, and whole grains. Unlike other carbs, fiber isn t digested or absorbed. So it doesn t raise blood sugar. In fact, fiber can help keep blood sugar from rising too fast. It also helps keep blood cholesterol at a healthy level.  Did you know?  Even though carbohydrates raise blood sugar, it s best to have some in every meal. They are an important part of a healthy diet.   Fat  Fat is an energy source that can be stored until needed. Fat does not raise blood sugar. However, it can raise blood cholesterol, increasing the risk of heart disease. Fat is also high in calories, which can cause weight gain. Not all types of fat are the same.  More Healthy:    Monounsaturated fats are mostly found in vegetable oils, such as olive, canola, and peanut oils. They are also found in avocados and some nuts. Monounsaturated fats are healthy for your  heart. That s because they lower LDL (unhealthy) cholesterol.    Polyunsaturated fats are mostly found in vegetable oils, such as corn, safflower, and soybean oils. They are also found in some seeds, nuts, and fish. Polyunsaturated fats lower LDL (unhealthy) cholesterol. So, choosing them instead of saturated fats is healthy for your heart. Certain unsaturated fats can help lower triglycerides.   Less Healthy:    Saturated fats are found in animal products, such as meat, poultry, whole milk, lard, and butter. Saturated fats raise LDL cholesterol and are not healthy for your heart.    Hydrogenated oils and trans fats are formed when vegetable oils are processed into solid fats. They are found in many processed foods. Hydrogenated oils and trans fats raise LDL cholesterol and lower HDL (healthy) cholesterol. They are not healthy for your heart.  Protein  Protein helps the body build and repair muscle and other tissue. Protein has little or no effect on blood sugar. However, many foods that contain protein also contain saturated fat. By choosing low-fat protein sources, you can get the benefits of protein without the extra fat:    Plant protein is found in dry beans and peas, nuts, and soy products, such as tofu and soymilk. These sources tend to be cholesterol-free and low in saturated fat.    Animal protein is found in fish, poultry, meat, cheese, milk, and eggs. These contain cholesterol and can be high in saturated fat. Aim for lean, lower-fat choices.  Date Last Reviewed: 3/1/2016    4278-3084 [employer]. [address] [place] [address], [place] [address]. All rights reserved. This information is not intended as a substitute for professional medical care. Always follow your healthcare professional's instructions.        Understanding Carbohydrates    A car needs the right type of fuel to run. And you need the right kind of food to function. To keep your energy level up, your body needs food that has  carbohydrates. But carbohydrates raise blood sugar levels higher and faster than other kinds of food. Your dietitian will work with you to figure out the amount of carbohydrates you need.  Starches  Starches are found in grains, some vegetables, and beans. Grain products include bread, pasta, cereal, and tortillas. Starchy vegetables include potatoes, peas, corn, lima beans, yams, and squash. Kidney beans, craig beans, black beans, garbanzo beans, and lentils also contain starches.  Sugars  Sugars are found naturally in many foods. Or sugar can be added. Foods that contain natural sugar include fruits and fruit juices, dairy products, honey, and molasses. Added sugars are found in most desserts, processed foods, candy, regular soda, and fruit drinks. These are very helpful for treating low blood sugar, or hypoglycemia. They provide sugar quickly. Try to keep at least 15 to 20 grams of these simple sugars with you at all times. Eat this if you begin to have low blood sugar symptoms.  Fiber  Fiber comes from plant foods. Most fiber isn t digested by the body. Instead of raising blood sugar levels like other carbohydrates, it actually stops blood sugar from rising too fast. Fiber is found in fruits, vegetables, whole grains, beans, peas, and many nuts.  Carb counting  Keep track of the amount of carbohydrates you eat. This can help you keep the right balance of physical activity and medicine. The amount of carbohydrates needed will vary for each person. It depends on many things such as your health, the medicines you take, and how active you are. Your healthcare team will help you figure out the right amount of carbohydrates for you. You may start with around 45 to 60 grams of carbohydrate per meal, depending on your situation. Carb counting is a system that helps you keep track of the carbohydrates you eat at each meal.  Carbohydrates come from a variety of foods. These include grains, starchy vegetables, fruit, milk,  beans, and snack foods. You can either count carbohydrate grams or carbohydrate servings. When you count carbohydrate servings, 1 carbohydrate serving = 15 grams of carbohydrates.  Here are some examples of foods containing about 15 grams of carbohydrates (1 serving of carbohydrates):    1/2 cup of canned or frozen fruit    A small piece of fresh fruit (4 ounces)    1 slice of bread    1/2 cup of oatmeal    1/3 cup of rice    4 to 6 crackers    1/2 English muffin    1/2 cup of black beans    1/4 of a large baked potato (3 ounces)    2/3 cup of plain fat-free yogurt    1 cup of soup    1/2 cup of casserole    6 chicken nuggets    2-inch-square brownie or cake without frosting    2 small cookies    1/2 cup of ice cream or sherbet  Carb counting is easier when food labels are available. Look at the label to see how many grams of total carbohydrates the food contains. Then you can figure out how much you should eat.  Two very important lines to look at on the label are the serving size and the total carbohydrate amount. Here are some tips for using food labels to count your carbohydrate intake:    Check the serving size. The information on the label is based on that serving size. If you eat more than the listed serving size, you may have to double or triple the other information on the label.     Check the total grams of carbohydrates. Total carbohydrate from the label includes sugar, starch, and fiber. Be sure to use the total carbohydrate number and not sugar alone.    Know how many grams of carbohydrates you can have.  Be familiar with the matching portion sizes.    Compare labels. Compare the labels of different products, looking at serving sizes and total carbohydrates to find the products that work best for you.     Don't forget protein and fat. With all the focus on carb counting, it might be easy to forget protein and fat in your meals. Don't forget to include sources of protein and healthy fat to balance your  meals.  It s also important to be consistent with the amount and time you eat when taking a fixed dose of diabetes medicine. Work with your healthcare provider or dietitian if you need additional help. He or she can help you keep track of your carbohydrate intake. He or she can also help you figure out how many grams of carbohydrates you should have.  Date Last Reviewed: 7/1/2016 2000-2017 [employer]. [address] [place] [address], [place] [address]. All rights reserved. This information is not intended as a substitute for professional medical care. Always follow your healthcare professional's instructions.

## 2018-07-23 NOTE — MR AVS SNAPSHOT
After Visit Summary   7/23/2018    Aleena Escamilla    MRN: [id number]           Patient Information     Date Of Birth          1975        Visit Information        Provider Department      7/23/2018 1:40 PM Concepcion Bocanegra MD [facility]        Today's Diagnoses     Elevated LFTs    -  1    Fatty liver          Care Instructions    At [facility], we strive to deliver an exceptional experience to you, every time we see you.  If you receive a survey in the mail, please send us back your thoughts. We really do value your feedback.    Based on your medical history, these are the current health maintenance/preventive care services that you are due for (some may have been done at this visit.)  Health Maintenance Due   Topic Date Due     URINE DRUG SCREEN Q1 YR  10/17/1991         Suggested websites for health information:  Www.[employer] : Up to date and easily searchable information on multiple topics.  Www.[employer].gov : medication info, interactive tutorials, watch real surgeries online  Www.familydoctor.org : good info from the Academy of Family Physicians  Www.cdc.gov : public health info, travel advisories, epidemics (H1N1)  Www.aap.org : children's health info, normal development, vaccinations  Www.health.[facility].[place].us : [place] dept of health, public health issues in [place], N1N1    Your care team:                            Family Medicine Internal Medicine   MD Antonio Mistry MD Shantel Branch-Fleming, MD Katya Georgiev PA-C Nam Ho, MD Pediatrics   DRAGAN Mancera, MD Haydee Shaw CNP, MD Deborah Mielke, MD Kim Thein, PIA [facility]      Clinic hours: Monday - Thursday 7 am-7 pm; Fridays 7 am-5 pm.   Urgent care: Monday - Friday 11 am-9 pm; Saturday and Sunday 9 am-5 pm.  Pharmacy : Monday -Thursday 8 am-8 pm; Friday 8 am-6 pm; Saturday and Sunday 9 am-5 pm.      Clinic: [phone]   Pharmacy: [phone]    Eating Heart-Healthy Foods  Eating has a big impact on your heart health. In fact, eating healthier can improve several of your heart risks at once. For instance, it helps you manage weight, cholesterol, and blood pressure. Here are ideas to help you make heart-healthy changes without giving up all the foods and flavors you love.  Getting started    Talk with your healthcare provider about eating plans, such as the DASH or Mediterranean diet. You may also be referred to a dietitian.    Change a few things at a time. Give yourself time to get used to a few eating changes before adding more.    Work to create a tasty, healthy eating plan that you can stick to for the rest of your life.    Goals for healthy eating  Below are some tips to improve your eating habits:    Limit saturated fats and trans fats. Saturated fats raise your levels of cholesterol, so keep these fats to a minimum. They are found in foods such as fatty meats, whole milk, cheese, and palm and coconut oils. Avoid trans fats because they lower good cholesterol as well as raise bad cholesterol. Trans fats are most often found in processed foods.    Reduce sodium (salt) intake. Eating too much salt may increase your blood pressure. Limit your sodium intake to 2,300 milligrams (mg) per day (the amount in 1 teaspoon of salt), or less if your healthcare provider recommends it. Dining out less often and eating fewer processed foods are two great ways to decrease the amount of salt you consume.    Managing calories. A calorie is a unit of energy. Your body burns calories for fuel, but if you eat more calories than your body burns, the extras are stored as fat. Your healthcare provider can help you create a diet plan to manage your calories. This will likely include eating healthier foods as well as exercising regularly. To help you track your progress, keep a diary to record what you eat and how often  you exercise.  Choose the right foods  Aim to make these foods staples of your diet. If you have diabetes, you may have different recommendations than what is listed here:    Fruits and vegetables provide plenty of nutrients without a lot of calories. At meals, fill half your plate with these foods. Split the other half of your plate between whole grains and lean protein.    Whole grains are high in fiber and rich in vitamins and nutrients. Good choices include whole-wheat bread, pasta, and brown rice.    Lean proteins give you nutrition with less fat. Good choices include fish, skinless chicken, and beans.    Low-fat or nonfat dairy provides nutrients without a lot of fat. Try low-fat or nonfat milk, cheese, or yogurt.    Healthy fats can be good for you in small amounts. These are unsaturated fats, such as olive oil, nuts, and fish. Try to have at least 2 servings per week of fatty fish, such as salmon, sardines, mackerel, rainbow trout, and albacore tuna. These contain omega-3 fatty acids, which are good for your heart. Flaxseed is another source of a heart-healthy fat.  More on heart-healthy eating  Read food labels  Healthy eating starts at the grocery store. Be sure to pay attention to food labels on packaged foods. Look for products that are high in fiber and protein, and low in saturated fat, cholesterol, and sodium. Avoid products that contain trans fat. And pay close attention to serving size. For instance, if you plan to eat two servings, double all the numbers on the label.  Prepare food right  A key part of healthy cooking is cutting down on added fat and salt. Look on the internet for lower-fat, lower-sodium recipes. Also, try these tips:    Remove fat from meat and skin from poultry before cooking.    Skim fat from the surface of soups and sauces.    Broil, boil, bake, steam, grill, and microwave food without added fats.    Choose ingredients that spice up your food without adding calories, fat, or  sodium. Try these items: horseradish, hot sauce, lemon, mustard, nonfat salad dressings, and vinegar. For salt-free herbs and spices, try basil, cilantro, cinnamon, pepper, and rosemary.  Date Last Reviewed: 10/1/2017    2335-0754 [employer]. [address] [place] [address]. All rights reserved. This information is not intended as a substitute for professional medical care. Always follow your healthcare professional's instructions.        Understanding Carbohydrates, Fats, and Protein  Food is a source of fuel and nourishment for your body. It s also a source of pleasure. Having diabetes doesn t mean you have to eat special foods or give up desserts. Instead, your dietitian can show you how to plan meals to suit your body. To start, learn how different foods affect blood sugar.  Carbohydrates  Carbohydrates are the main source of fuel for the body. Carbohydrates raise blood sugar. Many people think carbohydrates are only found in pasta or bread. But carbohydrates are actually in many kinds of foods:    Sugars occur naturally in foods such as fruit, milk, honey, and molasses. Sugars can also be added to many foods, from cereals and yogurt to candy and desserts. Sugars raise blood sugar.    Starches are found in bread, cereals, pasta, and dried beans. They re also found in corn, peas, potatoes, yam, acorn squash, and butternut squash. Starches also raise blood sugar.     Fiber is found in foods such as vegetables, fruits, beans, and whole grains. Unlike other carbs, fiber isn t digested or absorbed. So it doesn t raise blood sugar. In fact, fiber can help keep blood sugar from rising too fast. It also helps keep blood cholesterol at a healthy level.  Did you know?  Even though carbohydrates raise blood sugar, it s best to have some in every meal. They are an important part of a healthy diet.   Fat  Fat is an energy source that can be stored until needed. Fat does not raise blood sugar.  However, it can raise blood cholesterol, increasing the risk of heart disease. Fat is also high in calories, which can cause weight gain. Not all types of fat are the same.  More Healthy:    Monounsaturated fats are mostly found in vegetable oils, such as olive, canola, and peanut oils. They are also found in avocados and some nuts. Monounsaturated fats are healthy for your heart. That s because they lower LDL (unhealthy) cholesterol.    Polyunsaturated fats are mostly found in vegetable oils, such as corn, safflower, and soybean oils. They are also found in some seeds, nuts, and fish. Polyunsaturated fats lower LDL (unhealthy) cholesterol. So, choosing them instead of saturated fats is healthy for your heart. Certain unsaturated fats can help lower triglycerides.   Less Healthy:    Saturated fats are found in animal products, such as meat, poultry, whole milk, lard, and butter. Saturated fats raise LDL cholesterol and are not healthy for your heart.    Hydrogenated oils and trans fats are formed when vegetable oils are processed into solid fats. They are found in many processed foods. Hydrogenated oils and trans fats raise LDL cholesterol and lower HDL (healthy) cholesterol. They are not healthy for your heart.  Protein  Protein helps the body build and repair muscle and other tissue. Protein has little or no effect on blood sugar. However, many foods that contain protein also contain saturated fat. By choosing low-fat protein sources, you can get the benefits of protein without the extra fat:    Plant protein is found in dry beans and peas, nuts, and soy products, such as tofu and soymilk. These sources tend to be cholesterol-free and low in saturated fat.    Animal protein is found in fish, poultry, meat, cheese, milk, and eggs. These contain cholesterol and can be high in saturated fat. Aim for lean, lower-fat choices.  Date Last Reviewed: 3/1/2016    7710-9666 [employer]. [address] [facility],  [place] [address]. All rights reserved. This information is not intended as a substitute for professional medical care. Always follow your healthcare professional's instructions.        Understanding Carbohydrates    A car needs the right type of fuel to run. And you need the right kind of food to function. To keep your energy level up, your body needs food that has carbohydrates. But carbohydrates raise blood sugar levels higher and faster than other kinds of food. Your dietitian will work with you to figure out the amount of carbohydrates you need.  Starches  Starches are found in grains, some vegetables, and beans. Grain products include bread, pasta, cereal, and tortillas. Starchy vegetables include potatoes, peas, corn, lima beans, yams, and squash. Kidney beans, craig beans, black beans, garbanzo beans, and lentils also contain starches.  Sugars  Sugars are found naturally in many foods. Or sugar can be added. Foods that contain natural sugar include fruits and fruit juices, dairy products, honey, and molasses. Added sugars are found in most desserts, processed foods, candy, regular soda, and fruit drinks. These are very helpful for treating low blood sugar, or hypoglycemia. They provide sugar quickly. Try to keep at least 15 to 20 grams of these simple sugars with you at all times. Eat this if you begin to have low blood sugar symptoms.  Fiber  Fiber comes from plant foods. Most fiber isn t digested by the body. Instead of raising blood sugar levels like other carbohydrates, it actually stops blood sugar from rising too fast. Fiber is found in fruits, vegetables, whole grains, beans, peas, and many nuts.  Carb counting  Keep track of the amount of carbohydrates you eat. This can help you keep the right balance of physical activity and medicine. The amount of carbohydrates needed will vary for each person. It depends on many things such as your health, the medicines you take, and how active you are. Your  healthcare team will help you figure out the right amount of carbohydrates for you. You may start with around 45 to 60 grams of carbohydrate per meal, depending on your situation. Carb counting is a system that helps you keep track of the carbohydrates you eat at each meal.  Carbohydrates come from a variety of foods. These include grains, starchy vegetables, fruit, milk, beans, and snack foods. You can either count carbohydrate grams or carbohydrate servings. When you count carbohydrate servings, 1 carbohydrate serving = 15 grams of carbohydrates.  Here are some examples of foods containing about 15 grams of carbohydrates (1 serving of carbohydrates):    1/2 cup of canned or frozen fruit    A small piece of fresh fruit (4 ounces)    1 slice of bread    1/2 cup of oatmeal    1/3 cup of rice    4 to 6 crackers    1/2 English muffin    1/2 cup of black beans    1/4 of a large baked potato (3 ounces)    2/3 cup of plain fat-free yogurt    1 cup of soup    1/2 cup of casserole    6 chicken nuggets    2-inch-square brownie or cake without frosting    2 small cookies    1/2 cup of ice cream or sherbet  Carb counting is easier when food labels are available. Look at the label to see how many grams of total carbohydrates the food contains. Then you can figure out how much you should eat.  Two very important lines to look at on the label are the serving size and the total carbohydrate amount. Here are some tips for using food labels to count your carbohydrate intake:    Check the serving size. The information on the label is based on that serving size. If you eat more than the listed serving size, you may have to double or triple the other information on the label.     Check the total grams of carbohydrates. Total carbohydrate from the label includes sugar, starch, and fiber. Be sure to use the total carbohydrate number and not sugar alone.    Know how many grams of carbohydrates you can have.  Be familiar with the matching  portion sizes.    Compare labels. Compare the labels of different products, looking at serving sizes and total carbohydrates to find the products that work best for you.     Don't forget protein and fat. With all the focus on carb counting, it might be easy to forget protein and fat in your meals. Don't forget to include sources of protein and healthy fat to balance your meals.  It s also important to be consistent with the amount and time you eat when taking a fixed dose of diabetes medicine. Work with your healthcare provider or dietitian if you need additional help. He or she can help you keep track of your carbohydrate intake. He or she can also help you figure out how many grams of carbohydrates you should have.  Date Last Reviewed: 7/1/2016 2000-2017 [employer]. [address] [place] [address], [place] [address]. All rights reserved. This information is not intended as a substitute for professional medical care. Always follow your healthcare professional's instructions.                Follow-ups after your visit        Follow-up notes from your care team     Return in about 4 weeks (around 8/20/2018) for Lab Work.      Your next 10 appointments already scheduled     Jan 22, 2019 10:15 AM CST   LAB with LAB ONC [facility] ([facility])    [id number] [place] [id number] [phone]           Please do not eat 10-12 hours before your appointment if you are coming in fasting for labs on lipids, cholesterol, or glucose (sugar). This does not apply to pregnant women. Water, hot tea and black coffee (with nothing added) are okay. Do not drink other fluids, diet soda or chew gum.            Jan 22, 2019 11:00 AM CST   Return Visit with Bessy Tate MD   [facility])    [address] [address] [place] [id number] [phone]              Future tests that were ordered for you today     Open  Future Orders        Priority Expected Expires Ordered    Hepatic panel (Albumin, ALT, AST, Bili, Alk Phos, TP) Routine 8/23/2018 7/23/2019 7/23/2018            Who to contact     If you have questions or need follow up information about today's clinic visit or your schedule please contact [facility] directly at [phone].  Normal or non-critical lab and imaging results will be communicated to you by [employer]we[employer]art, letter or phone within 4 business days after the clinic has received the results. If you do not hear from us within 7 days, please contact the clinic through [employer] or phone. If you have a critical or abnormal lab result, we will notify you by phone as soon as possible.  Submit refill requests through [employer] or call your pharmacy and they will forward the refill request to us. Please allow 3 business days for your refill to be completed.          Additional Information About Your Visit        [employer] Information     [employer] gives you secure access to your electronic health record. If you see a primary care provider, you can also send messages to your care team and make appointments. If you have questions, please call your primary care clinic.  If you do not have a primary care provider, please call [phone] and they will assist you.        Care EveryWhere ID     This is your Care EveryWhere ID. This could be used by other organizations to access your [facility] medical records  [id number]        Your Vitals Were     Pulse Temperature Respirations Height Last Period Pulse Oximetry    79 98.1  F (36.7  C) (Oral) 16 5' (1.524 m) 03/01/2016 99%    Breastfeeding? BMI (Body Mass Index)                No 31.64 kg/m2           Blood Pressure from Last 3 Encounters:   07/23/18 100/64   07/19/18 108/75   05/21/18 106/80    Weight from Last 3 Encounters:   07/23/18 162 lb (73.5 kg)   07/19/18 162 lb 9.6 oz (73.8 kg)   05/21/18 165 lb 11.2 oz (75.2 kg)               Primary Care Provider Office  Phone # Fax #    Concepcion Tana Bocanegra [phone] [address]  [facility] [address]        Equal Access to Services     DESHAWN BERG : Hadii jame ku guillerminao Soloali, waaxda luqadaha, qaybta kaalmada adeegmarcoda, azam loredo laLazclint bernal. So [facility] [phone].    ATENCIÓN: Si habla español, tiene a wiley disposición servicios gratuitos de asistencia lingüística. Llame al [phone].    We comply with applicable federal civil rights laws and [place] laws. We do not discriminate on the basis of race, color, national origin, age, disability, sex, sexual orientation, or gender identity.            Thank you!     Thank you for choosing [facility]  for your care. Our goal is always to provide you with excellent care. Hearing back from our patients is one way we can continue to improve our services. Please take a few minutes to complete the written survey that you may receive in the mail after your visit with us. Thank you!             Your Updated Medication List - Protect others around you: Learn how to safely use, store and throw away your medicines at www.disposemymeds.org.          This list is accurate as of 7/23/18  2:22 PM.  Always use your most recent med list.                   Brand Name Dispense Instructions for use Diagnosis    IBUPROFEN PO      Take 400 mg by mouth every 6 hours as needed for moderate pain        letrozole 2.5 MG tablet    FEMARA    90 tablet    Take 1 tablet (2.5 mg) by mouth daily    Malignant neoplasm of right breast in female, estrogen receptor positive, unspecified site of breast (H)       traMADol 50 MG tablet    ULTRAM    20 tablet    Take 1 tablet (50 mg) by mouth every 6 hours as needed for severe pain    Pain in joint involving ankle and foot, left       venlafaxine 75 MG 24 hr capsule    EFFEXOR-XR    90 capsule    Take 1 capsule (75 mg) by mouth daily    Hot flashes related to aromatase inhibitor therapy, Malignant  neoplasm of overlapping sites of right breast in female, estrogen receptor positive (H)       vitamin D 2000 units tablet     100 tablet    Take 2,000 Units by mouth daily    Vitamin D deficiency

## 2018-07-23 NOTE — Clinical Note
Regarding elevated liver function tests: Aleena is going to work on lowering the carbohydrates in her diet. She has been drinking og and on a fairly high carbohydrate diet. I will check her liver function tests in 1 month and if they are still elevated, send her to gastroenterology. It seems to be related to the fatty liver, but I usually don't see the levels get this high. Concepcion Bocanegra MD

## 2018-07-23 NOTE — PROGRESS NOTES
SUBJECTIVE:   Aleena Escamilla is a 42 year old female who presents to clinic today for the following health issues:    Concerns:Liver  Patient recently saw oncologist and has questions wants to further discuss about liver.    Liver function tests have been increasing. Diagnosed with fatty liver disease by CT scan without signs of cancer. Consumes up to 3 alcoholic beverages a week- wine or beer and only 1 at a time or per day. Diet is high in carbohydrates and eats 3 large meals a day with the largest at night. Used to eat 5 smaller meals during the day. Drinking og up to 3 at a time in the heat and did not used to do this. Also has sugar in her tea.     Depression and Anxiety Follow-Up    Status since last visit: No change    Other associated symptoms:None    Complicating factors:     Significant life event: Yes-  Breast cancer and treatment     Current substance abuse: None    PHQ-9 2/6/2017 4/11/2017 4/10/2018   Total Score 2 3 4   Q9: Suicide Ideation Not at all Not at all Not at all     VENKAT-7 SCORE 2/6/2017 4/11/2017 4/10/2018   Total Score - - -   Total Score 5 0 2       PHQ-9  English  PHQ-9   Any Language  VENKAT-7  Suicide Assessment Five-step Evaluation and Treatment (SAFE-T)    Amount of exercise or physical activity: 4-5 days/week for an average of 30-45 minutes    Problems taking medications regularly: No    Medication side effects: none    Diet: regular (no restrictions)            Problem list and histories reviewed & adjusted, as indicated.  Additional history: as documented    Patient Active Problem List   Diagnosis     CARDIOVASCULAR SCREENING; LDL GOAL LESS THAN 160     Chronic pelvic pain in female     Chronic salpingitis and oophoritis     Major depressive disorder, recurrent episode, mild (H)     Generalized anxiety disorder     Arthritis, multiple joint involvement     Primary osteoarthritis of both hands     Fibromyalgia     Malignant neoplasm of overlapping sites of right female breast (H)      BRCA gene positive     Carpal tunnel syndrome     Non morbid drug-induced obesity     Chronic right shoulder pain     Bunion, right     Onychomycosis     Elevated LFTs     Fatty liver     Past Surgical History:   Procedure Laterality Date     BIOPSY BREAST NEEDLE LOCALIZATION Right 1/19/2016    Procedure: BIOPSY BREAST NEEDLE LOCALIZATION;  Surgeon: Adelina Demarco MD;  Location: [facility]     GYN SURGERY  2-    bilateral salpingectomy, left oophrectomy     LAPAROSCOPIC SALPINGO-OOPHORECTOMY Right 10/26/2016    Procedure: LAPAROSCOPIC SALPINGO-OOPHORECTOMY;  Surgeon: Bouchra Mayo MD;  Location: [facility]     LUMPECTOMY BREAST WITH SENTINEL NODE, COMBINED Right 1/19/2016    Procedure: COMBINED LUMPECTOMY BREAST WITH SENTINEL NODE;  Surgeon: Adelina Demarco MD;  Location: [facility]     PELVIS LAPAROSCOPY,DX  12/28/1998     RELEASE CARPAL TUNNEL Left 12/23/2016    Procedure: RELEASE CARPAL TUNNEL;  Surgeon: Sam Florence MD;  Location: [facility]       Social History   Substance Use Topics     Smoking status: Never Smoker     Smokeless tobacco: Never Used     Alcohol use Yes      Comment: 1 drink per week.     Family History   Problem Relation Age of Onset     Diabetes Mother      Hypertension Mother      Cancer Maternal Grandmother      cervical CA     Cardiovascular Father      MI     Cancer Maternal Aunt      cervical cancer     Pancreatic Cancer Maternal Aunt      COD         Current Outpatient Prescriptions   Medication Sig Dispense Refill     Cholecalciferol (VITAMIN D) 2000 UNITS tablet Take 2,000 Units by mouth daily 100 tablet 3     IBUPROFEN PO Take 400 mg by mouth every 6 hours as needed for moderate pain       letrozole (FEMARA) 2.5 MG tablet Take 1 tablet (2.5 mg) by mouth daily 90 tablet 3     traMADol (ULTRAM) 50 MG tablet Take 1 tablet (50 mg) by mouth every 6 hours as needed for severe pain 20 tablet 1     venlafaxine (EFFEXOR-XR) 75 MG 24 hr capsule Take 1 capsule (75 mg) by mouth  daily 90 capsule 1     Allergies   Allergen Reactions     Latex Rash     Recent Labs   Lab Test  07/19/18   1024  03/15/18   1101  02/05/18   1658   12/19/16   1021   10/26/16   0753  09/27/16   1212   11/07/11   1226  10/17/11   0809  01/07/11   0834   A1C   --    --    --    --   5.7   --    --    --    --    --    --    --    LDL   --    --    --    --   82   --    --    --    --    --   86  116   HDL   --    --    --    --   30*   --    --    --    --    --   33*  36*   TRIG   --    --    --    --   301*   --    --    --    --    --   212*  127   ALT  200*  102*  112*   < >   --    < >   --   70*   < >   --    --    --    CR   --    --   0.60   --    --    --    --   0.55   < >   --    --    --    GFRESTIMATED   --    --   >90   --    --    --    --   >90  Non  GFR Calc     < >   --    --    --    GFRESTBLACK   --    --   >90   --    --    --    --   >90   GFR Calc     < >   --    --    --    POTASSIUM   --    --   3.5   --    --    --   3.4  3.7   < >   --    --    --    TSH   --    --    --    --    --    --    --    --    --   0.83   --    --     < > = values in this interval not displayed.      BP Readings from Last 3 Encounters:   07/23/18 100/64   07/19/18 108/75   05/21/18 106/80    Wt Readings from Last 3 Encounters:   07/23/18 162 lb (73.5 kg)   07/19/18 162 lb 9.6 oz (73.8 kg)   05/21/18 165 lb 11.2 oz (75.2 kg)                  Labs reviewed in EPIC    Reviewed and updated as needed this visit by clinical staff  Tobacco  Allergies  Meds  Med Hx  Surg Hx  Fam Hx  Soc Hx      Reviewed and updated as needed this visit by Provider         ROS:  Constitutional, HEENT, cardiovascular, pulmonary, gi and gu systems are negative, except as otherwise noted.    OBJECTIVE:     /64 (BP Location: Right arm, Patient Position: Chair, Cuff Size: Adult Regular)  Pulse 79  Temp 98.1  F (36.7  C) (Oral)  Resp 16  Ht 5' (1.524 m)  Wt 162 lb (73.5 kg)  LMP 03/01/2016  SpO2  99%  Breastfeeding? No  BMI 31.64 kg/m2  Body mass index is 31.64 kg/(m^2).  GENERAL: healthy, alert, well nourished, well hydrated, no distress, obese  HENT: ear canals- normal; TMs- normal; Nose- normal; Mouth- no ulcers, no lesions, throat is clear with no erythema or exudate.   NECK: no tenderness, no adenopathy, no asymmetry, no masses, no stiffness; thyroid- normal to palpation  RESP: lungs clear to auscultation - no rales, no rhonchi, no wheezes  CV: regular rates and rhythm, normal S1 S2, no S3 or S4 and no murmur, no click or rub -  ABDOMEN: soft, no tenderness, no  hepatosplenomegaly, no masses, normal bowel sounds  MS: extremities- no gross deformities noted, no edema  SKIN: no suspicious lesions, no rashes  NEURO: strength and tone- normal, sensory exam- grossly normal, mentation- intact, speech- normal, reflexes- symmetric  BACK: no CVA tenderness, no paralumbar tenderness  PSYCH: Alert and oriented times 3; speech- coherent , normal rate and volume; able to articulate logical thoughts, able to abstract reason, no tangential thoughts, no hallucinations or delusions, affect- normal     Diagnostic Test Results:  Results for orders placed or performed in visit on 07/19/18   Ca27.29  breast tumor marker   Result Value Ref Range    CA 27-29 19 0 - 39 U/mL       ASSESSMENT/PLAN:         Tobacco Cessation:   reports that she has never smoked. She has never used smokeless tobacco.      BMI:   Estimated body mass index is 31.64 kg/(m^2) as calculated from the following:    Height as of this encounter: 5' (1.524 m).    Weight as of this encounter: 162 lb (73.5 kg).   Weight management plan: Discussed healthy diet and exercise guidelines and patient will follow up in 1 month in clinic to re-evaluate.        ICD-10-CM    1. Elevated LFTs- we reviewed a lower carbohydrate diet that should help decrease liver function tests from fatty liver disease. No sugar drinks and portion size with carbohydrate counting as  with diabetic diets.  R79.89 Hepatic panel (Albumin, ALT, AST, Bili, Alk Phos, TP)   2. Fatty liver- if liver function tests not improving in 1 month, will need gastroenterology referral for further evaluation and treatment. Evaluation of liver for fibrosis and inflammation.  K76.0 Hepatic panel (Albumin, ALT, AST, Bili, Alk Phos, TP)   3. Major depressive disorder, recurrent episode, mild (H) F33.0 Stable and doing better.    4. Generalized anxiety disorder F41.1 Stable    5. Non morbid drug-induced obesity E66.1 Improved diet and exercise should help with weight and with fatty liver disease.        CONSULTATION/REFERRAL to gastroenterology if not improving.   FUTURE LABS:       - Schedule a non-fasting blood draw 1 month  FUTURE APPOINTMENTS:       - Follow-up visit in 1-2 months or sooner if any questions or concerns.   Work on weight loss  Regular exercise  See Patient Instructions    Concepcion Bocanegra MD  [facility]

## 2018-07-23 NOTE — LETTER
[address] [address]  Phone: [phone]    July 23, 2018        Aleena Escamilla  [address] [facility] [address]          To whom it may concern:    RE: Aleena Escamilla    Patient may return to work 7- with the following:  No working or lifting restrictions    Please contact me for questions or concerns.      Sincerely,        Concepcion Bocanegra MD

## 2018-08-28 ENCOUNTER — OFFICE VISIT (OUTPATIENT)
Dept: FAMILY MEDICINE | Facility: CLINIC | Age: 43
End: 2018-08-28
Payer: COMMERCIAL

## 2018-08-28 VITALS
HEART RATE: 79 BPM | RESPIRATION RATE: 20 BRPM | OXYGEN SATURATION: 96 % | WEIGHT: 163 LBS | DIASTOLIC BLOOD PRESSURE: 72 MMHG | TEMPERATURE: 98.8 F | HEIGHT: 60 IN | SYSTOLIC BLOOD PRESSURE: 108 MMHG | BODY MASS INDEX: 32 KG/M2

## 2018-08-28 DIAGNOSIS — R79.89 ELEVATED LFTS: ICD-10-CM

## 2018-08-28 DIAGNOSIS — K76.0 FATTY LIVER: ICD-10-CM

## 2018-08-28 DIAGNOSIS — K76.0 FATTY LIVER: Primary | ICD-10-CM

## 2018-08-28 DIAGNOSIS — E78.5 HYPERLIPIDEMIA LDL GOAL <130: ICD-10-CM

## 2018-08-28 DIAGNOSIS — C50.811 MALIGNANT NEOPLASM OF OVERLAPPING SITES OF RIGHT BREAST IN FEMALE, ESTROGEN RECEPTOR POSITIVE (H): ICD-10-CM

## 2018-08-28 DIAGNOSIS — Z17.0 MALIGNANT NEOPLASM OF OVERLAPPING SITES OF RIGHT BREAST IN FEMALE, ESTROGEN RECEPTOR POSITIVE (H): ICD-10-CM

## 2018-08-28 DIAGNOSIS — E66.1 NON MORBID DRUG-INDUCED OBESITY: ICD-10-CM

## 2018-08-28 LAB
ALBUMIN SERPL-MCNC: 3.8 G/DL (ref 3.4–5)
ALP SERPL-CCNC: 127 U/L (ref 40–150)
ALT SERPL W P-5'-P-CCNC: 182 U/L (ref 0–50)
AST SERPL W P-5'-P-CCNC: 113 U/L (ref 0–45)
BILIRUB DIRECT SERPL-MCNC: 0.1 MG/DL (ref 0–0.2)
BILIRUB SERPL-MCNC: 0.5 MG/DL (ref 0.2–1.3)
PROT SERPL-MCNC: 7.6 G/DL (ref 6.8–8.8)

## 2018-08-28 PROCEDURE — 36415 COLL VENOUS BLD VENIPUNCTURE: CPT | Performed by: FAMILY MEDICINE

## 2018-08-28 PROCEDURE — 80061 LIPID PANEL: CPT | Performed by: FAMILY MEDICINE

## 2018-08-28 PROCEDURE — 80076 HEPATIC FUNCTION PANEL: CPT | Performed by: FAMILY MEDICINE

## 2018-08-28 PROCEDURE — 99214 OFFICE O/P EST MOD 30 MIN: CPT | Performed by: FAMILY MEDICINE

## 2018-08-28 ASSESSMENT — PAIN SCALES - GENERAL: PAINLEVEL: NO PAIN (0)

## 2018-08-28 NOTE — MR AVS SNAPSHOT
After Visit Summary   8/28/2018    Aleena Escamilla    MRN: [id number]           Patient Information     Date Of Birth          1975        Visit Information        Provider Department      8/28/2018 6:40 PM Concepcion Bocanegra MD [facility]        Today's Diagnoses     Malignant neoplasm of overlapping sites of right breast in female, estrogen receptor positive (H)    -  1    Fatty liver        Elevated LFTs          Care Instructions    At [facility], we strive to deliver an exceptional experience to you, every time we see you.  If you receive a survey in the mail, please send us back your thoughts. We really do value your feedback.    Based on your medical history, these are the current health maintenance/preventive care services that you are due for (some may have been done at this visit.)  Health Maintenance Due   Topic Date Due     URINE DRUG SCREEN Q1 YR  10/17/1991         Suggested websites for health information:  Www.[employer] : Up to date and easily searchable information on multiple topics.  Www.medlineplus.gov : medication info, interactive tutorials, watch real surgeries online  Www.familydoctor.org : good info from the Academy of Family Physicians  Www.cdc.gov : public health info, travel advisories, epidemics (H1N1)  Www.aap.org : children's health info, normal development, vaccinations  Www.health.state.[place].us : [place] dept of health, public health issues in [place], N1N1    Your care team:                            Family Medicine Internal Medicine   MD Antonio Mistry MD Shantel Branch-Fleming, MD Katya Georgiev PA-C Nam Ho, MD Pediatrics   DRAGAN Mancera, MD Haydee Shaw CNP, MD Deborah Mielke, MD Kim Thein, PIA CNP      Clinic hours: Monday - Thursday 7 am-7 pm; Fridays 7 am-5 pm.   Urgent care: Monday - Friday 11 am-9 pm; Saturday and Sunday 9  am-5 pm.  Pharmacy : Monday -Thursday 8 am-8 pm; Friday 8 am-6 pm; Saturday and Sunday 9 am-5 pm.     Clinic: [phone]   Pharmacy: [phone]    Nonalcoholic Fatty Liver Disease (NAFLD)  Nonalcoholic fatty liver disease (NAFLD) is a common disease of the liver. It occurs when you have too much fat in the liver. If NAFLD is severe, it can cause liver damage that seems like the damage caused by drinking too much alcohol. But NAFLD is not caused by drinking alcohol. This sheet tells you more about NAFLD and how it can be managed.    How the liver works   The liver is an organ in the upper right side of the belly (abdomen). It has many important jobs. These include:    Breaking down (metabolizing) proteins, carbohydrates, and fats    Making a substance called bile that helps break down fats    Storing and releasing sugar (glucose) into the blood to give the body energy    Removing toxins from the blood    Helping with blood clotting  Understanding NAFLD  A healthy liver may contain some fat. But if too much fat builds up in the liver, this causes NAFLD. NAFLD can be mild, causing fatty liver. Or it can be more severe and show inflammation, as well as the fat. This can cause non-alcoholic steatohepatitis (TALLEY).    Fatty liver. With fatty liver, the liver simply has more fat than normal. This extra fat usually does not harm the liver.    TALLEY. With TALLEY, the fatty liver becomes inflamed over time. TALLEY is serious because it can lead to scarring of the liver (fibrosis). Over time, the scarring may lead to cirrhosis of the liver. This can eventually cause liver failure or liver cancer.  Causes and risk factors of NAFLD  Doctors don't know what causes NAFLD. But certain things make the problem more likely to happen. These include:    Obesity    Prediabetes or diabetes    High levels of fat found in the blood (cholesterol and triglycerides)    Being exposed to certain medicines   Symptoms of NAFLD  Most people with  NAFLD have no symptoms. If symptoms do occur, they can include:    Tiredness    Weakness    Weight loss    Loss of appetite    Nausea and vomiting    Belly pain and cramping    Yellowing of the skin and eyes (jaundice), as well as dark urine, or light-colored stools    Swelling in the belly or legs  Diagnosing NAFLD  Your healthcare provider may think you have NAFLD if routine blood tests show high levels of liver enzymes. This may mean you have a liver problem. You may need one or more imaging tests, such as an ultrasound, CT, or MRI. You may need more blood tests to look for other causes of liver disease. You may also need a liver biopsy. During this test, a hollow needle is used to remove a tiny tissue sample from your liver. This tissue is then checked in a lab. This test can find signs of damage to liver tissue. It can also help figure out the cause of the damage and tell the difference between fatty liver and TALLEY.  Treating NAFLD  Treatment for NAFLD varies for each person. The best early treatment is to treat any underlying conditions causing metabolic syndrome. This is the name for a group of conditions that includes:    High blood pressure    High levels of cholesterol and triglycerides    Being overweight or obese    Diabetes  Your healthcare provider will monitor your health and treat any symptoms or underlying health problems you have. Your provider will also work with you to control your risk factors. This will make liver damage less likely. In fact, treating those underlying conditions can often improve liver disease. You may need to take certain medicines, but no medicine will cure NAFLD. This is why treating the underlying conditions is most important. Your plan may include:    Losing extra weight    Getting regular exercise    Controlling diabetes and high cholesterol or triglyceride levels    Taking medicines and vitamins as prescribed by your provider    Quitting smoking    Not drinking  alcohol    Eating a healthy and balanced diet  Living with NAFLD  If NAFLD is caught early, it can be managed with treatment. Your healthcare provider will discuss further treatment choices with you as needed.  Be sure to ask your provider about recommended vaccines. These include vaccines for viruses that can cause liver disease.  Date Last Reviewed: 12/1/2016 2000-2017 [email or web address]. [address] [place] [address], [place] [address]. All rights reserved. This information is not intended as a substitute for professional medical care. Always follow your healthcare professional's instructions.        Eating Heart-Healthy Foods  Eating has a big impact on your heart health. In fact, eating healthier can improve several of your heart risks at once. For instance, it helps you manage weight, cholesterol, and blood pressure. Here are ideas to help you make heart-healthy changes without giving up all the foods and flavors you love.  Getting started    Talk with your healthcare provider about eating plans, such as the DASH or Mediterranean diet. You may also be referred to a dietitian.    Change a few things at a time. Give yourself time to get used to a few eating changes before adding more.    Work to create a tasty, healthy eating plan that you can stick to for the rest of your life.    Goals for healthy eating  Below are some tips to improve your eating habits:    Limit saturated fats and trans fats. Saturated fats raise your levels of cholesterol, so keep these fats to a minimum. They are found in foods such as fatty meats, whole milk, cheese, and palm and coconut oils. Avoid trans fats because they lower good cholesterol as well as raise bad cholesterol. Trans fats are most often found in processed foods.    Reduce sodium (salt) intake. Eating too much salt may increase your blood pressure. Limit your sodium intake to 2,300 milligrams (mg) per day (the amount in 1 teaspoon of salt), or less if your healthcare  provider recommends it. Dining out less often and eating fewer processed foods are two great ways to decrease the amount of salt you consume.    Managing calories. A calorie is a unit of energy. Your body burns calories for fuel, but if you eat more calories than your body burns, the extras are stored as fat. Your healthcare provider can help you create a diet plan to manage your calories. This will likely include eating healthier foods as well as exercising regularly. To help you track your progress, keep a diary to record what you eat and how often you exercise.  Choose the right foods  Aim to make these foods staples of your diet. If you have diabetes, you may have different recommendations than what is listed here:    Fruits and vegetables provide plenty of nutrients without a lot of calories. At meals, fill half your plate with these foods. Split the other half of your plate between whole grains and lean protein.    Whole grains are high in fiber and rich in vitamins and nutrients. Good choices include whole-wheat bread, pasta, and brown rice.    Lean proteins give you nutrition with less fat. Good choices include fish, skinless chicken, and beans.    Low-fat or nonfat dairy provides nutrients without a lot of fat. Try low-fat or nonfat milk, cheese, or yogurt.    Healthy fats can be good for you in small amounts. These are unsaturated fats, such as olive oil, nuts, and fish. Try to have at least 2 servings per week of fatty fish, such as salmon, sardines, mackerel, rainbow trout, and albacore tuna. These contain omega-3 fatty acids, which are good for your heart. Flaxseed is another source of a heart-healthy fat.  More on heart-healthy eating  Read food labels  Healthy eating starts at the grocery store. Be sure to pay attention to food labels on packaged foods. Look for products that are high in fiber and protein, and low in saturated fat, cholesterol, and sodium. Avoid products that contain trans fat. And  pay close attention to serving size. For instance, if you plan to eat two servings, double all the numbers on the label.  Prepare food right  A key part of healthy cooking is cutting down on added fat and salt. Look on the internet for lower-fat, lower-sodium recipes. Also, try these tips:    Remove fat from meat and skin from poultry before cooking.    Skim fat from the surface of soups and sauces.    Broil, boil, bake, steam, grill, and microwave food without added fats.    Choose ingredients that spice up your food without adding calories, fat, or sodium. Try these items: horseradish, hot sauce, lemon, mustard, nonfat salad dressings, and vinegar. For salt-free herbs and spices, try basil, cilantro, cinnamon, pepper, and rosemary.  Date Last Reviewed: 10/1/2017    6012-0828 [employer]. [address] [place] [address]. All rights reserved. This information is not intended as a substitute for professional medical care. Always follow your healthcare professional's instructions.        Understanding Carbohydrates, Fats, and Protein  Food is a source of fuel and nourishment for your body. It s also a source of pleasure. Having diabetes doesn t mean you have to eat special foods or give up desserts. Instead, your dietitian can show you how to plan meals to suit your body. To start, learn how different foods affect blood sugar.  Carbohydrates  Carbohydrates are the main source of fuel for the body. Carbohydrates raise blood sugar. Many people think carbohydrates are only found in pasta or bread. But carbohydrates are actually in many kinds of foods:    Sugars occur naturally in foods such as fruit, milk, honey, and molasses. Sugars can also be added to many foods, from cereals and yogurt to candy and desserts. Sugars raise blood sugar.    Starches are found in bread, cereals, pasta, and dried beans. They re also found in corn, peas, potatoes, yam, acorn squash, and butternut squash. Starches  also raise blood sugar.     Fiber is found in foods such as vegetables, fruits, beans, and whole grains. Unlike other carbs, fiber isn t digested or absorbed. So it doesn t raise blood sugar. In fact, fiber can help keep blood sugar from rising too fast. It also helps keep blood cholesterol at a healthy level.  Did you know?  Even though carbohydrates raise blood sugar, it s best to have some in every meal. They are an important part of a healthy diet.   Fat  Fat is an energy source that can be stored until needed. Fat does not raise blood sugar. However, it can raise blood cholesterol, increasing the risk of heart disease. Fat is also high in calories, which can cause weight gain. Not all types of fat are the same.  More Healthy:    Monounsaturated fats are mostly found in vegetable oils, such as olive, canola, and peanut oils. They are also found in avocados and some nuts. Monounsaturated fats are healthy for your heart. That s because they lower LDL (unhealthy) cholesterol.    Polyunsaturated fats are mostly found in vegetable oils, such as corn, safflower, and soybean oils. They are also found in some seeds, nuts, and fish. Polyunsaturated fats lower LDL (unhealthy) cholesterol. So, choosing them instead of saturated fats is healthy for your heart. Certain unsaturated fats can help lower triglycerides.   Less Healthy:    Saturated fats are found in animal products, such as meat, poultry, whole milk, lard, and butter. Saturated fats raise LDL cholesterol and are not healthy for your heart.    Hydrogenated oils and trans fats are formed when vegetable oils are processed into solid fats. They are found in many processed foods. Hydrogenated oils and trans fats raise LDL cholesterol and lower HDL (healthy) cholesterol. They are not healthy for your heart.  Protein  Protein helps the body build and repair muscle and other tissue. Protein has little or no effect on blood sugar. However, many foods that contain protein  also contain saturated fat. By choosing low-fat protein sources, you can get the benefits of protein without the extra fat:    Plant protein is found in dry beans and peas, nuts, and soy products, such as tofu and soymilk. These sources tend to be cholesterol-free and low in saturated fat.    Animal protein is found in fish, poultry, meat, cheese, milk, and eggs. These contain cholesterol and can be high in saturated fat. Aim for lean, lower-fat choices.  Date Last Reviewed: 3/1/2016    7235-4691 [employer]. [address] [place] [address]. All rights reserved. This information is not intended as a substitute for professional medical care. Always follow your healthcare professional's instructions.        Understanding Carbohydrates    A car needs the right type of fuel to run. And you need the right kind of food to function. To keep your energy level up, your body needs food that has carbohydrates. But carbohydrates raise blood sugar levels higher and faster than other kinds of food. Your dietitian will work with you to figure out the amount of carbohydrates you need.  Starches  Starches are found in grains, some vegetables, and beans. Grain products include bread, pasta, cereal, and tortillas. Starchy vegetables include potatoes, peas, corn, lima beans, yams, and squash. Kidney beans, craig beans, black beans, garbanzo beans, and lentils also contain starches.  Sugars  Sugars are found naturally in many foods. Or sugar can be added. Foods that contain natural sugar include fruits and fruit juices, dairy products, honey, and molasses. Added sugars are found in most desserts, processed foods, candy, regular soda, and fruit drinks. These are very helpful for treating low blood sugar, or hypoglycemia. They provide sugar quickly. Try to keep at least 15 to 20 grams of these simple sugars with you at all times. Eat this if you begin to have low blood sugar symptoms.  Fiber  Fiber comes from plant  foods. Most fiber isn t digested by the body. Instead of raising blood sugar levels like other carbohydrates, it actually stops blood sugar from rising too fast. Fiber is found in fruits, vegetables, whole grains, beans, peas, and many nuts.  Carb counting  Keep track of the amount of carbohydrates you eat. This can help you keep the right balance of physical activity and medicine. The amount of carbohydrates needed will vary for each person. It depends on many things such as your health, the medicines you take, and how active you are. Your healthcare team will help you figure out the right amount of carbohydrates for you. You may start with around 45 to 60 grams of carbohydrate per meal, depending on your situation. Carb counting is a system that helps you keep track of the carbohydrates you eat at each meal.  Carbohydrates come from a variety of foods. These include grains, starchy vegetables, fruit, milk, beans, and snack foods. You can either count carbohydrate grams or carbohydrate servings. When you count carbohydrate servings, 1 carbohydrate serving = 15 grams of carbohydrates.  Here are some examples of foods containing about 15 grams of carbohydrates (1 serving of carbohydrates):    1/2 cup of canned or frozen fruit    A small piece of fresh fruit (4 ounces)    1 slice of bread    1/2 cup of oatmeal    1/3 cup of rice    4 to 6 crackers    1/2 English muffin    1/2 cup of black beans    1/4 of a large baked potato (3 ounces)    2/3 cup of plain fat-free yogurt    1 cup of soup    1/2 cup of casserole    6 chicken nuggets    2-inch-square brownie or cake without frosting    2 small cookies    1/2 cup of ice cream or sherbet  Carb counting is easier when food labels are available. Look at the label to see how many grams of total carbohydrates the food contains. Then you can figure out how much you should eat.  Two very important lines to look at on the label are the serving size and the total carbohydrate  amount. Here are some tips for using food labels to count your carbohydrate intake:    Check the serving size. The information on the label is based on that serving size. If you eat more than the listed serving size, you may have to double or triple the other information on the label.     Check the total grams of carbohydrates. Total carbohydrate from the label includes sugar, starch, and fiber. Be sure to use the total carbohydrate number and not sugar alone.    Know how many grams of carbohydrates you can have.  Be familiar with the matching portion sizes.    Compare labels. Compare the labels of different products, looking at serving sizes and total carbohydrates to find the products that work best for you.     Don't forget protein and fat. With all the focus on carb counting, it might be easy to forget protein and fat in your meals. Don't forget to include sources of protein and healthy fat to balance your meals.  It s also important to be consistent with the amount and time you eat when taking a fixed dose of diabetes medicine. Work with your healthcare provider or dietitian if you need additional help. He or she can help you keep track of your carbohydrate intake. He or she can also help you figure out how many grams of carbohydrates you should have.  Date Last Reviewed: 7/1/2016 2000-2017 [employer]. [address] [place] [address], [place] [address]. All rights reserved. This information is not intended as a substitute for professional medical care. Always follow your healthcare professional's instructions.                Follow-ups after your visit        Additional Services     GASTROENTEROLOGY ADULT REF CONSULT ONLY       Preferred Location: [facility], [place]: [phone]      Please be aware that coverage of these services is subject to the terms and limitations of your health insurance plan.  Call member services at your health plan with any benefit or coverage questions.  Any  procedures must be performed at a [facility] facility OR coordinated by your clinic's referral office.    Please bring the following with you to your appointment:    (1) Any X-Rays, CTs or MRIs which have been performed.  Contact the facility where they were done to arrange for  prior to your scheduled appointment.    (2) List of current medications   (3) This referral request   (4) Any documents/labs given to you for this referral                  Follow-up notes from your care team     Return in about 3 months (around 11/28/2018) for medication follow up, Mammogram.      Your next 10 appointments already scheduled     Jan 22, 2019 10:15 AM CST   LAB with [facility] ([facility])    [address] [place] [id number] [phone]           Please do not eat 10-12 hours before your appointment if you are coming in fasting for labs on lipids, cholesterol, or glucose (sugar). This does not apply to pregnant women. Water, hot tea and black coffee (with nothing added) are okay. Do not drink other fluids, diet soda or chew gum.            Jan 22, 2019 11:00 AM CST   Return Visit with Bessy Tate MD   [facility] ([facility])    [address] [place] [address] [id number] [phone]              Future tests that were ordered for you today     Open Future Orders        Priority Expected Expires Ordered    US Abdomen Limited Routine  8/28/2019 8/28/2018    MA Diagnostic Digital Bilateral Routine 12/26/2018 8/28/2019 8/28/2018            Who to contact     If you have questions or need follow up information about today's clinic visit or your schedule please contact [facility] directly at [phone].  Normal or non-critical lab and imaging results will be communicated to you by MyChart, letter or phone within 4 business days after the clinic has received the results. If you do not hear  from us within 7 days, please contact the clinic through [employer] or phone. If you have a critical or abnormal lab result, we will notify you by phone as soon as possible.  Submit refill requests through [employer] or call your pharmacy and they will forward the refill request to us. Please allow 3 business days for your refill to be completed.          Additional Information About Your Visit        [employer] Information     [employer] gives you secure access to your electronic health record. If you see a primary care provider, you can also send messages to your care team and make appointments. If you have questions, please call your primary care clinic.  If you do not have a primary care provider, please call [phone] and they will assist you.        Care EveryWhere ID     This is your Care EveryWhere ID. This could be used by other organizations to access your [facility] medical records  [id number]        Your Vitals Were     Pulse Temperature Respirations Height Last Period Pulse Oximetry    79 98.8  F (37.1  C) (Oral) 20 5' (1.524 m) 03/01/2016 96%    Breastfeeding? BMI (Body Mass Index)                No 31.83 kg/m2           Blood Pressure from Last 3 Encounters:   08/28/18 108/72   07/23/18 100/64   07/19/18 108/75    Weight from Last 3 Encounters:   08/28/18 163 lb (73.9 kg)   07/23/18 162 lb (73.5 kg)   07/19/18 162 lb 9.6 oz (73.8 kg)              We Performed the Following     GASTROENTEROLOGY ADULT REF CONSULT ONLY        Primary Care Provider Office Phone # Fax #    Concepcion Tana Bocanegra [phone]       [address]  [facility] [address]        Equal Access to Services     [facility] Center[facility] : Hadii jame dorsey hadashgisel Somelvin, waaxda luqadaha, qaybta kaalmada azam bruce. So [facility] [phone].    ATENCIÓN: Si habla español, tiene a wiley disposición servicios gratuitos de asistencia lingüística. Llame al [phone].    We comply with applicable federal  civil rights laws and [place] laws. We do not discriminate on the basis of race, color, national origin, age, disability, sex, sexual orientation, or gender identity.            Thank you!     Thank you for choosing [facility]  for your care. Our goal is always to provide you with excellent care. Hearing back from our patients is one way we can continue to improve our services. Please take a few minutes to complete the written survey that you may receive in the mail after your visit with us. Thank you!             Your Updated Medication List - Protect others around you: Learn how to safely use, store and throw away your medicines at [email or web address].          This list is accurate as of 8/28/18  7:20 PM.  Always use your most recent med list.                   Brand Name Dispense Instructions for use Diagnosis    IBUPROFEN PO      Take 400 mg by mouth every 6 hours as needed for moderate pain        letrozole 2.5 MG tablet    FEMARA    90 tablet    Take 1 tablet (2.5 mg) by mouth daily    Malignant neoplasm of right breast in female, estrogen receptor positive, unspecified site of breast (H)       venlafaxine 75 MG 24 hr capsule    EFFEXOR-XR    90 capsule    Take 1 capsule (75 mg) by mouth daily    Hot flashes related to aromatase inhibitor therapy, Malignant neoplasm of overlapping sites of right breast in female, estrogen receptor positive (H)       vitamin D 2000 units tablet     100 tablet    Take 2,000 Units by mouth daily    Vitamin D deficiency

## 2018-08-28 NOTE — PROGRESS NOTES
SUBJECTIVE:   Aleena Escamilla is a 42 year old female who presents to clinic today for the following health issues:    Concern: Elevated LFTs Follow up  8/20/18 One episode of upper abdominal sharp pain x4 hours before bedtime. Took Ibuprofen and helped slightly reduce the pain. Pain subsided when woke up in the morning. She recalls eating a lot of food for dinner but doesn't recall what she ate. Had fried cheese curds and 2 cocktails with some other foods that she normally doesn't eat. History of elevated liver function tests but higher this week and has fatty liver by CT scan last fall.     Hyperlipidemia Follow-Up      Rate your low fat/cholesterol diet?: good    Taking statin?  No    Other lipid medications/supplements?:  none    Anxiety Follow-Up    Status since last visit: No change    Other associated symptoms:None    Complicating factors:   Significant life event: No   Current substance abuse: None  Depression symptoms: No  VENKAT-7 SCORE 2/6/2017 4/11/2017 4/10/2018   Total Score - - -   Total Score 5 0 2       VENKAT-7    Breast cancer with follow up by oncology. Needs follow up mammogram in December before her next visit.       Amount of exercise or physical activity: 2-3 days/week for an average of 30-45 minutes    Problems taking medications regularly: No    Medication side effects: none    Diet: low fat/cholesterol            Problem list and histories reviewed & adjusted, as indicated.  Additional history: as documented    Patient Active Problem List   Diagnosis     CARDIOVASCULAR SCREENING; LDL GOAL LESS THAN 160     Chronic pelvic pain in female     Chronic salpingitis and oophoritis     Major depressive disorder, recurrent episode, mild (H)     Generalized anxiety disorder     Arthritis, multiple joint involvement     Primary osteoarthritis of both hands     Fibromyalgia     Malignant neoplasm of overlapping sites of right female breast (H)     BRCA gene positive     Carpal tunnel syndrome     Non  morbid drug-induced obesity     Chronic right shoulder pain     Bunion, right     Onychomycosis     Elevated LFTs     Fatty liver     Hyperlipidemia LDL goal <130     Past Surgical History:   Procedure Laterality Date     BIOPSY BREAST NEEDLE LOCALIZATION Right 1/19/2016    Procedure: BIOPSY BREAST NEEDLE LOCALIZATION;  Surgeon: Adelina Demarco MD;  Location: [facility] OR     GYN SURGERY  2-    bilateral salpingectomy, left oophrectomy     LAPAROSCOPIC SALPINGO-OOPHORECTOMY Right 10/26/2016    Procedure: LAPAROSCOPIC SALPINGO-OOPHORECTOMY;  Surgeon: Bouchra Mayo MD;  Location: [facility] OR     LUMPECTOMY BREAST WITH SENTINEL NODE, COMBINED Right 1/19/2016    Procedure: COMBINED LUMPECTOMY BREAST WITH SENTINEL NODE;  Surgeon: Adelina Demarco MD;  Location: [facility] OR     PELVIS LAPAROSCOPY,DX  12/28/1998     RELEASE CARPAL TUNNEL Left 12/23/2016    Procedure: RELEASE CARPAL TUNNEL;  Surgeon: Sam Florence MD;  Location: [facility] OR       Social History   Substance Use Topics     Smoking status: Never Smoker     Smokeless tobacco: Never Used     Alcohol use Yes      Comment: 1 drink per week.     Family History   Problem Relation Age of Onset     Diabetes Mother      Hypertension Mother      Cancer Maternal Grandmother      cervical CA     Cardiovascular Father      MI     Cancer Maternal Aunt      cervical cancer     Pancreatic Cancer Maternal Aunt      COD         Current Outpatient Prescriptions   Medication Sig Dispense Refill     Cholecalciferol (VITAMIN D) 2000 UNITS tablet Take 2,000 Units by mouth daily 100 tablet 3     IBUPROFEN PO Take 400 mg by mouth every 6 hours as needed for moderate pain       letrozole (FEMARA) 2.5 MG tablet Take 1 tablet (2.5 mg) by mouth daily 90 tablet 3     venlafaxine (EFFEXOR-XR) 75 MG 24 hr capsule Take 1 capsule (75 mg) by mouth daily 90 capsule 1     Allergies   Allergen Reactions     Latex Rash     Recent Labs   Lab Test  08/28/18   1127  07/19/18   1024  03/15/18   1101   02/05/18   1658   12/19/16   1021   10/26/16   0753  09/27/16   1212   11/07/11   1226  10/17/11   0809  01/07/11   0834   A1C   --    --    --    --    --   5.7   --    --    --    --    --    --    --    LDL   --    --    --    --    --   82   --    --    --    --    --   86  116   HDL   --    --    --    --    --   30*   --    --    --    --    --   33*  36*   TRIG   --    --    --    --    --   301*   --    --    --    --    --   212*  127   ALT  182*  200*  102*  112*   < >   --    < >   --   70*   < >   --    --    --    CR   --    --    --   0.60   --    --    --    --   0.55   < >   --    --    --    GFRESTIMATED   --    --    --   >90   --    --    --    --   >90  Non  GFR Calc     < >   --    --    --    GFRESTBLACK   --    --    --   >90   --    --    --    --   >90   GFR Calc     < >   --    --    --    POTASSIUM   --    --    --   3.5   --    --    --   3.4  3.7   < >   --    --    --    TSH   --    --    --    --    --    --    --    --    --    --   0.83   --    --     < > = values in this interval not displayed.      BP Readings from Last 3 Encounters:   08/28/18 108/72   07/23/18 100/64   07/19/18 108/75    Wt Readings from Last 3 Encounters:   08/28/18 163 lb (73.9 kg)   07/23/18 162 lb (73.5 kg)   07/19/18 162 lb 9.6 oz (73.8 kg)                  Labs reviewed in EPIC    Reviewed and updated as needed this visit by clinical staff  Tobacco  Allergies  Meds  Med Hx  Surg Hx  Fam Hx  Soc Hx      Reviewed and updated as needed this visit by Provider         ROS:  Constitutional, HEENT, cardiovascular, pulmonary, gi and gu systems are negative, except as otherwise noted.    OBJECTIVE:     /72 (BP Location: Left arm, Patient Position: Chair, Cuff Size: Adult Regular)  Pulse 79  Temp 98.8  F (37.1  C) (Oral)  Resp 20  Ht 5' (1.524 m)  Wt 163 lb (73.9 kg)  LMP 03/01/2016  SpO2 96%  Breastfeeding? No  BMI 31.83 kg/m2  Body mass index is 31.83  kg/(m^2).  GENERAL: healthy, alert, well nourished, well hydrated, no distress, obese  HENT: ear canals- normal; TMs- normal; Nose- normal; Mouth- no ulcers, no lesions, throat is clear with no erythema or exudate.   NECK: no tenderness, no adenopathy, no asymmetry, no masses, no stiffness; thyroid- normal to palpation  RESP: lungs clear to auscultation - no rales, no rhonchi, no wheezes  CV: regular rates and rhythm, normal S1 S2, no S3 or S4 and no murmur, no click or rub -  ABDOMEN: soft, no tenderness, no  hepatosplenomegaly, no masses, normal bowel sounds  MS: extremities- no gross deformities noted, no edema  SKIN: no suspicious lesions, no rashes  NEURO: strength and tone- normal, sensory exam- grossly normal, mentation- intact, speech- normal, reflexes- symmetric  BACK: no CVA tenderness, no paralumbar tenderness  PSYCH: Alert and oriented times 3; speech- coherent , normal rate and volume; able to articulate logical thoughts, able to abstract reason, no tangential thoughts, no hallucinations or delusions, affect- normal     Diagnostic Test Results:  Results for orders placed or performed in visit on 08/28/18   Hepatic panel (Albumin, ALT, AST, Bili, Alk Phos, TP)   Result Value Ref Range    Bilirubin Direct 0.1 0.0 - 0.2 mg/dL    Bilirubin Total 0.5 0.2 - 1.3 mg/dL    Albumin 3.8 3.4 - 5.0 g/dL    Protein Total 7.6 6.8 - 8.8 g/dL    Alkaline Phosphatase 127 40 - 150 U/L     (H) 0 - 50 U/L     (H) 0 - 45 U/L       ASSESSMENT/PLAN:         Tobacco Cessation:   reports that she has never smoked. She has never used smokeless tobacco.      BMI:   Estimated body mass index is 31.83 kg/(m^2) as calculated from the following:    Height as of this encounter: 5' (1.524 m).    Weight as of this encounter: 163 lb (73.9 kg).   Weight management plan: Discussed healthy diet and exercise guidelines and patient will follow up in 3 months in clinic to re-evaluate.        ICD-10-CM    1. Fatty liver- discussed  heart healthy eating with decreased carbohydrates, saturated fats and smaller portions. Higher proportion of vegetables and fruit. Lean meat and dairy. Whole grain, beans and nuts. 15/25 minutes spent on counseling and diet K76.0 US Abdomen Limited     GASTROENTEROLOGY ADULT REF CONSULT ONLY   2. Elevated LFTs R79.89 GASTROENTEROLOGY ADULT REF CONSULT ONLY   3. Malignant neoplasm of overlapping sites of right breast in female, estrogen receptor positive (H) C50.811 [facility] Bilateral schedule for December prior to oncology appointment     Z17.0    4. Hyperlipidemia LDL goal <130 E78.5 Elevated triglycerides and low HDL. Needs follow up lipid profile- add on   5. Non morbid drug-induced obesity E66.1 Weight loss counseling done        FURTHER TESTING:       - abdominal ultrasound follow up   CONSULTATION/REFERRAL to gastroenterology for persistent elevation of liver function tests and fatty liver  FUTURE LABS:       - Schedule fasting labs in 3 months  FUTURE APPOINTMENTS:       - Follow-up visit in 3 months or sooner if any questions or concerns.   Work on weight loss  Regular exercise  See Patient Instructions    Concepcion Bocanegra MD  [facility]

## 2018-08-28 NOTE — PATIENT INSTRUCTIONS
At [facility], we strive to deliver an exceptional experience to you, every time we see you.  If you receive a survey in the mail, please send us back your thoughts. We really do value your feedback.    Based on your medical history, these are the current health maintenance/preventive care services that you are due for (some may have been done at this visit.)  Health Maintenance Due   Topic Date Due     URINE DRUG SCREEN Q1 YR  [birth year]1991         Suggested websites for health information:  Www.NurseGrid.org : Up to date and easily searchable information on multiple topics.  Www.RUNform.gov : medication info, interactive tutorials, watch real surgeries online  Www.familydoctor.org : good info from the Academy of Family Physicians  Www.cdc.gov : public health info, travel advisories, epidemics (H1N1)  Www.aap.org : children's health info, normal development, vaccinations  Www.health.[facility].[place].us : [place] dept of health, public health issues in [place], N1N1    Your care team:                            Family Medicine Internal Medicine   MD Antonio Mistry MD Shantel Branch-Fleming, MD Katya Georgiev PA-C Nam Ho, MD Pediatrics   DRAGAN Mancera, CNP Cira PALACIO CNP   MD Haydee Sherman MD Deborah Mielke, MD Kim Thein, APRN CNP      Clinic hours: Monday - Thursday 7 am-7 pm; Fridays 7 am-5 pm.   Urgent care: Monday - Friday 11 am-9 pm; Saturday and Sunday 9 am-5 pm.  Pharmacy : Monday -Thursday 8 am-8 pm; Friday 8 am-6 pm; Saturday and Sunday 9 am-5 pm.     Clinic: [phone]   Pharmacy: [phone]    Nonalcoholic Fatty Liver Disease (NAFLD)  Nonalcoholic fatty liver disease (NAFLD) is a common disease of the liver. It occurs when you have too much fat in the liver. If NAFLD is severe, it can cause liver damage that seems like the damage caused by drinking too much alcohol. But NAFLD is not caused by drinking alcohol. This  sheet tells you more about NAFLD and how it can be managed.    How the liver works   The liver is an organ in the upper right side of the belly (abdomen). It has many important jobs. These include:    Breaking down (metabolizing) proteins, carbohydrates, and fats    Making a substance called bile that helps break down fats    Storing and releasing sugar (glucose) into the blood to give the body energy    Removing toxins from the blood    Helping with blood clotting  Understanding NAFLD  A healthy liver may contain some fat. But if too much fat builds up in the liver, this causes NAFLD. NAFLD can be mild, causing fatty liver. Or it can be more severe and show inflammation, as well as the fat. This can cause non-alcoholic steatohepatitis (TALLEY).    Fatty liver. With fatty liver, the liver simply has more fat than normal. This extra fat usually does not harm the liver.    TALLEY. With TALLEY, the fatty liver becomes inflamed over time. TALLEY is serious because it can lead to scarring of the liver (fibrosis). Over time, the scarring may lead to cirrhosis of the liver. This can eventually cause liver failure or liver cancer.  Causes and risk factors of NAFLD  Doctors don't know what causes NAFLD. But certain things make the problem more likely to happen. These include:    Obesity    Prediabetes or diabetes    High levels of fat found in the blood (cholesterol and triglycerides)    Being exposed to certain medicines   Symptoms of NAFLD  Most people with NAFLD have no symptoms. If symptoms do occur, they can include:    Tiredness    Weakness    Weight loss    Loss of appetite    Nausea and vomiting    Belly pain and cramping    Yellowing of the skin and eyes (jaundice), as well as dark urine, or light-colored stools    Swelling in the belly or legs  Diagnosing NAFLD  Your healthcare provider may think you have NAFLD if routine blood tests show high levels of liver enzymes. This may mean you have a liver problem. You may need  one or more imaging tests, such as an ultrasound, CT, or MRI. You may need more blood tests to look for other causes of liver disease. You may also need a liver biopsy. During this test, a hollow needle is used to remove a tiny tissue sample from your liver. This tissue is then checked in a lab. This test can find signs of damage to liver tissue. It can also help figure out the cause of the damage and tell the difference between fatty liver and TALLEY.  Treating NAFLD  Treatment for NAFLD varies for each person. The best early treatment is to treat any underlying conditions causing metabolic syndrome. This is the name for a group of conditions that includes:    High blood pressure    High levels of cholesterol and triglycerides    Being overweight or obese    Diabetes  Your healthcare provider will monitor your health and treat any symptoms or underlying health problems you have. Your provider will also work with you to control your risk factors. This will make liver damage less likely. In fact, treating those underlying conditions can often improve liver disease. You may need to take certain medicines, but no medicine will cure NAFLD. This is why treating the underlying conditions is most important. Your plan may include:    Losing extra weight    Getting regular exercise    Controlling diabetes and high cholesterol or triglyceride levels    Taking medicines and vitamins as prescribed by your provider    Quitting smoking    Not drinking alcohol    Eating a healthy and balanced diet  Living with NAFLD  If NAFLD is caught early, it can be managed with treatment. Your healthcare provider will discuss further treatment choices with you as needed.  Be sure to ask your provider about recommended vaccines. These include vaccines for viruses that can cause liver disease.  Date Last Reviewed: 12/1/2016 2000-2017 [employer]. [address] [place] [address], [place] [address]. All rights reserved. This information is  not intended as a substitute for professional medical care. Always follow your healthcare professional's instructions.        Eating Heart-Healthy Foods  Eating has a big impact on your heart health. In fact, eating healthier can improve several of your heart risks at once. For instance, it helps you manage weight, cholesterol, and blood pressure. Here are ideas to help you make heart-healthy changes without giving up all the foods and flavors you love.  Getting started    Talk with your healthcare provider about eating plans, such as the DASH or Mediterranean diet. You may also be referred to a dietitian.    Change a few things at a time. Give yourself time to get used to a few eating changes before adding more.    Work to create a tasty, healthy eating plan that you can stick to for the rest of your life.    Goals for healthy eating  Below are some tips to improve your eating habits:    Limit saturated fats and trans fats. Saturated fats raise your levels of cholesterol, so keep these fats to a minimum. They are found in foods such as fatty meats, whole milk, cheese, and palm and coconut oils. Avoid trans fats because they lower good cholesterol as well as raise bad cholesterol. Trans fats are most often found in processed foods.    Reduce sodium (salt) intake. Eating too much salt may increase your blood pressure. Limit your sodium intake to 2,300 milligrams (mg) per day (the amount in 1 teaspoon of salt), or less if your healthcare provider recommends it. Dining out less often and eating fewer processed foods are two great ways to decrease the amount of salt you consume.    Managing calories. A calorie is a unit of energy. Your body burns calories for fuel, but if you eat more calories than your body burns, the extras are stored as fat. Your healthcare provider can help you create a diet plan to manage your calories. This will likely include eating healthier foods as well as exercising regularly. To help you  track your progress, keep a diary to record what you eat and how often you exercise.  Choose the right foods  Aim to make these foods staples of your diet. If you have diabetes, you may have different recommendations than what is listed here:    Fruits and vegetables provide plenty of nutrients without a lot of calories. At meals, fill half your plate with these foods. Split the other half of your plate between whole grains and lean protein.    Whole grains are high in fiber and rich in vitamins and nutrients. Good choices include whole-wheat bread, pasta, and brown rice.    Lean proteins give you nutrition with less fat. Good choices include fish, skinless chicken, and beans.    Low-fat or nonfat dairy provides nutrients without a lot of fat. Try low-fat or nonfat milk, cheese, or yogurt.    Healthy fats can be good for you in small amounts. These are unsaturated fats, such as olive oil, nuts, and fish. Try to have at least 2 servings per week of fatty fish, such as salmon, sardines, mackerel, rainbow trout, and albacore tuna. These contain omega-3 fatty acids, which are good for your heart. Flaxseed is another source of a heart-healthy fat.  More on heart-healthy eating  Read food labels  Healthy eating starts at the grocery store. Be sure to pay attention to food labels on packaged foods. Look for products that are high in fiber and protein, and low in saturated fat, cholesterol, and sodium. Avoid products that contain trans fat. And pay close attention to serving size. For instance, if you plan to eat two servings, double all the numbers on the label.  Prepare food right  A key part of healthy cooking is cutting down on added fat and salt. Look on the internet for lower-fat, lower-sodium recipes. Also, try these tips:    Remove fat from meat and skin from poultry before cooking.    Skim fat from the surface of soups and sauces.    Broil, boil, bake, steam, grill, and microwave food without added fats.    Choose  ingredients that spice up your food without adding calories, fat, or sodium. Try these items: horseradish, hot sauce, lemon, mustard, nonfat salad dressings, and vinegar. For salt-free herbs and spices, try basil, cilantro, cinnamon, pepper, and rosemary.  Date Last Reviewed: 10/1/2017    4194-3103 [employer]. [address] [place] [address], [place] [address]. All rights reserved. This information is not intended as a substitute for professional medical care. Always follow your healthcare professional's instructions.        Understanding Carbohydrates, Fats, and Protein  Food is a source of fuel and nourishment for your body. It s also a source of pleasure. Having diabetes doesn t mean you have to eat special foods or give up desserts. Instead, your dietitian can show you how to plan meals to suit your body. To start, learn how different foods affect blood sugar.  Carbohydrates  Carbohydrates are the main source of fuel for the body. Carbohydrates raise blood sugar. Many people think carbohydrates are only found in pasta or bread. But carbohydrates are actually in many kinds of foods:    Sugars occur naturally in foods such as fruit, milk, honey, and molasses. Sugars can also be added to many foods, from cereals and yogurt to candy and desserts. Sugars raise blood sugar.    Starches are found in bread, cereals, pasta, and dried beans. They re also found in corn, peas, potatoes, yam, acorn squash, and butternut squash. Starches also raise blood sugar.     Fiber is found in foods such as vegetables, fruits, beans, and whole grains. Unlike other carbs, fiber isn t digested or absorbed. So it doesn t raise blood sugar. In fact, fiber can help keep blood sugar from rising too fast. It also helps keep blood cholesterol at a healthy level.  Did you know?  Even though carbohydrates raise blood sugar, it s best to have some in every meal. They are an important part of a healthy diet.   Fat  Fat is an energy source  that can be stored until needed. Fat does not raise blood sugar. However, it can raise blood cholesterol, increasing the risk of heart disease. Fat is also high in calories, which can cause weight gain. Not all types of fat are the same.  More Healthy:    Monounsaturated fats are mostly found in vegetable oils, such as olive, canola, and peanut oils. They are also found in avocados and some nuts. Monounsaturated fats are healthy for your heart. That s because they lower LDL (unhealthy) cholesterol.    Polyunsaturated fats are mostly found in vegetable oils, such as corn, safflower, and soybean oils. They are also found in some seeds, nuts, and fish. Polyunsaturated fats lower LDL (unhealthy) cholesterol. So, choosing them instead of saturated fats is healthy for your heart. Certain unsaturated fats can help lower triglycerides.   Less Healthy:    Saturated fats are found in animal products, such as meat, poultry, whole milk, lard, and butter. Saturated fats raise LDL cholesterol and are not healthy for your heart.    Hydrogenated oils and trans fats are formed when vegetable oils are processed into solid fats. They are found in many processed foods. Hydrogenated oils and trans fats raise LDL cholesterol and lower HDL (healthy) cholesterol. They are not healthy for your heart.  Protein  Protein helps the body build and repair muscle and other tissue. Protein has little or no effect on blood sugar. However, many foods that contain protein also contain saturated fat. By choosing low-fat protein sources, you can get the benefits of protein without the extra fat:    Plant protein is found in dry beans and peas, nuts, and soy products, such as tofu and soymilk. These sources tend to be cholesterol-free and low in saturated fat.    Animal protein is found in fish, poultry, meat, cheese, milk, and eggs. These contain cholesterol and can be high in saturated fat. Aim for lean, lower-fat choices.  Date Last Reviewed:  3/1/2016    5970-9761 [employer]. [address] [place] [address], [place] [address]. All rights reserved. This information is not intended as a substitute for professional medical care. Always follow your healthcare professional's instructions.        Understanding Carbohydrates    A car needs the right type of fuel to run. And you need the right kind of food to function. To keep your energy level up, your body needs food that has carbohydrates. But carbohydrates raise blood sugar levels higher and faster than other kinds of food. Your dietitian will work with you to figure out the amount of carbohydrates you need.  Starches  Starches are found in grains, some vegetables, and beans. Grain products include bread, pasta, cereal, and tortillas. Starchy vegetables include potatoes, peas, corn, lima beans, yams, and squash. Kidney beans, craig beans, black beans, garbanzo beans, and lentils also contain starches.  Sugars  Sugars are found naturally in many foods. Or sugar can be added. Foods that contain natural sugar include fruits and fruit juices, dairy products, honey, and molasses. Added sugars are found in most desserts, processed foods, candy, regular soda, and fruit drinks. These are very helpful for treating low blood sugar, or hypoglycemia. They provide sugar quickly. Try to keep at least 15 to 20 grams of these simple sugars with you at all times. Eat this if you begin to have low blood sugar symptoms.  Fiber  Fiber comes from plant foods. Most fiber isn t digested by the body. Instead of raising blood sugar levels like other carbohydrates, it actually stops blood sugar from rising too fast. Fiber is found in fruits, vegetables, whole grains, beans, peas, and many nuts.  Carb counting  Keep track of the amount of carbohydrates you eat. This can help you keep the right balance of physical activity and medicine. The amount of carbohydrates needed will vary for each person. It depends on many things such  as your health, the medicines you take, and how active you are. Your healthcare team will help you figure out the right amount of carbohydrates for you. You may start with around 45 to 60 grams of carbohydrate per meal, depending on your situation. Carb counting is a system that helps you keep track of the carbohydrates you eat at each meal.  Carbohydrates come from a variety of foods. These include grains, starchy vegetables, fruit, milk, beans, and snack foods. You can either count carbohydrate grams or carbohydrate servings. When you count carbohydrate servings, 1 carbohydrate serving = 15 grams of carbohydrates.  Here are some examples of foods containing about 15 grams of carbohydrates (1 serving of carbohydrates):    1/2 cup of canned or frozen fruit    A small piece of fresh fruit (4 ounces)    1 slice of bread    1/2 cup of oatmeal    1/3 cup of rice    4 to 6 crackers    1/2 English muffin    1/2 cup of black beans    1/4 of a large baked potato (3 ounces)    2/3 cup of plain fat-free yogurt    1 cup of soup    1/2 cup of casserole    6 chicken nuggets    2-inch-square brownie or cake without frosting    2 small cookies    1/2 cup of ice cream or sherbet  Carb counting is easier when food labels are available. Look at the label to see how many grams of total carbohydrates the food contains. Then you can figure out how much you should eat.  Two very important lines to look at on the label are the serving size and the total carbohydrate amount. Here are some tips for using food labels to count your carbohydrate intake:    Check the serving size. The information on the label is based on that serving size. If you eat more than the listed serving size, you may have to double or triple the other information on the label.     Check the total grams of carbohydrates. Total carbohydrate from the label includes sugar, starch, and fiber. Be sure to use the total carbohydrate number and not sugar alone.    Know how many  grams of carbohydrates you can have.  Be familiar with the matching portion sizes.    Compare labels. Compare the labels of different products, looking at serving sizes and total carbohydrates to find the products that work best for you.     Don't forget protein and fat. With all the focus on carb counting, it might be easy to forget protein and fat in your meals. Don't forget to include sources of protein and healthy fat to balance your meals.  It s also important to be consistent with the amount and time you eat when taking a fixed dose of diabetes medicine. Work with your healthcare provider or dietitian if you need additional help. He or she can help you keep track of your carbohydrate intake. He or she can also help you figure out how many grams of carbohydrates you should have.  Date Last Reviewed: 7/1/2016 2000-2017 [employer]. [address] [place] [address], [place] [address]. All rights reserved. This information is not intended as a substitute for professional medical care. Always follow your healthcare professional's instructions.

## 2018-08-29 PROBLEM — E78.5 HYPERLIPIDEMIA LDL GOAL <130: Status: ACTIVE | Noted: 2018-08-29

## 2018-08-29 LAB
CHOLEST SERPL-MCNC: 210 MG/DL
HDLC SERPL-MCNC: 32 MG/DL
LDLC SERPL CALC-MCNC: 138 MG/DL
NONHDLC SERPL-MCNC: 178 MG/DL
TRIGL SERPL-MCNC: 201 MG/DL

## 2018-08-29 NOTE — PROGRESS NOTES
Dear Aleena    Your test results are attached.     Follow up with gastroenterology as recommended to have them check your liver after you have the ultrasound completed. I hope that the change in diet helps with this.    Please contact me by [employer] if you have any questions about your labs or management.    Concepcion Bocanegra MD

## 2018-08-31 NOTE — PROGRESS NOTES
Dear Aleena    Your test results are attached. I am happy to let you know that they are stable.    I had the lab run your lipid panel. The cholesterol is higher and eating a healthier diet will help with this.    Please contact me by [employer] if you have any questions about your labs or management.    Concepcion Bocanegra MD

## 2018-09-17 ENCOUNTER — TELEPHONE (OUTPATIENT)
Dept: FAMILY MEDICINE | Facility: CLINIC | Age: 43
End: 2018-09-17

## 2018-09-17 NOTE — TELEPHONE ENCOUNTER
Reason for Call:  Other Letter    Detailed comments: Pt's Employer wants Dr. Bocanegra to send a letter informing Pt that Pt does not have any work restrictions and  would like that letter faxed to fax #  [phone]  attn: Johnny . She would like a call back to confirm letter has been faxed.    Phone Number Patient can be reached at: Home number on file [phone] (home)    Best Time: anytime    Can we leave a detailed message on this number? YES    Call taken on 9/17/2018 at 1:39 PM by Dirk GARVIN

## 2018-09-17 NOTE — LETTER
[address] [address]  Phone: [phone]    September 17, 2018        Aleena Escamilla  [address] [facility] [address]          To whom it may concern:    RE: Aleena Escamilla    Patient may return to work with the following:  No working or lifting restrictions    Please contact me for questions or concerns.      Sincerely,        Concepcion Bocanegra MD

## 2018-09-17 NOTE — TELEPHONE ENCOUNTER
This writer attempted to contact Aleena on 09/17/18    Reason for call work letter request and left detailed message letter approved but will sign and fax tomorrow with Dr Bocanegra back in office.    If patient calls back:   Patient contacted by 1st floor [facility] Care Team (MA/TC). Inform patient that someone from the team will contact them, document that pt called and route to care team.       Letter printed and on Dr Bocanegra desk to sign.  Fernando Almaraz

## 2018-10-08 ENCOUNTER — OFFICE VISIT (OUTPATIENT)
Dept: FAMILY MEDICINE | Facility: CLINIC | Age: 43
End: 2018-10-08
Payer: COMMERCIAL

## 2018-10-08 VITALS
DIASTOLIC BLOOD PRESSURE: 80 MMHG | WEIGHT: 159.6 LBS | RESPIRATION RATE: 20 BRPM | OXYGEN SATURATION: 98 % | TEMPERATURE: 100 F | SYSTOLIC BLOOD PRESSURE: 108 MMHG | HEART RATE: 74 BPM | HEIGHT: 60 IN | BODY MASS INDEX: 31.33 KG/M2

## 2018-10-08 DIAGNOSIS — R68.89 FLU-LIKE SYMPTOMS: Primary | ICD-10-CM

## 2018-10-08 DIAGNOSIS — Z20.828 EXPOSURE TO INFLUENZA: ICD-10-CM

## 2018-10-08 DIAGNOSIS — R07.0 THROAT PAIN: ICD-10-CM

## 2018-10-08 LAB
DEPRECATED S PYO AG THROAT QL EIA: NORMAL
FLUAV+FLUBV AG SPEC QL: NEGATIVE
FLUAV+FLUBV AG SPEC QL: NEGATIVE
SPECIMEN SOURCE: NORMAL
SPECIMEN SOURCE: NORMAL

## 2018-10-08 PROCEDURE — 87081 CULTURE SCREEN ONLY: CPT | Performed by: PHYSICIAN ASSISTANT

## 2018-10-08 PROCEDURE — 87804 INFLUENZA ASSAY W/OPTIC: CPT | Performed by: PHYSICIAN ASSISTANT

## 2018-10-08 PROCEDURE — 99213 OFFICE O/P EST LOW 20 MIN: CPT | Performed by: PHYSICIAN ASSISTANT

## 2018-10-08 PROCEDURE — 87880 STREP A ASSAY W/OPTIC: CPT | Performed by: PHYSICIAN ASSISTANT

## 2018-10-08 RX ORDER — OSELTAMIVIR PHOSPHATE 75 MG/1
75 CAPSULE ORAL 2 TIMES DAILY
Qty: 10 CAPSULE | Refills: 0 | Status: SHIPPED | OUTPATIENT
Start: 2018-10-08 | End: 2018-11-05

## 2018-10-08 RX ORDER — GUAIFENESIN AND DEXTROMETHORPHAN HYDROBROMIDE 1200; 60 MG/1; MG/1
1 TABLET, EXTENDED RELEASE ORAL 2 TIMES DAILY
Qty: 28 TABLET | Refills: 0 | Status: SHIPPED | OUTPATIENT
Start: 2018-10-08 | End: 2018-10-15

## 2018-10-08 RX ORDER — IBUPROFEN 600 MG/1
600 TABLET, FILM COATED ORAL EVERY 6 HOURS PRN
Qty: 30 TABLET | Refills: 1 | Status: SHIPPED | OUTPATIENT
Start: 2018-10-08 | End: 2019-02-22

## 2018-10-08 ASSESSMENT — PAIN SCALES - GENERAL: PAINLEVEL: EXTREME PAIN (8)

## 2018-10-08 NOTE — PATIENT INSTRUCTIONS
Tamiflu 1 capsule twice a day for 5 days with food  Ibuprofen 600 mg every 6 hours as needed fever   Mucinex DM 1 tablet twice a day for cough and mucous  Rest  Increase fluids    The Flu (Influenza)     The virus that causes the flu spreads through the air in droplets when someone who has the flu coughs, sneezes, laughs, or talks.   The flu (influenza) is an infection that affects your respiratory tract. This tract is made up of your mouth, nose, and lungs, and the passages between them. Unlike a cold, the flu can make you very ill. And it can lead to pneumonia, a serious lung infection. The flu can have serious complications and even cause death.  Who is at risk for the flu?  Anyone can get the flu. But you are more likely to become infected if you:    Have a weakened immune system    Work in a healthcare setting where you may be exposed to flu germs    Live or work with someone who has the flu    Haven t had an annual flu shot  How does the flu spread?  The flu is caused by a virus. The virus spreads through the air in droplets when someone who has the flu coughs, sneezes, laughs, or talks. You can become infected when you inhale these viruses directly. You can also become infected when you touch a surface on which the droplets have landed and then transfer the germs to your eyes, nose, or mouth. Touching used tissues, or sharing utensils, drinking glasses, or a toothbrush from an infected person can expose you to flu viruses, too.  What are the symptoms of the flu?  Flu symptoms tend to come on quickly and may last a few days to a few weeks. They include:    Fever usually higher than 100.4 F  (38 C) and chills    Sore throat and headache    Dry cough    Runny nose    Tiredness and weakness    Muscle aches  Who is at risk for flu complications?  For some people, the flu can be very serious. The risk for complications is greater for:    Children younger than age 5    Adults ages 65 and older    People with a  chronic illness such as diabetes or heart, kidney, or lung disease    People who live in a nursing home or long-term care facility   How is the flu treated?  The flu usually gets better after 7 days or so. In some cases, your healthcare provider may prescribe an antiviral medicine. This may help you get well a little sooner. For the medicine to help, you need to take it as soon as possible (ideally within 48 hours) after your symptoms start. If you develop pneumonia or other serious illness, you may need to stay in the hospital.  Easing flu symptoms    Drink lots of fluids such as water, juice, and warm soup. A good rule is to drink enough so that you urinate your normal amount.    Get plenty of rest.    Ask your healthcare provider what to take for fever and pain.    Call your provider if your fever is 100.4 F (38 C) or higher, or you become dizzy, lightheaded, or short of breath.  Taking steps to protect others    Wash your hands often, especially after coughing or sneezing. Or clean your hands with an alcohol-based hand  containing at least 60% alcohol.    Cough or sneeze into a tissue. Then throw the tissue away and wash your hands. If you don t have a tissue, cough and sneeze into your elbow.    Stay home until at least 24 hours after you no longer have a fever or chills. Be sure the fever isn t being hidden by fever-reducing medicine.    Don t share food, utensils, drinking glasses, or a toothbrush with others.    Ask your healthcare provider if others in your household should get antiviral medicine to help them avoid infection.  How can the flu be prevented?    One of the best ways to avoid the flu is to get a flu vaccine each year. The virus that causes the flu changes from year to year. For that reason, healthcare providers recommend getting the flu vaccine each year, as soon as it's available in your area. The vaccine is given as a shot. Your healthcare provider can tell you which vaccine is right  for you. The nasal spray is not recommended for the 4289-6854 flu season. The CDC says the nasal spray did not seem to protect against the flu over the last several flu seasons.    Wash your hands often. Frequent handwashing is a proven way to help prevent infection.    Carry an alcohol-based hand gel containing at least 60% alcohol. Use it when you can't use soap and water. Then wash your hands as soon as you can.    Avoid touching your eyes, nose, and mouth.    At home and work, clean phones, computer keyboards, and toys often with disinfectant wipes.    If possible, avoid close contact with others who have the flu or symptoms of the flu.  Handwashing tips  Handwashing is one of the best ways to prevent many common infections. If you are caring for or visiting someone with the flu, wash your hands each time you enter and leave the room. Follow these steps:    Use warm water and plenty of soap. Rub your hands together well.    Clean the whole hand, including under your nails, between your fingers, and up the wrists.    Wash for at least 15 seconds.    Rinse, letting the water run down your fingers, not up your wrists.    Dry your hands well. Use a paper towel to turn off the faucet and open the door.  Using alcohol-based hand   Alcohol-based hand  are also a good choice. Use them when you can't use soap and water. Follow these steps:    Squeeze about a tablespoon of gel into the palm of one hand.    Rub your hands together briskly, cleaning the backs of your hands, the palms, between your fingers, and up the wrists.    Rub until the gel is gone and your hands are completely dry.  Preventing the flu in healthcare settings  The flu is a special concern for people in hospitals and long-term care facilities. To help prevent the spread of flu, many hospitals and nursing homes take these steps:    Healthcare providers wash their hands or use an alcohol-based hand  before and after treating each  patient.    People with the flu have private rooms and bathrooms or share a room with someone with the same infection.    People who are at high risk for the flu but don't have it are encouraged to get the flu and pneumonia vaccines.    All healthcare workers are encouraged or required to get flu shots.   Date Last Reviewed: 12/1/2016 2000-2017 [employer]. [address] [place] [address]. All rights reserved. This information is not intended as a substitute for professional medical care. Always follow your healthcare professional's instructions.

## 2018-10-08 NOTE — PROGRESS NOTES
SUBJECTIVE:   Aleena Escamilla is a 42 year old female who presents to clinic today for the following health issues:    Acute Illness   Acute illness concerns: flu  Onset: 1 day     Fever: YES    Chills/Sweats: YES    Headache (location?): YES    Sinus Pressure:no    Conjunctivitis:  YES: both    Ear Pain: YES: both    Rhinorrhea: no    Congestion: YES    Sore Throat: YES     Cough: YES-non-productive    Wheeze: no    Decreased Appetite: no    Nausea: YES    Vomiting: no    Diarrhea:  no    Dysuria/Freq.: YES    Fatigue/Achiness: YES    Sick/Strep Exposure: YES- flu      Therapies Tried and outcome: cold Medicaine; no relief           Problem list and histories reviewed & adjusted, as indicated.  Additional history: as documented    Patient Active Problem List   Diagnosis     CARDIOVASCULAR SCREENING; LDL GOAL LESS THAN 160     Chronic pelvic pain in female     Chronic salpingitis and oophoritis     Major depressive disorder, recurrent episode, mild (H)     Generalized anxiety disorder     Arthritis, multiple joint involvement     Primary osteoarthritis of both hands     Fibromyalgia     Malignant neoplasm of overlapping sites of right female breast (H)     BRCA gene positive     Carpal tunnel syndrome     Non morbid drug-induced obesity     Chronic right shoulder pain     Bunion, right     Onychomycosis     Elevated LFTs     Fatty liver     Hyperlipidemia LDL goal <130     Past Surgical History:   Procedure Laterality Date     BIOPSY BREAST NEEDLE LOCALIZATION Right 1/19/2016    Procedure: BIOPSY BREAST NEEDLE LOCALIZATION;  Surgeon: Adelina Demarco MD;  Location:  OR     GYN SURGERY  2-    bilateral salpingectomy, left oophrectomy     LAPAROSCOPIC SALPINGO-OOPHORECTOMY Right 10/26/2016    Procedure: LAPAROSCOPIC SALPINGO-OOPHORECTOMY;  Surgeon: Bouchra Mayo MD;  Location: [facility] OR     LUMPECTOMY BREAST WITH SENTINEL NODE, COMBINED Right 1/19/2016    Procedure: COMBINED LUMPECTOMY BREAST WITH  SENTINEL NODE;  Surgeon: Adelina Demarco MD;  Location: [facility] OR     PELVIS LAPAROSCOPY,DX  12/28/1998     RELEASE CARPAL TUNNEL Left 12/23/2016    Procedure: RELEASE CARPAL TUNNEL;  Surgeon: Sam Florence MD;  Location: [facility] OR       Social History   Substance Use Topics     Smoking status: Never Smoker     Smokeless tobacco: Never Used     Alcohol use Yes      Comment: 1 drink per week.     Family History   Problem Relation Age of Onset     Diabetes Mother      Hypertension Mother      Cancer Maternal Grandmother      cervical CA     Cardiovascular Father      MI     Cancer Maternal Aunt      cervical cancer     Pancreatic Cancer Maternal Aunt      COD         Current Outpatient Prescriptions   Medication Sig Dispense Refill     Cholecalciferol (VITAMIN D) 2000 UNITS tablet Take 2,000 Units by mouth daily 100 tablet 3     Dextromethorphan-Guaifenesin  MG TB12 Take 1 tablet by mouth 2 times daily 28 tablet 0     ibuprofen (ADVIL/MOTRIN) 600 MG tablet Take 1 tablet (600 mg) by mouth every 6 hours as needed for moderate pain 30 tablet 1     IBUPROFEN PO Take 400 mg by mouth every 6 hours as needed for moderate pain       letrozole (FEMARA) 2.5 MG tablet Take 1 tablet (2.5 mg) by mouth daily 90 tablet 3     oseltamivir (TAMIFLU) 75 MG capsule Take 1 capsule (75 mg) by mouth 2 times daily 10 capsule 0     venlafaxine (EFFEXOR-XR) 75 MG 24 hr capsule Take 1 capsule (75 mg) by mouth daily 90 capsule 1     Allergies   Allergen Reactions     Latex Rash       Reviewed and updated as needed this visit by clinical staff  Tobacco  Allergies  Meds  Med Hx  Surg Hx  Fam Hx  Soc Hx      Reviewed and updated as needed this visit by Provider         ROS:  Constitutional, HEENT, cardiovascular, pulmonary, GI, , musculoskeletal, neuro, skin, endocrine and psych systems are negative, except as otherwise noted.    OBJECTIVE:     /80 (BP Location: Right arm, Patient Position: Sitting, Cuff Size: Adult Large)   Pulse 74  Temp 100  F (37.8  C) (Oral)  Resp 20  Ht 5' (1.524 m)  Wt 159 lb 9.6 oz (72.4 kg)  LMP 03/01/2016  SpO2 98%  BMI 31.17 kg/m2  Body mass index is 31.17 kg/(m^2).  GENERAL: healthy, alert and no distress  EYES: Eyes grossly normal to inspection, PERRL and conjunctivae and sclerae normal  HENT: normal cephalic/atraumatic, ear canals and TM's normal, nose and mouth without ulcers or lesions, rhinorrhea clear, oral mucous membranes moist and tonsillar erythema  NECK: no adenopathy, no asymmetry, masses, or scars and thyroid normal to palpation  RESP: lungs clear to auscultation - no rales, rhonchi or wheezes  CV: regular rate and rhythm, normal S1 S2, no S3 or S4, no murmur, click or rub, no peripheral edema and peripheral pulses strong  ABDOMEN: soft, nontender, no hepatosplenomegaly, no masses and bowel sounds normal  MS: no gross musculoskeletal defects noted, no edema    Diagnostic Test Results:  Results for orders placed or performed in visit on 10/08/18   Strep, Rapid Screen   Result Value Ref Range    Specimen Description Throat     Rapid Strep A Screen       NEGATIVE: No Group A streptococcal antigen detected by immunoassay, await culture report.   Influenza A/B antigen   Result Value Ref Range    Influenza A/B Agn Specimen Nasal     Influenza A Negative NEG^Negative    Influenza B Negative NEG^Negative   Beta strep group A culture   Result Value Ref Range    Specimen Description Throat     Culture Micro No beta hemolytic Streptococcus Group A isolated          ASSESSMENT/PLAN:               ICD-10-CM    1. Flu-like symptoms R68.89 Dextromethorphan-Guaifenesin  MG TB12     ibuprofen (ADVIL/MOTRIN) 600 MG tablet     oseltamivir (TAMIFLU) 75 MG capsule   2. Exposure to influenza Z20.828 oseltamivir (TAMIFLU) 75 MG capsule   3. Throat pain R07.0 Strep, Rapid Screen     Beta strep group A culture       Tamiflu 1 capsule twice a day for 5 days with food  Ibuprofen 600 mg every 6 hours as  needed fever   Mucinex DM 1 tablet twice a day for cough and mucous  Rest  Increase fluids    Romy Banks PA-C  [facility]

## 2018-10-08 NOTE — MR AVS SNAPSHOT
After Visit Summary   10/8/2018    Aleena Escamilla    MRN: [id number]           Patient Information     Date Of Birth          1975        Visit Information        Provider Department      10/8/2018 4:00 PM Romy Banks PA-C [facility]        Today's Diagnoses     Flu-like symptoms    -  1    Exposure to influenza        Throat pain          Care Instructions    Tamiflu 1 capsule twice a day for 5 days with food  Ibuprofen 600 mg every 6 hours as needed fever   Mucinex DM 1 tablet twice a day for cough and mucous  Rest  Increase fluids    The Flu (Influenza)     The virus that causes the flu spreads through the air in droplets when someone who has the flu coughs, sneezes, laughs, or talks.   The flu (influenza) is an infection that affects your respiratory tract. This tract is made up of your mouth, nose, and lungs, and the passages between them. Unlike a cold, the flu can make you very ill. And it can lead to pneumonia, a serious lung infection. The flu can have serious complications and even cause death.  Who is at risk for the flu?  Anyone can get the flu. But you are more likely to become infected if you:    Have a weakened immune system    Work in a healthcare setting where you may be exposed to flu germs    Live or work with someone who has the flu    Haven t had an annual flu shot  How does the flu spread?  The flu is caused by a virus. The virus spreads through the air in droplets when someone who has the flu coughs, sneezes, laughs, or talks. You can become infected when you inhale these viruses directly. You can also become infected when you touch a surface on which the droplets have landed and then transfer the germs to your eyes, nose, or mouth. Touching used tissues, or sharing utensils, drinking glasses, or a toothbrush from an infected person can expose you to flu viruses, too.  What are the symptoms of the flu?  Flu symptoms tend to come on  quickly and may last a few days to a few weeks. They include:    Fever usually higher than 100.4 F  (38 C) and chills    Sore throat and headache    Dry cough    Runny nose    Tiredness and weakness    Muscle aches  Who is at risk for flu complications?  For some people, the flu can be very serious. The risk for complications is greater for:    Children younger than age 5    Adults ages 65 and older    People with a chronic illness such as diabetes or heart, kidney, or lung disease    People who live in a nursing home or long-term care facility   How is the flu treated?  The flu usually gets better after 7 days or so. In some cases, your healthcare provider may prescribe an antiviral medicine. This may help you get well a little sooner. For the medicine to help, you need to take it as soon as possible (ideally within 48 hours) after your symptoms start. If you develop pneumonia or other serious illness, you may need to stay in the hospital.  Easing flu symptoms    Drink lots of fluids such as water, juice, and warm soup. A good rule is to drink enough so that you urinate your normal amount.    Get plenty of rest.    Ask your healthcare provider what to take for fever and pain.    Call your provider if your fever is 100.4 F (38 C) or higher, or you become dizzy, lightheaded, or short of breath.  Taking steps to protect others    Wash your hands often, especially after coughing or sneezing. Or clean your hands with an alcohol-based hand  containing at least 60% alcohol.    Cough or sneeze into a tissue. Then throw the tissue away and wash your hands. If you don t have a tissue, cough and sneeze into your elbow.    Stay home until at least 24 hours after you no longer have a fever or chills. Be sure the fever isn t being hidden by fever-reducing medicine.    Don t share food, utensils, drinking glasses, or a toothbrush with others.    Ask your healthcare provider if others in your household should get antiviral  medicine to help them avoid infection.  How can the flu be prevented?    One of the best ways to avoid the flu is to get a flu vaccine each year. The virus that causes the flu changes from year to year. For that reason, healthcare providers recommend getting the flu vaccine each year, as soon as it's available in your area. The vaccine is given as a shot. Your healthcare provider can tell you which vaccine is right for you. The nasal spray is not recommended for the 3751-7695 flu season. The CDC says the nasal spray did not seem to protect against the flu over the last several flu seasons.    Wash your hands often. Frequent handwashing is a proven way to help prevent infection.    Carry an alcohol-based hand gel containing at least 60% alcohol. Use it when you can't use soap and water. Then wash your hands as soon as you can.    Avoid touching your eyes, nose, and mouth.    At home and work, clean phones, computer keyboards, and toys often with disinfectant wipes.    If possible, avoid close contact with others who have the flu or symptoms of the flu.  Handwashing tips  Handwashing is one of the best ways to prevent many common infections. If you are caring for or visiting someone with the flu, wash your hands each time you enter and leave the room. Follow these steps:    Use warm water and plenty of soap. Rub your hands together well.    Clean the whole hand, including under your nails, between your fingers, and up the wrists.    Wash for at least 15 seconds.    Rinse, letting the water run down your fingers, not up your wrists.    Dry your hands well. Use a paper towel to turn off the faucet and open the door.  Using alcohol-based hand   Alcohol-based hand  are also a good choice. Use them when you can't use soap and water. Follow these steps:    Squeeze about a tablespoon of gel into the palm of one hand.    Rub your hands together briskly, cleaning the backs of your hands, the palms, between your  fingers, and up the wrists.    Rub until the gel is gone and your hands are completely dry.  Preventing the flu in healthcare settings  The flu is a special concern for people in hospitals and long-term care facilities. To help prevent the spread of flu, many hospitals and nursing homes take these steps:    Healthcare providers wash their hands or use an alcohol-based hand  before and after treating each patient.    People with the flu have private rooms and bathrooms or share a room with someone with the same infection.    People who are at high risk for the flu but don't have it are encouraged to get the flu and pneumonia vaccines.    All healthcare workers are encouraged or required to get flu shots.   Date Last Reviewed: 12/1/2016 2000-2017 [employer]. [address] [place] [address], [place] [address]. All rights reserved. This information is not intended as a substitute for professional medical care. Always follow your healthcare professional's instructions.                Follow-ups after your visit        Follow-up notes from your care team     Return in about 5 days (around 10/13/2018), or if symptoms worsen or fail to improve.      Your next 10 appointments already scheduled     Dec 28, 2018  8:00 AM CST   (Arrive by 7:45 AM)   MA SCREENING BILATERAL W/ CARLA with MGMA1, MG MA TECH   [facility]    [address] [address] [place] [address] [phone]           How do I prepare for my exam? (Food and drink instructions) No Food and Drink Restrictions.  How do I prepare for my exam? (Other instructions) Do not use any powder, lotion or deodorant under your arms or on your breast. If you do, we will ask you to remove it before your exam.  What should I wear: Wear comfortable, two-piece clothing.  How long does the exam take: Most scans will take 15 minutes.  What should I bring: Bring any previous mammograms from other facilities or have  "them mailed to the breast center.  Do I need a :  No  is needed.  What do I need to tell my doctor: If you have any allergies, tell your care team.  What should I do after the exam: No restrictions, You may resume normal activities.  What is this test: This test is an x-ray of the breast to look for breast disease. The breast is pressed between two plates to flatten and spread the tissue. An X-ray is taken of the breast from different angles.  Who should I call with questions: If you have any questions, please call the Imaging Department where you will have your exam. Directions, parking instructions, and other information is available on our website, [email or web address].  Other information about my exam Three-dimensional (3D) mammograms are available at [facility] locations in [facility], [place], and [place]. [facility] locations include [place] and the Clinics and [facility] Center in [place].  Benefits of 3D mammograms include: * Improved rate of cancer detection * Decreases your chance of having to go back for more tests, which means fewer: * \"False-positive\" results (This means that there is an abnormal area but it isn't cancer.) * Invasive testing procedures, such as a biopsy or surgery * Can provide clearer images of the breast if you have dense breast tissue.  *3D mammography is an optional exam that anyone can have with a 2D mammogram. It doesn't replace or take the place of a 2D mammogram. 2D mammograms remain an effective screening test for all women.  Not all insurance companies cover the cost of a 3D mammogram. Check with your insurance.            Jan 22, 2019 10:15 AM CST   LAB with LAB ONC [facility] ([facility])    [address] [place] [address] [phone]           Please do not eat 10-12 hours before your appointment if you are coming in fasting for labs on lipids, " cholesterol, or glucose (sugar). This does not apply to pregnant women. Water, hot tea and black coffee (with nothing added) are okay. Do not drink other fluids, diet soda or chew gum.            Jan 22, 2019 11:00 AM CST   Return Visit with Bessy Tate MD   [facility] ([facility]    [address] [address] [place] [address] [phone]              Who to contact     If you have questions or need follow up information about today's clinic visit or your schedule please contact [facility] directly at [phone].  Normal or non-critical lab and imaging results will be communicated to you by MyChart, letter or phone within 4 business days after the clinic has received the results. If you do not hear from us within 7 days, please contact the clinic through [facility] or phone. If you have a critical or abnormal lab result, we will notify you by phone as soon as possible.  Submit refill requests through [facility] or call your pharmacy and they will forward the refill request to us. Please allow 3 business days for your refill to be completed.          Additional Information About Your Visit        MyChart Information     [facility] [employer] gives you secure access to your electronic health record. If you see a primary care provider, you can also send messages to your care team and make appointments. If you have questions, please call your primary care clinic.  If you do not have a primary care provider, please call [phone] and they will assist you.        Care EveryWhere ID     This is your Care EveryWhere ID. This could be used by other organizations to access your [facility] medical records  [id number]        Your Vitals Were     Pulse Temperature Respirations Height Last Period Pulse Oximetry    74 100  F (37.8  C) (Oral) 20 5' (1.524 m) 03/01/2016 98%    BMI (Body Mass Index)                   31.17 kg/m2            Blood Pressure from Last 3 Encounters:    11/05/18 107/75   10/15/18 116/81   10/08/18 108/80    Weight from Last 3 Encounters:   11/05/18 161 lb 12.8 oz (73.4 kg)   10/15/18 161 lb (73 kg)   10/08/18 159 lb 9.6 oz (72.4 kg)              We Performed the Following     Beta strep group A culture     Influenza A/B antigen     Strep, Rapid Screen          Today's Medication Changes          These changes are accurate as of 10/8/18 11:59 PM.  If you have any questions, ask your nurse or doctor.               Start taking these medicines.        Dose/Directions    Dextromethorphan-Guaifenesin  MG Tb12   Used for:  Flu-like symptoms   Started by:  Romy Banks PA-C        Dose:  1 tablet   Take 1 tablet by mouth 2 times daily   Quantity:  28 tablet   Refills:  0       oseltamivir 75 MG capsule   Commonly known as:  TAMIFLU   Used for:  Exposure to influenza, Flu-like symptoms   Started by:  Romy Banks PA-C        Dose:  75 mg   Take 1 capsule (75 mg) by mouth 2 times daily   Quantity:  10 capsule   Refills:  0         These medicines have changed or have updated prescriptions.        Dose/Directions    * IBUPROFEN PO   This may have changed:  Another medication with the same name was added. Make sure you understand how and when to take each.   Changed by:  Romy Banks PA-C        Dose:  400 mg   Take 400 mg by mouth every 6 hours as needed for moderate pain   Refills:  0       * ibuprofen 600 MG tablet   Commonly known as:  ADVIL/MOTRIN   This may have changed:  You were already taking a medication with the same name, and this prescription was added. Make sure you understand how and when to take each.   Used for:  Flu-like symptoms   Changed by:  Romy Banks PA-C        Dose:  600 mg   Take 1 tablet (600 mg) by mouth every 6 hours as needed for moderate pain   Quantity:  30 tablet   Refills:  1       * Notice:  This list has 2 medication(s) that are the same as other medications  prescribed for you. Read the directions carefully, and ask your doctor or other care provider to review them with you.         Where to get your medicines      These medications were [facility] to [id number] - [place], [place] - [address] [facility]  [address] , [place] [address]    Hours:  24-hours Phone:  [phone]     Dextromethorphan-Guaifenesin  MG Tb12    ibuprofen 600 MG tablet    oseltamivir 75 MG capsule                Primary Care Provider Office Phone # Fax #    Concepcion Tana Bocanegra [phone]       [address] [facility] [address]        Equal Access to Services     DESHAWN BERG : Hadii jame ku hadasho Soomaali, waaxda luqadaha, qaybta kaalmada adeegyada, waxay idiin hayclint chirinos . So [facility] [phone].    ATENCIÓN: Si habla español, tiene a wiley disposición servicios gratuitos de asistencia lingüística. [facility] [phone].    We comply with applicable federal civil rights laws and [place] laws. We do not discriminate on the basis of race, color, national origin, age, disability, sex, sexual orientation, or gender identity.            Thank you!     Thank you for choosing [facility]  for your care. Our goal is always to provide you with excellent care. Hearing back from our patients is one way we can continue to improve our services. Please take a few minutes to complete the written survey that you may receive in the mail after your visit with us. Thank you!             Your Updated Medication List - Protect others around you: Learn how to safely use, store and throw away your medicines at www.disposemymeds.org.          This list is accurate as of 10/8/18 11:59 PM.  Always use your most recent med list.                   Brand Name Dispense Instructions for use Diagnosis    Dextromethorphan-Guaifenesin  MG Tb12     28 tablet    Take 1 tablet by mouth 2 times daily     Flu-like symptoms       * IBUPROFEN PO      Take 400 mg by mouth every 6 hours as needed for moderate pain        * ibuprofen 600 MG tablet    ADVIL/MOTRIN    30 tablet    Take 1 tablet (600 mg) by mouth every 6 hours as needed for moderate pain    Flu-like symptoms       letrozole 2.5 MG tablet    FEMARA    90 tablet    Take 1 tablet (2.5 mg) by mouth daily    Malignant neoplasm of right breast in female, estrogen receptor positive, unspecified site of breast (H)       oseltamivir 75 MG capsule    TAMIFLU    10 capsule    Take 1 capsule (75 mg) by mouth 2 times daily    Exposure to influenza, Flu-like symptoms       venlafaxine 75 MG 24 hr capsule    EFFEXOR-XR    90 capsule    Take 1 capsule (75 mg) by mouth daily    Hot flashes related to aromatase inhibitor therapy, Malignant neoplasm of overlapping sites of right breast in female, estrogen receptor positive (H)       vitamin D 2000 units tablet     100 tablet    Take 2,000 Units by mouth daily    Vitamin D deficiency       * Notice:  This list has 2 medication(s) that are the same as other medications prescribed for you. Read the directions carefully, and ask your doctor or other care provider to review them with you.

## 2018-10-08 NOTE — LETTER
[address] [address]  Phone: [phone]    October 8, 2018        Aleena Escamilla  [address] [facility] [address]          To whom it may concern:    RE: Aleena Escamilla    Patient was seen and treated today at our clinic and missed work 10/ 8/17-10/12/18 due an illness.     Please contact me for questions or concerns.      Sincerely,        Kathryn Banks PAC

## 2018-10-09 LAB
BACTERIA SPEC CULT: NORMAL
SPECIMEN SOURCE: NORMAL

## 2018-10-10 ASSESSMENT — PATIENT HEALTH QUESTIONNAIRE - PHQ9: SUM OF ALL RESPONSES TO PHQ QUESTIONS 1-9: 4

## 2018-10-15 ENCOUNTER — OFFICE VISIT (OUTPATIENT)
Dept: FAMILY MEDICINE | Facility: CLINIC | Age: 43
End: 2018-10-15
Payer: COMMERCIAL

## 2018-10-15 VITALS
BODY MASS INDEX: 31.44 KG/M2 | SYSTOLIC BLOOD PRESSURE: 116 MMHG | DIASTOLIC BLOOD PRESSURE: 81 MMHG | OXYGEN SATURATION: 97 % | TEMPERATURE: 98.4 F | HEART RATE: 90 BPM | WEIGHT: 161 LBS | RESPIRATION RATE: 20 BRPM

## 2018-10-15 DIAGNOSIS — J01.90 ACUTE SINUSITIS WITH SYMPTOMS > 10 DAYS: ICD-10-CM

## 2018-10-15 DIAGNOSIS — H66.001 ACUTE SUPPURATIVE OTITIS MEDIA OF RIGHT EAR WITHOUT SPONTANEOUS RUPTURE OF TYMPANIC MEMBRANE, RECURRENCE NOT SPECIFIED: Primary | ICD-10-CM

## 2018-10-15 PROCEDURE — 99213 OFFICE O/P EST LOW 20 MIN: CPT | Performed by: NURSE PRACTITIONER

## 2018-10-15 RX ORDER — CODEINE PHOSPHATE AND GUAIFENESIN 10; 100 MG/5ML; MG/5ML
1 SOLUTION ORAL EVERY 4 HOURS PRN
Qty: 120 ML | Refills: 0 | Status: SHIPPED | OUTPATIENT
Start: 2018-10-15 | End: 2018-11-05

## 2018-10-15 RX ORDER — GUAIFENESIN AND DEXTROMETHORPHAN HYDROBROMIDE 1200; 60 MG/1; MG/1
1 TABLET, EXTENDED RELEASE ORAL 2 TIMES DAILY
Qty: 28 TABLET | Refills: 0 | Status: SHIPPED | OUTPATIENT
Start: 2018-10-15 | End: 2018-11-05

## 2018-10-15 ASSESSMENT — PAIN SCALES - GENERAL: PAINLEVEL: NO PAIN (0)

## 2018-10-15 NOTE — LETTER
October 15, 2018      Aleena Escamilla  [address] [facility] [address]        To Whom It May Concern:    Aleena Escamilla was seen in our clinic 10/15/2018 for illness.  She missed work today (10/15/2018) and needs to be off of work tomorrow (10/16/18).  Please excuse her absences. She may return to work 10/17/18 without restrictions.      Sincerely,        PIA Clayton CNP

## 2018-10-15 NOTE — PROGRESS NOTES
Faxed 2 letters, per Thu, to Neil Renteria, [phone], right fax confirmed at 2:16 pm today. Letters in TC copy basket.  Jennifer Umana MA/  For [employer] and Robyn

## 2018-10-15 NOTE — PROGRESS NOTES
SUBJECTIVE:   Aleena Escamilla is a 42 year old female who presents to clinic today for the following health issues:    Medication Followup of Dextromethorphan-Guaifenesin    Taking Medication as prescribed: yes    Side Effects:  None    Medication Helping Symptoms:  Yes    No fever, coughing constantly with mucus, right ear pain, woke up this morning with discharge and right eye shut this morning.   Letter for work.  S/p tamiflu x 5 days for flu exposure (flu negative).  Fever gone but cough is persistent.    She is having blood in mucous,  Every time she coughs.  Small amount, pinkish in color.  Does admit to having bleeding from her nose as well.  Nose is dry.  Throat irritatied.  Cannot sleep due to cough.  No recent travel, no exposures to TB or other illnesses aside from flu.  No difficulty with breathing      Problem list and histories reviewed & adjusted, as indicated.  Additional history: as documented    Patient Active Problem List   Diagnosis     CARDIOVASCULAR SCREENING; LDL GOAL LESS THAN 160     Chronic pelvic pain in female     Chronic salpingitis and oophoritis     Major depressive disorder, recurrent episode, mild (H)     Generalized anxiety disorder     Arthritis, multiple joint involvement     Primary osteoarthritis of both hands     Fibromyalgia     Malignant neoplasm of overlapping sites of right female breast (H)     BRCA gene positive     Carpal tunnel syndrome     Non morbid drug-induced obesity     Chronic right shoulder pain     Bunion, right     Onychomycosis     Elevated LFTs     Fatty liver     Hyperlipidemia LDL goal <130     Past Surgical History:   Procedure Laterality Date     BIOPSY BREAST NEEDLE LOCALIZATION Right 1/19/2016    Procedure: BIOPSY BREAST NEEDLE LOCALIZATION;  Surgeon: Adelina Demarco MD;  Location: [facility]     GYN SURGERY  2-    bilateral salpingectomy, left oophrectomy     LAPAROSCOPIC SALPINGO-OOPHORECTOMY Right 10/26/2016    Procedure: LAPAROSCOPIC  SALPINGO-OOPHORECTOMY;  Surgeon: Bouchra Mayo MD;  Location: [facility]     LUMPECTOMY BREAST WITH SENTINEL NODE, COMBINED Right 1/19/2016    Procedure: COMBINED LUMPECTOMY BREAST WITH SENTINEL NODE;  Surgeon: Adelina Demarco MD;  Location: [facility]     PELVIS LAPAROSCOPY,DX  12/28/1998     RELEASE CARPAL TUNNEL Left 12/23/2016    Procedure: RELEASE CARPAL TUNNEL;  Surgeon: Sam Florence MD;  Location: [facility]       Social History   Substance Use Topics     Smoking status: Never Smoker     Smokeless tobacco: Never Used     Alcohol use Yes      Comment: 1 drink per week.     Family History   Problem Relation Age of Onset     Diabetes Mother      Hypertension Mother      Cancer Maternal Grandmother      cervical CA     Cardiovascular Father      MI     Cancer Maternal Aunt      cervical cancer     Pancreatic Cancer Maternal Aunt      COD         Current Outpatient Prescriptions   Medication Sig Dispense Refill     amoxicillin-clavulanate (AUGMENTIN) 875-125 MG per tablet Take 1 tablet by mouth 2 times daily 20 tablet 0     Cholecalciferol (VITAMIN D) 2000 UNITS tablet Take 2,000 Units by mouth daily 100 tablet 3     Dextromethorphan-Guaifenesin  MG TB12 Take 1 tablet by mouth 2 times daily 28 tablet 0     guaiFENesin-codeine (ROBITUSSIN AC) 100-10 MG/5ML SOLN solution Take 5 mLs by mouth every 4 hours as needed for cough 120 mL 0     ibuprofen (ADVIL/MOTRIN) 600 MG tablet Take 1 tablet (600 mg) by mouth every 6 hours as needed for moderate pain 30 tablet 1     IBUPROFEN PO Take 400 mg by mouth every 6 hours as needed for moderate pain       letrozole (FEMARA) 2.5 MG tablet Take 1 tablet (2.5 mg) by mouth daily 90 tablet 3     oseltamivir (TAMIFLU) 75 MG capsule Take 1 capsule (75 mg) by mouth 2 times daily 10 capsule 0     venlafaxine (EFFEXOR-XR) 75 MG 24 hr capsule Take 1 capsule (75 mg) by mouth daily 90 capsule 1     Allergies   Allergen Reactions     Latex Rash     BP Readings from Last 3  Encounters:   10/15/18 116/81   10/08/18 108/80   08/28/18 108/72    Wt Readings from Last 3 Encounters:   10/15/18 161 lb (73 kg)   10/08/18 159 lb 9.6 oz (72.4 kg)   08/28/18 163 lb (73.9 kg)          Reviewed and updated as needed this visit by clinical staff  Tobacco  Allergies  Meds  Med Hx  Surg Hx  Fam Hx  Soc Hx      Reviewed and updated as needed this visit by Provider  Tobacco  Allergies  Meds  Med Hx  Surg Hx  Fam Hx  Soc Hx        ROS:  Constitutional, HEENT, cardiovascular, pulmonary, gi and gu systems are negative, except as otherwise noted.    OBJECTIVE:     /81 (BP Location: Left arm, Patient Position: Chair, Cuff Size: Adult Regular)  Pulse 90  Temp 98.4  F (36.9  C) (Oral)  Resp 20  Wt 161 lb (73 kg)  LMP 03/01/2016  SpO2 97%  BMI 31.44 kg/m2  Body mass index is 31.44 kg/(m^2).  GENERAL: alert, no distress, fatigued and constant coughing during exam- dry cough  EYES: Eyes grossly normal to inspection, PERRL and conjunctivae and sclerae normal  HENT: normal cephalic/atraumatic, right ear: erythematous, bulging membrane and mucopurulent effusion, left ear: clear effusion, nasal mucosa edematous , rhinorrhea clear, oropharynx clear, oral mucous membranes moist, tonsillar erythema and sinuses: maxillary tenderness on right  NECK: no adenopathy, no asymmetry, masses, or scars and thyroid normal to palpation  RESP: lungs clear to auscultation - no rales, rhonchi or wheezes  CV: regular rate and rhythm, normal S1 S2, no S3 or S4, no murmur, click or rub, no peripheral edema and peripheral pulses strong  ABDOMEN: soft, nontender, no hepatosplenomegaly, no masses and bowel sounds normal  MS: no gross musculoskeletal defects noted, no edema  PSYCH: mentation appears normal, affect normal/bright    Diagnostic Test Results:  none     ASSESSMENT/PLAN:     1. Acute sinusitis with symptoms > 10 days  Treatment as below.  Advised as per patient instructions.  Follow-up if not improving,  notes written for work.   - Dextromethorphan-Guaifenesin  MG TB12; Take 1 tablet by mouth 2 times daily  Dispense: 28 tablet; Refill: 0  - amoxicillin-clavulanate (AUGMENTIN) 875-125 MG per tablet; Take 1 tablet by mouth 2 times daily  Dispense: 20 tablet; Refill: 0  - guaiFENesin-codeine (ROBITUSSIN AC) 100-10 MG/5ML SOLN solution; Take 5 mLs by mouth every 4 hours as needed for cough  Dispense: 120 mL; Refill: 0    2. Acute suppurative otitis media of right ear without spontaneous rupture of tympanic membrane, recurrence not specified  As above.   - amoxicillin-clavulanate (AUGMENTIN) 875-125 MG per tablet; Take 1 tablet by mouth 2 times daily  Dispense: 20 tablet; Refill: 0  - guaiFENesin-codeine (ROBITUSSIN AC) 100-10 MG/5ML SOLN solution; Take 5 mLs by mouth every 4 hours as needed for cough  Dispense: 120 mL; Refill: 0    Patient Instructions     For sinus and ear infection:  Take Augmentin twice a day for 10 days.  Take with food.  Take probiotic daily to avoid diarrhea.    Can use cough tablet during day- will not cause drowsiness.  Can use cough syrup with codeine at night which will cause drowsiness.    At [facility], we strive to deliver an exceptional experience to you, every time we see you.  If you receive a survey in the mail, please send us back your thoughts. We really do value your feedback.    Your care team:                            Family Medicine Internal Medicine   MD Antonio Mistry MD Shantel Branch-Fleming, MD Katya Georgiev PA-C Megan Hill, APRN CNP Nam Ho, MD Pediatrics   DRAGAN Mancera, MD Cira Velazquez APRN CNP   MD Haydee Sherman MD Deborah Mielke, MD Kim Thein, APRN [facility]      Clinic hours: Monday - Thursday 7 am-7 pm; Fridays 7 am-5 pm.   Urgent care: Monday - Friday 11 am-9 pm; Saturday and Sunday 9 am-5 pm.  Pharmacy : Monday -Thursday 8 am-8 pm; Friday 8 am-6 pm;  Saturday and Sunday 9 am-5 pm.     Clinic: [phone]   Pharmacy: [phone]            PIA [facility]

## 2018-10-15 NOTE — LETTER
October 15, 2018      Aleena Escamilla  [address] [address] [facility] [address]        To Whom It May Concern:    Aleena Escamilla was seen in our clinic.  As of 10/17/18, she may return to work without restrictions.      Sincerely,        PIA Clayton CNP

## 2018-10-15 NOTE — MR AVS SNAPSHOT
After Visit Summary   10/15/2018    Aleena Escamilla    MRN: [id number]           Patient Information     Date Of Birth          1975        Visit Information        Provider Department      10/15/2018 9:40 AM Thu Watson APRN CNP [facility]        Today's Diagnoses     Acute suppurative otitis media of right ear without spontaneous rupture of tympanic membrane, recurrence not specified    -  1    Acute sinusitis with symptoms > 10 days          Care Instructions    For sinus and ear infection:  Take Augmentin twice a day for 10 days.  Take with food.  Take probiotic daily to avoid diarrhea.    Can use cough tablet during day- will not cause drowsiness.  Can use cough syrup with codeine at night which will cause drowsiness.    At [facility], we strive to deliver an exceptional experience to you, every time we see you.  If you receive a survey in the mail, please send us back your thoughts. We really do value your feedback.    Your care team:                            Family Medicine Internal Medicine   MD Antonio Mistry MD Shantel Branch-Fleming, MD Katya Georgiev PA-C Megan Hill, APRN CNP Nam Ho, MD Pediatrics   DRAGAN Mancera, MD Cira Velazquez APRMD Haydee Graves CNP, MD Deborah Mielke, MD Kim Thein, PIA [facility]      Clinic hours: Monday - Thursday 7 am-7 pm; Fridays 7 am-5 pm.   Urgent care: Monday - Friday 11 am-9 pm; Saturday and Sunday 9 am-5 pm.  Pharmacy : Monday -Thursday 8 am-8 pm; Friday 8 am-6 pm; Saturday and Sunday 9 am-5 pm.     Clinic: [phone]   Pharmacy: [phone]                Follow-ups after your visit        Your next 10 appointments already scheduled     Dec 28, 2018  8:00 AM CST   (Arrive by 7:45 AM)   MA SCREENING BILATERAL W/ CARLA with MGMA1, MG MA TECH   [facility] ([facility]     "[address] [address] [place] [id number] [phone]           How do I prepare for my exam? (Food and drink instructions) No Food and Drink Restrictions.  How do I prepare for my exam? (Other instructions) Do not use any powder, lotion or deodorant under your arms or on your breast. If you do, we will ask you to remove it before your exam.  What should I wear: Wear comfortable, two-piece clothing.  How long does the exam take: Most scans will take 15 minutes.  What should I bring: Bring any previous mammograms from other facilities or have them mailed to the breast center.  Do I need a :  No  is needed.  What do I need to tell my doctor: If you have any allergies, tell your care team.  What should I do after the exam: No restrictions, You may resume normal activities.  What is this test: This test is an x-ray of the breast to look for breast disease. The breast is pressed between two plates to flatten and spread the tissue. An X-ray is taken of the breast from different angles.  Who should I call with questions: If you have any questions, please call the Imaging Department where you will have your exam. Directions, parking instructions, and other information is available on our website, [email or web address].  Other information about my exam Three-dimensional (3D) mammograms are available at [facility] locations in [facility], [facility], [place], and [place].  Health locations include [place] and the [facility] in [place].  Benefits of 3D mammograms include: * Improved rate of cancer detection * Decreases your chance of having to go back for more tests, which means fewer: * \"False-positive\" results (This means that there is an abnormal area but it isn't cancer.) * Invasive testing procedures, such as a biopsy or surgery * Can provide clearer images of the breast if you have dense breast tissue.  *3D mammography is an optional exam that " anyone can have with a 2D mammogram. It doesn't replace or take the place of a 2D mammogram. 2D mammograms remain an effective screening test for all women.  Not all insurance companies cover the cost of a 3D mammogram. Check with your insurance.            Jan 22, 2019 10:15 AM CST   LAB with [facility] ([facility])    [address] [place] [id number] [phone]           Please do not eat 10-12 hours before your appointment if you are coming in fasting for labs on lipids, cholesterol, or glucose (sugar). This does not apply to pregnant women. Water, hot tea and black coffee (with nothing added) are okay. Do not drink other fluids, diet soda or chew gum.            Jan 22, 2019 11:00 AM CST   Return Visit with Bessy Tate MD   [facility]    [address] [place] [id number] [phone]              Who to contact     If you have questions or need follow up information about today's clinic visit or your schedule please contact [facility] directly at [phone].  Normal or non-critical lab and imaging results will be communicated to you by MyChart, letter or phone within 4 business days after the clinic has received the results. If you do not hear from us within 7 days, please contact the clinic through [facility] or phone. If you have a critical or abnormal lab result, we will notify you by phone as soon as possible.  Submit refill requests through [facility] or call your pharmacy and they will forward the refill request to us. Please allow 3 business days for your refill to be completed.          Additional Information About Your Visit        [facility] Information     [facility] gives you secure access to your electronic health record. If you see a primary care provider, you can also send messages to your care team and make appointments. If you have questions,  please call your primary care clinic.  If you do not have a primary care provider, please call [phone] and they will assist you.        Care EveryWhere ID     This is your Care EveryWhere ID. This could be used by other organizations to access your [facility] medical records  [id number]        Your Vitals Were     Pulse Temperature Respirations Last Period Pulse Oximetry BMI (Body Mass Index)    90 98.4  F (36.9  C) (Oral) 20 03/01/2016 97% 31.44 kg/m2       Blood Pressure from Last 3 Encounters:   10/15/18 116/81   10/08/18 108/80   08/28/18 108/72    Weight from Last 3 Encounters:   10/15/18 161 lb (73 kg)   10/08/18 159 lb 9.6 oz (72.4 kg)   08/28/18 163 lb (73.9 kg)              Today, you had the following     No orders found for display         Today's Medication Changes          These changes are accurate as of 10/15/18 10:11 AM.  If you have any questions, ask your nurse or doctor.               Start taking these medicines.        Dose/Directions    amoxicillin-clavulanate 875-125 MG per tablet   Commonly known as:  AUGMENTIN   Used for:  Acute sinusitis with symptoms > 10 days, Acute suppurative otitis media of right ear without spontaneous rupture of tympanic membrane, recurrence not specified   Started by:  Thu Watson APRN CNP        Dose:  1 tablet   Take 1 tablet by mouth 2 times daily   Quantity:  20 tablet   Refills:  0       guaiFENesin-codeine 100-10 MG/5ML Soln solution   Commonly known as:  ROBITUSSIN AC   Used for:  Acute suppurative otitis media of right ear without spontaneous rupture of tympanic membrane, recurrence not specified, Acute sinusitis with symptoms > 10 days   Started by:  Thu Watson APRN CNP        Dose:  1 tsp.   Take 5 mLs by mouth every 4 hours as needed for cough   Quantity:  120 mL   Refills:  0            Where to get your medicines      These medications were sent to [facility] [address] [facility] [address] [facility]  AT [facility] [address]  [address] [place] [address]    Hours:  24-hours Phone:  [phone]     amoxicillin-clavulanate 875-125 MG per tablet    Dextromethorphan-Guaifenesin  MG Tb12         Some of these will need a paper prescription and others can be bought over the counter.  Ask your nurse if you have questions.     Bring a paper prescription for each of these medications     guaiFENesin-codeine 100-10 MG/5ML Soln solution                Primary Care Provider Office Phone # Fax #    Concepcion Tana Bocanegra [phone] [address]  [facility] [place] [address]        Equal Access to Services     RIVERA BERG : Hadii jame ku hadasho Soomaali, waaxda luqadaha, qaybta kaalmada adeegyada, waxay idiin hayclint chirinos . So [facility] [phone].    ATENCIÓN: Si habla español, tiene a wiley disposición servicios gratuitos de asistencia lingüística. [facility] [phone].    We comply with applicable federal civil rights laws and [place] laws. We do not discriminate on the basis of race, color, national origin, age, disability, sex, sexual orientation, or gender identity.            Thank you!     Thank you for choosing [facility]  for your care. Our goal is always to provide you with excellent care. Hearing back from our patients is one way we can continue to improve our services. Please take a few minutes to complete the written survey that you may receive in the mail after your visit with us. Thank you!             Your Updated Medication List - Protect others around you: Learn how to safely use, store and throw away your medicines at www.disposemymeds.org.          This list is accurate as of 10/15/18 10:11 AM.  Always use your most recent med list.                   Brand Name Dispense Instructions for use Diagnosis    amoxicillin-clavulanate 875-125 MG per tablet    AUGMENTIN    20 tablet    Take 1 tablet by mouth 2 times daily     Acute sinusitis with symptoms > 10 days, Acute suppurative otitis media of right ear without spontaneous rupture of tympanic membrane, recurrence not specified       Dextromethorphan-Guaifenesin  MG Tb12     28 tablet    Take 1 tablet by mouth 2 times daily    Acute sinusitis with symptoms > 10 days       guaiFENesin-codeine 100-10 MG/5ML Soln solution    ROBITUSSIN AC    120 mL    Take 5 mLs by mouth every 4 hours as needed for cough    Acute suppurative otitis media of right ear without spontaneous rupture of tympanic membrane, recurrence not specified, Acute sinusitis with symptoms > 10 days       * IBUPROFEN PO      Take 400 mg by mouth every 6 hours as needed for moderate pain        * ibuprofen 600 MG tablet    ADVIL/MOTRIN    30 tablet    Take 1 tablet (600 mg) by mouth every 6 hours as needed for moderate pain    Flu-like symptoms       letrozole 2.5 MG tablet    FEMARA    90 tablet    Take 1 tablet (2.5 mg) by mouth daily    Malignant neoplasm of right breast in female, estrogen receptor positive, unspecified site of breast (H)       oseltamivir 75 MG capsule    TAMIFLU    10 capsule    Take 1 capsule (75 mg) by mouth 2 times daily    Exposure to influenza, Flu-like symptoms       venlafaxine 75 MG 24 hr capsule    EFFEXOR-XR    90 capsule    Take 1 capsule (75 mg) by mouth daily    Hot flashes related to aromatase inhibitor therapy, Malignant neoplasm of overlapping sites of right breast in female, estrogen receptor positive (H)       vitamin D 2000 units tablet     100 tablet    Take 2,000 Units by mouth daily    Vitamin D deficiency       * Notice:  This list has 2 medication(s) that are the same as other medications prescribed for you. Read the directions carefully, and ask your doctor or other care provider to review them with you.

## 2018-10-15 NOTE — PATIENT INSTRUCTIONS
For sinus and ear infection:  Take Augmentin twice a day for 10 days.  Take with food.  Take probiotic daily to avoid diarrhea.    Can use cough tablet during day- will not cause drowsiness.  Can use cough syrup with codeine at night which will cause drowsiness.    At [facility], we strive to deliver an exceptional experience to you, every time we see you.  If you receive a survey in the mail, please send us back your thoughts. We really do value your feedback.    Your care team:                            Family Medicine Internal Medicine   MD Antonio Mistry MD Shantel Branch-Fleming, MD Katya Georgiev PA-C Megan Hill, APRN MARCELLUS Yap, MD Pediatrics   Luis Fernando Rhoades, DRAGAN Watson, MD Cira Velazquez APRN CNP   MD Haydee Sherman MD Deborah Mielke, MD Kim Thein, APRN [facility]      Clinic hours: Monday - Thursday 7 am-7 pm; Fridays 7 am-5 pm.   Urgent care: Monday - Friday 11 am-9 pm; Saturday and Sunday 9 am-5 pm.  Pharmacy : Monday -Thursday 8 am-8 pm; Friday 8 am-6 pm; Saturday and Sunday 9 am-5 pm.     Clinic: [phone]   Pharmacy: [phone]

## 2018-10-25 ENCOUNTER — TELEPHONE (OUTPATIENT)
Dept: FAMILY MEDICINE | Facility: CLINIC | Age: 43
End: 2018-10-25

## 2018-10-25 NOTE — TELEPHONE ENCOUNTER
What type of form? DISABLITY (patient states have to be done by 9am 10/26/2018 and she needs to .   What day did you drop off your forms? 10/25/2018  Is there a due date? 10/26/2018 (7-10 business day to compete forms)   How would you like to receive these forms? Patient will  at the clinic when completed    What is the best number to contact you? Cell  [facility] --[phone]  What time works best to contact you with in 4 hrs? ANY  Is it okay to leave a message? Yes YES    Concepcion WHITING

## 2018-10-25 NOTE — TELEPHONE ENCOUNTER
Form is received from the  and forward to Kathryn to address.  Asher Chandler,  For Teams [employer] [facility] [employer]

## 2018-10-26 NOTE — TELEPHONE ENCOUNTER
Faxed completed and signed form to Mina, [phone], right fax confirmed at 10:52 am today. Bringing original to the  by 1:00 pm today. Copy to TC and abstracting. Called and left a voicemail message regarding form .  Jennifer Umana MA/  For Teams [employer] and Robyn

## 2018-11-05 ENCOUNTER — OFFICE VISIT (OUTPATIENT)
Dept: FAMILY MEDICINE | Facility: CLINIC | Age: 43
End: 2018-11-05
Payer: COMMERCIAL

## 2018-11-05 VITALS
WEIGHT: 161.8 LBS | BODY MASS INDEX: 31.77 KG/M2 | HEART RATE: 85 BPM | SYSTOLIC BLOOD PRESSURE: 107 MMHG | OXYGEN SATURATION: 98 % | HEIGHT: 60 IN | DIASTOLIC BLOOD PRESSURE: 75 MMHG | TEMPERATURE: 97.1 F

## 2018-11-05 DIAGNOSIS — H69.91 DYSFUNCTION OF RIGHT EUSTACHIAN TUBE: Primary | ICD-10-CM

## 2018-11-05 PROCEDURE — 99213 OFFICE O/P EST LOW 20 MIN: CPT | Performed by: NURSE PRACTITIONER

## 2018-11-05 RX ORDER — PREDNISONE 20 MG/1
TABLET ORAL
Qty: 20 TABLET | Refills: 0 | Status: SHIPPED | OUTPATIENT
Start: 2018-11-05 | End: 2019-01-29

## 2018-11-05 RX ORDER — PSEUDOEPHEDRINE HCL 120 MG/1
120 TABLET, FILM COATED, EXTENDED RELEASE ORAL EVERY 12 HOURS
Qty: 28 TABLET | Refills: 0 | Status: SHIPPED | OUTPATIENT
Start: 2018-11-05 | End: 2019-02-22

## 2018-11-05 NOTE — PATIENT INSTRUCTIONS
For the ear:  Take the prednisone as prescribed.  Again, can cause trouble sleeping, irritability, and increased appetite.  Take also the Sudafed twice a day for 14 days to help decongest the ear canal.    At [facility], we strive to deliver an exceptional experience to you, every time we see you.  If you receive a survey in the mail, please send us back your thoughts. We really do value your feedback.    Your care team:                            Family Medicine Internal Medicine   MD Antonio Mistry MD Shantel Branch-Fleming, MD Katya Georgiev PA-C Megan Hill, APRJEFFERY Yap MD Pediatrics   Luis Fernando Rhoades, PAReneC  Thu Watson, MD Cira Velazquez APRN CNP   MD Haydee Sherman MD Deborah Mielke, MD Kim Thein, APRN CNP      Clinic hours: Monday - Thursday 7 am-7 pm; Fridays 7 am-5 pm.   Urgent care: Monday - Friday 11 am-9 pm; Saturday and Sunday 9 am-5 pm.  Pharmacy : Monday -Thursday 8 am-8 pm; Friday 8 am-6 pm; Saturday and Sunday 9 am-5 pm.     Clinic: [phone]   Pharmacy: [phone]

## 2018-11-05 NOTE — TELEPHONE ENCOUNTER
"Received original form and letter back with a note from the provider to \" Pt would like this faxed to Mina, Atten: [place], [phone]\". Faxed and right fax confirmed at 3:16 pm today. Copy to TC and abstracting.  Jennifer Umana MA/  For Teams [employer] and Robyn    "

## 2018-11-05 NOTE — LETTER
November 5, 2018      Aleena Escamilla  [address] [facility] [address]        To Whom It May Concern:    Aleena Escamilla was seen in our clinic 11/5/2018 for follow up/continued problems with her ear. She may return to work without restrictions 11/5/2018.      Sincerely,        PIA Clayton CNP

## 2018-11-05 NOTE — PROGRESS NOTES
SUBJECTIVE:   Aleena Escamilla is a 43 year old female who presents to clinic today for the following health issues:      Pt states that she is still have the right ear problem.  Now states cannot hear out of ear. No Pain.  Finished antibiotics.  Denies fever.  Also has some residual nasal congestion.  States after every time she gets sick, takes 1-2 months for hearing to return.  Has been seen by ENT previously who stated was eustachian tube dysfunction.     Problem list and histories reviewed & adjusted, as indicated.  Additional history: as documented    Patient Active Problem List   Diagnosis     CARDIOVASCULAR SCREENING; LDL GOAL LESS THAN 160     Chronic pelvic pain in female     Chronic salpingitis and oophoritis     Major depressive disorder, recurrent episode, mild (H)     Generalized anxiety disorder     Arthritis, multiple joint involvement     Primary osteoarthritis of both hands     Fibromyalgia     Malignant neoplasm of overlapping sites of right female breast (H)     BRCA gene positive     Carpal tunnel syndrome     Non morbid drug-induced obesity     Chronic right shoulder pain     Bunion, right     Onychomycosis     Elevated LFTs     Fatty liver     Hyperlipidemia LDL goal <130     Past Surgical History:   Procedure Laterality Date     BIOPSY BREAST NEEDLE LOCALIZATION Right 1/19/2016    Procedure: BIOPSY BREAST NEEDLE LOCALIZATION;  Surgeon: Adelina Demarco MD;  Location:  OR     GYN SURGERY  2-    bilateral salpingectomy, left oophrectomy     LAPAROSCOPIC SALPINGO-OOPHORECTOMY Right 10/26/2016    Procedure: LAPAROSCOPIC SALPINGO-OOPHORECTOMY;  Surgeon: Bouchra Mayo MD;  Location: [facility]     LUMPECTOMY BREAST WITH SENTINEL NODE, COMBINED Right 1/19/2016    Procedure: COMBINED LUMPECTOMY BREAST WITH SENTINEL NODE;  Surgeon: Adelina Demarco MD;  Location:  OR     PELVIS LAPAROSCOPY,DX  12/28/1998     RELEASE CARPAL TUNNEL Left 12/23/2016    Procedure: RELEASE CARPAL  TUNNEL;  Surgeon: Sam Florence MD;  Location:  OR       Social History   Substance Use Topics     Smoking status: Never Smoker     Smokeless tobacco: Never Used     Alcohol use Yes      Comment: 1 drink per week.     Family History   Problem Relation Age of Onset     Diabetes Mother      Hypertension Mother      Cancer Maternal Grandmother      cervical CA     Cardiovascular Father      MI     Cancer Maternal Aunt      cervical cancer     Pancreatic Cancer Maternal Aunt      COD         Current Outpatient Prescriptions   Medication Sig Dispense Refill     Cholecalciferol (VITAMIN D) 2000 UNITS tablet Take 2,000 Units by mouth daily 100 tablet 3     ibuprofen (ADVIL/MOTRIN) 600 MG tablet Take 1 tablet (600 mg) by mouth every 6 hours as needed for moderate pain 30 tablet 1     IBUPROFEN PO Take 400 mg by mouth every 6 hours as needed for moderate pain       letrozole (FEMARA) 2.5 MG tablet Take 1 tablet (2.5 mg) by mouth daily 90 tablet 3     predniSONE (DELTASONE) 20 MG tablet Take 3 tabs (60 mg) by mouth daily x 3 days, 2 tabs (40 mg) daily x 3 days, 1 tab (20 mg) daily x 3 days, then 1/2 tab (10 mg) x 3 days. 20 tablet 0     pseudoePHEDrine (SUDAFED) 120 MG 12 hr tablet Take 1 tablet (120 mg) by mouth every 12 hours for 14 days 28 tablet 0     venlafaxine (EFFEXOR-XR) 75 MG 24 hr capsule Take 1 capsule (75 mg) by mouth daily 90 capsule 1     BP Readings from Last 3 Encounters:   11/05/18 107/75   10/15/18 116/81   10/08/18 108/80    Wt Readings from Last 3 Encounters:   11/05/18 161 lb 12.8 oz (73.4 kg)   10/15/18 161 lb (73 kg)   10/08/18 159 lb 9.6 oz (72.4 kg)                    Reviewed and updated as needed this visit by clinical staff  Tobacco  Allergies  Meds  Med Hx  Surg Hx  Fam Hx  Soc Hx      Reviewed and updated as needed this visit by Provider  Tobacco  Allergies  Meds  Med Hx  Surg Hx  Fam Hx  Soc Hx        ROS:  Constitutional, HEENT, cardiovascular, pulmonary, gi and gu systems  are negative, except as otherwise noted.    OBJECTIVE:     /75 (BP Location: Left arm, Patient Position: Chair, Cuff Size: Adult Regular)  Pulse 85  Temp 97.1  F (36.2  C) (Oral)  Ht 5' (1.524 m)  Wt 161 lb 12.8 oz (73.4 kg)  LMP 03/01/2016  SpO2 98%  Breastfeeding? No  BMI 31.6 kg/m2  Body mass index is 31.6 kg/(m^2).  GENERAL: healthy, alert and no distress  EYES: Eyes grossly normal to inspection, PERRL and conjunctivae and sclerae normal  HENT: normal cephalic/atraumatic, right ear: clear effusion, left ear: normal: no effusions, no erythema, normal landmarks, nose and mouth without ulcers or lesions, oropharynx clear and oral mucous membranes moist  NECK: no adenopathy, no asymmetry, masses, or scars and thyroid normal to palpation  RESP: lungs clear to auscultation - no rales, rhonchi or wheezes  CV: regular rate and rhythm, normal S1 S2, no S3 or S4, no murmur, click or rub, no peripheral edema and peripheral pulses strong  ABDOMEN: soft, nontender, no hepatosplenomegaly, no masses and bowel sounds normal  MS: no gross musculoskeletal defects noted, no edema    Diagnostic Test Results:  none     ASSESSMENT/PLAN:     1. Dysfunction of right eustachian tube  Trial of below.  - predniSONE (DELTASONE) 20 MG tablet; Take 3 tabs (60 mg) by mouth daily x 3 days, 2 tabs (40 mg) daily x 3 days, 1 tab (20 mg) daily x 3 days, then 1/2 tab (10 mg) x 3 days.  Dispense: 20 tablet; Refill: 0  - pseudoePHEDrine (SUDAFED) 120 MG 12 hr tablet; Take 1 tablet (120 mg) by mouth every 12 hours for 14 days  Dispense: 28 tablet; Refill: 0    Patient Instructions     For the ear:  Take the prednisone as prescribed.  Again, can cause trouble sleeping, irritability, and increased appetite.  Take also the Sudafed twice a day for 14 days to help decongest the ear canal.    At [facility], we strive to deliver an exceptional experience to you, every time we see you.  If you receive a survey in the mail,  please send us back your thoughts. We really do value your feedback.    Your care team:                            Family Medicine Internal Medicine   MD Antonio Mistry MD Shantel Branch-Fleming, MD Katya Georgiev PA-C Megan Hill, APRN CNP Nam Ho, MD Pediatrics   DRAGAN Mancera, MD Cira Velazquez CNP, MD Bethany Templen, MD Deborah Mielke, MD Kim Thein, APRN CNP      Clinic hours: Monday - Thursday 7 am-7 pm; Fridays 7 am-5 pm.   Urgent care: Monday - Friday 11 am-9 pm; Saturday and Sunday 9 am-5 pm.  Pharmacy : Monday -Thursday 8 am-8 pm; Friday 8 am-6 pm; Saturday and Sunday 9 am-5 pm.     Clinic: [phone]   Pharmacy: [phone]            PIA Clayton CNP  [facility]

## 2018-11-05 NOTE — MR AVS SNAPSHOT
After Visit Summary   11/5/2018    Aleena Escamilla    MRN: [id number]           Patient Information     Date Of Birth          1975        Visit Information        Provider Department      11/5/2018 11:00 AM Thu Watson APRN CNP [facility]        Today's Diagnoses     Dysfunction of right eustachian tube    -  1      Care Instructions    For the ear:  Take the prednisone as prescribed.  Again, can cause trouble sleeping, irritability, and increased appetite.  Take also the Sudafed twice a day for 14 days to help decongest the ear canal.    At [facility], we strive to deliver an exceptional experience to you, every time we see you.  If you receive a survey in the mail, please send us back your thoughts. We really do value your feedback.    Your care team:                            Family Medicine Internal Medicine   MD Antonio Mistry MD Shantel Branch-Fleming, MD Katya Georgiev PA-C Megan Hill, APRN CNP Nam Ho, MD Pediatrics   DRAGAN Mancera, MD Cira Velazquez CNP, MD Bethany Templen, MD Deborah Mielke, MD Kim Thein, PIA [facility]      Clinic hours: Monday - Thursday 7 am-7 pm; Fridays 7 am-5 pm.   Urgent care: Monday - Friday 11 am-9 pm; Saturday and Sunday 9 am-5 pm.  Pharmacy : Monday -Thursday 8 am-8 pm; Friday 8 am-6 pm; Saturday and Sunday 9 am-5 pm.     Clinic: [phone]   Pharmacy: [phone]                Follow-ups after your visit        Your next 10 appointments already scheduled     Dec 28, 2018  8:00 AM CST   (Arrive by 7:45 AM)   MA SCREENING BILATERAL W/ CARLA with MGMA1, MG MONTENEGRO [facility] ([facility])    [address] [place] [id number] [phone]           How do I prepare for my exam? (Food and drink instructions) No Food and Drink Restrictions.  How do I prepare for my  "exam? (Other instructions) Do not use any powder, lotion or deodorant under your arms or on your breast. If you do, we will ask you to remove it before your exam.  What should I wear: Wear comfortable, two-piece clothing.  How long does the exam take: Most scans will take 15 minutes.  What should I bring: Bring any previous mammograms from other facilities or have them mailed to the breast center.  Do I need a :  No  is needed.  What do I need to tell my doctor: If you have any allergies, tell your care team.  What should I do after the exam: No restrictions, You may resume normal activities.  What is this test: This test is an x-ray of the breast to look for breast disease. The breast is pressed between two plates to flatten and spread the tissue. An X-ray is taken of the breast from different angles.  Who should I call with questions: If you have any questions, please call the Imaging Department where you will have your exam. Directions, parking instructions, and other information is available on our website, [email or web address].  Other information about my exam Three-dimensional (3D) mammograms are available at [facility] locations in [facility], [facility], [place], and [place]. [facility] locations include [place] and the [facility] in [place].  Benefits of 3D mammograms include: * Improved rate of cancer detection * Decreases your chance of having to go back for more tests, which means fewer: * \"False-positive\" results (This means that there is an abnormal area but it isn't cancer.) * Invasive testing procedures, such as a biopsy or surgery * Can provide clearer images of the breast if you have dense breast tissue.  *3D mammography is an optional exam that anyone can have with a 2D mammogram. It doesn't replace or take the place of a 2D mammogram. 2D mammograms remain an effective screening test for all women.  Not all insurance companies cover " the cost of a 3D mammogram. Check with your insurance.            Jan 22, 2019 10:15 AM CST   LAB with LAB ONC [facility] ([facility])    [address] [place] [address] [phone]           Please do not eat 10-12 hours before your appointment if you are coming in fasting for labs on lipids, cholesterol, or glucose (sugar). This does not apply to pregnant women. Water, hot tea and black coffee (with nothing added) are okay. Do not drink other fluids, diet soda or chew gum.            Jan 22, 2019 11:00 AM CST   Return Visit with Bessy Tate MD   [facility]    [address] [place] [address] [phone]              Who to contact     If you have questions or need follow up information about today's clinic visit or your schedule please contact [facility] directly at [phone].  Normal or non-critical lab and imaging results will be communicated to you by Locciehart, letter or phone within 4 business days after the clinic has received the results. If you do not hear from us within 7 days, please contact the clinic through [employer] or phone. If you have a critical or abnormal lab result, we will notify you by phone as soon as possible.  Submit refill requests through [employer] or call your pharmacy and they will forward the refill request to us. Please allow 3 business days for your refill to be completed.          Additional Information About Your Visit        [employer] Information     [employer] gives you secure access to your electronic health record. If you see a primary care provider, you can also send messages to your care team and make appointments. If you have questions, please call your primary care clinic.  If you do not have a primary care provider, please call [phone] and they will assist you.        Care EveryWhere ID     This is your Care EveryWhere ID.  This could be used by other organizations to access your [facility] medical records  [id number]        Your Vitals Were     Pulse Temperature Height Last Period Pulse Oximetry Breastfeeding?    85 97.1  F (36.2  C) (Oral) 5' (1.524 m) 03/01/2016 98% No    BMI (Body Mass Index)                   31.6 kg/m2            Blood Pressure from Last 3 Encounters:   11/05/18 107/75   10/15/18 116/81   10/08/18 108/80    Weight from Last 3 Encounters:   11/05/18 161 lb 12.8 oz (73.4 kg)   10/15/18 161 lb (73 kg)   10/08/18 159 lb 9.6 oz (72.4 kg)              Today, you had the following     No orders found for display         Today's Medication Changes          These changes are accurate as of 11/5/18 11:25 AM.  If you have any questions, ask your nurse or doctor.               Start taking these medicines.        Dose/Directions    predniSONE 20 MG tablet   Commonly known as:  DELTASONE   Used for:  Dysfunction of right eustachian tube   Started by:  Thu Watson APRN CNP        Take 3 tabs (60 mg) by mouth daily x 3 days, 2 tabs (40 mg) daily x 3 days, 1 tab (20 mg) daily x 3 days, then 1/2 tab (10 mg) x 3 days.   Quantity:  20 tablet   Refills:  0       pseudoePHEDrine 120 MG 12 hr tablet   Commonly known as:  SUDAFED   Used for:  Dysfunction of right eustachian tube   Started by:  Thu Watson APRN CNP        Dose:  120 mg   Take 1 tablet (120 mg) by mouth every 12 hours for 14 days   Quantity:  28 tablet   Refills:  0         Stop taking these medicines if you haven't already. Please contact your care team if you have questions.     amoxicillin-clavulanate 875-125 MG per tablet   Commonly known as:  AUGMENTIN   Stopped by:  Thu Watson APRN CNP           Dextromethorphan-Guaifenesin  MG Tb12   Stopped by:  Thu Watson APRN CNP           guaiFENesin-codeine 100-10 MG/5ML Soln solution   Commonly known as:  ROBITUSSIN AC   Stopped by:  Thu Watson  PIA Rodriguez CNP           oseltamivir 75 MG capsule   Commonly known as:  TAMIFLU   Stopped by:  Thu Watson APRN CNP                Where to get your medicines      These medications were sent to [facility] - [place], [place] - [address] [facility] AT [facility] [address]  [address] [facility] [address]    Hours:  24-hours Phone:  [phone]     predniSONE 20 MG tablet         Some of these will need a paper prescription and others can be bought over the counter.  Ask your nurse if you have questions.     Bring a paper prescription for each of these medications     pseudoePHEDrine 120 MG 12 hr tablet                Primary Care Provider Office Phone # Fax #    Concepcion Tana Bocanegra [phone]       [address] [address] [facility] [address]        Equal Access to Services     [facility]: Hadii jame ku hadasho Soomaali, waaxda luqadaha, qaybta kaalmada adeegyada, waxay romainin hayaacheyanne chirinos . So [facility] [phone].    ATENCIÓN: Si habla español, tiene a wiley disposición servicios gratuitos de asistencia lingüística. Cam al [phone].    We comply with applicable federal civil rights laws and [place] laws. We do not discriminate on the basis of race, color, national origin, age, disability, sex, sexual orientation, or gender identity.            Thank you!     Thank you for choosing [facility]  for your care. Our goal is always to provide you with excellent care. Hearing back from our patients is one way we can continue to improve our services. Please take a few minutes to complete the written survey that you may receive in the mail after your visit with us. Thank you!             Your Updated Medication List - Protect others around you: Learn how to safely use, store and throw away your medicines at www.disposemymeds.org.          This list is accurate as of 11/5/18 11:25 AM.  Always use your most  recent med list.                   Brand Name Dispense Instructions for use Diagnosis    * IBUPROFEN PO      Take 400 mg by mouth every 6 hours as needed for moderate pain        * ibuprofen 600 MG tablet    ADVIL/MOTRIN    30 tablet    Take 1 tablet (600 mg) by mouth every 6 hours as needed for moderate pain    Flu-like symptoms       letrozole 2.5 MG tablet    FEMARA    90 tablet    Take 1 tablet (2.5 mg) by mouth daily    Malignant neoplasm of right breast in female, estrogen receptor positive, unspecified site of breast (H)       predniSONE 20 MG tablet    DELTASONE    20 tablet    Take 3 tabs (60 mg) by mouth daily x 3 days, 2 tabs (40 mg) daily x 3 days, 1 tab (20 mg) daily x 3 days, then 1/2 tab (10 mg) x 3 days.    Dysfunction of right eustachian tube       pseudoePHEDrine 120 MG 12 hr tablet    SUDAFED    28 tablet    Take 1 tablet (120 mg) by mouth every 12 hours for 14 days    Dysfunction of right eustachian tube       venlafaxine 75 MG 24 hr capsule    EFFEXOR-XR    90 capsule    Take 1 capsule (75 mg) by mouth daily    Hot flashes related to aromatase inhibitor therapy, Malignant neoplasm of overlapping sites of right breast in female, estrogen receptor positive (H)       vitamin D 2000 units tablet     100 tablet    Take 2,000 Units by mouth daily    Vitamin D deficiency       * Notice:  This list has 2 medication(s) that are the same as other medications prescribed for you. Read the directions carefully, and ask your doctor or other care provider to review them with you.

## 2018-11-08 DIAGNOSIS — E55.9 VITAMIN D DEFICIENCY: ICD-10-CM

## 2018-11-09 ENCOUNTER — TELEPHONE (OUTPATIENT)
Dept: FAMILY MEDICINE | Facility: CLINIC | Age: 43
End: 2018-11-09

## 2018-11-09 NOTE — TELEPHONE ENCOUNTER
"Requested Prescriptions   Pending Prescriptions Disp Refills     Cholecalciferol (VITAMIN D3) 2000 units TABS [Pharmacy Med Name: VITAMIN D3 2,000IU TABLETS]  Last Written Prescription Date:  09/22/17  Last Fill Quantity: 100,  # refills: 3   Last Office Visit with MALI, MAGGIE or [facility] prescribing provider:  11/05/18-Shirley   Future Office Visit:    Next 5 appointments (look out 90 days)     Jan 22, 2019 11:00 AM CST   [facility]y [facility]   [facility]    [address] [place] [address] [phone]                100 tablet 0     Sig: TAKE 1 TABLET BY MOUTH ONCE DAILY    Vitamin Supplements (Adult) Protocol Passed    11/8/2018  9:43 PM       Passed - High dose Vitamin D not ordered       Passed - Recent (12 mo) or future (30 days) visit within the authorizing provider's specialty    Patient had office visit in the last 12 months or has a visit in the next 30 days with authorizing provider or within the authorizing provider's specialty.  See \"Patient Info\" tab in inbasket, or \"Choose Columns\" in Meds & Orders section of the refill encounter.                "

## 2018-11-09 NOTE — TELEPHONE ENCOUNTER
[facility] stated [facility] the [facility] forms and AVS from 10/8/18 with Kathryn.     Patient stated that her case will be closed 11/14/18 if not all paper work has been received.    Patient stated that [facility] has attempted to contact [place] about this case several times and no success.    [facility] Phone number: [phone]  [facility] fax number: [phone]    Her  is Carmelita Foster  extension: [phone]    Best time to reach patient: is 7AM - 11 AM  Phone number: [phone]

## 2018-11-10 NOTE — TELEPHONE ENCOUNTER
What type of form? disability / insurance  What day did you drop off your forms? Friday, 11/9/18  Is there a due date? asap (7-10 business day to compete forms)   How would you like to receive these forms? Please fax to [phone]    **Patient will also  original forms when completed*    **Insurance company also needs a copy of the AVS with Shirley on 10/15/18    **Patient stated insurance will close her case if all paper work is not submitted by 11/14/18.    Which clinic was the form dropped off at? [facility]    What is the best number to contact you? Cell [phone]  What time works best to contact you with in 4 hrs?   7 AM - 11 AM  Is it okay to leave a message? Yes    Gagan Almaraz

## 2018-11-12 RX ORDER — CHOLECALCIFEROL (VITAMIN D3) 50 MCG
TABLET ORAL
Qty: 100 TABLET | Refills: 1 | Status: SHIPPED | OUTPATIENT
Start: 2018-11-12 | End: 2019-05-16

## 2018-11-12 NOTE — TELEPHONE ENCOUNTER
AVS dated 10/8/18, [place] form and Physician statement completed by Kathryn is printed from pt's chart and faxed today to [employer] at fax # [phone].  Asher Chandler,  [employer]

## 2018-11-12 NOTE — TELEPHONE ENCOUNTER
Prescription approved per [facility] Refill Protocol.      Amrik Guthrie RN, BSN

## 2018-11-14 NOTE — TELEPHONE ENCOUNTER
Received DOMI and faxed DOMI and office visit notes from 10/8/18 and 10/15/18 to Mina, [phone], right fax confirmed at 12:06 pm today. Copy of DOMI to TC and abstracting. Routing encounter to Thu to advise on the forms that are to be still filled out. Thu returns to the clinic tomorrow.  Jnenifer Umana MA/  [employer] [facility]

## 2018-11-14 NOTE — TELEPHONE ENCOUNTER
Pt stopped by clinic to check on form completion status -  Informed pt. That form has not been completed -- pt stated that the form was due today or benefits would stop - pt. is requesting that a copy of office visits notes for Dates of service for Oct 8th and Oct 15th  be faxed to [facility] short term disability [phone] or benefits will stop tonight at 12:00am per STD .    Pt signed an DOMI -        DOMI  ON [facility]      What is the best number to contact you? Pt can be reached at ( [phone]  What time works best to contact you?  Anytime     Mariana Napoles, Patient Rep  [facility]

## 2018-11-15 NOTE — TELEPHONE ENCOUNTER
Forms completed for the dates I saw patient (10/15-10/17).  The provider who saw her prior to that already completed forms.  Thanks,  PIA Clayton CNP

## 2018-11-15 NOTE — TELEPHONE ENCOUNTER
Faxed completed/signed forms with claim # [id number] and Leave # [id number] to [facility] [phone], right fax confirmed at 10:01 am today. Bringing Originals to the  by 1:00 pm today. Copies to TC and abstracting. Called and left a voicemail message regarding form .  Jennifer Umana MA/  [employer] [facility] Robyn

## 2018-12-28 ENCOUNTER — ANCILLARY PROCEDURE (OUTPATIENT)
Dept: MAMMOGRAPHY | Facility: CLINIC | Age: 43
End: 2018-12-28
Attending: INTERNAL MEDICINE
Payer: COMMERCIAL

## 2018-12-28 DIAGNOSIS — Z12.31 VISIT FOR SCREENING MAMMOGRAM: ICD-10-CM

## 2018-12-28 PROCEDURE — 77063 BREAST TOMOSYNTHESIS BI: CPT | Performed by: RADIOLOGY

## 2018-12-28 PROCEDURE — 77067 SCR MAMMO BI INCL CAD: CPT | Performed by: RADIOLOGY

## 2019-01-15 NOTE — TELEPHONE ENCOUNTER
1/15/19 Patient never picked up forms, there is a copy scanned in the patient's chart.  [facility].  Jennifer Umana MA/  [employer] [facility]

## 2019-01-22 ENCOUNTER — ONCOLOGY VISIT (OUTPATIENT)
Dept: ONCOLOGY | Facility: CLINIC | Age: 44
End: 2019-01-22
Payer: COMMERCIAL

## 2019-01-22 VITALS
OXYGEN SATURATION: 98 % | TEMPERATURE: 98.9 F | WEIGHT: 159.88 LBS | RESPIRATION RATE: 16 BRPM | HEART RATE: 77 BPM | SYSTOLIC BLOOD PRESSURE: 111 MMHG | HEIGHT: 60 IN | BODY MASS INDEX: 31.39 KG/M2 | DIASTOLIC BLOOD PRESSURE: 79 MMHG

## 2019-01-22 DIAGNOSIS — C50.811 MALIGNANT NEOPLASM OF OVERLAPPING SITES OF RIGHT FEMALE BREAST (H): ICD-10-CM

## 2019-01-22 DIAGNOSIS — Z78.0 MENOPAUSE: Primary | ICD-10-CM

## 2019-01-22 LAB
ALBUMIN SERPL-MCNC: 4.1 G/DL (ref 3.4–5)
ALP SERPL-CCNC: 111 U/L (ref 40–150)
ALT SERPL W P-5'-P-CCNC: 193 U/L (ref 0–50)
AST SERPL W P-5'-P-CCNC: 84 U/L (ref 0–45)
BILIRUB DIRECT SERPL-MCNC: 0.1 MG/DL (ref 0–0.2)
BILIRUB SERPL-MCNC: 0.5 MG/DL (ref 0.2–1.3)
PROT SERPL-MCNC: 8.4 G/DL (ref 6.8–8.8)

## 2019-01-22 PROCEDURE — 36415 COLL VENOUS BLD VENIPUNCTURE: CPT | Performed by: INTERNAL MEDICINE

## 2019-01-22 PROCEDURE — 99214 OFFICE O/P EST MOD 30 MIN: CPT | Performed by: INTERNAL MEDICINE

## 2019-01-22 PROCEDURE — 80076 HEPATIC FUNCTION PANEL: CPT | Performed by: INTERNAL MEDICINE

## 2019-01-22 ASSESSMENT — MIFFLIN-ST. JEOR: SCORE: 1306.69

## 2019-01-22 ASSESSMENT — PAIN SCALES - GENERAL: PAINLEVEL: SEVERE PAIN (7)

## 2019-01-22 NOTE — LETTER
1/22/2019         RE: Aleena Escamilla  [address] [facility] [address]        Dear Colleague,    Thank you for referring your patient, Aleena Escamilla, to the [facility]. Please see a copy of my visit note below.    Visit Date:   01/22/2019      ONCOLOGY DIAGNOSIS:   1. January 2016: Diagnosed with stage II multifocal invasive lobular carcinoma of the right breast. Final pathology showed a grade 2 with the first foci measuring 36 x 16 x 8 mm and the second one 21 x 14 x 11 mm. LCIS classical type present, lymphovascular invasion not identified. Margins negative, estrogen, progesterone receptor positive by IHC and HER-2 not amplified performed on a core biopsy. 1/4 lymph nodes positive with the greatest dimension being 10 mm. No extranodal extension. Oncotype DX recurrence score 25.   2. Genetics: Variant of Uncertain Significance in BRCA2 gene.        THERAPY TO DATE:   1. January 19, 2016: Right breast partial mastectomy with wire localization and right axillary sentinel node biopsy.   2. February 2016 to June 2016. Weekly Taxol followed by dose-dense AC.    3.  August 2016 to September 2016. Radiotherapy.    4. October 26, 2016: Laparoscopic right oophorectomy, biopsy of pelvic nodule, removal of a portion of the right fallopian tube. Pathology: right pelvic nodule showed fibrous nodule with calcifications, endosalpinosis. Right ovary with serous cystadenoma, benign epithelial inclusion cyst, negative for malignancy. A portion of the right fallopian tube with no significant histologic abnormality.   5. May 2016 to Present. Letrozole.      INTERVAL HISTORY:  Aleena is a 43-year-old female diagnosed with pT2N1a MX multifocal invasive lobular carcinoma of the right breast in 06/2016 after self-palpating a right breast mass.  The patient presents to clinic for followup of malignancy.  On today's visit, states that occasionally she has some pain in her breast, but overall this is  improved and tolerating the letrozole without issues.  No longer having hot flashes and has come off of the Effexor.  One of her concerns is her elevated liver function tests.  She knows this is related to fatty liver.  She is supposed to have LFTs performed today.  Denies any fevers, chills, nausea, vomiting, chest pain, shortness of breath, cough, no abdominal discomfort, change in bowels, melena or hematochezia.  No new palpable masses.  Remainder comprehensive review of systems is negative.      PAST MEDICAL HISTORY:(Not addressed on today's visit.)  1. Multifocal right breast cancer.   2. History of migraines.   3. History of chronic pelvic pain.   4. Bilateral carpal tunnel       PAST SURGICAL HISTORY: (Not addressed on today's visit.)  1. Right breast lumpectomy 01/2016.   2. Pelvic laparoscopy.   3. Bilateral salpingectomy, left oophorectomy 02/2001 for pelvic pain, uterus intact.   4. 10/2016, right oophorectomy.   5. 11/2016, right carpal tunnel release.   6. Right foot bunion removed.     SOCIAL HISTORY:  , comes in alone, has 2 children.  Continue to work at [employer].  No current tobacco use.  Does admit to caffeine use.      PHYSICAL EXAMINATION:   VITAL SIGNS:  Blood pressure 111/79, pulse 77, respirations 16, temperature 98.9, pulse ox 98% on room air, weight 159 pounds, BMI 31.22.   GENERAL:  Comfortable, in no acute distress, pleasant.   HEENT:  Atraumatic, normocephalic.  Pupils equal, round, reactive.  Sclerae are anicteric.  Oropharynx:  Moist mucous membranes, no lesions, ulcers.   NECK:  Supple, full range of motion.  Trachea midline.   HEART:  Regular rhythm.  Normal S1, S2.  No murmurs or gallops.  No edema.   LUNGS:  Clear to auscultation bilaterally.  No crackles or wheezes.  Normal respiratory effort.    ABDOMEN:  Positive bowel sounds, soft, no tenderness, nondistended. No hepatosplenomegaly.   EXTREMITIES:  No cyanosis, warm.   MUSCULOSKELETAL: No point tenderness.   LYMPHATICS:  No  cervical, supraclavicular or axillary nodes palpable.   SKIN:   No petechiae or rashes.   NEUROLOGIC:  Alert and oriented.   BREASTS:  Examined in the upright, supine position.  Right breast, no dominant masses, nipple everted without discharge.  Left breast, no dominant masses, nipple everted without discharge.      LABORATORY DATA:  Alkaline phosphatase 111, , AST 84.    Mammogram from 2018:  No significant interval change or suspicious findings in either breast.    DEXA scan from 2016 showed normal bone density.      ASSESSMENT AND PLAN:   1.  Stage II, pT2N1a MX multifocal invasive lobular carcinoma.  No evidence of recurrence by history, physical.  Continue Femara.  Return to clinic in 6 months to see MD with labs, LFT, CA 27-29.  Next mammogram 2019.   2.  Right apical reticulation.  CT does not show any evidence of pulmonary abnormalities.  No further CTs unless symptoms dictate.   3.  Hot flashes.  Resolved.  has come off of Effexor.  Encouraged a healthy lifestyle by avoiding caffeine, alcohol, chocolate.     4.  Headaches.  Resolved after coming off Effexor.   5.  BRCA2 variant of undetermined significance.  Recommend patient contact genetics team to see whether she would be a candidate for any further testing.   6.  Elevated liver function tests, thought to be related to fatty liver.  The patient will be given results and to follow up with her primary.   7.  Health care maintenance.  Discussed importance of healthy lifestyle with diet and exercise.  Recommend 150 minutes of moderate exercise a week with diet rich in fruits, vegetables, fiber and avoiding processed meat.   8.  Bone health encouraged. Encouraged calcium with vitamin D.  Check DEXA scan.         ALBERTO RENEE MD             D: 2019   T: 2019   MT: YOKO      Name:     KEYUR GALVAN   MRN:      [id number]        Account:      [id number]   :      1975           Visit Date:   2019       Document: [id number]       cc: Concepcion Bocanegra MD       Again, thank you for allowing me to participate in the care of your patient.        Sincerely,        Bessy Tate MD

## 2019-01-22 NOTE — NURSING NOTE
Oncology Rooming Note    January 22, 2019 10:39 AM   Aleena Escamilla is a 43 year old female who presents for:    Chief Complaint   Patient presents with     Oncology Clinic Visit     6 month follow up     Initial Vitals: /79 (BP Location: Left arm)   Pulse 77   Temp 98.9  F (37.2  C) (Oral)   Resp 16   Ht 1.524 m (5')   Wt 72.5 kg (159 lb 14 oz)   LMP 03/01/2016   SpO2 98%   BMI 31.22 kg/m   Estimated body mass index is 31.22 kg/m  as calculated from the following:    Height as of this encounter: 1.524 m (5').    Weight as of this encounter: 72.5 kg (159 lb 14 oz). Body surface area is 1.75 meters squared.  Severe Pain (7) Comment: Data Unavailable   Patient's last menstrual period was 03/01/2016.  Allergies reviewed: Yes  Medications reviewed: Yes    Medications: Medication refills not needed today.  Pharmacy name entered into [facility]:    [facility] [place], [place] - [address] [id number] - [place], [place] - [address] AT [facility] [place] [address], [address]        5 minutes for nursing intake (face to face time)     Elly Gtz LPN

## 2019-01-23 NOTE — PROGRESS NOTES
Visit Date:   01/22/2019      ONCOLOGY DIAGNOSIS:   1. January 2016: Diagnosed with stage II multifocal invasive lobular carcinoma of the right breast. Final pathology showed a grade 2 with the first foci measuring 36 x 16 x 8 mm and the second one 21 x 14 x 11 mm. LCIS classical type present, lymphovascular invasion not identified. Margins negative, estrogen, progesterone receptor positive by IHC and HER-2 not amplified performed on a core biopsy. 1/4 lymph nodes positive with the greatest dimension being 10 mm. No extranodal extension. Oncotype DX recurrence score 25.   2. Genetics: Variant of Uncertain Significance in BRCA2 gene.        THERAPY TO DATE:   1. January 19, 2016: Right breast partial mastectomy with wire localization and right axillary sentinel node biopsy.   2. February 2016 to June 2016. Weekly Taxol followed by dose-dense AC.    3.  August 2016 to September 2016. Radiotherapy.    4. October 26, 2016: Laparoscopic right oophorectomy, biopsy of pelvic nodule, removal of a portion of the right fallopian tube. Pathology: right pelvic nodule showed fibrous nodule with calcifications, endosalpinosis. Right ovary with serous cystadenoma, benign epithelial inclusion cyst, negative for malignancy. A portion of the right fallopian tube with no significant histologic abnormality.   5. May 2016 to Present. Letrozole.      INTERVAL HISTORY:  Aleena is a 43-year-old female diagnosed with pT2N1a MX multifocal invasive lobular carcinoma of the right breast in 06/2016 after self-palpating a right breast mass.  The patient presents to clinic for followup of malignancy.  On today's visit, states that occasionally she has some pain in her breast, but overall this is improved and tolerating the letrozole without issues.  No longer having hot flashes and has come off of the Effexor.  One of her concerns is her elevated liver function tests.  She knows this is related to fatty liver.  She is supposed to have LFTs  performed today.  Denies any fevers, chills, nausea, vomiting, chest pain, shortness of breath, cough, no abdominal discomfort, change in bowels, melena or hematochezia.  No new palpable masses.  Remainder comprehensive review of systems is negative.      PAST MEDICAL HISTORY:(Not addressed on today's visit.)  1. Multifocal right breast cancer.   2. History of migraines.   3. History of chronic pelvic pain.   4. Bilateral carpal tunnel       PAST SURGICAL HISTORY: (Not addressed on today's visit.)  1. Right breast lumpectomy 01/2016.   2. Pelvic laparoscopy.   3. Bilateral salpingectomy, left oophorectomy 02/2001 for pelvic pain, uterus intact.   4. 10/2016, right oophorectomy.   5. 11/2016, right carpal tunnel release.   6. Right foot bunion removed.     SOCIAL HISTORY:  , comes in alone, has 2 children.  Continue to work at [employer].  No current tobacco use.  Does admit to caffeine use.      PHYSICAL EXAMINATION:   VITAL SIGNS:  Blood pressure 111/79, pulse 77, respirations 16, temperature 98.9, pulse ox 98% on room air, weight 159 pounds, BMI 31.22.   GENERAL:  Comfortable, in no acute distress, pleasant.   HEENT:  Atraumatic, normocephalic.  Pupils equal, round, reactive.  Sclerae are anicteric.  Oropharynx:  Moist mucous membranes, no lesions, ulcers.   NECK:  Supple, full range of motion.  Trachea midline.   HEART:  Regular rhythm.  Normal S1, S2.  No murmurs or gallops.  No edema.   LUNGS:  Clear to auscultation bilaterally.  No crackles or wheezes.  Normal respiratory effort.    ABDOMEN:  Positive bowel sounds, soft, no tenderness, nondistended. No hepatosplenomegaly.   EXTREMITIES:  No cyanosis, warm.   MUSCULOSKELETAL: No point tenderness.   LYMPHATICS:  No cervical, supraclavicular or axillary nodes palpable.   SKIN:   No petechiae or rashes.   NEUROLOGIC:  Alert and oriented.   BREASTS:  Examined in the upright, supine position.  Right breast, no dominant masses, nipple everted without discharge.   Left breast, no dominant masses, nipple everted without discharge.      LABORATORY DATA:  Alkaline phosphatase 111, , AST 84.    Mammogram from 2018:  No significant interval change or suspicious findings in either breast.    DEXA scan from 2016 showed normal bone density.      ASSESSMENT AND PLAN:   1.  Stage II, pT2N1a MX multifocal invasive lobular carcinoma.  No evidence of recurrence by history, physical.  Continue Femara.  Return to clinic in 6 months to see MD with labs, LFT, CA 27-29.  Next mammogram 2019.   2.  Right apical reticulation.  CT does not show any evidence of pulmonary abnormalities.  No further CTs unless symptoms dictate.   3.  Hot flashes.  Resolved.  has come off of Effexor.  Encouraged a healthy lifestyle by avoiding caffeine, alcohol, chocolate.     4.  Headaches.  Resolved after coming off Effexor.   5.  BRCA2 variant of undetermined significance.  Recommend patient contact genetics team to see whether she would be a candidate for any further testing.   6.  Elevated liver function tests, thought to be related to fatty liver.  The patient will be given results and to follow up with her primary.   7.  Health care maintenance.  Discussed importance of healthy lifestyle with diet and exercise.  Recommend 150 minutes of moderate exercise a week with diet rich in fruits, vegetables, fiber and avoiding processed meat.   8.  Bone health encouraged. Encouraged calcium with vitamin D.  Check DEXA scan.         ALBERTO RENEE MD             D: 2019   T: 2019   MT: YOKO      Name:     KEYUR GALVAN   MRN:      [id number]        Account:      [id number]   :      1975           Visit Date:   2019      Document: [id number]       cc: Concepcion Bocanegra MD

## 2019-01-25 ENCOUNTER — ANCILLARY PROCEDURE (OUTPATIENT)
Dept: BONE DENSITY | Facility: CLINIC | Age: 44
End: 2019-01-25
Payer: COMMERCIAL

## 2019-01-25 DIAGNOSIS — Z78.0 MENOPAUSE: ICD-10-CM

## 2019-01-25 PROCEDURE — 77080 DXA BONE DENSITY AXIAL: CPT | Performed by: RADIOLOGY

## 2019-01-29 ENCOUNTER — OFFICE VISIT (OUTPATIENT)
Dept: FAMILY MEDICINE | Facility: CLINIC | Age: 44
End: 2019-01-29
Payer: OTHER MISCELLANEOUS

## 2019-01-29 VITALS
SYSTOLIC BLOOD PRESSURE: 127 MMHG | OXYGEN SATURATION: 96 % | DIASTOLIC BLOOD PRESSURE: 82 MMHG | TEMPERATURE: 97.9 F | BODY MASS INDEX: 31.22 KG/M2 | WEIGHT: 159 LBS | HEART RATE: 74 BPM | HEIGHT: 60 IN

## 2019-01-29 DIAGNOSIS — L72.3 INFECTED SEBACEOUS CYST: ICD-10-CM

## 2019-01-29 DIAGNOSIS — L08.9 INFECTED SEBACEOUS CYST: ICD-10-CM

## 2019-01-29 DIAGNOSIS — Z02.6 ENCOUNTER RELATED TO WORKER'S COMPENSATION CLAIM: Primary | ICD-10-CM

## 2019-01-29 PROCEDURE — 99214 OFFICE O/P EST MOD 30 MIN: CPT | Performed by: PREVENTIVE MEDICINE

## 2019-01-29 RX ORDER — CEPHALEXIN 500 MG/1
500 CAPSULE ORAL 3 TIMES DAILY
Qty: 21 CAPSULE | Refills: 0 | Status: SHIPPED | OUTPATIENT
Start: 2019-01-29 | End: 2019-02-22

## 2019-01-29 RX ORDER — IBUPROFEN 600 MG/1
600 TABLET, FILM COATED ORAL EVERY 8 HOURS PRN
Qty: 20 TABLET | Refills: 0 | Status: SHIPPED | OUTPATIENT
Start: 2019-01-29

## 2019-01-29 ASSESSMENT — PAIN SCALES - GENERAL: PAINLEVEL: EXTREME PAIN (8)

## 2019-01-29 ASSESSMENT — MIFFLIN-ST. JEOR: SCORE: 1302.72

## 2019-01-29 NOTE — PROGRESS NOTES
SUBJECTIVE:   Aleena Escamilla is a 43 year old female who presents to clinic today for the following health issues:      Injury/pain/bump      Duration: 1-2 months    Description (location/character/radiation): head    Intensity:  8/10    Accompanying signs and symptoms: none    History (similar episodes/previous evaluation): None    Precipitating or alleviating factors: None    Therapies tried and outcome: IBU does not healp     Work Comp visit  Happened between November and December, was moving something, when got up to stand, hit head on the underside of a metal slab (that keyboards are placed)   Bump a size of a bean   For the last few days now has noted that bump has gotten bigger  No pain throughout except since yesterday morning  Feels throbbing inside  No pus or blood  No severe headaches or vision changes  Head hurts with laying down too     Problem list and histories reviewed & adjusted, as indicated.  Additional history: as documented    Patient Active Problem List   Diagnosis     CARDIOVASCULAR SCREENING; LDL GOAL LESS THAN 160     Chronic pelvic pain in female     Chronic salpingitis and oophoritis     Major depressive disorder, recurrent episode, mild (H)     Generalized anxiety disorder     Arthritis, multiple joint involvement     Primary osteoarthritis of both hands     Fibromyalgia     Malignant neoplasm of overlapping sites of right female breast (H)     BRCA gene positive     Carpal tunnel syndrome     Non morbid drug-induced obesity     Chronic right shoulder pain     Bunion, right     Onychomycosis     Elevated LFTs     Fatty liver     Hyperlipidemia LDL goal <130     Past Surgical History:   Procedure Laterality Date     BIOPSY BREAST NEEDLE LOCALIZATION Right 1/19/2016    Procedure: BIOPSY BREAST NEEDLE LOCALIZATION;  Surgeon: Adelina Demarco MD;  Location: [facility] OR     GYN SURGERY  2-    bilateral salpingectomy, left oophrectomy     LAPAROSCOPIC SALPINGO-OOPHORECTOMY Right  10/26/2016    Procedure: LAPAROSCOPIC SALPINGO-OOPHORECTOMY;  Surgeon: Bouchra Mayo MD;  Location: [facility] OR     LUMPECTOMY BREAST WITH SENTINEL NODE, COMBINED Right 1/19/2016    Procedure: COMBINED LUMPECTOMY BREAST WITH SENTINEL NODE;  Surgeon: Adelina Demarco MD;  Location: [facility] OR     PELVIS LAPAROSCOPY,DX  12/28/1998     RELEASE CARPAL TUNNEL Left 12/23/2016    Procedure: RELEASE CARPAL TUNNEL;  Surgeon: Sam Florence MD;  Location: [facility] OR       Social History     Tobacco Use     Smoking status: Never Smoker     Smokeless tobacco: Never Used   Substance Use Topics     Alcohol use: Yes     Comment: 1 drink per week.     Family History   Problem Relation Age of Onset     Diabetes Mother      Hypertension Mother      Cancer Maternal Grandmother         cervical CA     Cardiovascular Father         MI     Cancer Maternal Aunt         cervical cancer     Pancreatic Cancer Maternal Aunt         COD         Current Outpatient Medications   Medication Sig Dispense Refill     cephALEXin (KEFLEX) 500 MG capsule Take 1 capsule (500 mg) by mouth 3 times daily for 7 days 21 capsule 0     Cholecalciferol (VITAMIN D3) 2000 units TABS TAKE 1 TABLET BY MOUTH ONCE DAILY 100 tablet 1     ibuprofen (ADVIL/MOTRIN) 600 MG tablet Take 1 tablet (600 mg) by mouth every 8 hours as needed for moderate pain 20 tablet 0     ibuprofen (ADVIL/MOTRIN) 600 MG tablet Take 1 tablet (600 mg) by mouth every 6 hours as needed for moderate pain 30 tablet 1     letrozole (FEMARA) 2.5 MG tablet Take 1 tablet (2.5 mg) by mouth daily 90 tablet 3     Allergies   Allergen Reactions     Latex Rash     BP Readings from Last 3 Encounters:   01/29/19 127/82   01/22/19 111/79   11/05/18 107/75    Wt Readings from Last 3 Encounters:   01/29/19 72.1 kg (159 lb)   01/22/19 72.5 kg (159 lb 14 oz)   11/05/18 73.4 kg (161 lb 12.8 oz)                  Labs reviewed in EPIC    Reviewed and updated as needed this visit by clinical staff  Allergies  Meds        Reviewed and updated as needed this visit by Provider         ROS:  Constitutional, HEENT, cardiovascular, pulmonary, gi and gu systems are negative, except as otherwise noted.    OBJECTIVE:                                                    /82   Pulse 74   Temp 97.9  F (36.6  C) (Oral)   Ht 1.524 m (5')   Wt 72.1 kg (159 lb)   LMP 03/01/2016   SpO2 96%   Breastfeeding? No   BMI 31.05 kg/m    Body mass index is 31.05 kg/m .  GENERAL APPEARANCE: healthy, alert and no distress  EYES: Eyes grossly normal to inspection and conjunctivae and sclerae normal  RESP: lungs clear to auscultation - no rales, rhonchi or wheezes  CV: regular rates and rhythm, normal S1 S2, no S3 or S4 and no murmur, click or rub  SKIN: no suspicious lesions or rashes  NEURO: Normal strength and tone, mentation intact and speech normal  PSYCH: mentation appears normal and affect normal/bright  Scalp: right side 1 X 0.5 cms soft nodule, edema+, erythema+, no drainage, tender+    Diagnostic test results:  Diagnostic Test Results:  No results found for this or any previous visit (from the past 24 hour(s)).     ASSESSMENT/PLAN:                                                    1. Encounter related to worker's compensation claim  -Injury between November and December 2018, does not know a specific date  - cephALEXin (KEFLEX) 500 MG capsule; Take 1 capsule (500 mg) by mouth 3 times daily for 7 days  Dispense: 21 capsule; Refill: 0  - ibuprofen (ADVIL/MOTRIN) 600 MG tablet; Take 1 tablet (600 mg) by mouth every 8 hours as needed for moderate pain  Dispense: 20 tablet; Refill: 0    2. Infected sebaceous cyst  -Warm compresses  -Low likelihood that this is related to the injury sustained almost 2 months ago   - cephALEXin (KEFLEX) 500 MG capsule; Take 1 capsule (500 mg) by mouth 3 times daily for 7 days  Dispense: 21 capsule; Refill: 0  - ibuprofen (ADVIL/MOTRIN) 600 MG tablet; Take 1 tablet (600 mg) by mouth every 8 hours as needed  for moderate pain  Dispense: 20 tablet; Refill: 0  -work note for today provided       Follow up with Provider - if not better in one week, she can call for a referral to Dermatology      Nida Alcazar MD MPH    [facility]

## 2019-01-29 NOTE — LETTER
January 29, 2019      Aleena Escamilla  [address] [facility] [address]        To Whom It May Concern:    Aleena Escamilla was seen on 1/29/19.  Please excuse her  until 1/30/19 due to illness.        Sincerely,        Nida Alcazar MD MPH

## 2019-01-29 NOTE — PATIENT INSTRUCTIONS
At [facility], we strive to deliver an exceptional experience to you, every time we see you.  If you receive a survey in the mail, please send us back your thoughts. We really do value your feedback.    Your care team:                            Family Medicine Internal Medicine   MD Antonio Mistry MD Shantel Branch-Fleming, MD Katya Georgiev PA-C Megan Hill, APRN MARCELLUS Yap MD Pediatrics   Luis Fernando Rhoades, DRAGAN Watson, MD Cira Velazquez APRN CNP   MD Haydee Sherman MD Deborah Mielke, MD Tana Luna, APRN [facility]      Clinic hours: Monday - Thursday 7 am-7 pm; Fridays 7 am-5 pm.   Urgent care: Monday - Friday 11 am-9 pm; Saturday and Sunday 9 am-5 pm.  Pharmacy : Monday -Thursday 8 am-8 pm; Friday 8 am-6 pm; Saturday and Sunday 9 am-5 pm.     Clinic: [phone]   Pharmacy: [phone]

## 2019-02-06 ENCOUNTER — ANCILLARY PROCEDURE (OUTPATIENT)
Dept: ULTRASOUND IMAGING | Facility: CLINIC | Age: 44
End: 2019-02-06
Payer: COMMERCIAL

## 2019-02-06 DIAGNOSIS — K76.0 FATTY LIVER: ICD-10-CM

## 2019-02-06 PROCEDURE — 76705 ECHO EXAM OF ABDOMEN: CPT

## 2019-02-06 NOTE — RESULT ENCOUNTER NOTE
Dear Aleena    Your test results are attached. I am happy to let you know that they are stable.    The ultrasound of your liver shows fatty liver and no gallbladder problems. Eating smaller meals and fewer carbohydrates can improve this.     Please contact me by [id number] if you have any questions about your labs or management.    Concepcion Bocanegra MD

## 2019-02-08 DIAGNOSIS — K76.0 FATTY LIVER: Primary | ICD-10-CM

## 2019-02-22 ENCOUNTER — OFFICE VISIT (OUTPATIENT)
Dept: FAMILY MEDICINE | Facility: CLINIC | Age: 44
End: 2019-02-22
Payer: COMMERCIAL

## 2019-02-22 VITALS
OXYGEN SATURATION: 99 % | TEMPERATURE: 98.3 F | WEIGHT: 161 LBS | DIASTOLIC BLOOD PRESSURE: 70 MMHG | SYSTOLIC BLOOD PRESSURE: 100 MMHG | RESPIRATION RATE: 20 BRPM | HEIGHT: 60 IN | BODY MASS INDEX: 31.61 KG/M2 | HEART RATE: 79 BPM

## 2019-02-22 DIAGNOSIS — B02.9 HERPES ZOSTER WITHOUT COMPLICATION: Primary | ICD-10-CM

## 2019-02-22 DIAGNOSIS — R79.89 ELEVATED LFTS: ICD-10-CM

## 2019-02-22 DIAGNOSIS — K76.0 FATTY LIVER: ICD-10-CM

## 2019-02-22 DIAGNOSIS — E66.1 NON MORBID DRUG-INDUCED OBESITY: ICD-10-CM

## 2019-02-22 DIAGNOSIS — E78.5 HYPERLIPIDEMIA LDL GOAL <130: ICD-10-CM

## 2019-02-22 PROCEDURE — 99214 OFFICE O/P EST MOD 30 MIN: CPT | Performed by: FAMILY MEDICINE

## 2019-02-22 RX ORDER — VALACYCLOVIR HYDROCHLORIDE 500 MG/1
500 TABLET, FILM COATED ORAL 2 TIMES DAILY
Qty: 20 TABLET | Refills: 0 | Status: SHIPPED | OUTPATIENT
Start: 2019-02-22 | End: 2019-05-16

## 2019-02-22 ASSESSMENT — MIFFLIN-ST. JEOR: SCORE: 1311.79

## 2019-02-22 ASSESSMENT — PAIN SCALES - GENERAL: PAINLEVEL: SEVERE PAIN (7)

## 2019-02-22 NOTE — PATIENT INSTRUCTIONS
At [facility], we strive to deliver an exceptional experience to you, every time we see you.  If you receive a survey in the mail, please send us back your thoughts. We really do value your feedback.    Based on your medical history, these are the current health maintenance/preventive care services that you are due for (some may have been done at this visit.)  Health Maintenance Due   Topic Date Due     URINE DRUG SCREEN Q1 YR  10/17/1991     PREVENTIVE CARE VISIT  04/18/2017     INFLUENZA VACCINE (1) 09/01/2018         Suggested websites for health information:  Www.[employer].org : Up to date and easily searchable information on multiple topics.  Www.[employer].gov : medication info, interactive tutorials, watch real surgeries online  Www.familydoctor.org : good info from the Academy of Family Physicians  Www.cdc.gov : public health info, travel advisories, epidemics (H1N1)  Www.aap.org : children's health info, normal development, vaccinations  Www.health.[facility].[place].us : [place] dept of health, public health issues in [place], N1N1    Your care team:                            Family Medicine Internal Medicine   MD Antonio Mistry MD Shantel Branch-Fleming, MD Katya Georgiev PA-C Nam Ho, MD Pediatrics   DRAGAN Mancera, MD Haydee Shaw CNP, MD Deborah Mielke, MD Kim Thein, APRN CNP      Clinic hours: Monday - Thursday 7 am-7 pm; Fridays 7 am-5 pm.   Urgent care: Monday - Friday 11 am-9 pm; Saturday and Sunday 9 am-5 pm.  Pharmacy : Monday -Thursday 8 am-8 pm; Friday 8 am-6 pm; Saturday and Sunday 9 am-5 pm.     Clinic: [phone]   Pharmacy: [phone]

## 2019-02-22 NOTE — PROGRESS NOTES
SUBJECTIVE:   Aleena Escamilla is a 43 year old female who presents to clinic today for the following health issues:    Concern:  Result follow up - Abdominal ultrasound completed 2/6/19. Patient has few questions.    Concern - Shingles  Onset: 2/15/19    Description:   Patient complains of burning pain on right back shoulder into the back upper arm and axillary area. She thinks may possibly have recurrent shingles and wants it checked.    Intensity: severe    Progression of Symptoms:  worsening    Accompanying Signs & Symptoms:  burning    Previous history of similar problem:   yes    Precipitating factors:   Worsened by: stretching of body area where burning is, worse at night    Alleviating factors:  Improved by: none    Therapies Tried and outcome: Ibuprofen does not help burning pain        Problem list and histories reviewed & adjusted, as indicated.  Additional history: as documented    Patient Active Problem List   Diagnosis     CARDIOVASCULAR SCREENING; LDL GOAL LESS THAN 160     Chronic pelvic pain in female     Chronic salpingitis and oophoritis     Major depressive disorder, recurrent episode, mild (H)     Generalized anxiety disorder     Arthritis, multiple joint involvement     Primary osteoarthritis of both hands     Fibromyalgia     Malignant neoplasm of overlapping sites of right female breast (H)     BRCA gene positive     Carpal tunnel syndrome     Non morbid drug-induced obesity     Chronic right shoulder pain     Bunion, right     Onychomycosis     Elevated LFTs     Fatty liver     Hyperlipidemia LDL goal <130     Past Surgical History:   Procedure Laterality Date     BIOPSY BREAST NEEDLE LOCALIZATION Right 1/19/2016    Procedure: BIOPSY BREAST NEEDLE LOCALIZATION;  Surgeon: Adelina Demarco MD;  Location: [facility]     GYN SURGERY  2-    bilateral salpingectomy, left oophrectomy     LAPAROSCOPIC SALPINGO-OOPHORECTOMY Right 10/26/2016    Procedure: LAPAROSCOPIC SALPINGO-OOPHORECTOMY;   Surgeon: Bouchra Mayo MD;  Location: [facility]     LUMPECTOMY BREAST WITH SENTINEL NODE, COMBINED Right 1/19/2016    Procedure: COMBINED LUMPECTOMY BREAST WITH SENTINEL NODE;  Surgeon: Adelina Demarco MD;  Location: [facility]     PELVIS LAPAROSCOPY,DX  12/28/1998     RELEASE CARPAL TUNNEL Left 12/23/2016    Procedure: RELEASE CARPAL TUNNEL;  Surgeon: Sam Florence MD;  Location: [facility]       Social History     Tobacco Use     Smoking status: Never Smoker     Smokeless tobacco: Never Used   Substance Use Topics     Alcohol use: Yes     Comment: 1 drink per week.     Family History   Problem Relation Age of Onset     Diabetes Mother      Hypertension Mother      Cancer Maternal Grandmother         cervical CA     Cardiovascular Father         MI     Cancer Maternal Aunt         cervical cancer     Pancreatic Cancer Maternal Aunt         COD         Current Outpatient Medications   Medication Sig Dispense Refill     Cholecalciferol (VITAMIN D3) 2000 units TABS TAKE 1 TABLET BY MOUTH ONCE DAILY 100 tablet 1     ibuprofen (ADVIL/MOTRIN) 600 MG tablet Take 1 tablet (600 mg) by mouth every 8 hours as needed for moderate pain 20 tablet 0     letrozole (FEMARA) 2.5 MG tablet Take 1 tablet (2.5 mg) by mouth daily 90 tablet 3     valACYclovir (VALTREX) 500 MG tablet Take 1 tablet (500 mg) by mouth 2 times daily for 10 days 20 tablet 0     Allergies   Allergen Reactions     Latex Rash     Recent Labs   Lab Test 01/22/19  1022 08/28/18  1128 08/28/18  1127 07/19/18  1024  02/05/18  1658  12/19/16  1021  10/26/16  0753 09/27/16  1212  11/07/11  1226 10/17/11  0809   A1C  --   --   --   --   --   --   --  5.7  --   --   --   --   --   --    LDL  --  138*  --   --   --   --   --  82  --   --   --   --   --  86   HDL  --  32*  --   --   --   --   --  30*  --   --   --   --   --  33*   TRIG  --  201*  --   --   --   --   --  301*  --   --   --   --   --  212*   *  --  182* 200*   < > 112*   < >  --    < >  --  70*   <  >  --   --    CR  --   --   --   --   --  0.60  --   --   --   --  0.55   < >  --   --    GFRESTIMATED  --   --   --   --   --  >90  --   --   --   --  >90  Non  GFR Calc     < >  --   --    GFRESTBLACK  --   --   --   --   --  >90  --   --   --   --  >90  African American GFR Calc     < >  --   --    POTASSIUM  --   --   --   --   --  3.5  --   --   --  3.4 3.7   < >  --   --    TSH  --   --   --   --   --   --   --   --   --   --   --   --  0.83  --     < > = values in this interval not displayed.      BP Readings from Last 3 Encounters:   02/22/19 100/70   01/29/19 127/82   01/22/19 111/79    Wt Readings from Last 3 Encounters:   02/22/19 73 kg (161 lb)   01/29/19 72.1 kg (159 lb)   01/22/19 72.5 kg (159 lb 14 oz)                  Labs reviewed in EPIC    Reviewed and updated as needed this visit by clinical staff       Reviewed and updated as needed this visit by Provider         ROS:  Constitutional, HEENT, cardiovascular, pulmonary, gi and gu systems are negative, except as otherwise noted.    OBJECTIVE:     /70 (BP Location: Left arm, Patient Position: Chair, Cuff Size: Adult Regular)   Pulse 79   Temp 98.3  F (36.8  C) (Oral)   Resp 20   Ht 1.524 m (5')   Wt 73 kg (161 lb)   LMP 03/01/2016   SpO2 99%   BMI 31.44 kg/m    Body mass index is 31.44 kg/m .  GENERAL: healthy, alert, well nourished, well hydrated, no distress, obese  HENT: ear canals- normal; TMs- normal; Nose- normal; Mouth- no ulcers, no lesions, throat is clear with no erythema or exudate.   NECK: no tenderness, no adenopathy, no asymmetry, no masses, no stiffness; thyroid- normal to palpation  RESP: lungs clear to auscultation - no rales, no rhonchi, no wheezes  CV: regular rates and rhythm, normal S1 S2, no S3 or S4 and no murmur, no click or rub -  ABDOMEN: soft, no tenderness, no  hepatosplenomegaly, no masses, normal bowel sounds  MS: extremities- no gross deformities noted, no edema  SKIN: no suspicious  lesions, no rashes  NEURO: strength and tone- normal, sensory exam- grossly normal, mentation- intact, speech- normal, reflexes- symmetric  BACK: no CVA tenderness, no paralumbar tenderness  PSYCH: Alert and oriented times 3; speech- coherent , normal rate and volume; able to articulate logical thoughts, able to abstract reason, no tangential thoughts, no hallucinations or delusions, affect- normal     Diagnostic Test Results:  No results found for this or any previous visit (from the past 24 hour(s)).    ASSESSMENT/PLAN:         Tobacco Cessation:   reports that  has never smoked. she has never used smokeless tobacco.      BMI:   Estimated body mass index is 31.44 kg/m  as calculated from the following:    Height as of this encounter: 1.524 m (5').    Weight as of this encounter: 73 kg (161 lb).   Weight management plan: Discussed healthy diet and exercise guidelines        ICD-10-CM    1. Herpes zoster without complication B02.9 valACYclovir (VALTREX) 500 MG tablet twice a day for 10 days for prodromal symptoms in location she has had shingles in the past.    2. Fatty liver K76.0 Ultrasound shows fatty liver. Discussed diet and exercise. Follow up with nutrition as recommended.    3. Elevated LFTs R94.5 Recheck and follow up with gastroenterology    4. Hyperlipidemia LDL goal <130 E78.5 Not due for lipids today and not fasting   5. Non morbid drug-induced obesity E66.1 Weight loss counseling done, discussed no sugar drinks and how to keep appetite lower.       CONSULTATION/REFERRAL to nutrition  FUTURE LABS:       - Schedule fasting labs in 3 months  FUTURE APPOINTMENTS:       - Follow-up visit in 3 months or sooner if any questions or concerns.   Work on weight loss  Regular exercise  See Patient Instructions    Concepcion Bocanegra MD  [facility]

## 2019-05-16 ENCOUNTER — OFFICE VISIT (OUTPATIENT)
Dept: FAMILY MEDICINE | Facility: CLINIC | Age: 44
End: 2019-05-16
Payer: COMMERCIAL

## 2019-05-16 VITALS
DIASTOLIC BLOOD PRESSURE: 70 MMHG | RESPIRATION RATE: 18 BRPM | TEMPERATURE: 98.5 F | HEIGHT: 60 IN | WEIGHT: 152 LBS | HEART RATE: 75 BPM | SYSTOLIC BLOOD PRESSURE: 96 MMHG | BODY MASS INDEX: 29.84 KG/M2 | OXYGEN SATURATION: 96 %

## 2019-05-16 DIAGNOSIS — Z13.1 SCREENING FOR DIABETES MELLITUS: ICD-10-CM

## 2019-05-16 DIAGNOSIS — M85.88 OSTEOPENIA OF SPINE: ICD-10-CM

## 2019-05-16 DIAGNOSIS — C50.811 MALIGNANT NEOPLASM OF OVERLAPPING SITES OF RIGHT BREAST IN FEMALE, ESTROGEN RECEPTOR POSITIVE (H): ICD-10-CM

## 2019-05-16 DIAGNOSIS — E55.9 VITAMIN D DEFICIENCY: ICD-10-CM

## 2019-05-16 DIAGNOSIS — Z20.2 EXPOSURE TO HUMAN PAPILLOMAVIRUS: Primary | ICD-10-CM

## 2019-05-16 DIAGNOSIS — Z17.0 MALIGNANT NEOPLASM OF OVERLAPPING SITES OF RIGHT BREAST IN FEMALE, ESTROGEN RECEPTOR POSITIVE (H): ICD-10-CM

## 2019-05-16 DIAGNOSIS — Z12.4 SCREENING FOR MALIGNANT NEOPLASM OF CERVIX: ICD-10-CM

## 2019-05-16 LAB — GLUCOSE SERPL-MCNC: 105 MG/DL (ref 70–99)

## 2019-05-16 PROCEDURE — G0145 SCR C/V CYTO,THINLAYER,RESCR: HCPCS | Performed by: FAMILY MEDICINE

## 2019-05-16 PROCEDURE — 99214 OFFICE O/P EST MOD 30 MIN: CPT | Performed by: FAMILY MEDICINE

## 2019-05-16 PROCEDURE — 82947 ASSAY GLUCOSE BLOOD QUANT: CPT | Performed by: FAMILY MEDICINE

## 2019-05-16 PROCEDURE — 87624 HPV HI-RISK TYP POOLED RSLT: CPT | Performed by: FAMILY MEDICINE

## 2019-05-16 PROCEDURE — 36415 COLL VENOUS BLD VENIPUNCTURE: CPT | Performed by: FAMILY MEDICINE

## 2019-05-16 RX ORDER — CHOLECALCIFEROL (VITAMIN D3) 50 MCG
1 TABLET ORAL DAILY
Qty: 100 TABLET | Refills: 3 | Status: SHIPPED | OUTPATIENT
Start: 2019-05-16

## 2019-05-16 ASSESSMENT — ANXIETY QUESTIONNAIRES
3. WORRYING TOO MUCH ABOUT DIFFERENT THINGS: NOT AT ALL
5. BEING SO RESTLESS THAT IT IS HARD TO SIT STILL: NOT AT ALL
2. NOT BEING ABLE TO STOP OR CONTROL WORRYING: NOT AT ALL
6. BECOMING EASILY ANNOYED OR IRRITABLE: NOT AT ALL
7. FEELING AFRAID AS IF SOMETHING AWFUL MIGHT HAPPEN: NOT AT ALL
IF YOU CHECKED OFF ANY PROBLEMS ON THIS QUESTIONNAIRE, HOW DIFFICULT HAVE THESE PROBLEMS MADE IT FOR YOU TO DO YOUR WORK, TAKE CARE OF THINGS AT HOME, OR GET ALONG WITH OTHER PEOPLE: NOT DIFFICULT AT ALL
1. FEELING NERVOUS, ANXIOUS, OR ON EDGE: NOT AT ALL
GAD7 TOTAL SCORE: 0

## 2019-05-16 ASSESSMENT — PATIENT HEALTH QUESTIONNAIRE - PHQ9
SUM OF ALL RESPONSES TO PHQ QUESTIONS 1-9: 5
5. POOR APPETITE OR OVEREATING: NOT AT ALL

## 2019-05-16 ASSESSMENT — MIFFLIN-ST. JEOR: SCORE: 1270.97

## 2019-05-16 ASSESSMENT — PAIN SCALES - GENERAL: PAINLEVEL: NO PAIN (0)

## 2019-05-16 NOTE — PATIENT INSTRUCTIONS
At [facility], we strive to deliver an exceptional experience to you, every time we see you.  If you receive a survey in the mail, please send us back your thoughts. We really do value your feedback.    Based on your medical history, these are the current health maintenance/preventive care services that you are due for (some may have been done at this visit.)  Health Maintenance Due   Topic Date Due     URINE DRUG SCREEN Q1 YR  10/17/1991     PREVENTIVE CARE VISIT  04/18/2017     PHQ-9 Q6 MONTHS  04/08/2019     VENKAT QUESTIONNAIRE 1 YEAR  04/10/2019         Suggested websites for health information:  Www.BuyPlayWin.RF Code : Up to date and easily searchable information on multiple topics.  Www.Telespree.gov : medication info, interactive tutorials, watch real surgeries online  Www.familydoctor.org : good info from the Academy of Family Physicians  Www.cdc.gov : public health info, travel advisories, epidemics (H1N1)  Www.aap.org : children's health info, normal development, vaccinations  Www.health.[facility].[place].us : [place] dept of health, public health issues in [place], N1N1    Your care team:                            Family Medicine Internal Medicine   MD Antonio Mistry MD Shantel Branch-Fleming, MD Katya Georgiev PA-C Nam Ho, MD Pediatrics   DRAGAN Mancera, MARCELLUS Fields APRN CNP   MD Haydee Sherman MD Deborah Mielke, MD Kim Thein, APRN [facility]      Clinic hours: Monday - Thursday 7 am-7 pm; Fridays 7 am-5 pm.   Urgent care: Monday - Friday 11 am-9 pm; Saturday and Sunday 9 am-5 pm.  Pharmacy : Monday -Thursday 8 am-8 pm; Friday 8 am-6 pm; Saturday and Sunday 9 am-5 pm.     Clinic: [phone]   Pharmacy: [phone]      Patient Education     Genital Warts  Genital warts are painless skin bumps in your genital area. You may have a single wart or several grouped together. They can have a flat or rough surface. Genital warts can appear  on the penis, scrotum, vagina, vulva, or anus.  Genital warts are caused by the human papillomavirus (HPV). HPV is the most common sexually transmitted disease in the [place] Most people who become infected with HPV will not have any warts, but they can still pass the virus on to another person. HPV is passed on through skin contact with a wart during sex.  It can take 1 to 6 months for warts to appear after you are exposed to the virus. But sometimes a wart may not appear until years later. Genital warts can grow at different rates. Warts may grow and spread faster in people who are pregnant and who have a weak immune system.  About 30 types of HPV can cause genital warts. Most of these types cause no other problems. But a few types of HPV can infect a woman s cervix. If a cervical infection with one of these viruses goes undiagnosed and untreated, the woman is at higher risk for cervical cancer. It takes many months to years for signs of cancer to develop from an HPV virus infection of the cervix. Women who have genital warts and women who have a partner with genital warts should get regular Pap tests. These tests look for changes that may lead to cervical cancer.  Most warts go away on their own in 1 to 2 years. But it s important to consider treatment. Treatment can:    Make the warts go away sooner    Lower the risk of spreading HPV to others    Lower the risk for cervical cancer if you re a woman  Warts can be treated by freezing them, cutting them, removing them with a laser, using a prescription cream (imiquimod) to boost the immune system around the warts, or applying a liquid or gel to dissolve them. You may need more than 1 treatment session to make the warts go away. But even after the warts are gone, the virus remains in your skin. The warts may reappear. Be sure to let your healthcare provider know if there's any chance that you could be pregnant before starting treatment.  You can get an HPV vaccine. The  vaccine protects against certain types of HPV that cause genital warts and cervical cancer. Even though you have HPV now, being vaccinated could still help protect you from getting other types of HPV in the future. This is true for your partner as well. Ask your healthcare provider if getting vaccinated makes sense for you. Urge your partner to also ask about getting vaccinated.  Home care  Follow these tips to care for yourself at home:    Apply an ointment or gel prescribed for you exactly as directed. Be careful not to get the ointment or gel on nearby healthy skin.    After applying the ointment or gel, keep the area clean and dry. Wear loose-fitting cotton underwear to avoid chafing.    If you have pain after treatment, sit in a tub with a few inches of warm water for 20 minutes. Do this 3 times a day for the next 2 to 3 days. Add a cup of cornstarch or baking soda, or a packet of colloidal oatmeal or astringent solution powder, to the water. This will help soothe your skin.  How to prevent passing on the virus  The HPV virus is passed on to other people by having sex with someone who is infected. The risk of passing on the virus is greatest when you have warts. But there is a chance of spreading the virus even after treatment, when the wart can t be seen. Condoms offer only limited protection from the warts. This is because HPV can infect skin in the genital area not covered by the condom.  Partners usually have the same type of HPV, so it s probably not necessary to refrain from sex with your current partner. But don t have sex with any new partner until all visible warts are gone. If you re a man, tell all current and future sexual partners that you have had genital warts. This way, the woman can be sure to have regular Pap test.  Follow-up care  When you first learn that you have genital warts, you may feel guilty, angry, or emotionally upset. Getting the facts helps put you back in control. Follow up with  your healthcare provider or your local public health department. You can get a complete STD screening, including HIV testing. For more information, call the national STD hotline at 558-054-3506. After treatment, you should be rechecked in about 3 months to make sure all warts are gone.  When to seek medical advice  Call your healthcare provider right away if any of these occur after treating a wart:    Redness or burning on your skin that doesn t stop within a few hours    Swelling of the skin around the treated area    Pus draining from the treated area  Otherwise, get prompt medical attention if you have:    Difficulty urinating or having a bowel movement    Fluid coming from the vagina or penis    Skin rash or pain in a joint  Date Last Reviewed: 9/1/2016 2000-2018 [employer]. [address] [place] [address], [place] [address]. All rights reserved. This information is not intended as a substitute for professional medical care. Always follow your healthcare professional's instructions.

## 2019-05-16 NOTE — PROGRESS NOTES
"  SUBJECTIVE:   Aleena Escamilla is a 43 year old female who presents to clinic today for the following health issues:    HPI   Concern:   1. DEXA scan - Discuss results completed 1/25/19- osteopenia may be due to chemotherapy and Femera.    2. HPV - Patient complains of  noticing genital warts x3 weeks ago and he has some problem starting with a \"H\" could be HPV when he was 14 years old. She mentions that  was a  once before she  him and ex-wife was positive for this problem too. She is concerned and wants to know if she is positive for anything. Had normal Pap and negative HPV but  is flipping out over possible cervical cancer because of patient's breast cancer history .        Additional history: as documented    Reviewed and updated as needed this visit by clinical staff         Reviewed and updated as needed this visit by Provider             Patient Active Problem List   Diagnosis     CARDIOVASCULAR SCREENING; LDL GOAL LESS THAN 160     Chronic pelvic pain in female     Chronic salpingitis and oophoritis     Major depressive disorder, recurrent episode, mild (H)     Generalized anxiety disorder     Arthritis, multiple joint involvement     Primary osteoarthritis of both hands     Fibromyalgia     Malignant neoplasm of overlapping sites of right female breast (H)     BRCA gene positive     Carpal tunnel syndrome     Non morbid drug-induced obesity     Chronic right shoulder pain     Bunion, right     Onychomycosis     Elevated LFTs     Fatty liver     Hyperlipidemia LDL goal <130     Past Surgical History:   Procedure Laterality Date     BIOPSY BREAST NEEDLE LOCALIZATION Right 1/19/2016    Procedure: BIOPSY BREAST NEEDLE LOCALIZATION;  Surgeon: Adelina Demarco MD;  Location: [facility]     GYN SURGERY  2-    bilateral salpingectomy, left oophrectomy     LAPAROSCOPIC SALPINGO-OOPHORECTOMY Right 10/26/2016    Procedure: LAPAROSCOPIC SALPINGO-OOPHORECTOMY;  Surgeon: " Bouchra Mayo MD;  Location: [facility]     LUMPECTOMY BREAST WITH SENTINEL NODE, COMBINED Right 1/19/2016    Procedure: COMBINED LUMPECTOMY BREAST WITH SENTINEL NODE;  Surgeon: Adelina Demarco MD;  Location: [facility]     PELVIS LAPAROSCOPY,DX  12/28/1998     RELEASE CARPAL TUNNEL Left 12/23/2016    Procedure: RELEASE CARPAL TUNNEL;  Surgeon: Sam Florence MD;  Location: [facility]       Social History     Tobacco Use     Smoking status: Never Smoker     Smokeless tobacco: Never Used   Substance Use Topics     Alcohol use: Yes     Comment: 1 drink per week.     Family History   Problem Relation Age of Onset     Diabetes Mother      Hypertension Mother      Cancer Maternal Grandmother         cervical CA     Cardiovascular Father         MI     Cancer Maternal Aunt         cervical cancer     Pancreatic Cancer Maternal Aunt         COD         Current Outpatient Medications   Medication Sig Dispense Refill     Cholecalciferol (VITAMIN D3) 2000 units TABS Take 1 tablet by mouth daily 100 tablet 3     ibuprofen (ADVIL/MOTRIN) 600 MG tablet Take 1 tablet (600 mg) by mouth every 8 hours as needed for moderate pain 20 tablet 0     letrozole (FEMARA) 2.5 MG tablet Take 1 tablet (2.5 mg) by mouth daily 90 tablet 3     Allergies   Allergen Reactions     Latex Rash     Recent Labs   Lab Test 01/22/19  1022 08/28/18  1128 08/28/18  1127 07/19/18  1024  02/05/18  1658  12/19/16  1021  10/26/16  0753 09/27/16  1212  11/07/11  1226 10/17/11  0809   A1C  --   --   --   --   --   --   --  5.7  --   --   --   --   --   --    LDL  --  138*  --   --   --   --   --  82  --   --   --   --   --  86   HDL  --  32*  --   --   --   --   --  30*  --   --   --   --   --  33*   TRIG  --  201*  --   --   --   --   --  301*  --   --   --   --   --  212*   *  --  182* 200*   < > 112*   < >  --    < >  --  70*   < >  --   --    CR  --   --   --   --   --  0.60  --   --   --   --  0.55   < >  --   --    GFRESTIMATED  --   --   --   --    --  >90  --   --   --   --  >90  Non  GFR Calc     < >  --   --    GFRESTBLACK  --   --   --   --   --  >90  --   --   --   --  >90  African American GFR Calc     < >  --   --    POTASSIUM  --   --   --   --   --  3.5  --   --   --  3.4 3.7   < >  --   --    TSH  --   --   --   --   --   --   --   --   --   --   --   --  0.83  --     < > = values in this interval not displayed.      BP Readings from Last 3 Encounters:   05/16/19 96/70   02/22/19 100/70   01/29/19 127/82    Wt Readings from Last 3 Encounters:   05/16/19 68.9 kg (152 lb)   02/22/19 73 kg (161 lb)   01/29/19 72.1 kg (159 lb)                  Labs reviewed in EPIC    ROS:  Constitutional, HEENT, cardiovascular, pulmonary, gi and gu systems are negative, except as otherwise noted.    OBJECTIVE:     BP 96/70 (BP Location: Left arm, Patient Position: Chair, Cuff Size: Adult Regular)   Pulse 75   Temp 98.5  F (36.9  C) (Oral)   Resp 18   Ht 1.524 m (5')   Wt 68.9 kg (152 lb)   LMP 03/01/2016   SpO2 96%   BMI 29.69 kg/m    Body mass index is 29.69 kg/m .  GENERAL: healthy, alert and no distress  EYES: Eyes grossly normal to inspection, conjunctivae and sclerae normal  MS: no gross musculoskeletal defects noted, no edema  SKIN: no suspicious lesions or rashes  NEURO: Normal strength and tone, gait and speech normal  PSYCH: mentation appears normal, affect normal/bright   GYN: External genitalia normal with no rash or lesions, Cervix normal, vaginal mucosa pink with normal rugae, no discharge, uterus normal size, shape and position, adnexa non-tender with no masses.      Diagnostic Test Results:  Results for orders placed or performed in visit on 02/06/19   US Abdomen Limited    Narrative    RIGHT UPPER QUADRANT ULTRASOUND 2/6/2019 11:25 AM    HISTORY:  Fatty liver    COMPARISON: None.    FINDINGS:    Liver echogenicity consistent with fatty infiltration. The liver is 15  cm in length. Pancreas normal where visualized, partially  obscured.  Gallbladder appears normal. No gallstones, no gallbladder wall  thickening or duct dilatation. 0.5 cm common bile duct. Normal right  kidney 11.5 cm in length.      Impression    IMPRESSION:  Fatty liver otherwise normal right upper quadrant  ultrasound.    LING KASPER MD       ASSESSMENT/PLAN:         Tobacco Cessation:   reports that she has never smoked. She has never used smokeless tobacco.      BMI:   Estimated body mass index is 29.69 kg/m  as calculated from the following:    Height as of this encounter: 1.524 m (5').    Weight as of this encounter: 68.9 kg (152 lb).   Weight management plan: Discussed healthy diet and exercise guidelines        ICD-10-CM    1. Exposure to human papillomavirus Z20.2 Fear of disease due to 's HPV or genital warts. Discussed how this is most likely due to Types 6 and 12, which are non- cancer producing. No lesions seen on exam.    2. Vitamin D deficiency E55.9 Cholecalciferol (VITAMIN D3) 2000 units TABS   3. Malignant neoplasm of overlapping sites of right breast in female, estrogen receptor positive (H) C50.811 Stable     Z17.0    4. Osteopenia of spine M85.88 Needs follow up in 2-3 years.    5. Screening for malignant neoplasm of cervix Z12.4 HPV High Risk Types DNA Cervical     Pap imaged thin layer screen with HPV - recommended age 30 - 65 years (select HPV order below)   6. Screening for diabetes mellitus Z13.1 Glucose       CONSULTATION/REFERRAL to oncology and GI as scheduled.   FUTURE LABS:       - Schedule fasting labs in 3 months  FUTURE APPOINTMENTS:       - Follow-up visit in 3 months or sooner if any questions or concerns.   Work on weight loss  Regular exercise  See Patient Instructions    Concepcion Bocanegra MD  [facility]

## 2019-05-17 ASSESSMENT — ANXIETY QUESTIONNAIRES: GAD7 TOTAL SCORE: 0

## 2019-05-17 NOTE — RESULT ENCOUNTER NOTE
Dear Aleena    Your test results are attached. I am happy to let you know that they are stable.    The blood sugar is normal and you do not have diabetes. Pap smear results will be sent later.     Please contact me by [facility] if you have any questions about your labs or management.    Concepcion Bocanegra MD

## 2019-05-21 LAB
COPATH REPORT: NORMAL
PAP: NORMAL

## 2019-05-23 LAB
FINAL DIAGNOSIS: NORMAL
HPV HR 12 DNA CVX QL NAA+PROBE: NEGATIVE
HPV16 DNA SPEC QL NAA+PROBE: NEGATIVE
HPV18 DNA SPEC QL NAA+PROBE: NEGATIVE
SPECIMEN DESCRIPTION: NORMAL
SPECIMEN SOURCE CVX/VAG CYTO: NORMAL

## 2019-06-07 ENCOUNTER — OFFICE VISIT (OUTPATIENT)
Dept: FAMILY MEDICINE | Facility: CLINIC | Age: 44
End: 2019-06-07
Payer: COMMERCIAL

## 2019-06-07 VITALS
HEIGHT: 60 IN | RESPIRATION RATE: 18 BRPM | OXYGEN SATURATION: 98 % | WEIGHT: 151 LBS | TEMPERATURE: 98.5 F | BODY MASS INDEX: 29.64 KG/M2 | SYSTOLIC BLOOD PRESSURE: 94 MMHG | DIASTOLIC BLOOD PRESSURE: 62 MMHG | HEART RATE: 73 BPM

## 2019-06-07 DIAGNOSIS — R79.89 ELEVATED LFTS: ICD-10-CM

## 2019-06-07 DIAGNOSIS — K76.0 FATTY LIVER: ICD-10-CM

## 2019-06-07 DIAGNOSIS — L65.9 HAIR LOSS: Primary | ICD-10-CM

## 2019-06-07 LAB
ALBUMIN SERPL-MCNC: 4.1 G/DL (ref 3.4–5)
ALP SERPL-CCNC: 99 U/L (ref 40–150)
ALT SERPL W P-5'-P-CCNC: 51 U/L (ref 0–50)
ANION GAP SERPL CALCULATED.3IONS-SCNC: 8 MMOL/L (ref 3–14)
AST SERPL W P-5'-P-CCNC: 22 U/L (ref 0–45)
BILIRUB DIRECT SERPL-MCNC: <0.1 MG/DL (ref 0–0.2)
BILIRUB SERPL-MCNC: 0.5 MG/DL (ref 0.2–1.3)
BUN SERPL-MCNC: 12 MG/DL (ref 7–30)
CALCIUM SERPL-MCNC: 9.2 MG/DL (ref 8.5–10.1)
CHLORIDE SERPL-SCNC: 106 MMOL/L (ref 94–109)
CO2 SERPL-SCNC: 25 MMOL/L (ref 20–32)
CREAT SERPL-MCNC: 0.56 MG/DL (ref 0.52–1.04)
ERYTHROCYTE [DISTWIDTH] IN BLOOD BY AUTOMATED COUNT: 13.9 % (ref 10–15)
GFR SERPL CREATININE-BSD FRML MDRD: >90 ML/MIN/{1.73_M2}
GLUCOSE SERPL-MCNC: 104 MG/DL (ref 70–99)
HCT VFR BLD AUTO: 42.7 % (ref 35–47)
HGB BLD-MCNC: 14.9 G/DL (ref 11.7–15.7)
INR PPP: 0.95 (ref 0.86–1.14)
MCH RBC QN AUTO: 30 PG (ref 26.5–33)
MCHC RBC AUTO-ENTMCNC: 34.9 G/DL (ref 31.5–36.5)
MCV RBC AUTO: 86 FL (ref 78–100)
PLATELET # BLD AUTO: 255 10E9/L (ref 150–450)
POTASSIUM SERPL-SCNC: 3.8 MMOL/L (ref 3.4–5.3)
PROT SERPL-MCNC: 7.9 G/DL (ref 6.8–8.8)
RBC # BLD AUTO: 4.96 10E12/L (ref 3.8–5.2)
SODIUM SERPL-SCNC: 139 MMOL/L (ref 133–144)
TSH SERPL DL<=0.005 MIU/L-ACNC: 1.38 MU/L (ref 0.4–4)
WBC # BLD AUTO: 4.8 10E9/L (ref 4–11)

## 2019-06-07 PROCEDURE — 87340 HEPATITIS B SURFACE AG IA: CPT | Performed by: PHYSICIAN ASSISTANT

## 2019-06-07 PROCEDURE — 85610 PROTHROMBIN TIME: CPT | Performed by: PHYSICIAN ASSISTANT

## 2019-06-07 PROCEDURE — 86706 HEP B SURFACE ANTIBODY: CPT | Performed by: PHYSICIAN ASSISTANT

## 2019-06-07 PROCEDURE — 80076 HEPATIC FUNCTION PANEL: CPT | Performed by: PHYSICIAN ASSISTANT

## 2019-06-07 PROCEDURE — 99213 OFFICE O/P EST LOW 20 MIN: CPT | Performed by: FAMILY MEDICINE

## 2019-06-07 PROCEDURE — 36415 COLL VENOUS BLD VENIPUNCTURE: CPT | Performed by: PHYSICIAN ASSISTANT

## 2019-06-07 PROCEDURE — 80048 BASIC METABOLIC PNL TOTAL CA: CPT | Performed by: PHYSICIAN ASSISTANT

## 2019-06-07 PROCEDURE — 86803 HEPATITIS C AB TEST: CPT | Performed by: PHYSICIAN ASSISTANT

## 2019-06-07 PROCEDURE — 86704 HEP B CORE ANTIBODY TOTAL: CPT | Performed by: PHYSICIAN ASSISTANT

## 2019-06-07 PROCEDURE — 85027 COMPLETE CBC AUTOMATED: CPT | Performed by: PHYSICIAN ASSISTANT

## 2019-06-07 PROCEDURE — 84443 ASSAY THYROID STIM HORMONE: CPT | Performed by: PHYSICIAN ASSISTANT

## 2019-06-07 ASSESSMENT — PAIN SCALES - GENERAL: PAINLEVEL: NO PAIN (0)

## 2019-06-07 ASSESSMENT — MIFFLIN-ST. JEOR: SCORE: 1266.43

## 2019-06-07 NOTE — PROGRESS NOTES
Subjective     Aleena Escamilla is a 43 year old female who presents to clinic today for the following health issues:    HPI   Concern - Hair Loss  Onset: 3 months    Description:   Patient complains of significant hair loss on head, noticing a lot of hair fall out on pillow when wakes up in the morning from bed. She reports no change in hair products or food choices. She states daughter having the same problem as well and was referred to a dermatologist but unable to get in until after July.    Intensity: severe    Progression of Symptoms:  worsening    Accompanying Signs & Symptoms:  none    Previous history of similar problem:   none    Precipitating factors:   Worsened by: none    Alleviating factors:  Improved by: none    Therapies Tried and outcome: None    Elevated liver function tests and fatty liver. Due for follow up labs and office visit with gastroenterology. Would like to have the blood work done today.     Patient Active Problem List   Diagnosis     CARDIOVASCULAR SCREENING; LDL GOAL LESS THAN 160     Chronic pelvic pain in female     Chronic salpingitis and oophoritis     Major depressive disorder, recurrent episode, mild (H)     Generalized anxiety disorder     Arthritis, multiple joint involvement     Primary osteoarthritis of both hands     Fibromyalgia     Malignant neoplasm of overlapping sites of right female breast (H)     BRCA gene positive     Carpal tunnel syndrome     Non morbid drug-induced obesity     Chronic right shoulder pain     Bunion, right     Onychomycosis     Elevated LFTs     Fatty liver     Hyperlipidemia LDL goal <130     Past Surgical History:   Procedure Laterality Date     BIOPSY BREAST NEEDLE LOCALIZATION Right 1/19/2016    Procedure: BIOPSY BREAST NEEDLE LOCALIZATION;  Surgeon: Adelina Demarco MD;  Location: [facility]     GYN SURGERY  2-    bilateral salpingectomy, left oophrectomy     LAPAROSCOPIC SALPINGO-OOPHORECTOMY Right 10/26/2016    Procedure: LAPAROSCOPIC  SALPINGO-OOPHORECTOMY;  Surgeon: Bouchra Mayo MD;  Location: [facility]     LUMPECTOMY BREAST WITH SENTINEL NODE, COMBINED Right 1/19/2016    Procedure: COMBINED LUMPECTOMY BREAST WITH SENTINEL NODE;  Surgeon: Adelina Demarco MD;  Location: [facility]     PELVIS LAPAROSCOPY,DX  12/28/1998     RELEASE CARPAL TUNNEL Left 12/23/2016    Procedure: RELEASE CARPAL TUNNEL;  Surgeon: Sam Florence MD;  Location: [facility]       Social History     Tobacco Use     Smoking status: Never Smoker     Smokeless tobacco: Never Used   Substance Use Topics     Alcohol use: Yes     Comment: 1 drink per week.     Family History   Problem Relation Age of Onset     Diabetes Mother      Hypertension Mother      Cancer Maternal Grandmother         cervical CA     Cardiovascular Father         MI     Cancer Maternal Aunt         cervical cancer     Pancreatic Cancer Maternal Aunt         COD         Current Outpatient Medications   Medication Sig Dispense Refill     Cholecalciferol (VITAMIN D3) 2000 units TABS Take 1 tablet by mouth daily 100 tablet 3     ibuprofen (ADVIL/MOTRIN) 600 MG tablet Take 1 tablet (600 mg) by mouth every 8 hours as needed for moderate pain 20 tablet 0     letrozole (FEMARA) 2.5 MG tablet Take 1 tablet (2.5 mg) by mouth daily 90 tablet 3     Allergies   Allergen Reactions     Latex Rash     Recent Labs   Lab Test 01/22/19  1022 08/28/18  1128 08/28/18  1127 07/19/18  1024  02/05/18  1658  12/19/16  1021  10/26/16  0753 09/27/16  1212  11/07/11  1226 10/17/11  0809   A1C  --   --   --   --   --   --   --  5.7  --   --   --   --   --   --    LDL  --  138*  --   --   --   --   --  82  --   --   --   --   --  86   HDL  --  32*  --   --   --   --   --  30*  --   --   --   --   --  33*   TRIG  --  201*  --   --   --   --   --  301*  --   --   --   --   --  212*   *  --  182* 200*   < > 112*   < >  --    < >  --  70*   < >  --   --    CR  --   --   --   --   --  0.60  --   --   --   --  0.55   < >  --   --     GFRESTIMATED  --   --   --   --   --  >90  --   --   --   --  >90  Non  GFR Calc     < >  --   --    GFRESTBLACK  --   --   --   --   --  >90  --   --   --   --  >90  African American GFR Calc     < >  --   --    POTASSIUM  --   --   --   --   --  3.5  --   --   --  3.4 3.7   < >  --   --    TSH  --   --   --   --   --   --   --   --   --   --   --   --  0.83  --     < > = values in this interval not displayed.      BP Readings from Last 3 Encounters:   06/07/19 94/62   05/16/19 96/70   02/22/19 100/70    Wt Readings from Last 3 Encounters:   06/07/19 68.5 kg (151 lb)   05/16/19 68.9 kg (152 lb)   02/22/19 73 kg (161 lb)                    Reviewed and updated as needed this visit by Provider         Review of Systems   ROS COMP: Constitutional, HEENT, cardiovascular, pulmonary, gi and gu systems are negative, except as otherwise noted.      Objective    BP 94/62 (BP Location: Right arm, Patient Position: Chair, Cuff Size: Adult Large)   Pulse 73   Temp 98.5  F (36.9  C) (Oral)   Resp 18   Ht 1.524 m (5')   Wt 68.5 kg (151 lb)   LMP 03/01/2016   SpO2 98%   BMI 29.49 kg/m    Body mass index is 29.49 kg/m .  Physical Exam   GENERAL: healthy, alert and no distress  EYES: Eyes grossly normal to inspection, conjunctivae and sclerae normal  MS: no gross musculoskeletal defects noted, no edema  SKIN: no suspicious lesions or rashes, hair and scalp exam normal except some thinning of hair on top of head. Hair is white underneath, dyed black.   NEURO: Normal strength and tone, gait and speech normal  PSYCH: mentation appears normal, affect normal/bright     Diagnostic Test Results:  Labs reviewed in Epic  Results for orders placed or performed in visit on 06/07/19 (from the past 24 hour(s))   INR   Result Value Ref Range    INR 0.95 0.86 - 1.14   CBC with platelets   Result Value Ref Range    WBC 4.8 4.0 - 11.0 10e9/L    RBC Count 4.96 3.8 - 5.2 10e12/L    Hemoglobin 14.9 11.7 - 15.7 g/dL     Hematocrit 42.7 35.0 - 47.0 %    MCV 86 78 - 100 fl    MCH 30.0 26.5 - 33.0 pg    MCHC 34.9 31.5 - 36.5 g/dL    RDW 13.9 10.0 - 15.0 %    Platelet Count 255 150 - 450 10e9/L           Assessment & Plan       ICD-10-CM    1. Hair loss- with normal exam, may be due to female pattern hair loss, hair was lost entirely after chemotherapy and grew back white.  L65.9 TSH with free T4 reflex     DERMATOLOGY REFERRAL   2. Fatty liver K76.0 Hepatitis C antibody     Hepatitis B surface antigen     Hepatitis B core antibody     Hepatitis B Surface Antibody     INR     Hepatic panel     CBC with platelets     Basic metabolic panel   3. Elevated LFTs R94.5 Follow up with gastroenterology         BMI:   Estimated body mass index is 29.49 kg/m  as calculated from the following:    Height as of this encounter: 1.524 m (5').    Weight as of this encounter: 68.5 kg (151 lb).           CONSULTATION/REFERRAL to dermatology and gastroenterology   FUTURE LABS:       - Schedule fasting labs in 6 months  FUTURE APPOINTMENTS:       - Follow-up visit in 6 months or sooner if any questions or concerns.   Work on weight loss  Regular exercise  See Patient Instructions    No follow-ups on file.    Concepcion Bocanegra MD  [facility]

## 2019-06-07 NOTE — PATIENT INSTRUCTIONS
At [facility], we strive to deliver an exceptional experience to you, every time we see you.  If you receive a survey in the mail, please send us back your thoughts. We really do value your feedback.    Based on your medical history, these are the current health maintenance/preventive care services that you are due for (some may have been done at this visit.)  Health Maintenance Due   Topic Date Due     URINE DRUG SCREEN  1975     PREVENTIVE CARE VISIT  04/18/2017         Suggested websites for health information:  Www.Zauber.org : Up to date and easily searchable information on multiple topics.  Www.[employer]Navi.gov : medication info, interactive tutorials, watch real surgeries online  Www.familydoctor.org : good info from the Academy of Family Physicians  Www.cdc.gov : public health info, travel advisories, epidemics (H1N1)  Www.aap.org : children's health info, normal development, vaccinations  Www.health.[facility].[place].us : [place] dept of health, public health issues in [place], N1N1    Your care team:                            Family Medicine Internal Medicine   MD Antonio Mistry MD Shantel Branch-Fleming, MD Katya Georgiev PA-C Nam Ho, MD Pediatrics   DRAGAN Mancera, MD Haydee Shaw CNP, MD Deborah Mielke, MD Kim Thein, APRN [facility]      Clinic hours: Monday - Thursday 7 am-7 pm; Fridays 7 am-5 pm.   Urgent care: Monday - Friday 11 am-9 pm; Saturday and Sunday 9 am-5 pm.  Pharmacy : Monday -Thursday 8 am-8 pm; Friday 8 am-6 pm; Saturday and Sunday 9 am-5 pm.     Clinic: [phone]   Pharmacy: [phone]

## 2019-06-09 NOTE — RESULT ENCOUNTER NOTE
Dear Aleena    Your test results are attached. I am happy to let you know that they are stable.    The thyroid test is normal. The liver function tests are much better now.     Please contact me by MyChart if you have any questions about your labs or management.    Concepcion Bocanegra MD

## 2019-06-10 LAB
HBV CORE AB SERPL QL IA: NONREACTIVE
HBV SURFACE AB SERPL IA-ACNC: 0.13 M[IU]/ML
HBV SURFACE AG SERPL QL IA: NONREACTIVE
HCV AB SERPL QL IA: NONREACTIVE

## 2019-06-19 ENCOUNTER — OFFICE VISIT (OUTPATIENT)
Dept: URGENT CARE | Facility: URGENT CARE | Age: 44
End: 2019-06-19
Payer: OTHER MISCELLANEOUS

## 2019-06-19 ENCOUNTER — APPOINTMENT (OUTPATIENT)
Age: 44
Setting detail: DERMATOLOGY
End: 2019-06-19

## 2019-06-19 VITALS
SYSTOLIC BLOOD PRESSURE: 114 MMHG | WEIGHT: 154.4 LBS | OXYGEN SATURATION: 98 % | HEART RATE: 72 BPM | DIASTOLIC BLOOD PRESSURE: 77 MMHG | BODY MASS INDEX: 30.15 KG/M2 | TEMPERATURE: 98.3 F

## 2019-06-19 DIAGNOSIS — T30.0 BURN, FIRST DEGREE: Primary | ICD-10-CM

## 2019-06-19 DIAGNOSIS — R42 LIGHT-HEADED FEELING: ICD-10-CM

## 2019-06-19 PROCEDURE — 99214 OFFICE O/P EST MOD 30 MIN: CPT | Performed by: NURSE PRACTITIONER

## 2019-06-19 RX ORDER — SILVER SULFADIAZINE 10 MG/G
CREAM TOPICAL DAILY
Qty: 25 G | Refills: 0 | Status: SHIPPED | OUTPATIENT
Start: 2019-06-19 | End: 2019-08-09

## 2019-06-19 ASSESSMENT — ENCOUNTER SYMPTOMS
RHINORRHEA: 0
DIARRHEA: 0
HEADACHES: 0
LIGHT-HEADEDNESS: 1
SORE THROAT: 0
SHORTNESS OF BREATH: 0
VOMITING: 0
COUGH: 0
CHILLS: 0
FEVER: 0
NAUSEA: 1

## 2019-06-19 NOTE — PATIENT INSTRUCTIONS
Patient Education     Burn Emergencies    Electricity, chemicals, steam, very hot water, and fire can all cause serious burns. The care you receive for burns will depend on the type and severity of your injury. Many burns can be treated in an outpatient setting. If a burn needs inpatient treatment, it should be done in a burn center. Very severe burns need treatment in a burn center.   Types of burns  You may be most familiar with the traditional burn classification of first, second, and third-degree burns.    First-degree burns are usually mild. They affect only the outer layer of skin (epidermis). The skin is likely to be red, and there may be some pain and swelling. Most sunburns are first-degree burns.    Second-degree burns injure the second layer (dermis) of skin. The skin will be intensely red and may develop blisters. These burns are often very painful.    Third-degree burns involve all the layers of skin. Fat, nerves, muscles, and even bone may be burned. The skin may be charred black or appear very white. Pain is likely to be severe. If nerves are damaged, no pain may be felt at all.    A newer classification uses designations based on the depth of the burn and the need for surgical intervention.    Superficial burns involve the outer layer of skin (epidermis). They are painful and red, but do not blister    Superficial partial-thickness involves the outer layer and second layer (dermis) of skin. These burns usually blister within 24 hours that are painful, red and weeping.    Deep partial-thickness burns extend into the deeper dermis and damage hair follicles and glandular tissue. The burn is painful on pressure and the blister is wet or waxy and mottled or patchy in color.    Full-thickness burns extend through all layers of the skin and often to the underlying tissue. Blisters do not develop but skin can vary from waxy white to gray to charred and black.    Fourth degree burns are deep and can be life  threatening, extending through skin into underlying tissues, muscle or bone.  When to go to the emergency department  For a second- or third-degree burn, burns all the way around an arm or leg, burns caused by electricity or chemicals, burns involving the eyes, mouth, or airway, or any burn that covers large parts of the body, go to the emergency department or call 911 right away.  What to expect in the emergency department  Some activities that will happen include:    Medicine will be given to relieve pain.    The burn is cooled with moist cloths.    A chemical burn will be flushed with water and may be injected with medicine.    The burn is washed and any damaged tissue is removed. An antibiotic ointment may be applied and the burn covered with a dressing.    Fluids are given through a vein by intravenous access (IV) in the arm or other extremity not affected by burns.    If smoke was inhaled, the airways may be burned. If so, a tube may be placed in the windpipe (trachea) and connected to a ventilator to help breathing. A chest X-ray may be done to look for damage to the lungs from inhaling smoke. Blood and urine tests may be done to check the body s levels of oxygen and carbon dioxide.  Follow-up  Some burns can be cared for at home. Burns easily become infected, so careful cleaning and dressing changes are important. Very deep burns often require long-term medical treatment. For these burns, treatment is done in a burn center. Depending on the severity of the burn, skin grafts may be needed to replace damaged tissue.  Date Last Reviewed: 4/1/2017 2000-2018 [employer]. [address] [place] [address], [place] [address]. All rights reserved. This information is not intended as a substitute for professional medical care. Always follow your healthcare professional's instructions.

## 2019-06-19 NOTE — PROGRESS NOTES
"SUBJECTIVE:   Aleena Escamilla is a 43 year old female presenting with a chief complaint of   Chief Complaint   Patient presents with     Headache     Patient complains of headache and burning in lips after touching chemicals at work        She is an established patient of [facility].  Aleena had a chemiical spill/splash at work. The name of the chemical is [employer] (cleaning agent).  \"The chemical splashed on the lips and she felt headed, nausea and burning sensation on the knuckles. She rinced the lips and hands with water after the splash.  She has since been feeling a burning sensation around the lips and the knuckles.  She reports mild lightheadedness and on and off nausea since the time of incident.    Review of Systems   Constitutional: Negative for chills and fever.   HENT: Negative for congestion, ear pain, rhinorrhea and sore throat.    Respiratory: Negative for cough and shortness of breath.    Gastrointestinal: Positive for nausea. Negative for diarrhea and vomiting.   Skin:        Burning sensation on lips and both knuckles.   Neurological: Positive for light-headedness. Negative for headaches.   All other systems reviewed and are negative.      Past Medical History:   Diagnosis Date     Chronic pelvic pain in female      Malignant neoplasm of right breast, stage 2 (H) 1/2016 1/4 lymph nodes positive, Oncotype DX = 25.  Chemotherapy and radiation. Letrozole.     Migraine headaches      Family History   Problem Relation Age of Onset     Diabetes Mother      Hypertension Mother      Cancer Maternal Grandmother         cervical CA     Cardiovascular Father         MI     Cancer Maternal Aunt         cervical cancer     Pancreatic Cancer Maternal Aunt         COD     Current Outpatient Medications   Medication Sig Dispense Refill     Cholecalciferol (VITAMIN D3) 2000 units TABS Take 1 tablet by mouth daily 100 tablet 3     ibuprofen (ADVIL/MOTRIN) 600 MG tablet Take 1 tablet (600 mg) by mouth " every 8 hours as needed for moderate pain 20 tablet 0     letrozole (FEMARA) 2.5 MG tablet Take 1 tablet (2.5 mg) by mouth daily 90 tablet 3     silver sulfADIAZINE (SILVADENE) 1 % external cream Apply topically daily for 7 days On the broke skin 25 g 0     Social History     Tobacco Use     Smoking status: Never Smoker     Smokeless tobacco: Never Used   Substance Use Topics     Alcohol use: Yes     Comment: 1 drink per week.       OBJECTIVE  /77 (BP Location: Left arm, Patient Position: Chair, Cuff Size: Adult Regular)   Pulse 72   Temp 98.3  F (36.8  C) (Oral)   Wt 70 kg (154 lb 6.4 oz)   LMP 03/01/2016   SpO2 98%   BMI 30.15 kg/m      Physical Exam   Constitutional: No distress.   HENT:   Head: Normocephalic and atraumatic.   Right Ear: Tympanic membrane and external ear normal.   Left Ear: Tympanic membrane and external ear normal.   Mouth/Throat: Oropharynx is clear and moist.   No bruising, ecchymosis, swelling on the lips noted.  Normal color and no swelling on both knuckles noted.   Eyes: Pupils are equal, round, and reactive to light. EOM are normal.   Neck: Normal range of motion. Neck supple.   Cardiovascular: Normal rate.   Pulmonary/Chest: Effort normal and breath sounds normal. No respiratory distress.   Lymphadenopathy:     She has no cervical adenopathy.   Neurological: She is alert. No cranial nerve deficit.   Skin: Skin is warm and dry. She is not diaphoretic.   Psychiatric: She has a normal mood and affect.   Nursing note and vitals reviewed.      ASSESSMENT:      ICD-10-CM    1. Burn, first degree T30.0 silver sulfADIAZINE (SILVADENE) 1 % external cream   2. Light-headed feeling R42         PLAN:  The instructions on the cleaning agent manual indicate to flash the skin with cold water and the provider to do symptomatic treatment.  Advised to rest and hydrate  I have advised patient to go to the ER if the lightheadedness is getting worse.    She is worried about blistering on the  lips.  And silver sulfadiazine ordered in case of a blister.  Patient prefers to go to the ER for further evaluation of lightheadedness.  She will go to [facility] ER.      Patient Instructions     Patient Education     Burn Emergencies    Electricity, chemicals, steam, very hot water, and fire can all cause serious burns. The care you receive for burns will depend on the type and severity of your injury. Many burns can be treated in an outpatient setting. If a burn needs inpatient treatment, it should be done in a burn center. Very severe burns need treatment in a burn center.   Types of burns  You may be most familiar with the traditional burn classification of first, second, and third-degree burns.    First-degree burns are usually mild. They affect only the outer layer of skin (epidermis). The skin is likely to be red, and there may be some pain and swelling. Most sunburns are first-degree burns.    Second-degree burns injure the second layer (dermis) of skin. The skin will be intensely red and may develop blisters. These burns are often very painful.    Third-degree burns involve all the layers of skin. Fat, nerves, muscles, and even bone may be burned. The skin may be charred black or appear very white. Pain is likely to be severe. If nerves are damaged, no pain may be felt at all.    A newer classification uses designations based on the depth of the burn and the need for surgical intervention.    Superficial burns involve the outer layer of skin (epidermis). They are painful and red, but do not blister    Superficial partial-thickness involves the outer layer and second layer (dermis) of skin. These burns usually blister within 24 hours that are painful, red and weeping.    Deep partial-thickness burns extend into the deeper dermis and damage hair follicles and glandular tissue. The burn is painful on pressure and the blister is wet or waxy and mottled or patchy in color.    Full-thickness burns extend  through all layers of the skin and often to the underlying tissue. Blisters do not develop but skin can vary from waxy white to gray to charred and black.    Fourth degree burns are deep and can be life threatening, extending through skin into underlying tissues, muscle or bone.  When to go to the emergency department  For a second- or third-degree burn, burns all the way around an arm or leg, burns caused by electricity or chemicals, burns involving the eyes, mouth, or airway, or any burn that covers large parts of the body, go to the emergency department or call 911 right away.  What to expect in the emergency department  Some activities that will happen include:    Medicine will be given to relieve pain.    The burn is cooled with moist cloths.    A chemical burn will be flushed with water and may be injected with medicine.    The burn is washed and any damaged tissue is removed. An antibiotic ointment may be applied and the burn covered with a dressing.    Fluids are given through a vein by intravenous access (IV) in the arm or other extremity not affected by burns.    If smoke was inhaled, the airways may be burned. If so, a tube may be placed in the windpipe (trachea) and connected to a ventilator to help breathing. A chest X-ray may be done to look for damage to the lungs from inhaling smoke. Blood and urine tests may be done to check the body s levels of oxygen and carbon dioxide.  Follow-up  Some burns can be cared for at home. Burns easily become infected, so careful cleaning and dressing changes are important. Very deep burns often require long-term medical treatment. For these burns, treatment is done in a burn center. Depending on the severity of the burn, skin grafts may be needed to replace damaged tissue.  Date Last Reviewed: 4/1/2017 2000-2018 [employer]. [address] [place] [address], [place] [address]. All rights reserved. This information is not intended as a substitute for  professional medical care. Always follow your healthcare professional's instructions.

## 2019-06-19 NOTE — LETTER
[facility]  [address]  [facility] [address]  Phone: [phone]    June 19, 2019        Aleena Escamilla  [address] [facility] [address]          To whom it may concern:    RE: Aleena Escamilla    Patient was seen and treated today at our clinic. Please allow her to rest home today 6/19/2019  Patient may return to work with no restrictions.  Please contact me for questions or concerns.      Sincerely,        Paola Ibanez NP, APRN

## 2019-06-22 NOTE — RESULT ENCOUNTER NOTE
Dear Aleena    Your test results are attached. I am happy to let you know that they are stable.    The liver test looks much better now! Screen for hepatitis is negative or normal. The blood sugar is normal and you do not have diabetes. The kidneys are healthy. The hemoglobin is normal and you do not have anemia. The thyroid test is normal. We can recheck labs in 1 year.     Please contact me by [employer] if you have any questions about your labs or management.    Concepcion Bocanegra MD

## 2019-06-24 DIAGNOSIS — Z17.0 MALIGNANT NEOPLASM OF RIGHT BREAST IN FEMALE, ESTROGEN RECEPTOR POSITIVE, UNSPECIFIED SITE OF BREAST (H): ICD-10-CM

## 2019-06-24 DIAGNOSIS — C50.911 MALIGNANT NEOPLASM OF RIGHT BREAST IN FEMALE, ESTROGEN RECEPTOR POSITIVE, UNSPECIFIED SITE OF BREAST (H): ICD-10-CM

## 2019-06-24 RX ORDER — LETROZOLE 2.5 MG/1
2.5 TABLET, FILM COATED ORAL DAILY
Qty: 90 TABLET | Refills: 3 | Status: SHIPPED | OUTPATIENT
Start: 2019-06-24 | End: 2020-01-16

## 2019-07-05 ENCOUNTER — OFFICE VISIT (OUTPATIENT)
Dept: FAMILY MEDICINE | Facility: CLINIC | Age: 44
End: 2019-07-05
Payer: COMMERCIAL

## 2019-07-05 VITALS
RESPIRATION RATE: 18 BRPM | BODY MASS INDEX: 30.23 KG/M2 | TEMPERATURE: 98.5 F | SYSTOLIC BLOOD PRESSURE: 96 MMHG | OXYGEN SATURATION: 98 % | WEIGHT: 154 LBS | HEART RATE: 74 BPM | DIASTOLIC BLOOD PRESSURE: 70 MMHG | HEIGHT: 60 IN

## 2019-07-05 DIAGNOSIS — Z17.0 MALIGNANT NEOPLASM OF OVERLAPPING SITES OF RIGHT BREAST IN FEMALE, ESTROGEN RECEPTOR POSITIVE (H): ICD-10-CM

## 2019-07-05 DIAGNOSIS — C50.811 MALIGNANT NEOPLASM OF OVERLAPPING SITES OF RIGHT BREAST IN FEMALE, ESTROGEN RECEPTOR POSITIVE (H): ICD-10-CM

## 2019-07-05 DIAGNOSIS — N64.4 BREAST PAIN, RIGHT: Primary | ICD-10-CM

## 2019-07-05 DIAGNOSIS — M25.511 ACUTE PAIN OF RIGHT SHOULDER: ICD-10-CM

## 2019-07-05 PROCEDURE — 99214 OFFICE O/P EST MOD 30 MIN: CPT | Performed by: FAMILY MEDICINE

## 2019-07-05 ASSESSMENT — MIFFLIN-ST. JEOR: SCORE: 1280.04

## 2019-07-05 ASSESSMENT — PAIN SCALES - GENERAL: PAINLEVEL: MODERATE PAIN (5)

## 2019-07-05 NOTE — PROGRESS NOTES
Subjective     Aleena Escamilla is a 43 year old female who presents to clinic today for the following health issues:    HPI   Pain    Onset: 2 months- doing a lot of painting and moving    Description:   Location: right breast  Character: tenderness    Intensity: moderate, 5/10    Progression of Symptoms: pain better since surgery but    Accompanying Signs & Symptoms:  Other symptoms: radiation of pain to right shoulder, axillary, and back of shoulder    History:   Previous similar pain: YES- pain since surgery but was going away then worsened again in May, area of pain is higher than better      Precipitating factors:   Trauma or overuse: no     Alleviating factors:  Improved by: nothing    Therapies Tried and outcome: was told could be a side effect after surgery and recommended to ice and Ibuprofen tried and failed      History of breast cancer in right breast with lumpectomy, chemotherapy. Now on medication for prevention of recurrence. Last mammogram December good. Should have 6 month follow up with Dr. Tate, oncology coming up but this has not been scheduled and needs follow up labs also.     Patient Active Problem List   Diagnosis     CARDIOVASCULAR SCREENING; LDL GOAL LESS THAN 160     Chronic pelvic pain in female     Chronic salpingitis and oophoritis     Major depressive disorder, recurrent episode, mild (H)     Generalized anxiety disorder     Arthritis, multiple joint involvement     Primary osteoarthritis of both hands     Fibromyalgia     Malignant neoplasm of overlapping sites of right female breast (H)     BRCA gene positive     Carpal tunnel syndrome     Non morbid drug-induced obesity     Chronic right shoulder pain     Bunion, right     Onychomycosis     Elevated LFTs     Fatty liver     Hyperlipidemia LDL goal <130     Past Surgical History:   Procedure Laterality Date     BIOPSY BREAST NEEDLE LOCALIZATION Right 1/19/2016    Procedure: BIOPSY BREAST NEEDLE LOCALIZATION;  Surgeon: Dav  Adelina GUADARRAMA MD;  Location: [facility]     GYN SURGERY  2-    bilateral salpingectomy, left oophrectomy     LAPAROSCOPIC SALPINGO-OOPHORECTOMY Right 10/26/2016    Procedure: LAPAROSCOPIC SALPINGO-OOPHORECTOMY;  Surgeon: Bouchra Mayo MD;  Location: [facility]     LUMPECTOMY BREAST WITH SENTINEL NODE, COMBINED Right 1/19/2016    Procedure: COMBINED LUMPECTOMY BREAST WITH SENTINEL NODE;  Surgeon: Adelina Demarco MD;  Location: [facility]     PELVIS LAPAROSCOPY,DX  12/28/1998     RELEASE CARPAL TUNNEL Left 12/23/2016    Procedure: RELEASE CARPAL TUNNEL;  Surgeon: Sam Florence MD;  Location: [facility]       Social History     Tobacco Use     Smoking status: Never Smoker     Smokeless tobacco: Never Used   Substance Use Topics     Alcohol use: Yes     Comment: 1 drink per week.     Family History   Problem Relation Age of Onset     Diabetes Mother      Hypertension Mother      Cancer Maternal Grandmother         cervical CA     Cardiovascular Father         MI     Cancer Maternal Aunt         cervical cancer     Pancreatic Cancer Maternal Aunt         COD         Current Outpatient Medications   Medication Sig Dispense Refill     Cholecalciferol (VITAMIN D3) 2000 units TABS Take 1 tablet by mouth daily 100 tablet 3     ibuprofen (ADVIL/MOTRIN) 600 MG tablet Take 1 tablet (600 mg) by mouth every 8 hours as needed for moderate pain 20 tablet 0     letrozole (FEMARA) 2.5 MG tablet Take 1 tablet (2.5 mg) by mouth daily 90 tablet 3     Allergies   Allergen Reactions     Latex Rash     Recent Labs   Lab Test 06/07/19  1102 01/22/19  1022 08/28/18  1128 08/28/18  1127  02/05/18  1658  12/19/16  1021  11/07/11  1226 10/17/11  0809   A1C  --   --   --   --   --   --   --  5.7  --   --   --    LDL  --   --  138*  --   --   --   --  82  --   --  86   HDL  --   --  32*  --   --   --   --  30*  --   --  33*   TRIG  --   --  201*  --   --   --   --  301*  --   --  212*   ALT 51* 193*  --  182*   < > 112*   < >  --    < >  --   --     CR 0.56  --   --   --   --  0.60  --   --    < >  --   --    GFRESTIMATED >90  --   --   --   --  >90  --   --    < >  --   --    GFRESTBLACK >90  --   --   --   --  >90  --   --    < >  --   --    POTASSIUM 3.8  --   --   --   --  3.5  --   --    < >  --   --    TSH 1.38  --   --   --   --   --   --   --   --  0.83  --     < > = values in this interval not displayed.      BP Readings from Last 3 Encounters:   07/05/19 96/70   06/19/19 114/77   06/07/19 94/62    Wt Readings from Last 3 Encounters:   07/05/19 69.9 kg (154 lb)   06/19/19 70 kg (154 lb 6.4 oz)   06/07/19 68.5 kg (151 lb)                    Reviewed and updated as needed this visit by Provider         Review of Systems   ROS COMP: Constitutional, HEENT, cardiovascular, pulmonary, gi and gu systems are negative, except as otherwise noted.      Objective    BP 96/70 (BP Location: Left arm, Patient Position: Chair, Cuff Size: Adult Large)   Pulse 74   Temp 98.5  F (36.9  C) (Oral)   Resp 18   Ht 1.524 m (5')   Wt 69.9 kg (154 lb)   LMP 03/01/2016   SpO2 98%   BMI 30.08 kg/m    Body mass index is 30.08 kg/m .  Physical Exam   GENERAL: healthy, alert and no distress  EYES: Eyes grossly normal to inspection, conjunctivae and sclerae normal  MS: no gross musculoskeletal defects noted, no edema, tenderness anterior right shoulder into abductor muscles without deformity or swelling. Normal ROM of shoulder.   SKIN: no suspicious lesions or rashes  NEURO: Normal strength and tone, gait and speech normal  PSYCH: mentation appears normal, affect normal/bright   BREAST: Normal appearance symmetric with no retractions, no palpable lesions or masses, normal nipples with no discharge, no axillary adenopathy.  Scar on right breast outer mid quadrant. Tenderness in muscles above breast tissue.     Diagnostic Test Results:  Labs reviewed in Epic  Results for orders placed or performed in visit on 06/07/19   Hepatitis C antibody   Result Value Ref Range     Hepatitis C Antibody Nonreactive NR^Nonreactive   Hepatitis B surface antigen   Result Value Ref Range    Hep B Surface Agn Nonreactive NR^Nonreactive   Hepatitis B core antibody   Result Value Ref Range    Hepatitis B Core Risa Nonreactive NR^Nonreactive   Hepatitis B Surface Antibody   Result Value Ref Range    Hepatitis B Surface Antibody 0.13 <8.00 m[IU]/mL   INR   Result Value Ref Range    INR 0.95 0.86 - 1.14   Hepatic panel   Result Value Ref Range    Bilirubin Direct <0.1 0.0 - 0.2 mg/dL    Bilirubin Total 0.5 0.2 - 1.3 mg/dL    Albumin 4.1 3.4 - 5.0 g/dL    Protein Total 7.9 6.8 - 8.8 g/dL    Alkaline Phosphatase 99 40 - 150 U/L    ALT 51 (H) 0 - 50 U/L    AST 22 0 - 45 U/L   CBC with platelets   Result Value Ref Range    WBC 4.8 4.0 - 11.0 10e9/L    RBC Count 4.96 3.8 - 5.2 10e12/L    Hemoglobin 14.9 11.7 - 15.7 g/dL    Hematocrit 42.7 35.0 - 47.0 %    MCV 86 78 - 100 fl    MCH 30.0 26.5 - 33.0 pg    MCHC 34.9 31.5 - 36.5 g/dL    RDW 13.9 10.0 - 15.0 %    Platelet Count 255 150 - 450 10e9/L   Basic metabolic panel   Result Value Ref Range    Sodium 139 133 - 144 mmol/L    Potassium 3.8 3.4 - 5.3 mmol/L    Chloride 106 94 - 109 mmol/L    Carbon Dioxide 25 20 - 32 mmol/L    Anion Gap 8 3 - 14 mmol/L    Glucose 104 (H) 70 - 99 mg/dL    Urea Nitrogen 12 7 - 30 mg/dL    Creatinine 0.56 0.52 - 1.04 mg/dL    GFR Estimate >90 >60 mL/min/[1.73_m2]    GFR Estimate If Black >90 >60 mL/min/[1.73_m2]    Calcium 9.2 8.5 - 10.1 mg/dL   TSH with free T4 reflex   Result Value Ref Range    TSH 1.38 0.40 - 4.00 mU/L           Assessment & Plan     1. Breast pain, right  Exam does not show any suspicious masses or lesions, but will get an early repeat mammogram due to pain in this area.   - MA Diagnostic Digital Bilateral; Future    2. Malignant neoplasm of overlapping sites of right breast in female, estrogen receptor positive (H)  No signs of recurrence. Due for follow up and patient will call for appointment. She will be  gone on vacation this coming week.   - MA Diagnostic Digital Bilateral; Future    3. Acute pain of right shoulder  Seen by chiropractor, who was concerned due to the breast pain and wanted her to also see primary care provider. OK to resume therapy. Most likely due to over use when moving and painting old house. Rest the affected painful area as much as possible.  Apply ice for 15-20 minutes intermittently as needed and especially after any offending activity. Daily stretching.  As pain recedes, begin normal activities slowly as tolerated.  Consider Physical Therapy if symptoms not better with symptomatic care.        BMI:   Estimated body mass index is 30.08 kg/m  as calculated from the following:    Height as of this encounter: 1.524 m (5').    Weight as of this encounter: 69.9 kg (154 lb).           CONSULTATION/REFERRAL to oncology  FUTURE APPOINTMENTS:       - Follow-up visit in 1-2 months or sooner if pain persists.   See Patient Instructions    Return in about 2 months (around 9/5/2019) for medication follow up, recheck.    Concepcion Bocanegra MD  [facility]

## 2019-07-05 NOTE — PATIENT INSTRUCTIONS
Patient Education     You are due for your annual mammogram. Please call and schedule your appointment at a time and location that is good for you.    [facility] is available for screening mammographs every other Monday 4:15-5:15, Tuesday 8-10:45, Wednesday 11-12, Friday 3:15-4:15, and every other Saturday 9:15-12:30.    [address]  [place] [address]  [phone]   24 hour Mammography scheduling  [phone]    [facility] is available M,W, Th 7:15 am to 5 pm, Tuesday 7:15 am to 4 pm, and Fridays 7:15 to 4:30 pm.     [facility]  [address] [place] [address]  [phone]    Understanding Shoulder Impingement Syndrome    Shoulder impingement syndrome is a problem with the shoulder joint. It occurs when certain parts within the joint swell and are pinched. This can cause nagging pain and problems with moving the arm.  What causes shoulder impingement syndrome?  It is possible to develop impingement after years of normal shoulder use. But in most cases the condition occurs because of repeated overhead movements. These include such things as stocking shelves, painting, swimming, and throwing. These movements can irritate parts of the shoulder, leading to swelling. Swollen parts of the shoulder take up more room, making the joint space smaller. Some parts that may be involved include:    A sac of fluid (bursa) that cushions the shoulder joint.  When the bursa is irritated, it may lead to a condition called bursitis. This is when the bursa swells with fluid, filling and squeezing the joint space.    Fibrous tissues (tendons) that connect muscle to bone. When tendons are irritated, they may become swollen. This is called tendonitis.    The end (acromion) of the shoulder blade. This bone may be flat or hooked. If the acromion is hooked, the joint space may be smaller than normal. Growths (bone spurs)  on the acromion can also narrow the joint space. Acromion problems don t often cause impingement. But they can make it worse.  Symptoms of shoulder impingement syndrome  Symptoms include pain, pinching, or stiffness in your shoulder. Pain often comes with movement, particularly reaching overhead or backward. It may also be felt when the shoulder is at rest. Pain at night during sleep is common.  Treatment for shoulder impingement syndrome  Treatment will depend on the cause of the problem, how bad the problem is, and if other parts of the shoulder are damaged. Treatment may include the following:    Active rest. This allows the shoulder to heal. It means using the arm and shoulder, but avoiding activities that cause pain. These likely include reaching overhead or sleeping on your shoulder.    Cold packs. These help reduce swelling and relieve pain.    Prescription or over-the-counter pain medicines. These help relieve pain and swelling.    Arm and shoulder exercises. These help keep your shoulder joint mobile as it heals. They also help improve muscle strength around the joint.    Shots of medicine into the joint. This can help reduce swelling and pain for a short time.  If other measures don t work to relieve symptoms, you may need surgery. This opens up space in the joint to allow pain-free motion.  Possible complications of shoulder impingement syndrome  It might be tempting to stop using your shoulder completely to avoid pain. But doing so may lead to a condition called frozen shoulder. To help prevent this, follow instructions you are given for active rest and for doing exercises to help your shoulder heal.  When to call your healthcare provider  Call your healthcare provider right away if you have any of these:    Fever of 100.4 F (38 C) or higher, or as directed    Symptoms that don t get better, or get worse    New symptoms   Date Last Reviewed: 3/10/2016    2811-9438 [employer]. [address] [place] [address]  [place] [address]. All rights reserved. This information is not intended as a substitute for professional medical care. Always follow your healthcare professional's instructions.

## 2019-07-05 NOTE — Clinical Note
Mary Tate,I am ordering an early mammogram for Aleena because of right breast pain. I think it is due to overuse of her right shoulder and not any recurrent cancer, but she is somewhat anxious about the pain. She is also due for her 6 month follow up and will call to set this up with your clinic.Avril

## 2019-07-17 ENCOUNTER — ANCILLARY PROCEDURE (OUTPATIENT)
Dept: MAMMOGRAPHY | Facility: CLINIC | Age: 44
End: 2019-07-17
Attending: FAMILY MEDICINE
Payer: COMMERCIAL

## 2019-07-17 ENCOUNTER — ANCILLARY PROCEDURE (OUTPATIENT)
Dept: ULTRASOUND IMAGING | Facility: CLINIC | Age: 44
End: 2019-07-17
Attending: FAMILY MEDICINE
Payer: COMMERCIAL

## 2019-07-17 DIAGNOSIS — N64.4 BREAST PAIN, RIGHT: ICD-10-CM

## 2019-07-17 DIAGNOSIS — Z17.0 MALIGNANT NEOPLASM OF OVERLAPPING SITES OF RIGHT BREAST IN FEMALE, ESTROGEN RECEPTOR POSITIVE (H): ICD-10-CM

## 2019-07-17 DIAGNOSIS — C50.811 MALIGNANT NEOPLASM OF OVERLAPPING SITES OF RIGHT BREAST IN FEMALE, ESTROGEN RECEPTOR POSITIVE (H): ICD-10-CM

## 2019-07-17 PROCEDURE — 76642 ULTRASOUND BREAST LIMITED: CPT | Mod: 50

## 2019-07-17 PROCEDURE — 77066 DX MAMMO INCL CAD BI: CPT

## 2019-07-17 PROCEDURE — G0279 TOMOSYNTHESIS, MAMMO: HCPCS

## 2019-07-17 NOTE — RESULT ENCOUNTER NOTE
Dear Aleena    Your test results are attached. I am happy to let you know that they are stable.    The mammogram results are reassuring. Please follow up with oncology as scheduled. Let me know if the breast pain is not improving.     Please contact me by MyChart if you have any questions about your labs or management.    Concepcion Bocanegra MD

## 2019-07-18 ENCOUNTER — ONCOLOGY VISIT (OUTPATIENT)
Dept: ONCOLOGY | Facility: CLINIC | Age: 44
End: 2019-07-18
Payer: COMMERCIAL

## 2019-07-18 VITALS
SYSTOLIC BLOOD PRESSURE: 98 MMHG | BODY MASS INDEX: 30.86 KG/M2 | HEIGHT: 60 IN | HEART RATE: 70 BPM | OXYGEN SATURATION: 97 % | RESPIRATION RATE: 20 BRPM | TEMPERATURE: 98.2 F | DIASTOLIC BLOOD PRESSURE: 70 MMHG | WEIGHT: 157.2 LBS

## 2019-07-18 DIAGNOSIS — M89.8X9 BONE PAIN: ICD-10-CM

## 2019-07-18 DIAGNOSIS — Z17.0 MALIGNANT NEOPLASM OF RIGHT BREAST IN FEMALE, ESTROGEN RECEPTOR POSITIVE, UNSPECIFIED SITE OF BREAST (H): Primary | ICD-10-CM

## 2019-07-18 DIAGNOSIS — C50.911 MALIGNANT NEOPLASM OF RIGHT BREAST IN FEMALE, ESTROGEN RECEPTOR POSITIVE, UNSPECIFIED SITE OF BREAST (H): ICD-10-CM

## 2019-07-18 DIAGNOSIS — C50.811 MALIGNANT NEOPLASM OF OVERLAPPING SITES OF RIGHT FEMALE BREAST (H): ICD-10-CM

## 2019-07-18 DIAGNOSIS — Z17.0 MALIGNANT NEOPLASM OF RIGHT BREAST IN FEMALE, ESTROGEN RECEPTOR POSITIVE, UNSPECIFIED SITE OF BREAST (H): ICD-10-CM

## 2019-07-18 DIAGNOSIS — C50.911 MALIGNANT NEOPLASM OF RIGHT BREAST IN FEMALE, ESTROGEN RECEPTOR POSITIVE, UNSPECIFIED SITE OF BREAST (H): Primary | ICD-10-CM

## 2019-07-18 LAB
ALBUMIN SERPL-MCNC: 3.9 G/DL (ref 3.4–5)
ALP SERPL-CCNC: 99 U/L (ref 40–150)
ALT SERPL W P-5'-P-CCNC: 76 U/L (ref 0–50)
AST SERPL W P-5'-P-CCNC: 34 U/L (ref 0–45)
BILIRUB DIRECT SERPL-MCNC: <0.1 MG/DL (ref 0–0.2)
BILIRUB SERPL-MCNC: 0.3 MG/DL (ref 0.2–1.3)
CANCER AG27-29 SERPL-ACNC: 20 U/ML (ref 0–39)
PROT SERPL-MCNC: 7.7 G/DL (ref 6.8–8.8)

## 2019-07-18 PROCEDURE — 86300 IMMUNOASSAY TUMOR CA 15-3: CPT | Performed by: INTERNAL MEDICINE

## 2019-07-18 PROCEDURE — 99214 OFFICE O/P EST MOD 30 MIN: CPT | Performed by: INTERNAL MEDICINE

## 2019-07-18 PROCEDURE — 36415 COLL VENOUS BLD VENIPUNCTURE: CPT | Performed by: INTERNAL MEDICINE

## 2019-07-18 PROCEDURE — 80076 HEPATIC FUNCTION PANEL: CPT | Performed by: INTERNAL MEDICINE

## 2019-07-18 RX ORDER — EVE PRIMROSE/LINOLEIC/G-LENIC 1000 MG
CAPSULE ORAL
COMMUNITY

## 2019-07-18 RX ORDER — CHLORAL HYDRATE 500 MG
1 CAPSULE ORAL DAILY
COMMUNITY

## 2019-07-18 ASSESSMENT — MIFFLIN-ST. JEOR: SCORE: 1294.55

## 2019-07-18 ASSESSMENT — PAIN SCALES - GENERAL: PAINLEVEL: SEVERE PAIN (6)

## 2019-07-18 NOTE — LETTER
7/18/2019         RE: Aleena Escamilla  [address] [facility] [address]        Dear Colleague,    Thank you for referring your patient, Aleena Escamilla, to the [facility]. Please see a copy of my visit note below.    Visit Date:   07/18/2019      ONCOLOGY DIAGNOSIS:   1. January 2016: Diagnosed with stage II multifocal invasive lobular carcinoma of the right breast. Final pathology showed a grade 2 with the first foci measuring 36 x 16 x 8 mm and the second one 21 x 14 x 11 mm. LCIS classical type present, lymphovascular invasion not identified. Margins negative, estrogen, progesterone receptor positive by IHC and HER-2 not amplified performed on a core biopsy. 1/4 lymph nodes positive with the greatest dimension being 10 mm. No extranodal extension. Oncotype DX recurrence score 25.   2. Genetics: Variant of Uncertain Significance in BRCA2 gene.        THERAPY TO DATE:   1. January 19, 2016: Right breast partial mastectomy with wire localization and right axillary sentinel node biopsy.   2. February 2016 to June 2016. Weekly Taxol followed by dose-dense AC.    3.  August 2016 to September 2016. Radiotherapy.    4. October 26, 2016: Laparoscopic right oophorectomy, biopsy of pelvic nodule, removal of a portion of the right fallopian tube. Pathology: right pelvic nodule showed fibrous nodule with calcifications, endosalpinosis. Right ovary with serous cystadenoma, benign epithelial inclusion cyst, negative for malignancy. A portion of the right fallopian tube with no significant histologic abnormality.   5. May 2016 to Present. Letrozole.      INTERVAL HISTORY:  Aleena is a 43-year-old female diagnosed with pT2N1a MX multifocal invasive lobular carcinoma of the right breast in 06/2016 after self-palpating a right breast mass.  The patient presents to clinic for followup of her malignancy.  On today's visit, the patient reports having right breast pain.  She said this has been  longstanding, feels it now is also affecting her back.  She was seen by her primary and mammogram and ultrasound were ordered, which were negative.  Other than that, she is tolerating menopause very well, does not even notice hot flashes anymore.  Denies any fevers, chills, nausea, vomiting, chest pain, shortness of breath, cough.  No abdominal discomfort.  No new palpable masses.  Remaining comprehensive review of systems is negative.      PAST MEDICAL HISTORY:(Not addressed on today's visit.)  1. Multifocal right breast cancer.   2. History of migraines.   3. History of chronic pelvic pain.   4. Bilateral carpal tunnel       PAST SURGICAL HISTORY: (Not addressed on today's visit.)  1. Right breast lumpectomy 01/2016.   2. Pelvic laparoscopy.   3. Bilateral salpingectomy, left oophorectomy 02/2001 for pelvic pain, uterus intact.   4. 10/2016, right oophorectomy.   5. 11/2016, right carpal tunnel release.   6. Right foot bunion removed.     SOCIAL HISTORY:   with 2 children.  She comes in alone.  Works for [employer].  No current tobacco use.      PHYSICAL EXAMINATION  BP 98/70 (BP Location: Left arm)   Pulse 70   Temp 98.2  F (36.8  C) (Oral)   Resp 20   Ht 1.524 m (5')   Wt 71.3 kg (157 lb 3.2 oz)   LMP 03/01/2016   SpO2 97%   BMI 30.70 kg/m      GENERAL:  Comfortable, in no acute distress, pleasant.   HEENT:  Atraumatic, normocephalic.  Pupils equal, round, reactive.  Sclerae anicteric.  Oropharynx:  Moist mucous membranes.  No lesions, ulcers or exudates.   NECK:  Supple, full range of motion.  Trachea midline.   HEART:  Regular rate and rhythm, normal S1, S2.  No murmurs or gallop.  No edema.   LUNGS:  Clear to auscultation bilaterally.  No crackles or wheezes.  Normal respiratory effort.   ABDOMEN:  Positive bowel sounds.  Soft, nontender, nondistended.  No hepatomegaly.   EXTREMITIES:  No cyanosis.  Warm.   MUSCULOSKELETAL:  No point tenderness.   LYMPH NODES:  No cervical, supraclavicular or  axillary nodes palpable.   SKIN:  No petechiae or rashes.   NEUROLOGIC:  Alert and oriented.   BREASTS:  Breast exam in the upright supine position.  Right breast:  No dominant masses.  Nipple without discharge. Left breast:  No dominant masses.  Nipple without discharge.      LABORATORY DATA:  Alkaline phosphatase 99, ALT 76, AST 34.    Diagnostic mammogram from 07/17/2019 shows breast conservation therapy on the right, not significantly changed.  No suspicious findings.  A targeted bilateral breast ultrasound is performed at the site of the scar in the right breast.  The scar is noted without any worrisome findings.  In the right axilla, only normal subcutaneous fat is seen at the site of the pain.  DEXA scan from 01/2019 shows osteopenia.      ASSESSMENT AND PLAN:   1.  Stage II, pT2N1a MX multifocal invasive lobular carcinoma.  The patient is experiencing breast pain but her mammogram and ultrasound have been negative.  Labs are normal.  Awaiting CA27-29 and if this within normal, recommend patient follow up with Dr. Ortega who has taken over for Dr. Davila.  Return to clinic in 6 months to see MD with LFTs and CA27-29.   2.  Right apical reticulation.  CT does not show any evidence of pulmonary abnormalities.  No further CTs unless symptoms dictate.  Not addressed on today's visit.   3.  Hot flashes, resolved.   4.  BRCA2 variant of undetermined significance.  Recommend patient continue to follow up with Genetics team once a year.   5.  Elevated liver function tests.  This continues to fluctuate and thought be related to fatty liver.  Discussed the importance of healthy lifestyle.  Continue to monitor.   6.  Osteopenia.  Encouraged calcium and vitamin D.  She should follow up with Dr. Bocanegra whether she should start a bisphosphonate.   7.  Cardiac.  Should have cholesterol checked annually while she is on aromatase inhibitor.   8.  Right shoulder pain.  The patient feels this is related to her breast  cancer; however, I will check a bone scan to rule out any concerns for disease.   9.  Health care maintenance:  I recommend 150 minutes of moderate exercise a week with diet rich in fruits, vegetables and fiber and avoiding processed meats.         ALBERTO TATE MD    Above note accurate for diagnosis, plan and orders placed. Epic/EMR orders and data may not be accurate because of available options or interface issues. All data entered with the intent patient care not be compromised nor patient be unnecessarily billed.     D: 2019   T: 2019   MT: LINETTE      Name:     KEYUR GALVAN   MRN:      [id number]        Account:      [id number]   :      1975           Visit Date:   2019      Document: [id number]        Again, thank you for allowing me to participate in the care of your patient.        Sincerely,        Alberto Tate MD

## 2019-07-18 NOTE — PROGRESS NOTES
Visit Date:   07/18/2019      ONCOLOGY DIAGNOSIS:   1. January 2016: Diagnosed with stage II multifocal invasive lobular carcinoma of the right breast. Final pathology showed a grade 2 with the first foci measuring 36 x 16 x 8 mm and the second one 21 x 14 x 11 mm. LCIS classical type present, lymphovascular invasion not identified. Margins negative, estrogen, progesterone receptor positive by IHC and HER-2 not amplified performed on a core biopsy. 1/4 lymph nodes positive with the greatest dimension being 10 mm. No extranodal extension. Oncotype DX recurrence score 25.   2. Genetics: Variant of Uncertain Significance in BRCA2 gene.        THERAPY TO DATE:   1. January 19, 2016: Right breast partial mastectomy with wire localization and right axillary sentinel node biopsy.   2. February 2016 to June 2016. Weekly Taxol followed by dose-dense AC.    3.  August 2016 to September 2016. Radiotherapy.    4. October 26, 2016: Laparoscopic right oophorectomy, biopsy of pelvic nodule, removal of a portion of the right fallopian tube. Pathology: right pelvic nodule showed fibrous nodule with calcifications, endosalpinosis. Right ovary with serous cystadenoma, benign epithelial inclusion cyst, negative for malignancy. A portion of the right fallopian tube with no significant histologic abnormality.   5. May 2016 to Present. Letrozole.      INTERVAL HISTORY:  Aleena is a 43-year-old female diagnosed with pT2N1a MX multifocal invasive lobular carcinoma of the right breast in 06/2016 after self-palpating a right breast mass.  The patient presents to clinic for followup of her malignancy.  On today's visit, the patient reports having right breast pain.  She said this has been longstanding, feels it now is also affecting her back.  She was seen by her primary and mammogram and ultrasound were ordered, which were negative.  Other than that, she is tolerating menopause very well, does not even notice hot flashes anymore.  Denies any  fevers, chills, nausea, vomiting, chest pain, shortness of breath, cough.  No abdominal discomfort.  No new palpable masses.  Remaining comprehensive review of systems is negative.      PAST MEDICAL HISTORY:(Not addressed on today's visit.)  1. Multifocal right breast cancer.   2. History of migraines.   3. History of chronic pelvic pain.   4. Bilateral carpal tunnel       PAST SURGICAL HISTORY: (Not addressed on today's visit.)  1. Right breast lumpectomy 01/2016.   2. Pelvic laparoscopy.   3. Bilateral salpingectomy, left oophorectomy 02/2001 for pelvic pain, uterus intact.   4. 10/2016, right oophorectomy.   5. 11/2016, right carpal tunnel release.   6. Right foot bunion removed.     SOCIAL HISTORY:   with 2 children.  She comes in alone.  Works for [employer].  No current tobacco use.      PHYSICAL EXAMINATION  BP 98/70 (BP Location: Left arm)   Pulse 70   Temp 98.2  F (36.8  C) (Oral)   Resp 20   Ht 1.524 m (5')   Wt 71.3 kg (157 lb 3.2 oz)   LMP 03/01/2016   SpO2 97%   BMI 30.70 kg/m     GENERAL:  Comfortable, in no acute distress, pleasant.   HEENT:  Atraumatic, normocephalic.  Pupils equal, round, reactive.  Sclerae anicteric.  Oropharynx:  Moist mucous membranes.  No lesions, ulcers or exudates.   NECK:  Supple, full range of motion.  Trachea midline.   HEART:  Regular rate and rhythm, normal S1, S2.  No murmurs or gallop.  No edema.   LUNGS:  Clear to auscultation bilaterally.  No crackles or wheezes.  Normal respiratory effort.   ABDOMEN:  Positive bowel sounds.  Soft, nontender, nondistended.  No hepatomegaly.   EXTREMITIES:  No cyanosis.  Warm.   MUSCULOSKELETAL:  No point tenderness.   LYMPH NODES:  No cervical, supraclavicular or axillary nodes palpable.   SKIN:  No petechiae or rashes.   NEUROLOGIC:  Alert and oriented.   BREASTS:  Breast exam in the upright supine position.  Right breast:  No dominant masses.  Nipple without discharge. Left breast:  No dominant masses.  Nipple without  discharge.      LABORATORY DATA:  Alkaline phosphatase 99, ALT 76, AST 34.    Diagnostic mammogram from 07/17/2019 shows breast conservation therapy on the right, not significantly changed.  No suspicious findings.  A targeted bilateral breast ultrasound is performed at the site of the scar in the right breast.  The scar is noted without any worrisome findings.  In the right axilla, only normal subcutaneous fat is seen at the site of the pain.  DEXA scan from 01/2019 shows osteopenia.      ASSESSMENT AND PLAN:   1.  Stage II, pT2N1a MX multifocal invasive lobular carcinoma.  The patient is experiencing breast pain but her mammogram and ultrasound have been negative.  Labs are normal.  Awaiting CA27-29 and if this within normal, recommend patient follow up with Dr. Ortega who has taken over for Dr. Davila.  Return to clinic in 6 months to see MD with LFTs and CA27-29.   2.  Right apical reticulation.  CT does not show any evidence of pulmonary abnormalities.  No further CTs unless symptoms dictate.  Not addressed on today's visit.   3.  Hot flashes, resolved.   4.  BRCA2 variant of undetermined significance.  Recommend patient continue to follow up with Genetics team once a year.   5.  Elevated liver function tests.  This continues to fluctuate and thought be related to fatty liver.  Discussed the importance of healthy lifestyle.  Continue to monitor.   6.  Osteopenia.  Encouraged calcium and vitamin D.  She should follow up with Dr. Bocanegra whether she should start a bisphosphonate.   7.  Cardiac.  Should have cholesterol checked annually while she is on aromatase inhibitor.   8.  Right shoulder pain.  The patient feels this is related to her breast cancer; however, I will check a bone scan to rule out any concerns for disease.   9.  Health care maintenance:  I recommend 150 minutes of moderate exercise a week with diet rich in fruits, vegetables and fiber and avoiding processed meats.         ALBERTO JORDAN  MD THELMA    Above note accurate for diagnosis, plan and orders placed. Epic/EMR orders and data may not be accurate because of available options or interface issues. All data entered with the intent patient care not be compromised nor patient be unnecessarily billed.     D: 2019   T: 2019   MT: LINETTE      Name:     KEYUR GALVAN   MRN:      [id number]        Account:      [id number]   :      1975           Visit Date:   2019      Document: [id number]

## 2019-07-18 NOTE — NURSING NOTE
Oncology Rooming Note    July 18, 2019 9:54 AM   Aleena Escamilla is a 43 year old female who presents for:    Chief Complaint   Patient presents with     Oncology Clinic Visit     6 month follow up     Initial Vitals: BP 98/70 (BP Location: Left arm)   Pulse 70   Temp 98.2  F (36.8  C) (Oral)   Resp 20   Ht 1.524 m (5')   Wt 71.3 kg (157 lb 3.2 oz)   LMP 03/01/2016   SpO2 97%   BMI 30.70 kg/m   Estimated body mass index is 30.7 kg/m  as calculated from the following:    Height as of this encounter: 1.524 m (5').    Weight as of this encounter: 71.3 kg (157 lb 3.2 oz). Body surface area is 1.74 meters squared.  Severe Pain (6) Comment: Data Unavailable   Patient's last menstrual period was 03/01/2016.  Allergies reviewed: Yes  Medications reviewed: Yes    Medications: Medication refills not needed today.  Pharmacy name entered into [facility]:    [facility] - [address] [id number] - [place], [place] - [address] AT [facility] [place] - [address], [id number]    Meredith Strong LPN

## 2019-08-09 ENCOUNTER — OFFICE VISIT (OUTPATIENT)
Dept: FAMILY MEDICINE | Facility: CLINIC | Age: 44
End: 2019-08-09
Payer: COMMERCIAL

## 2019-08-09 VITALS
DIASTOLIC BLOOD PRESSURE: 71 MMHG | SYSTOLIC BLOOD PRESSURE: 104 MMHG | RESPIRATION RATE: 16 BRPM | HEIGHT: 60 IN | HEART RATE: 84 BPM | OXYGEN SATURATION: 97 % | WEIGHT: 153 LBS | BODY MASS INDEX: 30.04 KG/M2 | TEMPERATURE: 98.9 F

## 2019-08-09 DIAGNOSIS — N92.6 IRREGULAR UTERINE BLEEDING: Primary | ICD-10-CM

## 2019-08-09 DIAGNOSIS — K64.4 EXTERNAL HEMORRHOIDS: ICD-10-CM

## 2019-08-09 DIAGNOSIS — R30.0 DYSURIA: ICD-10-CM

## 2019-08-09 LAB
ALBUMIN UR-MCNC: NEGATIVE MG/DL
APPEARANCE UR: CLEAR
BILIRUB UR QL STRIP: NEGATIVE
COLOR UR AUTO: YELLOW
GLUCOSE UR STRIP-MCNC: NEGATIVE MG/DL
HGB UR QL STRIP: ABNORMAL
KETONES UR STRIP-MCNC: NEGATIVE MG/DL
LEUKOCYTE ESTERASE UR QL STRIP: NEGATIVE
NITRATE UR QL: NEGATIVE
PH UR STRIP: 6.5 PH (ref 5–7)
RBC #/AREA URNS AUTO: NORMAL /HPF
SOURCE: ABNORMAL
SP GR UR STRIP: 1.02 (ref 1–1.03)
UROBILINOGEN UR STRIP-ACNC: 1 EU/DL (ref 0.2–1)
WBC #/AREA URNS AUTO: NORMAL /HPF

## 2019-08-09 PROCEDURE — 99214 OFFICE O/P EST MOD 30 MIN: CPT | Performed by: FAMILY MEDICINE

## 2019-08-09 PROCEDURE — 81001 URINALYSIS AUTO W/SCOPE: CPT | Performed by: FAMILY MEDICINE

## 2019-08-09 ASSESSMENT — PAIN SCALES - GENERAL: PAINLEVEL: NO PAIN (0)

## 2019-08-09 ASSESSMENT — MIFFLIN-ST. JEOR: SCORE: 1275.5

## 2019-08-09 NOTE — PROGRESS NOTES
Subjective     Aleena Escamilla is a 43 year old female who presents to clinic today for the following health issues:    HPI   Concern - Breast Pain with history of cancer  Onset: 3 months ago    Description:   Breast pain- right side improved and resolved. Normal mammogram and follow up with oncology    Intensity: intermittent    Progression of Symptoms:  intermittent    Accompanying Signs & Symptoms:  None    Previous history of similar problem:   Yes    Precipitating factors:   Worsened by: None    Alleviating factors:  Improved by: Heat    Therapies Tried and outcome: Heat: mild relief    Vaginal bleeding x 1 week and maybe from rectum. History of hemorrhoid 1 month ago and became better.         Patient Active Problem List   Diagnosis     CARDIOVASCULAR SCREENING; LDL GOAL LESS THAN 160     Chronic pelvic pain in female     Chronic salpingitis and oophoritis     Major depressive disorder, recurrent episode, mild (H)     Generalized anxiety disorder     Arthritis, multiple joint involvement     Primary osteoarthritis of both hands     Fibromyalgia     Malignant neoplasm of overlapping sites of right female breast (H)     BRCA gene positive     Carpal tunnel syndrome     Non morbid drug-induced obesity     Chronic right shoulder pain     Bunion, right     Onychomycosis     Elevated LFTs     Fatty liver     Hyperlipidemia LDL goal <130     Past Surgical History:   Procedure Laterality Date     BIOPSY BREAST NEEDLE LOCALIZATION Right 1/19/2016    Procedure: BIOPSY BREAST NEEDLE LOCALIZATION;  Surgeon: Adelina Demarco MD;  Location:  OR     GYN SURGERY  2-    bilateral salpingectomy, left oophrectomy     LAPAROSCOPIC SALPINGO-OOPHORECTOMY Right 10/26/2016    Procedure: LAPAROSCOPIC SALPINGO-OOPHORECTOMY;  Surgeon: Bouchra Mayo MD;  Location: [facility] OR     LUMPECTOMY BREAST WITH SENTINEL NODE, COMBINED Right 1/19/2016    Procedure: COMBINED LUMPECTOMY BREAST WITH SENTINEL NODE;  Surgeon: Dav  Adelina GUADARRAMA MD;  Location: [facility] OR     PELVIS LAPAROSCOPY,DX  12/28/1998     RELEASE CARPAL TUNNEL Left 12/23/2016    Procedure: RELEASE CARPAL TUNNEL;  Surgeon: Sam Florence MD;  Location: [facility] OR       Social History     Tobacco Use     Smoking status: Never Smoker     Smokeless tobacco: Never Used   Substance Use Topics     Alcohol use: Yes     Comment: 1 drink per week.     Family History   Problem Relation Age of Onset     Diabetes Mother      Hypertension Mother      Cancer Maternal Grandmother         cervical CA     Cardiovascular Father         MI     Cancer Maternal Aunt         cervical cancer     Pancreatic Cancer Maternal Aunt         COD         Current Outpatient Medications   Medication Sig Dispense Refill     Cholecalciferol (VITAMIN D3) 2000 units TABS Take 1 tablet by mouth daily 100 tablet 3     ibuprofen (ADVIL/MOTRIN) 600 MG tablet Take 1 tablet (600 mg) by mouth every 8 hours as needed for moderate pain 20 tablet 0     letrozole (FEMARA) 2.5 MG tablet Take 1 tablet (2.5 mg) by mouth daily 90 tablet 3     Evening Primrose Oil 1000 MG CAPS        fish oil-omega-3 fatty acids 1000 MG capsule Take 1 g by mouth daily       Allergies   Allergen Reactions     Latex Rash     Recent Labs   Lab Test 07/18/19  0932 06/07/19  1102 01/22/19  1022 08/28/18  1128  02/05/18  1658  12/19/16  1021  11/07/11  1226 10/17/11  0809   A1C  --   --   --   --   --   --   --  5.7  --   --   --    LDL  --   --   --  138*  --   --   --  82  --   --  86   HDL  --   --   --  32*  --   --   --  30*  --   --  33*   TRIG  --   --   --  201*  --   --   --  301*  --   --  212*   ALT 76* 51* 193*  --    < > 112*   < >  --    < >  --   --    CR  --  0.56  --   --   --  0.60  --   --    < >  --   --    GFRESTIMATED  --  >90  --   --   --  >90  --   --    < >  --   --    GFRESTBLACK  --  >90  --   --   --  >90  --   --    < >  --   --    POTASSIUM  --  3.8  --   --   --  3.5  --   --    < >  --   --    TSH  --  1.38  --   --   --    --   --   --   --  0.83  --     < > = values in this interval not displayed.      BP Readings from Last 3 Encounters:   08/09/19 104/71   07/18/19 98/70   07/05/19 96/70    Wt Readings from Last 3 Encounters:   08/09/19 69.4 kg (153 lb)   07/18/19 71.3 kg (157 lb 3.2 oz)   07/05/19 69.9 kg (154 lb)                      Reviewed and updated as needed this visit by Provider         Review of Systems   ROS COMP: Constitutional, HEENT, cardiovascular, pulmonary, gi and gu systems are negative, except as otherwise noted.      Objective    /71 (BP Location: Right arm, Patient Position: Chair, Cuff Size: Adult Large)   Pulse 84   Temp 98.9  F (37.2  C) (Oral)   Resp 16   Ht 1.524 m (5')   Wt 69.4 kg (153 lb)   LMP 03/01/2016 (Approximate)   SpO2 97%   Breastfeeding? No   BMI 29.88 kg/m    Body mass index is 29.88 kg/m .  Physical Exam   GENERAL: healthy, alert and no distress  EYES: Eyes grossly normal to inspection, PERRL and conjunctivae and sclerae normal  HENT: ear canals and TM's normal, nose and mouth without ulcers or lesions  NECK: no adenopathy, no asymmetry, masses, or scars and thyroid normal to palpation  RESP: lungs clear to auscultation - no rales, rhonchi or wheezes  CV: regular rate and rhythm, normal S1 S2, no S3 or S4, no murmur, click or rub, no peripheral edema and peripheral pulses strong  ABDOMEN: soft, nontender, no hepatosplenomegaly, no masses and bowel sounds normal   (female): normal female external genitalia, normal urethral meatus, vaginal mucosa pink, moist, well rugated, and normal cervix/adnexa/uterus without masses or discharge, no palpable masses or ovaries.   RECTAL: normal sphincter tone, no rectal masses, hemorrhoidal skin tag  MS: no gross musculoskeletal defects noted, no edema  SKIN: no suspicious lesions or rashes  NEURO: Normal strength and tone, mentation intact and speech normal  PSYCH: mentation appears normal, affect normal/bright    Diagnostic Test  Results:  Labs reviewed in Epic  Results for orders placed or performed in visit on 08/09/19   UA reflex to Microscopic and Culture   Result Value Ref Range    Color Urine Yellow     Appearance Urine Clear     Glucose Urine Negative NEG^Negative mg/dL    Bilirubin Urine Negative NEG^Negative    Ketones Urine Negative NEG^Negative mg/dL    Specific Gravity Urine 1.020 1.003 - 1.035    Blood Urine Trace (A) NEG^Negative    pH Urine 6.5 5.0 - 7.0 pH    Protein Albumin Urine Negative NEG^Negative mg/dL    Urobilinogen Urine 1.0 0.2 - 1.0 EU/dL    Nitrite Urine Negative NEG^Negative    Leukocyte Esterase Urine Negative NEG^Negative    Source Midstream Urine    Urine Microscopic   Result Value Ref Range    WBC Urine 0 - 5 OTO5^0 - 5 /HPF    RBC Urine O - 2 OTO2^O - 2 /HPF           Assessment & Plan       ICD-10-CM    1. Irregular uterine bleeding- should not have any menses because ovaries were removed prophylactically and patient is in menopause N92.6 US Pelvic Complete w Transvaginal     Urine Microscopic   2. External hemorrhoids- may have had bleeding from hemorrhoid, exam just shows skin tag at this time. Discussed treatment for constipation K64.4 COLORECTAL SURGERY REFERRAL   3. Dysuria- no sign of urinary tract infection  R30.0 UA reflex to Microscopic and Culture        BMI:   Estimated body mass index is 29.88 kg/m  as calculated from the following:    Height as of this encounter: 1.524 m (5').    Weight as of this encounter: 69.4 kg (153 lb).   Weight management plan: Discussed healthy diet and exercise guidelines        FURTHER TESTING:       - pelvic ultrasound  CONSULTATION/REFERRAL to colorectal clinic for evaluation of hemorrhoids and rectal bleeding  FUTURE APPOINTMENTS:       - Follow-up visit in 3 months or sooner if any questions or concerns.   Work on weight loss  Regular exercise  See Patient Instructions    Return in about 3 months (around 11/9/2019) for medication follow up, Lab Work.    Concepcion  Tana Bocanegra MD  [facility]

## 2019-08-09 NOTE — PATIENT INSTRUCTIONS
At [facility], we strive to deliver an exceptional experience to you, every time we see you.  If you receive a survey in the mail, please send us back your thoughts. We really do value your feedback.    Based on your medical history, these are the current health maintenance/preventive care services that you are due for (some may have been done at this visit.)  Health Maintenance Due   Topic Date Due     URINE DRUG SCREEN  1975     PREVENTIVE CARE VISIT  04/18/2017         Suggested websites for health information:  Www.[employer].Sidecar : Up to date and easily searchable information on multiple topics.  Www.TopVisible.gov : medication info, interactive tutorials, watch real surgeries online  Www.familydoctor.org : good info from the Academy of Family Physicians  Www.cdc.gov : public health info, travel advisories, epidemics (H1N1)  Www.aap.org : children's health info, normal development, vaccinations  Www.health.[facility].[place].us : [place] dept of health, public health issues in [place], N1N1    Your care team:                            Family Medicine Internal Medicine   MD Antonio Mistry MD Shantel Branch-Fleming, MD Katya Georgiev PA-C Nam Ho, MD Pediatrics   DRAGAN Mancera, MD Haydee Shaw CNP, MD Deborah Mielke, MD Kim Thein, APRN [facility]      Clinic hours: Monday - Thursday 7 am-7 pm; Fridays 7 am-5 pm.   Urgent care: Monday - Friday 11 am-9 pm; Saturday and Sunday 9 am-5 pm.  Pharmacy : Monday -Thursday 8 am-8 pm; Friday 8 am-6 pm; Saturday and Sunday 9 am-5 pm.     Clinic: [phone]   Pharmacy: [phone]    Patient Education     Hemorrhoids    Hemorrhoids are swollen and inflamed veins inside the rectum and near the anus. The rectum is the last several inches of the colon. The anus is the passage between the rectum and the outside of the body.  Causes  The veins can become swollen due to increased  pressure in them. This is most often caused by:    Chronic constipation or diarrhea    Straining when having a bowel movement    Sitting too long on the toilet    A low-fiber diet    Pregnancy  Symptoms    Bleeding from the rectum (this may be noticeable after bowel movements)    Lump near the anus    Itching around the anus    Pain around the anus  There are different types of hemorrhoids. Depending on the type you have and the severity, you may be able to treat yourself at home. In some cases, a procedure may be the best treatment option. Your healthcare provider can tell you more about this, if needed.  Home care  General care    To get relief from pain or itching, try:  ? Medicines. Your healthcare provider may recommend stool softeners, suppositories, or laxatives to help manage constipation. Use these exactly as directed.  ? Sitz baths. A sitz bath involves sitting in a few inches of warm bath water. Be careful not to make the water so hot that you burn yourself--test it before sitting in it. Soak for about 10 to 15 minutes a few times a day. This may help relieve pain.  ? Topical products. Your healthcare provider may prescribe or recommend creams, ointments, or pads that can be applied to the hemorrhoid. Use these exactly as directed.  Tips to help prevent hemorrhoids    Eat more fiber. Fiber adds bulk to stool and absorbs water as it moves through your colon. This makes stool softer and easier to pass.  ? Increase the fiber in your diet with more fiber-rich foods. These include fresh fruit, vegetables, and whole grains.  ? Take a fiber supplement or bulking agent, if advised by your healthcare provider. These include products such as psyllium or methylcellulose.    Drink more water. Your healthcare provider may direct you to drink plenty of water. This can help keep stool soft.    Be more active. Frequent exercise aids digestion and helps prevent constipation. It may also help make bowel movements more  regular.    Don t strain during bowel movements. This can make hemorrhoids more likely. Also, don t sit on the toilet for long periods of time.  Follow-up care  Follow up with your healthcare provider as advised. If a culture or imaging tests were done, someone will let you know the results when they are ready. This may take a few days or longer. If your healthcare provider recommends a procedure for your hemorrhoids, these options can be discussed. Options may include surgery and outpatient office treatments.  When to seek medical advice  Call your healthcare provider right away if any of these occur:    Increased bleeding from the rectum    Increased pain around the rectum or anus    Weakness or dizziness  Call 911  Call 911 if any of these occur:    Trouble breathing or swallowing    Fainting or loss of consciousness    Unusually fast heart rate    Vomiting blood    Large amounts of blood in stool or black, tarry stools  Date Last Reviewed: 9/1/2017 2000-2018 [employer]. [address] [place] [address]. All rights reserved. This information is not intended as a substitute for professional medical care. Always follow your healthcare professional's instructions.           Patient Education     Understanding Uterine Bleeding  Your uterine bleeding may be heavy. Or you may have bleeding between periods. These problems may be caused by hormonal imbalance. Or they can be caused by uterine growths, an intrauterine device (IUD), bleeding disorder, or pregnancy.  Hormonal imbalance  Your menstrual cycle is controlled by hormones. The hormones include estrogen and progesterone. Sometimes there is too much or too little of one or both of these hormones, causing an imbalance. This can cause heavy periods. Or it can cause bleeding between periods. Causes of hormonal imbalance can include:    Hormonal changes in teens and in women nearing menopause    Diabetes, thyroid disease, or other medical  problems    Obesity    Stress    Strenuous exercise    Anorexia, an eating disorder    Pregnancy  Uterine growths  There are different kinds of uterine growths. These include:    Fibroids. These are round knots of noncancer (benign) muscle tissue in the uterus.    Polyps. These are soft tissue growths in the uterine lining. They often extend into the uterus.    Adenomyosis. This is when the uterine lining grows into the muscle wall.    Hyperplasia. This is when the uterine lining gets too thick or grows too much.    Endometrial cancer. This is uncontrolled growth of part of the uterine lining.  Other causes of uterine bleeding  There are other causes of uterine bleeding. These include:    IUD (intrauterine device). This is a method of birth control. Some IUDs contain hormones.    Bleeding disorders. This is when the blood can't clot properly.  Treatment  Your healthcare provider can help diagnose the cause of your bleeding problem. He or she will work with you to plan treatment as needed.  Date Last Reviewed: 6/1/2017 2000-2018 [employer]. [address] [place] [address], [place] [address]. All rights reserved. This information is not intended as a substitute for professional medical care. Always follow your healthcare professional's instructions.

## 2019-08-12 NOTE — RESULT ENCOUNTER NOTE
Dear Aleena    Your test results are attached.     The urinalysis was normal and does not show any blood or infection.    Please contact me by MyChart if you have any questions about your labs or management.    Concepcion Bocanegra MD

## 2019-08-14 ENCOUNTER — ANCILLARY PROCEDURE (OUTPATIENT)
Dept: ULTRASOUND IMAGING | Facility: CLINIC | Age: 44
End: 2019-08-14
Attending: FAMILY MEDICINE
Payer: COMMERCIAL

## 2019-08-14 DIAGNOSIS — N92.6 IRREGULAR UTERINE BLEEDING: ICD-10-CM

## 2019-08-14 PROCEDURE — 76830 TRANSVAGINAL US NON-OB: CPT

## 2019-08-14 PROCEDURE — 76856 US EXAM PELVIC COMPLETE: CPT

## 2019-08-15 NOTE — RESULT ENCOUNTER NOTE
Dear Aleena    Your test results are attached. I am happy to let you know that the pelvic ultrasound is normal. The uterus does not show any fibroids or other source of bleeding.     Please contact me by MyChart if you have any questions about your labs or management.    Concepcion Bocanegra MD

## 2019-08-19 ENCOUNTER — TELEPHONE (OUTPATIENT)
Dept: SURGERY | Facility: CLINIC | Age: 44
End: 2019-08-19

## 2019-08-19 NOTE — TELEPHONE ENCOUNTER
Left message on pt's voicemail that if bleeding persists and worsens, she should go to ER for evaluation.

## 2019-08-19 NOTE — TELEPHONE ENCOUNTER
Health Call Center    Phone Message    May a detailed message be left on voicemail: yes    Reason for Call: Symptoms or Concerns     If patient has red-flag symptoms, warm transfer to triage line    Current symptom or concern: External hemorrhoids & Rectal Bleeding    Symptoms have been present for:   week(s)    Has patient previously been seen for this? Yes    By : Referred by Concepcion Bocanegra MD    Date: 8/9/19    Are there any new or worsening symptoms? No      Aleena is scheduled for 10/21/19  -  She is worried that she should not wait that long.  Please call her to discuss.         Action Taken: Message routed to:  [facility] colon / rectal surgery.

## 2019-08-21 NOTE — TELEPHONE ENCOUNTER
RECORDS RECEIVED FROM: Internal    DATE RECEIVED: 10/21/19   NOTES STATUS DETAILS   OFFICE NOTE from referring provider  Internal OV note 8/9/19    OFFICE NOTE from other specialist   N/A    DISCHARGE SUMMARY from hospital  N/A    DISCHARGE REPORT from the ER N/A    OPERATIVE REPORT  N/A    MEDICATION LIST N/A    LABS     PFC TESTING N/A    ANAL PAP N/A    BIOPSIES/PATHOLOGY RELATED TO DIAGNOSIS N/A    DIAGNOSTIC PROCEDURES     COLONOSCOPY N/A    UPPER ENDOSCOPY (EGD) N/A    FLEX SIGMOIDOSCOPY  N/A    ERCP N/A    IMAGING (DISC & REPORT)      CT  N/A    MRI N/A    XRAY N/A    ULTRASOUND (ENDOANAL/ENDORECTAL) N/A

## 2019-09-24 DIAGNOSIS — M25.572 PAIN IN JOINT INVOLVING ANKLE AND FOOT, LEFT: ICD-10-CM

## 2019-09-24 NOTE — TELEPHONE ENCOUNTER
"Requested Prescriptions   Pending Prescriptions Disp Refills     piroxicam (FELDENE) 20 MG capsule [Pharmacy Med Name: PIROXICAM 20MG CAPSULES]  Last Written Prescription Date:  05/21/18  Last Fill Quantity: 30,  # refills: 1   Last Office Visit with GAY, BHARATHI or [facility] prescribing provider:  08/09/19Brandy   Future Office Visit:    90 capsule 0     Sig: TAKE 1 CAPSULE(20 MG) BY MOUTH DAILY WITH BREAKFAST       NSAID Medications Failed - 9/24/2019  3:17 AM        Failed - Normal ALT on file in past 12 months     Recent Labs   Lab Test 07/18/19  0932   ALT 76*             Failed - Medication is active on med list        Passed - Blood pressure under 140/90 in past 12 months     BP Readings from Last 3 Encounters:   08/09/19 104/71   07/18/19 98/70   07/05/19 96/70                 Passed - Normal AST on file in past 12 months     Recent Labs   Lab Test 07/18/19  0932   AST 34             Passed - Recent (12 mo) or future (30 days) visit within the authorizing provider's specialty     Patient had office visit in the last 12 months or has a visit in the next 30 days with authorizing provider or within the authorizing provider's specialty.  See \"Patient Info\" tab in inbasket, or \"Choose Columns\" in Meds & Orders section of the refill encounter.              Passed - Patient is age 6-64 years        Passed - Normal CBC on file in past 12 months     Recent Labs   Lab Test 06/07/19  1102   WBC 4.8   RBC 4.96   HGB 14.9   HCT 42.7                    Passed - No active pregnancy on record        Passed - Normal serum creatinine on file in past 12 months     Recent Labs   Lab Test 06/07/19  1102   CR 0.56             Passed - No positive pregnancy test in past 12 months          "

## 2019-09-26 NOTE — TELEPHONE ENCOUNTER
Routing refill request to provider for review/approval because:  Drug not active on patient's medication list  Discontinued in July 2018  Labs out of range:  ALT is abnormal    Christin Canas RN

## 2019-10-21 ENCOUNTER — PRE VISIT (OUTPATIENT)
Dept: SURGERY | Facility: CLINIC | Age: 44
End: 2019-10-21

## 2019-11-01 ENCOUNTER — OFFICE VISIT (OUTPATIENT)
Dept: FAMILY MEDICINE | Facility: CLINIC | Age: 44
End: 2019-11-01
Payer: OTHER MISCELLANEOUS

## 2019-11-01 VITALS
SYSTOLIC BLOOD PRESSURE: 104 MMHG | WEIGHT: 151 LBS | HEART RATE: 74 BPM | OXYGEN SATURATION: 97 % | DIASTOLIC BLOOD PRESSURE: 71 MMHG | HEIGHT: 60 IN | BODY MASS INDEX: 29.64 KG/M2 | RESPIRATION RATE: 18 BRPM | TEMPERATURE: 98.7 F

## 2019-11-01 DIAGNOSIS — S00.03XS CONTUSION OF SCALP, SEQUELA: ICD-10-CM

## 2019-11-01 DIAGNOSIS — L98.9 SCALP LESION: Primary | ICD-10-CM

## 2019-11-01 PROCEDURE — 99213 OFFICE O/P EST LOW 20 MIN: CPT | Performed by: FAMILY MEDICINE

## 2019-11-01 RX ORDER — TRIAMCINOLONE ACETONIDE 1 MG/G
CREAM TOPICAL 2 TIMES DAILY
Qty: 30 G | Refills: 1 | Status: SHIPPED | OUTPATIENT
Start: 2019-11-01 | End: 2021-07-09

## 2019-11-01 ASSESSMENT — ANXIETY QUESTIONNAIRES
3. WORRYING TOO MUCH ABOUT DIFFERENT THINGS: NOT AT ALL
2. NOT BEING ABLE TO STOP OR CONTROL WORRYING: NOT AT ALL
IF YOU CHECKED OFF ANY PROBLEMS ON THIS QUESTIONNAIRE, HOW DIFFICULT HAVE THESE PROBLEMS MADE IT FOR YOU TO DO YOUR WORK, TAKE CARE OF THINGS AT HOME, OR GET ALONG WITH OTHER PEOPLE: NOT DIFFICULT AT ALL
7. FEELING AFRAID AS IF SOMETHING AWFUL MIGHT HAPPEN: NOT AT ALL
GAD7 TOTAL SCORE: 1
5. BEING SO RESTLESS THAT IT IS HARD TO SIT STILL: NOT AT ALL
1. FEELING NERVOUS, ANXIOUS, OR ON EDGE: SEVERAL DAYS
6. BECOMING EASILY ANNOYED OR IRRITABLE: NOT AT ALL

## 2019-11-01 ASSESSMENT — PATIENT HEALTH QUESTIONNAIRE - PHQ9
5. POOR APPETITE OR OVEREATING: NOT AT ALL
SUM OF ALL RESPONSES TO PHQ QUESTIONS 1-9: 3

## 2019-11-01 ASSESSMENT — MIFFLIN-ST. JEOR: SCORE: 1261.43

## 2019-11-01 ASSESSMENT — PAIN SCALES - GENERAL: PAINLEVEL: MILD PAIN (3)

## 2019-11-01 NOTE — PATIENT INSTRUCTIONS
At [facility], we strive to deliver an exceptional experience to you, every time we see you.  If you receive a survey in the mail, please send us back your thoughts. We really do value your feedback.    Based on your medical history, these are the current health maintenance/preventive care services that you are due for (some may have been done at this visit.)  Health Maintenance Due   Topic Date Due     URINE DRUG SCREEN  1975     PNEUMOCOCCAL IMMUNIZATION 19-64 HIGHEST RISK (1 of 3 - PCV13) 10/17/1995     PREVENTIVE CARE VISIT  04/18/2017     INFLUENZA VACCINE (1) 09/01/2019     PHQ-9  11/16/2019         Suggested websites for health information:  [email or web address] : Up to date and easily searchable information on multiple topics.  Www.medlineplus.gov : medication info, interactive tutorials, watch real surgeries online  Www.familydoctor.org : good info from the Academy of Family Physicians  Www.cdc.gov : public health info, travel advisories, epidemics (H1N1)  Www.aap.org : children's health info, normal development, vaccinations  Www.health.[facility].[place].us : [place] dept of health, public health issues in [place], N1N1    Your care team:                            Family Medicine Internal Medicine   MD Antonio Mistry MD Shantel Branch-Fleming, MD Katya Georgiev PA-C Nam Ho, MD Pediatrics   DRAGAN Mancera, MD Haydee Shaw CNP, MD Deborah Mielke, MD Kim Thein, APRN [facility]      Clinic hours: Monday - Thursday 7 am-7 pm; Fridays 7 am-5 pm.   Urgent care: Monday - Friday 11 am-9 pm; Saturday and Sunday 9 am-5 pm.  Pharmacy : Monday -Thursday 8 am-8 pm; Friday 8 am-6 pm; Saturday and Sunday 9 am-5 pm.     Clinic: [phone]   Pharmacy: [phone]

## 2019-11-01 NOTE — PROGRESS NOTES
Subjective     Aleena Escamilla is a 44 year old female who presents to clinic today for the following health issues:    HPI   Concern - Work Comp  Onset: Follow up from 1/2019 visit    Description:   Patient complains of itching in the same area on top of head, press on it and can feel a lump. She thinks have an infection again.    Intensity: moderate    Progression of Symptoms:  Worsening x2 weeks    Accompanying Signs & Symptoms:  Itching in the same area, lump growing back-larger in size, hair loss improved    Previous history of similar problem:   yes    Precipitating factors:   Worsened by:pressure/touch    Alleviating factors:  Improved by: none    Therapies Tried and outcome: none            Reviewed and updated as needed this visit by Provider         Review of Systems   ROS COMP: Constitutional, HEENT, cardiovascular, pulmonary, gi and gu systems are negative, except as otherwise noted.      Objective    /71 (BP Location: Right arm, Patient Position: Chair, Cuff Size: Adult Regular)   Pulse 74   Temp 98.7  F (37.1  C) (Oral)   Resp 18   Ht 1.524 m (5')   Wt 68.5 kg (151 lb)   LMP 03/01/2016 (Approximate)   SpO2 97%   BMI 29.49 kg/m    Body mass index is 29.49 kg/m .  Physical Exam   GENERAL: healthy, alert and no distress  EYES: Eyes grossly normal to inspection, PERRL and conjunctivae and sclerae normal  NECK: no adenopathy, no asymmetry, masses, or scars and thyroid normal to palpation  RESP: lungs clear to auscultation - no rales, rhonchi or wheezes  CV: regular rate and rhythm, normal S1 S2, no S3 or S4, no murmur, click or rub, no peripheral edema and peripheral pulses strong  ABDOMEN: soft, nontender, no hepatosplenomegaly, no masses and bowel sounds normal  MS: no gross musculoskeletal defects noted, no edema  SKIN: no suspicious lesions or rashes, area depressed at site of previous work related contusion and hematoma. There is a small area of tender scar tissue that is mildly inflamed.  No signs of infection.     Diagnostic Test Results:  none         Assessment & Plan       ICD-10-CM    1. Scalp lesion L98.9 triamcinolone (KENALOG) 0.1 % external cream twice a day for 2 weeks   2. Contusion of scalp, sequela S00.03XS Scar tissue at site of work related contusion.         BMI:   Estimated body mass index is 29.49 kg/m  as calculated from the following:    Height as of this encounter: 1.524 m (5').    Weight as of this encounter: 68.5 kg (151 lb).           See Patient Instructions    No follow-ups on file.    Concepcion Bocanegra MD  [facility]

## 2019-11-02 ASSESSMENT — ANXIETY QUESTIONNAIRES: GAD7 TOTAL SCORE: 1

## 2019-11-25 ENCOUNTER — OFFICE VISIT (OUTPATIENT)
Dept: URGENT CARE | Facility: URGENT CARE | Age: 44
End: 2019-11-25
Payer: OTHER MISCELLANEOUS

## 2019-11-25 VITALS
DIASTOLIC BLOOD PRESSURE: 79 MMHG | HEART RATE: 75 BPM | RESPIRATION RATE: 16 BRPM | WEIGHT: 153 LBS | OXYGEN SATURATION: 98 % | SYSTOLIC BLOOD PRESSURE: 115 MMHG | TEMPERATURE: 98.3 F | BODY MASS INDEX: 29.88 KG/M2

## 2019-11-25 DIAGNOSIS — L98.9 SCALP LESION: Primary | ICD-10-CM

## 2019-11-25 PROCEDURE — 99213 OFFICE O/P EST LOW 20 MIN: CPT | Performed by: PHYSICIAN ASSISTANT

## 2019-11-25 ASSESSMENT — ENCOUNTER SYMPTOMS
WEAKNESS: 0
CHILLS: 0
WOUND: 0
NECK PAIN: 0
MYALGIAS: 0
HEMATOLOGIC/LYMPHATIC NEGATIVE: 1
COUGH: 0
VOMITING: 0
MUSCULOSKELETAL NEGATIVE: 1
JOINT SWELLING: 0
ALLERGIC/IMMUNOLOGIC NEGATIVE: 1
BRUISES/BLEEDS EASILY: 0
ENDOCRINE NEGATIVE: 1
DIARRHEA: 0
ARTHRALGIAS: 0
HEADACHES: 0
PALPITATIONS: 0
BACK PAIN: 0
NAUSEA: 0
RHINORRHEA: 0
SHORTNESS OF BREATH: 0
NECK STIFFNESS: 0
SORE THROAT: 0
EYES NEGATIVE: 1
RESPIRATORY NEGATIVE: 1
LIGHT-HEADEDNESS: 0
DIZZINESS: 0
CARDIOVASCULAR NEGATIVE: 1
FEVER: 0

## 2019-11-25 ASSESSMENT — PAIN SCALES - GENERAL: PAINLEVEL: SEVERE PAIN (6)

## 2019-11-26 ENCOUNTER — OFFICE VISIT (OUTPATIENT)
Dept: DERMATOLOGY | Facility: CLINIC | Age: 44
End: 2019-11-26
Payer: OTHER MISCELLANEOUS

## 2019-11-26 DIAGNOSIS — D48.5 NEOPLASM OF UNCERTAIN BEHAVIOR OF SKIN: Primary | ICD-10-CM

## 2019-11-26 PROCEDURE — 88305 TISSUE EXAM BY PATHOLOGIST: CPT | Mod: TC | Performed by: DERMATOLOGY

## 2019-11-26 PROCEDURE — 88312 SPECIAL STAINS GROUP 1: CPT | Mod: TC | Performed by: DERMATOLOGY

## 2019-11-26 PROCEDURE — 11104 PUNCH BX SKIN SINGLE LESION: CPT | Performed by: DERMATOLOGY

## 2019-11-26 ASSESSMENT — PAIN SCALES - GENERAL: PAINLEVEL: MODERATE PAIN (5)

## 2019-11-26 NOTE — LETTER
11/26/2019         RE: Aleena Escamilla  [address] [address] [address]        Dear Colleague,    Thank you for referring your patient, Aleena Escamilla, to the [facility]. Please see a copy of my visit note below.    [facility] Dermatology Note    Dermatology Problem List:  1. Relevant medical history  - Breast cancer: Stage II, pT2N1a MX multifocal invasive lobular carcinoma, left breast, s/p Paclitaxel  -BRCA2 mutation of unknown significance.    2. Tinea pedis  -s/p terbinafine 250 mg daily  3. NUB, right vertex scalp, s/p biopsy 11/26/19    Encounter Date: Nov 26, 2019    CC:  Chief Complaint   Patient presents with     Derm Problem     Aleena is visiting ot discuss salp lesions     History of Present Illness:  Ms. Aleena Escamilla is a 44 year old female who presents as a referral from Yves Hwang PA-C for a spot check. She was last seen on Dr. Head on 8/11/16 when she was treated for tinea pedis with terbinafine 250 mg daily. Today she reports that she has a scalp lesion that is very tender to the touch and occurred due to an injury. She hit her head at work on a sharp corner in November 2018 and she has had pain since. Her skin did not break open at that time. In Jan-Feb it seemed that the bump was growing and she was prescribed antibiotics to treat. These did not help. This lesion was also thought to be a cyst at one point. At that time she was told to return for surgical removal if it did not resolve in 30 days. Sometime within those 30 days this lesion grew, popped, and then drained. She also previously saw PCP for this lesion and was prescribed TAC 0.1% cream. She does not think this is helping with pain any. This lesion was also thought to be a scar at one point when evaluated in urgent care. No other concerns addressed today.     Past Medical History:   Patient Active Problem List   Diagnosis     CARDIOVASCULAR SCREENING; LDL GOAL LESS THAN 160      Chronic pelvic pain in female     Chronic salpingitis and oophoritis     Major depressive disorder, recurrent episode, mild (H)     Generalized anxiety disorder     Arthritis, multiple joint involvement     Primary osteoarthritis of both hands     Fibromyalgia     Malignant neoplasm of overlapping sites of right female breast (H)     BRCA gene positive     Carpal tunnel syndrome     Non morbid drug-induced obesity     Chronic right shoulder pain     Bunion, right     Onychomycosis     Elevated LFTs     Fatty liver     Hyperlipidemia LDL goal <130     Past Medical History:   Diagnosis Date     Chronic pelvic pain in female      Malignant neoplasm of right breast, stage 2 (H) 1/2016 1/4 lymph nodes positive, Oncotype DX = 25.  Chemotherapy and radiation. Letrozole.     Migraine headaches      Past Surgical History:   Procedure Laterality Date     BIOPSY BREAST NEEDLE LOCALIZATION Right 1/19/2016    Procedure: BIOPSY BREAST NEEDLE LOCALIZATION;  Surgeon: Adelina Demarco MD;  Location:  OR     GYN SURGERY  2-    bilateral salpingectomy, left oophrectomy     LAPAROSCOPIC SALPINGO-OOPHORECTOMY Right 10/26/2016    Procedure: LAPAROSCOPIC SALPINGO-OOPHORECTOMY;  Surgeon: Bouchra Mayo MD;  Location: [facility] OR     LUMPECTOMY BREAST WITH SENTINEL NODE, COMBINED Right 1/19/2016    Procedure: COMBINED LUMPECTOMY BREAST WITH SENTINEL NODE;  Surgeon: Adelina Demarco MD;  Location:  OR     PELVIS LAPAROSCOPY,DX  12/28/1998     RELEASE CARPAL TUNNEL Left 12/23/2016    Procedure: RELEASE CARPAL TUNNEL;  Surgeon: Sam Florence MD;  Location:  OR     Social History:  Patient works in quality ensurance    Family History:  No family history of skin cancer    Medications:  Current Outpatient Medications   Medication Sig Dispense Refill     Cholecalciferol (VITAMIN D3) 2000 units TABS Take 1 tablet by mouth daily 100 tablet 3     Evening Primrose Oil 1000 MG CAPS        fish oil-omega-3 fatty acids  1000 MG capsule Take 1 g by mouth daily       ibuprofen (ADVIL/MOTRIN) 600 MG tablet Take 1 tablet (600 mg) by mouth every 8 hours as needed for moderate pain 20 tablet 0     letrozole (FEMARA) 2.5 MG tablet Take 1 tablet (2.5 mg) by mouth daily 90 tablet 3     triamcinolone (KENALOG) 0.1 % external cream Apply topically 2 times daily 30 g 1       Allergies   Allergen Reactions     Latex Rash     Review of Systems:  -Constitutional: Patient is otherwise feeling well, in usual state of health.   -Skin: As above in HPI. No additional skin concerns.    Physical exam:  Vitals: LMP 03/01/2016 (Approximate)   GEN: This is a well developed, well-nourished female in no acute distress, in a pleasant mood.    SKIN: Sun-exposed skin, which includes the head/face, neck, both arms, digits, and/or nails was examined.   - Resendiz Type III  - NUB, right vertex scalp there is a oval shaped ill defined subcutaneous nodule with slightly pink, slightly translucent appearance superficially. Extremely tender to touch.   - No other lesions of concern on areas examined.                 Impression/Plan:  1. Neoplasm of uncertain behavior on the right vertex scalp. The differential diagnosis includes Adnexal tumor vs scar tissue vs DFSP vs ruptured EIC. During biopsy, there did appear to be keratin debris, so ruptured cyst is most likely.     Punch biopsy:  After discussion of benefits and risks including but not limited to bleeding/bruising, pain/swelling, infection, scar, incomplete removal, nerve damage/numbness, recurrence, and non-diagnostic biopsy, written consent, verbal consent and photographs were obtained. Time-out was performed. The area was cleaned with isopropyl alcohol. 0.5mL of 1% lidocaine with 1:100,000 epinephrine was injected to obtain adequate anesthesia of the lesion on the right vertex scalp.  A 4 mm punch biopsy was performed. 4-0 prolene sutures were utilized to approximate the epidermal edges. White petroleum  jelly/Vaseline and a bandage was applied to the wound. Explicit verbal and written wound care instructions were provided. The patient left the Dermatology Clinic in good condition. The patient was counseled to follow up for suture removal in approximately 14 days.    CC Yves Hwang PA-C on close of this encounter.  Follow-up pending biopsy results, earlier for new or changing lesions.     Staff Involved:  Scribe/Staff    Scribe Disclosure  I, Tara Smith, am serving as a scribe to document services personally performed by Dr. Coco Luther MD, based on data collection and the provider's statements to me.     Provider Disclosure:   The documentation recorded by the scribe accurately reflects the services I personally performed and the decisions made by me.    Coco Luther MD    Department of Dermatology  [facility]: Phone: [phone], Fax:[phone]  [facility]: Phone: [phone], Fax: [phone]                Again, thank you for allowing me to participate in the care of your patient.        Sincerely,        Coco Luther MD

## 2019-11-26 NOTE — NURSING NOTE
The following medication was given:     MEDICATION:  Lidocaine with epinephrine 1% 1:[id number]  ROUTE: ID  SITE: see procedure note  DOSE: 3cc  LOT #: -[id number]  : Toshia  EXPIRATION DATE: 07/01/2020  NDC#: 2962-2535-90   Was there drug waste? 0cc  Multi-dose vial: Yes  Aysha Veras LPN  November 26, 2019

## 2019-11-26 NOTE — PATIENT INSTRUCTIONS

## 2019-11-26 NOTE — NURSING NOTE
Aleena Escamilla's goals for this visit include:   Chief Complaint   Patient presents with     Derm Problem     Aleena is visiting ot discuss salp lesions     She requests these members of her care team be copied on today's visit information:     PCP: Concepcion Bocanegra    Referring Provider:  Yves Hwang PA-C  [address] [place] [address]    [facility] 03/01/2016 (Approximate)     Do you need any medication refills at today's visit? Edel Veras LPN

## 2019-11-26 NOTE — PROGRESS NOTES
Chief Complaint:    Chief Complaint   Patient presents with     Lesion     scalp lesion, work-comp on 11/2018-was hitting on head at work and had a cyst on top of the head-she saw Dr. Chakraborty back in 11/01/19 and was told to come back in a couple of weeks if the cyst not better        HPI: Aleena Escamilla is an 44 year old female who presents for evaluation and treatment of cyst on her head.  This is an ongoing problem for her.  She was seen by her PCP on 11/1/2019 and Rx for steroid cream for itching. She states that the cream is not helping.  Patient believes that this is from work comp injury roughly 1 year ago.        ROS:      Review of Systems   Constitutional: Negative for chills and fever.   HENT: Negative for congestion, ear pain, rhinorrhea and sore throat.    Eyes: Negative.    Respiratory: Negative.  Negative for cough and shortness of breath.    Cardiovascular: Negative.  Negative for chest pain and palpitations.   Gastrointestinal: Negative for diarrhea, nausea and vomiting.   Endocrine: Negative.    Genitourinary: Negative.    Musculoskeletal: Negative.  Negative for arthralgias, back pain, joint swelling, myalgias, neck pain and neck stiffness.   Skin: Positive for rash. Negative for wound.   Allergic/Immunologic: Negative.  Negative for immunocompromised state.   Neurological: Negative for dizziness, weakness, light-headedness and headaches.   Hematological: Negative.  Does not bruise/bleed easily.        Family History   Family History   Problem Relation Age of Onset     Diabetes Mother      Hypertension Mother      Cancer Maternal Grandmother         cervical CA     Cardiovascular Father         MI     Cancer Maternal Aunt         cervical cancer     Pancreatic Cancer Maternal Aunt         COD       Social History  Social History     Socioeconomic History     Marital status:      Spouse name: Not on file     Number of children: Not on file     Years of education: Not on file     Highest  education level: Not on file   Occupational History     Not on file   Social Needs     Financial resource strain: Not on file     Food insecurity:     Worry: Not on file     Inability: Not on file     Transportation needs:     Medical: Not on file     Non-medical: Not on file   Tobacco Use     Smoking status: Never Smoker     Smokeless tobacco: Never Used   Substance and Sexual Activity     Alcohol use: Yes     Comment: 1 drink per week.     Drug use: No     Sexual activity: Yes     Partners: Male     Birth control/protection: None, Female Surgical   Lifestyle     Physical activity:     Days per week: Not on file     Minutes per session: Not on file     Stress: Not on file   Relationships     Social connections:     Talks on phone: Not on file     Gets together: Not on file     Attends [employer] service: Not on file     Active member of club or organization: Not on file     Attends meetings of clubs or organizations: Not on file     Relationship status: Not on file     Intimate partner violence:     Fear of current or ex partner: Not on file     Emotionally abused: Not on file     Physically abused: Not on file     Forced sexual activity: Not on file   Other Topics Concern     Parent/sibling w/ CABG, MI or angioplasty before 65F 55M? No      Service No     Blood Transfusions No     Caffeine Concern No     Comment: coffee 2 cups per day     Occupational Exposure No     Hobby Hazards No     Sleep Concern No     Stress Concern No     Weight Concern No     Special Diet No     Back Care No     Exercise No     Bike Helmet No     Seat Belt Yes     Comment: 100%     Self-Exams No   Social History Narrative     Not on file        Surgical History:  Past Surgical History:   Procedure Laterality Date     BIOPSY BREAST NEEDLE LOCALIZATION Right 1/19/2016    Procedure: BIOPSY BREAST NEEDLE LOCALIZATION;  Surgeon: Adelina Demarco MD;  Location: [facility] OR     GYN SURGERY  2-    bilateral salpingectomy, left  oophrectomy     LAPAROSCOPIC SALPINGO-OOPHORECTOMY Right 10/26/2016    Procedure: LAPAROSCOPIC SALPINGO-OOPHORECTOMY;  Surgeon: Bouchra Mayo MD;  Location: [facility] OR     LUMPECTOMY BREAST WITH SENTINEL NODE, COMBINED Right 1/19/2016    Procedure: COMBINED LUMPECTOMY BREAST WITH SENTINEL NODE;  Surgeon: Adelina Demarco MD;  Location: [facility] OR     PELVIS LAPAROSCOPY,DX  12/28/1998     RELEASE CARPAL TUNNEL Left 12/23/2016    Procedure: RELEASE CARPAL TUNNEL;  Surgeon: Sam Florence MD;  Location: [facility] OR        Problem List:  Patient Active Problem List   Diagnosis     CARDIOVASCULAR SCREENING; LDL GOAL LESS THAN 160     Chronic pelvic pain in female     Chronic salpingitis and oophoritis     Major depressive disorder, recurrent episode, mild (H)     Generalized anxiety disorder     Arthritis, multiple joint involvement     Primary osteoarthritis of both hands     Fibromyalgia     Malignant neoplasm of overlapping sites of right female breast (H)     BRCA gene positive     Carpal tunnel syndrome     Non morbid drug-induced obesity     Chronic right shoulder pain     Bunion, right     Onychomycosis     Elevated LFTs     Fatty liver     Hyperlipidemia LDL goal <130        Allergies:  Allergies   Allergen Reactions     Latex Rash        Current Meds:    Current Outpatient Medications:      Cholecalciferol (VITAMIN D3) 2000 units TABS, Take 1 tablet by mouth daily, Disp: 100 tablet, Rfl: 3     Evening Primrose Oil 1000 MG CAPS, , Disp: , Rfl:      fish oil-omega-3 fatty acids 1000 MG capsule, Take 1 g by mouth daily, Disp: , Rfl:      ibuprofen (ADVIL/MOTRIN) 600 MG tablet, Take 1 tablet (600 mg) by mouth every 8 hours as needed for moderate pain, Disp: 20 tablet, Rfl: 0     letrozole (FEMARA) 2.5 MG tablet, Take 1 tablet (2.5 mg) by mouth daily, Disp: 90 tablet, Rfl: 3     triamcinolone (KENALOG) 0.1 % external cream, Apply topically 2 times daily, Disp: 30 g, Rfl: 1     piroxicam (FELDENE) 20 MG capsule, TAKE 1  CAPSULE(20 MG) BY MOUTH DAILY WITH BREAKFAST (Patient not taking: Reported on 11/25/2019), Disp: 90 capsule, Rfl: 0     PHYSICAL EXAM:     Vital signs noted and reviewed by Yves Hwang PA-C  /79 (BP Location: Left arm, Patient Position: Sitting, Cuff Size: Adult Regular)   Pulse 75   Temp 98.3  F (36.8  C) (Oral)   Resp 16   Wt 69.4 kg (153 lb)   LMP 03/01/2016 (Approximate)   SpO2 98%   BMI 29.88 kg/m       PEFR:    Physical Exam  Vitals signs and nursing note reviewed.   Constitutional:       General: She is not in acute distress.     Appearance: She is well-developed. She is not ill-appearing, toxic-appearing or diaphoretic.   HENT:      Head: Normocephalic and atraumatic.      Right Ear: Tympanic membrane and external ear normal. No drainage, swelling or tenderness. Tympanic membrane is not perforated, erythematous, retracted or bulging.      Left Ear: Tympanic membrane and external ear normal. No drainage, swelling or tenderness. Tympanic membrane is not perforated, erythematous, retracted or bulging.      Nose: No mucosal edema, congestion or rhinorrhea.      Right Sinus: No maxillary sinus tenderness or frontal sinus tenderness.      Left Sinus: No maxillary sinus tenderness or frontal sinus tenderness.      Mouth/Throat:      Pharynx: No pharyngeal swelling, oropharyngeal exudate, posterior oropharyngeal erythema or uvula swelling.      Tonsils: No tonsillar abscesses.   Eyes:      Pupils: Pupils are equal, round, and reactive to light.   Neck:      Musculoskeletal: Full passive range of motion without pain, normal range of motion and neck supple.      Trachea: Trachea normal.   Cardiovascular:      Rate and Rhythm: Normal rate and regular rhythm.      Heart sounds: Normal heart sounds, S1 normal and S2 normal. No murmur. No friction rub. No gallop.    Pulmonary:      Effort: Pulmonary effort is normal. No respiratory distress.      Breath sounds: Normal breath sounds. No decreased breath  sounds, wheezing, rhonchi or rales.   Abdominal:      General: Bowel sounds are normal. There is no distension.      Palpations: Abdomen is soft. Abdomen is not rigid. There is no mass.      Tenderness: There is no abdominal tenderness. There is no guarding or rebound.   Lymphadenopathy:      Cervical: No cervical adenopathy.   Skin:     General: Skin is warm and dry.      Comments: Roughly 2 mm in diameter area of hyperpigmentation to the posterior scalp.  No weeping, discharge or swelling.   Neurological:      Mental Status: She is alert and oriented to person, place, and time.      Cranial Nerves: No cranial nerve deficit.      Deep Tendon Reflexes: Reflexes are normal and symmetric.   Psychiatric:         Behavior: Behavior normal. Behavior is cooperative.         Thought Content: Thought content normal.         Judgment: Judgment normal.          Labs:     No results found for any visits on 11/25/19.    Medical Decision Making:    Differential Diagnosis:  Scalp lesion.     ASSESSMENT:     1. Scalp lesion           PLAN:     Order placed for patient to follow up with dermatology.  Patient verbalized understanding and agreed with this plan.     Yves Hwang PA-C  11/25/2019, 7:18 PM

## 2019-11-26 NOTE — PROGRESS NOTES
[facility] Dermatology Note    Dermatology Problem List:  1. Relevant medical history  - Breast cancer: Stage II, pT2N1a MX multifocal invasive lobular carcinoma, left breast, s/p Paclitaxel  -BRCA2 mutation of unknown significance.    2. Tinea pedis  -s/p terbinafine 250 mg daily  3. NUB, right vertex scalp, s/p biopsy 11/26/19    Encounter Date: Nov 26, 2019    CC:  Chief Complaint   Patient presents with     Derm Problem     Aleena is visiting ot discuss salp lesions     History of Present Illness:  Ms. Aleena Escamilla is a 44 year old female who presents as a referral from Yves Hwang PA-C for a spot check. She was last seen on Dr. Head on 8/11/16 when she was treated for tinea pedis with terbinafine 250 mg daily. Today she reports that she has a scalp lesion that is very tender to the touch and occurred due to an injury. She hit her head at work on a sharp corner in November 2018 and she has had pain since. Her skin did not break open at that time. In Jan-Feb it seemed that the bump was growing and she was prescribed antibiotics to treat. These did not help. This lesion was also thought to be a cyst at one point. At that time she was told to return for surgical removal if it did not resolve in 30 days. Sometime within those 30 days this lesion grew, popped, and then drained. She also previously saw PCP for this lesion and was prescribed TAC 0.1% cream. She does not think this is helping with pain any. This lesion was also thought to be a scar at one point when evaluated in urgent care. No other concerns addressed today.     Past Medical History:   Patient Active Problem List   Diagnosis     CARDIOVASCULAR SCREENING; LDL GOAL LESS THAN 160     Chronic pelvic pain in female     Chronic salpingitis and oophoritis     Major depressive disorder, recurrent episode, mild (H)     Generalized anxiety disorder     Arthritis, multiple joint involvement     Primary osteoarthritis of both hands      Fibromyalgia     Malignant neoplasm of overlapping sites of right female breast (H)     BRCA gene positive     Carpal tunnel syndrome     Non morbid drug-induced obesity     Chronic right shoulder pain     Bunion, right     Onychomycosis     Elevated LFTs     Fatty liver     Hyperlipidemia LDL goal <130     Past Medical History:   Diagnosis Date     Chronic pelvic pain in female      Malignant neoplasm of right breast, stage 2 (H) 1/2016    1/4 lymph nodes positive, Oncotype DX = 25.  Chemotherapy and radiation. Letrozole.     Migraine headaches      Past Surgical History:   Procedure Laterality Date     BIOPSY BREAST NEEDLE LOCALIZATION Right 1/19/2016    Procedure: BIOPSY BREAST NEEDLE LOCALIZATION;  Surgeon: Adelina Demarco MD;  Location: [facility] OR     GYN SURGERY  2-    bilateral salpingectomy, left oophrectomy     LAPAROSCOPIC SALPINGO-OOPHORECTOMY Right 10/26/2016    Procedure: LAPAROSCOPIC SALPINGO-OOPHORECTOMY;  Surgeon: Bouchra Mayo MD;  Location: [facility] OR     LUMPECTOMY BREAST WITH SENTINEL NODE, COMBINED Right 1/19/2016    Procedure: COMBINED LUMPECTOMY BREAST WITH SENTINEL NODE;  Surgeon: Adelina Demarco MD;  Location: [facility] OR     PELVIS LAPAROSCOPY,DX  12/28/1998     RELEASE CARPAL TUNNEL Left 12/23/2016    Procedure: RELEASE CARPAL TUNNEL;  Surgeon: Sam Florence MD;  Location:  OR     Social History:  Patient works in quality [employer]    Family History:  No family history of skin cancer    Medications:  Current Outpatient Medications   Medication Sig Dispense Refill     Cholecalciferol (VITAMIN D3) 2000 units TABS Take 1 tablet by mouth daily 100 tablet 3     Evening Primrose Oil 1000 MG CAPS        fish oil-omega-3 fatty acids 1000 MG capsule Take 1 g by mouth daily       ibuprofen (ADVIL/MOTRIN) 600 MG tablet Take 1 tablet (600 mg) by mouth every 8 hours as needed for moderate pain 20 tablet 0     letrozole (FEMARA) 2.5 MG tablet Take 1 tablet (2.5 mg) by mouth daily 90  tablet 3     triamcinolone (KENALOG) 0.1 % external cream Apply topically 2 times daily 30 g 1       Allergies   Allergen Reactions     Latex Rash     Review of Systems:  -Constitutional: Patient is otherwise feeling well, in usual state of health.   -Skin: As above in HPI. No additional skin concerns.    Physical exam:  Vitals: LMP 03/01/2016 (Approximate)   GEN: This is a well developed, well-nourished female in no acute distress, in a pleasant mood.    SKIN: Sun-exposed skin, which includes the head/face, neck, both arms, digits, and/or nails was examined.   - Resendiz Type III  - NUB, right vertex scalp there is a oval shaped ill defined subcutaneous nodule with slightly pink, slightly translucent appearance superficially. Extremely tender to touch.   - No other lesions of concern on areas examined.                 Impression/Plan:  1. Neoplasm of uncertain behavior on the right vertex scalp. The differential diagnosis includes Adnexal tumor vs scar tissue vs DFSP vs ruptured EIC. During biopsy, there did appear to be keratin debris, so ruptured cyst is most likely.     Punch biopsy:  After discussion of benefits and risks including but not limited to bleeding/bruising, pain/swelling, infection, scar, incomplete removal, nerve damage/numbness, recurrence, and non-diagnostic biopsy, written consent, verbal consent and photographs were obtained. Time-out was performed. The area was cleaned with isopropyl alcohol. 0.5mL of 1% lidocaine with 1:100,000 epinephrine was injected to obtain adequate anesthesia of the lesion on the right vertex scalp.  A 4 mm punch biopsy was performed. 4-0 prolene sutures were utilized to approximate the epidermal edges. White petroleum jelly/Vaseline and a bandage was applied to the wound. Explicit verbal and written wound care instructions were provided. The patient left the Dermatology Clinic in good condition. The patient was counseled to follow up for suture removal in  approximately 14 days.    CC Yves Hwang PA-C on close of this encounter.  Follow-up pending biopsy results, earlier for new or changing lesions.     Staff Involved:  Scribe/Staff    Scribe Disclosure  I, Tara Smith, am serving as a scribe to document services personally performed by Dr. Coco Luther MD, based on data collection and the provider's statements to me.     Provider Disclosure:   The documentation recorded by the scribe accurately reflects the services I personally performed and the decisions made by me.    Coco Luther MD    Department of Dermatology  [facility]: Phone: [phone], Fax:[phone]  [facility]: Phone: [phone], Fax: [phone]

## 2019-12-09 ENCOUNTER — OFFICE VISIT (OUTPATIENT)
Dept: SURGERY | Facility: CLINIC | Age: 44
End: 2019-12-09
Attending: FAMILY MEDICINE
Payer: COMMERCIAL

## 2019-12-09 VITALS
BODY MASS INDEX: 30.23 KG/M2 | OXYGEN SATURATION: 96 % | WEIGHT: 154 LBS | HEART RATE: 64 BPM | SYSTOLIC BLOOD PRESSURE: 100 MMHG | DIASTOLIC BLOOD PRESSURE: 76 MMHG | HEIGHT: 60 IN

## 2019-12-09 DIAGNOSIS — K62.5 RECTAL BLEEDING: Primary | ICD-10-CM

## 2019-12-09 ASSESSMENT — MIFFLIN-ST. JEOR: SCORE: 1275.04

## 2019-12-09 ASSESSMENT — PAIN SCALES - GENERAL: PAINLEVEL: NO PAIN (0)

## 2019-12-09 NOTE — PROGRESS NOTES
Colon and Rectal Surgery Consult Clinic Note    Date: 2019     Referring provider:  Concepcion Bocanegra MD  [address] [address]     RE: Aleena Escamilla  : 1975  JOHN PAUL: 2019    Aleena Escamilla is a very pleasant 44 year old female with a history of breast cancer with VUS in [facility] 2 and she had a BSO with a recent diagnosis of rectal bleeding.  Given these findings they were subsequently sent to the Colon and Rectal Surgery Clinic for an opinion on this and a new patient consultation.     Aleena had a very heavy rectal bleeding about 4 months ago with a bowel movement.  She states that the bowel movement was hard at that time but she does have intermittent constipation and is never had bleeding like that in the past.  She has had bleeding, although lighter, twice since that time.  No associated pain.  No change in bowel habits.  No nausea, vomiting, abdominal pain, or unexplained weight loss.  No family history of any colon cancer or inflammatory bowel disease.  She is never had a colonoscopy.  She is not on any blood thinners.    Physical Examination:  /76 (BP Location: Left arm, Patient Position: Sitting, Cuff Size: Adult Regular)   Pulse 64   Ht 5'   Wt 154 lb   LMP 2016 (Approximate)   SpO2 96%   BMI 30.08 kg/m    General: Alert, oriented, in no acute distress, sitting comfortably  HEENT: Mucous membranes moist    Perianal external examination: Exam was chaperoned by FLACO Salinas  Perianal skin: Intact with no excoriation or lichenification.  Lesions: No evidence of an external lesion, nodularity, or induration in the perianal region.  Eversion of buttocks: There was not evidence of an anal fissure. Details: N/A.  Skin tags or external hemorrhoids: None.  Digital rectal examination: Was performed.   Sphincter tone: Good.  Palpable lesions: No.  Other: None.  Bimanual examination: was not performed    Anoscopy: Was performed.   Hemorrhoids: Small  internal hemorrhoids without active bleeding.  Lesions: No    Assessment/Plan: 44 year old female with rectal bleeding.  No significant hemorrhoids on exam, although these certainly could be the source of her bleeding the setting of constipation.  However, since she has had more than one episode of rectal bleeding, would recommend a full colonoscopy to rule out any other sources of bleeding.  In the meantime, will start on a daily fiber supplement to try to improve her stool consistency to see if this improves her symptoms. Patient's questions were answered to her stated satisfaction and she is in agreement with this plan.    Medical history:  Past Medical History:   Diagnosis Date     Chronic pelvic pain in female      Malignant neoplasm of right breast, stage 2 (H) 1/2016 1/4 lymph nodes positive, Oncotype DX = 25.  Chemotherapy and radiation. Letrozole.     Migraine headaches        Surgical history:  Past Surgical History:   Procedure Laterality Date     BIOPSY BREAST NEEDLE LOCALIZATION Right 1/19/2016    Procedure: BIOPSY BREAST NEEDLE LOCALIZATION;  Surgeon: Adelina Demarco MD;  Location:  OR     GYN SURGERY  2-    bilateral salpingectomy, left oophrectomy     LAPAROSCOPIC SALPINGO-OOPHORECTOMY Right 10/26/2016    Procedure: LAPAROSCOPIC SALPINGO-OOPHORECTOMY;  Surgeon: Bouchra Mayo MD;  Location: [facility] OR     LUMPECTOMY BREAST WITH SENTINEL NODE, COMBINED Right 1/19/2016    Procedure: COMBINED LUMPECTOMY BREAST WITH SENTINEL NODE;  Surgeon: Adelina Demarco MD;  Location:  OR     PELVIS LAPAROSCOPY,DX  12/28/1998     RELEASE CARPAL TUNNEL Left 12/23/2016    Procedure: RELEASE CARPAL TUNNEL;  Surgeon: Sam Florence MD;  Location:  OR       Problem list:  Patient Active Problem List    Diagnosis Date Noted     Hyperlipidemia LDL goal <130 08/29/2018     Priority: Medium     Fatty liver 07/23/2018     Priority: Medium     Elevated LFTs 12/29/2017     Priority: Medium      Onychomycosis 09/22/2017     Priority: Medium     Bunion, right 05/08/2017     Priority: Medium     Chronic right shoulder pain 03/06/2017     Priority: Medium     Non morbid drug-induced obesity 01/09/2017     Priority: Medium     Carpal tunnel syndrome 11/23/2016     Priority: Medium     BRCA gene positive 10/21/2016     Priority: Medium     Malignant neoplasm of overlapping sites of right female breast (H) 12/18/2015     Priority: Medium     Major depressive disorder, recurrent episode, mild (H) 07/31/2015     Priority: Medium     Generalized anxiety disorder 07/31/2015     Priority: Medium     Diagnosis updated by automated process. Provider to review and confirm.       Arthritis, multiple joint involvement 07/31/2015     Priority: Medium     Primary osteoarthritis of both hands 07/31/2015     Priority: Medium     Fibromyalgia 07/31/2015     Priority: Medium     Chronic pelvic pain in female 01/31/2011     Priority: Medium     Chronic salpingitis and oophoritis 01/31/2011     Priority: Medium     CARDIOVASCULAR SCREENING; LDL GOAL LESS THAN 160 10/31/2010     Priority: Medium       Medications:  Current Outpatient Medications   Medication Sig Dispense Refill     Cholecalciferol (VITAMIN D3) 2000 units TABS Take 1 tablet by mouth daily 100 tablet 3     Evening Primrose Oil 1000 MG CAPS        fish oil-omega-3 fatty acids 1000 MG capsule Take 1 g by mouth daily       ibuprofen (ADVIL/MOTRIN) 600 MG tablet Take 1 tablet (600 mg) by mouth every 8 hours as needed for moderate pain 20 tablet 0     letrozole (FEMARA) 2.5 MG tablet Take 1 tablet (2.5 mg) by mouth daily 90 tablet 3     triamcinolone (KENALOG) 0.1 % external cream Apply topically 2 times daily (Patient not taking: Reported on 12/9/2019) 30 g 1       Allergies:  Allergies   Allergen Reactions     Latex Rash       Family history:  Family History   Problem Relation Age of Onset     Diabetes Mother      Hypertension Mother      Cancer Maternal Grandmother          cervical CA     Cardiovascular Father         MI     Cancer Maternal Aunt         cervical cancer     Pancreatic Cancer Maternal Aunt         COD       Social history:  Social History     Tobacco Use     Smoking status: Never Smoker     Smokeless tobacco: Never Used   Substance Use Topics     Alcohol use: Yes     Comment: 1 drink per week.    Marital status: .  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    Nursing Notes:   Ever Beverly EMT  12/9/2019 10:15 AM  Signed  Chief Complaint   Patient presents with     Consult     External hemorrhoids and rectal bleeding.       Vitals:    12/09/19 1012   BP: 100/76   BP Location: Left arm   Patient Position: Sitting   Cuff Size: Adult Regular   Pulse: 64   SpO2: 96%   Weight: 154 lb   Height: 5'       Body mass index is 30.08 kg/m .      FLACO Salinas                         Total face to face time was 20 minutes, >50% counseling.    PIA Waters, NP-C  Colon and Rectal Surgery   [facility]    This note was created using speech recognition software and may contain unintended word substitutions.

## 2019-12-09 NOTE — PATIENT INSTRUCTIONS
Start a daily fiber supplement such as Citrucel or Metamucil. Start with once a day and slowly increase up to three times a day, if needed, over the next 4-6 weeks    Colonoscopy to rule out other sources of bleeding

## 2019-12-09 NOTE — NURSING NOTE
Chief Complaint   Patient presents with     Consult     External hemorrhoids and rectal bleeding.       Vitals:    12/09/19 1012   BP: 100/76   BP Location: Left arm   Patient Position: Sitting   Cuff Size: Adult Regular   Pulse: 64   SpO2: 96%   Weight: 154 lb   Height: 5'       Body mass index is 30.08 kg/m .      Ever Beverly, EMT

## 2019-12-09 NOTE — LETTER
RE: Aleena Escamilla  [address] [place] [address]     Dear Colleague,    Thank you for referring your patient, Aleena Escamilla, to the [facility] COLON AND RECTAL SURGERY at [facility] Please see a copy of my visit note below.    Colon and Rectal Surgery Consult Clinic Note    Date: 2019     Referring provider:  Concepcion Bocanegra MD  [address] [place] [address]     RE: Aleena Escamilla  : 1975  JOHN PAUL: 2019    Aleena Escamilla is a very pleasant 44 year old female with a history of breast cancer with VUS in [facility] 2 and she had a BSO with a recent diagnosis of rectal bleeding.  Given these findings they were subsequently sent to the Colon and Rectal Surgery Clinic for an opinion on this and a new patient consultation.     Aleena had a very heavy rectal bleeding about 4 months ago with a bowel movement.  She states that the bowel movement was hard at that time but she does have intermittent constipation and is never had bleeding like that in the past.  She has had bleeding, although lighter, twice since that time.  No associated pain.  No change in bowel habits.  No nausea, vomiting, abdominal pain, or unexplained weight loss.  No family history of any colon cancer or inflammatory bowel disease.  She is never had a colonoscopy.  She is not on any blood thinners.    Physical Examination:  /76 (BP Location: Left arm, Patient Position: Sitting, Cuff Size: Adult Regular)   Pulse 64   Ht 5'   Wt 154 lb   LMP 2016 (Approximate)   SpO2 96%   BMI 30.08 kg/m     General: Alert, oriented, in no acute distress, sitting comfortably  HEENT: Mucous membranes moist    Perianal external examination: Exam was chaperoned by FLACO Salinas  Perianal skin: Intact with no excoriation or lichenification.  Lesions: No evidence of an external lesion, nodularity, or induration in the perianal region.  Eversion of buttocks: There was  not evidence of an anal fissure. Details: N/A.  Skin tags or external hemorrhoids: None.  Digital rectal examination: Was performed.   Sphincter tone: Good.  Palpable lesions: No.  Other: None.  Bimanual examination: was not performed    Anoscopy: Was performed.   Hemorrhoids: Small internal hemorrhoids without active bleeding.  Lesions: No    Assessment/Plan: 44 year old female with rectal bleeding.  No significant hemorrhoids on exam, although these certainly could be the source of her bleeding the setting of constipation.  However, since she has had more than one episode of rectal bleeding, would recommend a full colonoscopy to rule out any other sources of bleeding.  In the meantime, will start on a daily fiber supplement to try to improve her stool consistency to see if this improves her symptoms. Patient's questions were answered to her stated satisfaction and she is in agreement with this plan.    Medical history:  Past Medical History:   Diagnosis Date     Chronic pelvic pain in female      Malignant neoplasm of right breast, stage 2 (H) 1/2016 1/4 lymph nodes positive, Oncotype DX = 25.  Chemotherapy and radiation. Letrozole.     Migraine headaches        Surgical history:  Past Surgical History:   Procedure Laterality Date     BIOPSY BREAST NEEDLE LOCALIZATION Right 1/19/2016    Procedure: BIOPSY BREAST NEEDLE LOCALIZATION;  Surgeon: Adelina Demarco MD;  Location:  OR     GYN SURGERY  2-    bilateral salpingectomy, left oophrectomy     LAPAROSCOPIC SALPINGO-OOPHORECTOMY Right 10/26/2016    Procedure: LAPAROSCOPIC SALPINGO-OOPHORECTOMY;  Surgeon: Bouchra Mayo MD;  Location: [facility] OR     LUMPECTOMY BREAST WITH SENTINEL NODE, COMBINED Right 1/19/2016    Procedure: COMBINED LUMPECTOMY BREAST WITH SENTINEL NODE;  Surgeon: Adelina Demarco MD;  Location:  OR     PELVIS LAPAROSCOPY,DX  12/28/1998     RELEASE CARPAL TUNNEL Left 12/23/2016    Procedure: RELEASE CARPAL TUNNEL;  Surgeon:  Sam Florence MD;  Location:  OR       Problem list:  Patient Active Problem List    Diagnosis Date Noted     Hyperlipidemia LDL goal <130 08/29/2018     Priority: Medium     Fatty liver 07/23/2018     Priority: Medium     Elevated LFTs 12/29/2017     Priority: Medium     Onychomycosis 09/22/2017     Priority: Medium     Bunion, right 05/08/2017     Priority: Medium     Chronic right shoulder pain 03/06/2017     Priority: Medium     Non morbid drug-induced obesity 01/09/2017     Priority: Medium     Carpal tunnel syndrome 11/23/2016     Priority: Medium     BRCA gene positive 10/21/2016     Priority: Medium     Malignant neoplasm of overlapping sites of right female breast (H) 12/18/2015     Priority: Medium     Major depressive disorder, recurrent episode, mild (H) 07/31/2015     Priority: Medium     Generalized anxiety disorder 07/31/2015     Priority: Medium     Diagnosis updated by automated process. Provider to review and confirm.       Arthritis, multiple joint involvement 07/31/2015     Priority: Medium     Primary osteoarthritis of both hands 07/31/2015     Priority: Medium     Fibromyalgia 07/31/2015     Priority: Medium     Chronic pelvic pain in female 01/31/2011     Priority: Medium     Chronic salpingitis and oophoritis 01/31/2011     Priority: Medium     CARDIOVASCULAR SCREENING; LDL GOAL LESS THAN 160 10/31/2010     Priority: Medium       Medications:  Current Outpatient Medications   Medication Sig Dispense Refill     Cholecalciferol (VITAMIN D3) 2000 units TABS Take 1 tablet by mouth daily 100 tablet 3     Evening Primrose Oil 1000 MG CAPS        fish oil-omega-3 fatty acids 1000 MG capsule Take 1 g by mouth daily       ibuprofen (ADVIL/MOTRIN) 600 MG tablet Take 1 tablet (600 mg) by mouth every 8 hours as needed for moderate pain 20 tablet 0     letrozole (FEMARA) 2.5 MG tablet Take 1 tablet (2.5 mg) by mouth daily 90 tablet 3     triamcinolone (KENALOG) 0.1 % external cream Apply topically  2 times daily (Patient not taking: Reported on 12/9/2019) 30 g 1       Allergies:  Allergies   Allergen Reactions     Latex Rash       Family history:  Family History   Problem Relation Age of Onset     Diabetes Mother      Hypertension Mother      Cancer Maternal Grandmother         cervical CA     Cardiovascular Father         MI     Cancer Maternal Aunt         cervical cancer     Pancreatic Cancer Maternal Aunt         COD       Social history:  Social History     Tobacco Use     Smoking status: Never Smoker     Smokeless tobacco: Never Used   Substance Use Topics     Alcohol use: Yes     Comment: 1 drink per week.    Marital status: .  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    Nursing Notes:   Ever Beverly EMT  12/9/2019 10:15 AM  Signed  Chief Complaint   Patient presents with     Consult     External hemorrhoids and rectal bleeding.       Vitals:    12/09/19 1012   BP: 100/76   BP Location: Left arm   Patient Position: Sitting   Cuff Size: Adult Regular   Pulse: 64   SpO2: 96%   Weight: 154 lb   Height: 5'       Body mass index is 30.08 kg/m .      FLACO Salinas       Total face to face time was 20 minutes, >50% counseling.    PIA Waters, NP-C  Colon and Rectal Surgery   [facility]    This note was created using speech recognition software and may contain unintended word substitutions.

## 2019-12-10 ENCOUNTER — ALLIED HEALTH/NURSE VISIT (OUTPATIENT)
Dept: NURSING | Facility: CLINIC | Age: 44
End: 2019-12-10
Payer: COMMERCIAL

## 2019-12-10 DIAGNOSIS — Z48.02 VISIT FOR SUTURE REMOVAL: Primary | ICD-10-CM

## 2019-12-10 PROCEDURE — 99207 ZZC NO CHARGE NURSE ONLY: CPT | Performed by: DERMATOLOGY

## 2019-12-10 NOTE — NURSING NOTE
Dermatology Suture Removal Record  S: Aleena presents to the clinic today for  removal of sutures and suture.  The patient has had the sutures and suture in place for 14 days.    There has been no history of infection or drainage.    O: 1 suture is seen located on the scalp.  The wound is healing well with no signs of infection.      A: Suture removal.    P:  All sutures were easily removed today.  Routine wound care discussed.  The patient will follow up as needed.    Aleena Escamilla comes into clinic today at the request of Dr. Luther Ordering Provider for suture removal.    This service provided today was under the supervising provider of the day Dr. Luther, who was available if needed.    Tonia Butler LPN

## 2019-12-17 LAB — COPATH REPORT: NORMAL

## 2019-12-23 ENCOUNTER — TELEPHONE (OUTPATIENT)
Dept: DERMATOLOGY | Facility: CLINIC | Age: 44
End: 2019-12-23

## 2020-01-13 ENCOUNTER — ANCILLARY PROCEDURE (OUTPATIENT)
Dept: NUCLEAR MEDICINE | Facility: CLINIC | Age: 45
End: 2020-01-13
Attending: INTERNAL MEDICINE
Payer: COMMERCIAL

## 2020-01-13 DIAGNOSIS — C50.811 MALIGNANT NEOPLASM OF OVERLAPPING SITES OF RIGHT FEMALE BREAST (H): ICD-10-CM

## 2020-01-13 DIAGNOSIS — Z17.0 MALIGNANT NEOPLASM OF RIGHT BREAST IN FEMALE, ESTROGEN RECEPTOR POSITIVE, UNSPECIFIED SITE OF BREAST (H): ICD-10-CM

## 2020-01-13 DIAGNOSIS — C50.911 MALIGNANT NEOPLASM OF RIGHT BREAST IN FEMALE, ESTROGEN RECEPTOR POSITIVE, UNSPECIFIED SITE OF BREAST (H): ICD-10-CM

## 2020-01-13 DIAGNOSIS — M89.8X9 BONE PAIN: ICD-10-CM

## 2020-01-13 LAB
ALBUMIN SERPL-MCNC: 3.8 G/DL (ref 3.4–5)
ALP SERPL-CCNC: 98 U/L (ref 40–150)
ALT SERPL W P-5'-P-CCNC: 28 U/L (ref 0–50)
AST SERPL W P-5'-P-CCNC: 14 U/L (ref 0–45)
BILIRUB DIRECT SERPL-MCNC: <0.1 MG/DL (ref 0–0.2)
BILIRUB SERPL-MCNC: 0.3 MG/DL (ref 0.2–1.3)
CANCER AG27-29 SERPL-ACNC: 18 U/ML (ref 0–39)
PROT SERPL-MCNC: 7.8 G/DL (ref 6.8–8.8)

## 2020-01-13 PROCEDURE — 80076 HEPATIC FUNCTION PANEL: CPT | Performed by: INTERNAL MEDICINE

## 2020-01-13 PROCEDURE — 78306 BONE IMAGING WHOLE BODY: CPT

## 2020-01-13 PROCEDURE — 36415 COLL VENOUS BLD VENIPUNCTURE: CPT | Performed by: INTERNAL MEDICINE

## 2020-01-13 PROCEDURE — A9503 TC99M MEDRONATE: HCPCS | Performed by: INTERNAL MEDICINE

## 2020-01-13 PROCEDURE — 86300 IMMUNOASSAY TUMOR CA 15-3: CPT | Performed by: INTERNAL MEDICINE

## 2020-01-13 RX ORDER — TC 99M MEDRONATE 20 MG/10ML
20-30 INJECTION, POWDER, LYOPHILIZED, FOR SOLUTION INTRAVENOUS ONCE
Status: COMPLETED | OUTPATIENT
Start: 2020-01-13 | End: 2020-01-13

## 2020-01-13 RX ADMIN — TC 99M MEDRONATE 20.8 MCI.: 20 INJECTION, POWDER, LYOPHILIZED, FOR SOLUTION INTRAVENOUS at 09:12

## 2020-01-16 ENCOUNTER — ONCOLOGY VISIT (OUTPATIENT)
Dept: ONCOLOGY | Facility: CLINIC | Age: 45
End: 2020-01-16
Attending: INTERNAL MEDICINE
Payer: COMMERCIAL

## 2020-01-16 VITALS
HEIGHT: 60 IN | SYSTOLIC BLOOD PRESSURE: 101 MMHG | OXYGEN SATURATION: 97 % | DIASTOLIC BLOOD PRESSURE: 67 MMHG | HEART RATE: 70 BPM | WEIGHT: 151 LBS | TEMPERATURE: 98.6 F | RESPIRATION RATE: 16 BRPM | BODY MASS INDEX: 29.64 KG/M2

## 2020-01-16 DIAGNOSIS — Z17.0 MALIGNANT NEOPLASM OF RIGHT BREAST IN FEMALE, ESTROGEN RECEPTOR POSITIVE, UNSPECIFIED SITE OF BREAST (H): ICD-10-CM

## 2020-01-16 DIAGNOSIS — C50.911 MALIGNANT NEOPLASM OF RIGHT BREAST IN FEMALE, ESTROGEN RECEPTOR POSITIVE, UNSPECIFIED SITE OF BREAST (H): ICD-10-CM

## 2020-01-16 PROCEDURE — 99214 OFFICE O/P EST MOD 30 MIN: CPT | Performed by: INTERNAL MEDICINE

## 2020-01-16 RX ORDER — LETROZOLE 2.5 MG/1
2.5 TABLET, FILM COATED ORAL DAILY
Qty: 90 TABLET | Refills: 3 | Status: SHIPPED | OUTPATIENT
Start: 2020-01-16 | End: 2020-07-29

## 2020-01-16 ASSESSMENT — PAIN SCALES - GENERAL: PAINLEVEL: MODERATE PAIN (4)

## 2020-01-16 ASSESSMENT — MIFFLIN-ST. JEOR: SCORE: 1261.43

## 2020-01-16 NOTE — PROGRESS NOTES
Visit Date:   01/16/2020      ONCOLOGY DIAGNOSIS:   1. January 2016: Diagnosed with stage II multifocal invasive lobular carcinoma of the right breast. Final pathology showed a grade 2 with the first foci measuring 36 x 16 x 8 mm and the second one 21 x 14 x 11 mm. LCIS classical type present, lymphovascular invasion not identified. Margins negative, estrogen, progesterone receptor positive by IHC and HER-2 not amplified performed on a core biopsy. 1/4 lymph nodes positive with the greatest dimension being 10 mm. No extranodal extension. Oncotype DX recurrence score 25.   2. Genetics: Variant of Uncertain Significance in BRCA2 gene.        THERAPY TO DATE:   1. January 19, 2016: Right breast partial mastectomy with wire localization and right axillary sentinel node biopsy.   2. February 2016 to June 2016. Weekly Taxol followed by dose-dense AC.    3.  August 2016 to September 2016. Radiotherapy.    4. October 26, 2016: Laparoscopic right oophorectomy, biopsy of pelvic nodule, removal of a portion of the right fallopian tube. Pathology: right pelvic nodule showed fibrous nodule with calcifications, endosalpinosis. Right ovary with serous cystadenoma, benign epithelial inclusion cyst, negative for malignancy. A portion of the right fallopian tube with no significant histologic abnormality.   5. May 2016 to Present. Letrozole.      INTERVAL HISTORY:  Aleena is a 44-year-old female diagnosed with [id number] multifocal invasive lobular carcinoma of the right breast in 06/2016 after self-palpating a right breast mass.  The patient presents to clinic for followup of malignancy.  On today's visit, tells me overall she is doing well, tolerating the Femara without any issues.  Has lost a few pounds since I last saw her.  She said she is motivated because her  lost 100 pounds.  Denies any fevers, chills, nausea, vomiting, chest pain, shortness of breath, cough.  No abdominal discomfort.  No new palpable masses.  Remaining  comprehensive review of systems is negative.      PAST MEDICAL HISTORY:(No new medical history since last visit per patient.)  1. Multifocal right breast cancer.   2. History of migraines.   3. History of chronic pelvic pain.   4. Bilateral carpal tunnel       PAST SURGICAL HISTORY:(No new medical history since last visit per patient.)  1. Right breast lumpectomy 01/2016.   2. Pelvic laparoscopy.   3. Bilateral salpingectomy, left oophorectomy 02/2001 for pelvic pain, uterus intact.   4. 10/2016, right oophorectomy.   5. 11/2016, right carpal tunnel release.   6. Right foot bunion removed.     SOCIAL HISTORY:   with 2 children.  She comes in alone.  Continues to work for [employer].  No current tobacco use.  Had a wonderful holiday season and hosted her family.      PHYSICAL EXAMINATION  /67 (BP Location: Left arm)   Pulse 70   Temp 98.6  F (37  C) (Oral)   Resp 16   Ht 1.524 m (5')   Wt 68.5 kg (151 lb)   LMP 03/01/2016 (Approximate)   SpO2 97%   BMI 29.49 kg/m     GENERAL:  Comfortable, in no acute distress, pleasant.   HEENT:  Atraumatic, normocephalic.  Pupils equal, round, reactive.  Sclerae anicteric.  Oropharynx:  Moist mucous membranes.  No lesions, ulcers or exudates.   NECK:  Supple, full range of motion.  Trachea midline.   HEART:  Regular rate and rhythm, normal S1, S2.  No murmurs or gallop.  No edema.   LUNGS:  Clear to auscultation bilaterally.  No crackles or wheezes.  Normal respiratory effort.   ABDOMEN:  Positive bowel sounds.  Soft, nontender, nondistended.  No hepatomegaly.   EXTREMITIES:  No cyanosis.  Warm.   MUSCULOSKELETAL:  No point tenderness.   LYMPH NODES:  No cervical, supraclavicular or axillary nodes palpable.   SKIN:  No petechiae or rashes.   NEUROLOGIC:  Alert and oriented.   BREASTS:  Examined in the upright, supine position.  Right breast, no dominant masses, nipple everted without discharge.  Left breast, no dominant masses, nipple everted without discharge.       LABORATORY DATA:  Alkaline phosphatase 90, ALT 28, AST 14, CA 27-29 is 18.    Bone scan from 1/13/2020, no evidence of osseous metastatic disease, presumed significant dental disease within the left maxilla.   Diagnostic mammogram from 07/17/2019 shows breast conservation therapy on the right, not significantly changed. No suspicious findings.  A targeted bilateral breast ultrasound is performed at the site of the scar in the right breast. The scar is noted without any worrisome findings.  In the right axilla, only normal subcutaneous fat is seen at the site of the pain.  DEXA scan from 01/2019 shows osteopenia.      ASSESSMENT AND PLAN:   1.  Stage II, [id number] multifocal invasive lobular carcinoma.  No evidence of recurrence by history or physical.  Continue Femara.  Return to clinic in 6 months to see MD with LFTs and CA 27-29.   2.  Right apical reticulation.  CT does not show any evidence of pulmonary abnormalities.  No further CTs unless symptoms dictate.  Not addressed at today's visit.   3.  Hot flashes.  No issues.   4.  BRCA2 variant of undetermined significance.  The patient contacted Genetics team to see whether she is a candidate for any testing.  States that she has not received a call back.  We will attempt to call them again.   5.  History of elevated liver function tests.  It is normalized.  Bone scan is negative.   6.  Osteopenia.  Encouraged calcium and vitamin D.  She should check with her primary whether she is a candidate for bisphosphonate.  Next DEXA scan 01/20/2021.   7.  Cardiac.  Check lipids annually while she is an aromatase inhibitor.  This will be done through her primary.   8.  Health care maintenance.  I recommend 150 minutes of moderate exercise a week with diet rich in fruits, vegetables, fiber and avoiding processed meats.  The patient states that she is trying to be much healthier especially since her  has lost 100 pounds, and is feeling better.         ALBERTO JORDAN  MD THELMA    Above note accurate for diagnosis, plan and orders placed. Epic/EMR orders and data may not be accurate because of available options, information not entered correctly/updated by staff other than writer or interface issues. All data entered by writer with the intent patient care not be compromised nor patient be unnecessarily billed     D: 2020   T: 2020   MT: VASILE      Name:     KEYUR GALVAN   MRN:      [id number]        Account:      [id number]   :      1975           Visit Date:   2020      Document: [id number]       cc: Concepcion Bocanegra MD

## 2020-01-16 NOTE — NURSING NOTE
Oncology Rooming Note    January 16, 2020 9:39 AM   Aleena Escamilla is a 44 year old female who presents for:    Chief Complaint   Patient presents with     Oncology Clinic Visit     6 month follow up     Initial Vitals: /67 (BP Location: Left arm)   Pulse 70   Temp 98.6  F (37  C) (Oral)   Resp 16   Ht 1.524 m (5')   Wt 68.5 kg (151 lb)   LMP 03/01/2016 (Approximate)   SpO2 97%   BMI 29.49 kg/m   Estimated body mass index is 29.49 kg/m  as calculated from the following:    Height as of this encounter: 1.524 m (5').    Weight as of this encounter: 68.5 kg (151 lb). Body surface area is 1.7 meters squared.  Moderate Pain (4) Comment: Data Unavailable   Patient's last menstrual period was 03/01/2016 (approximate).  Allergies reviewed: Yes  Medications reviewed: Yes    Medications: Medication refills not needed today.  Pharmacy name entered into [facility]:    [facility] [place] [facility] - [address] [id number] - [place] - [address] AT [facility] [place] - [address], [id number]    Meredith Strong LPN

## 2020-01-16 NOTE — LETTER
1/16/2020         RE: Aleena Escamilla  [address] [facility] [address]        Dear Colleague,    Thank you for referring your patient, Aleena Escamilla, to the [facility]. Please see a copy of my visit note below.    Visit Date:   01/16/2020      ONCOLOGY DIAGNOSIS:   1. January 2016: Diagnosed with stage II multifocal invasive lobular carcinoma of the right breast. Final pathology showed a grade 2 with the first foci measuring 36 x 16 x 8 mm and the second one 21 x 14 x 11 mm. LCIS classical type present, lymphovascular invasion not identified. Margins negative, estrogen, progesterone receptor positive by IHC and HER-2 not amplified performed on a core biopsy. 1/4 lymph nodes positive with the greatest dimension being 10 mm. No extranodal extension. Oncotype DX recurrence score 25.   2. Genetics: Variant of Uncertain Significance in BRCA2 gene.        THERAPY TO DATE:   1. January 19, 2016: Right breast partial mastectomy with wire localization and right axillary sentinel node biopsy.   2. February 2016 to June 2016. Weekly Taxol followed by dose-dense AC.    3.  August 2016 to September 2016. Radiotherapy.    4. October 26, 2016: Laparoscopic right oophorectomy, biopsy of pelvic nodule, removal of a portion of the right fallopian tube. Pathology: right pelvic nodule showed fibrous nodule with calcifications, endosalpinosis. Right ovary with serous cystadenoma, benign epithelial inclusion cyst, negative for malignancy. A portion of the right fallopian tube with no significant histologic abnormality.   5. May 2016 to Present. Letrozole.      INTERVAL HISTORY:  Aleena is a 44-year-old female diagnosed with [id number] multifocal invasive lobular carcinoma of the right breast in 06/2016 after self-palpating a right breast mass.  The patient presents to clinic for followup of malignancy.  On today's visit, tells me overall she is doing well, tolerating the Femara without any issues.  Has  lost a few pounds since I last saw her.  She said she is motivated because her  lost 100 pounds.  Denies any fevers, chills, nausea, vomiting, chest pain, shortness of breath, cough.  No abdominal discomfort.  No new palpable masses.  Remaining comprehensive review of systems is negative.      PAST MEDICAL HISTORY:(No new medical history since last visit per patient.)  1. Multifocal right breast cancer.   2. History of migraines.   3. History of chronic pelvic pain.   4. Bilateral carpal tunnel       PAST SURGICAL HISTORY:(No new medical history since last visit per patient.)  1. Right breast lumpectomy 01/2016.   2. Pelvic laparoscopy.   3. Bilateral salpingectomy, left oophorectomy 02/2001 for pelvic pain, uterus intact.   4. 10/2016, right oophorectomy.   5. 11/2016, right carpal tunnel release.   6. Right foot bunion removed.     SOCIAL HISTORY:   with 2 children.  She comes in alone.  Continues to work for [employer].  No current tobacco use.  Had a wonderful holiday season and hosted her family.      PHYSICAL EXAMINATION  /67 (BP Location: Left arm)   Pulse 70   Temp 98.6  F (37  C) (Oral)   Resp 16   Ht 1.524 m (5')   Wt 68.5 kg (151 lb)   LMP 03/01/2016 (Approximate)   SpO2 97%   BMI 29.49 kg/m      GENERAL:  Comfortable, in no acute distress, pleasant.   HEENT:  Atraumatic, normocephalic.  Pupils equal, round, reactive.  Sclerae anicteric.  Oropharynx:  Moist mucous membranes.  No lesions, ulcers or exudates.   NECK:  Supple, full range of motion.  Trachea midline.   HEART:  Regular rate and rhythm, normal S1, S2.  No murmurs or gallop.  No edema.   LUNGS:  Clear to auscultation bilaterally.  No crackles or wheezes.  Normal respiratory effort.   ABDOMEN:  Positive bowel sounds.  Soft, nontender, nondistended.  No hepatomegaly.   EXTREMITIES:  No cyanosis.  Warm.   MUSCULOSKELETAL:  No point tenderness.   LYMPH NODES:  No cervical, supraclavicular or axillary nodes palpable.   SKIN:   No petechiae or rashes.   NEUROLOGIC:  Alert and oriented.   BREASTS:  Examined in the upright, supine position.  Right breast, no dominant masses, nipple everted without discharge.  Left breast, no dominant masses, nipple everted without discharge.      LABORATORY DATA:  Alkaline phosphatase 90, ALT 28, AST 14, CA 27-29 is 18.    Bone scan from 1/13/2020, no evidence of osseous metastatic disease, presumed significant dental disease within the left maxilla.   Diagnostic mammogram from 07/17/2019 shows breast conservation therapy on the right, not significantly changed. No suspicious findings.  A targeted bilateral breast ultrasound is performed at the site of the scar in the right breast. The scar is noted without any worrisome findings.  In the right axilla, only normal subcutaneous fat is seen at the site of the pain.  DEXA scan from 01/2019 shows osteopenia.      ASSESSMENT AND PLAN:   1.  Stage II, [id number] multifocal invasive lobular carcinoma.  No evidence of recurrence by history or physical.  Continue Femara.  Return to clinic in 6 months to see MD with LFTs and CA 27-29.   2.  Right apical reticulation.  CT does not show any evidence of pulmonary abnormalities.  No further CTs unless symptoms dictate.  Not addressed at today's visit.   3.  Hot flashes.  No issues.   4.  BRCA2 variant of undetermined significance.  The patient contacted Genetics team to see whether she is a candidate for any testing.  States that she has not received a call back.  We will attempt to call them again.   5.  History of elevated liver function tests.  It is normalized.  Bone scan is negative.   6.  Osteopenia.  Encouraged calcium and vitamin D.  She should check with her primary whether she is a candidate for bisphosphonate.  Next DEXA scan 01/20/2021.   7.  Cardiac.  Check lipids annually while she is an aromatase inhibitor.  This will be done through her primary.   8.  Health care maintenance.  I recommend 150 minutes of  moderate exercise a week with diet rich in fruits, vegetables, fiber and avoiding processed meats.  The patient states that she is trying to be much healthier especially since her  has lost 100 pounds, and is feeling better.         ALBERTO TATE MD    Above note accurate for diagnosis, plan and orders placed. Epic/EMR orders and data may not be accurate because of available options, information not entered correctly/updated by staff other than writer or interface issues. All data entered by writer with the intent patient care not be compromised nor patient be unnecessarily billed     D: 2020   T: 2020   MT: NTS      Name:     KEYUR GALVAN   MRN:      [id number]        Account:      [id number]   :      1975           Visit Date:   2020      Document: [id number]       cc: Concepcion Bocanegra MD       Again, thank you for allowing me to participate in the care of your patient.        Sincerely,        Alberto Tate MD     [employer]

## 2020-02-10 ENCOUNTER — TELEPHONE (OUTPATIENT)
Dept: DERMATOLOGY | Facility: CLINIC | Age: 45
End: 2020-02-10

## 2020-02-10 NOTE — TELEPHONE ENCOUNTER
[facility] Call Center    Phone Message    May a detailed message be left on voicemail: yes     Reason for Call: Patient called back stating that her cyst is coming back and wants to know if she should be seen for it or if she can get referred to a surgeon to get it removed. Please advise. Thank you.    Action Taken: Message routed to:  Adult Clinics: Dermatology p [id number]    Travel Screening: Not Applicable

## 2020-02-24 ENCOUNTER — HEALTH MAINTENANCE LETTER (OUTPATIENT)
Age: 45
End: 2020-02-24

## 2020-07-22 ENCOUNTER — ANCILLARY PROCEDURE (OUTPATIENT)
Dept: MAMMOGRAPHY | Facility: CLINIC | Age: 45
End: 2020-07-22
Attending: FAMILY MEDICINE
Payer: COMMERCIAL

## 2020-07-22 DIAGNOSIS — Z17.0 MALIGNANT NEOPLASM OF RIGHT BREAST IN FEMALE, ESTROGEN RECEPTOR POSITIVE, UNSPECIFIED SITE OF BREAST (H): ICD-10-CM

## 2020-07-22 DIAGNOSIS — Z12.31 VISIT FOR SCREENING MAMMOGRAM: ICD-10-CM

## 2020-07-22 DIAGNOSIS — C50.911 MALIGNANT NEOPLASM OF RIGHT BREAST IN FEMALE, ESTROGEN RECEPTOR POSITIVE, UNSPECIFIED SITE OF BREAST (H): ICD-10-CM

## 2020-07-22 LAB
ALBUMIN SERPL-MCNC: 4 G/DL (ref 3.4–5)
ALP SERPL-CCNC: 107 U/L (ref 40–150)
ALT SERPL W P-5'-P-CCNC: 65 U/L (ref 0–50)
AST SERPL W P-5'-P-CCNC: 29 U/L (ref 0–45)
BILIRUB DIRECT SERPL-MCNC: 0.1 MG/DL (ref 0–0.2)
BILIRUB SERPL-MCNC: 0.4 MG/DL (ref 0.2–1.3)
CANCER AG27-29 SERPL-ACNC: 17 U/ML (ref 0–39)
PROT SERPL-MCNC: 7.9 G/DL (ref 6.8–8.8)

## 2020-07-22 PROCEDURE — 77067 SCR MAMMO BI INCL CAD: CPT

## 2020-07-22 PROCEDURE — 86300 IMMUNOASSAY TUMOR CA 15-3: CPT | Performed by: INTERNAL MEDICINE

## 2020-07-22 PROCEDURE — 77063 BREAST TOMOSYNTHESIS BI: CPT

## 2020-07-22 PROCEDURE — 80076 HEPATIC FUNCTION PANEL: CPT | Performed by: INTERNAL MEDICINE

## 2020-07-22 PROCEDURE — 36415 COLL VENOUS BLD VENIPUNCTURE: CPT | Performed by: INTERNAL MEDICINE

## 2020-07-26 NOTE — RESULT ENCOUNTER NOTE
Dear Aleena GODFREY Dooley,    I am happy to let you know that your mammogram was normal. You may schedule a follow up mammogram in 1 year.  Please let me know if you have any questions about your results. You should be receiving a letter from the radiologist.    You may call me at [phone] with any questions or send me a [employer] message.     Concepcion Bocanegra MD

## 2020-07-29 ENCOUNTER — VIRTUAL VISIT (OUTPATIENT)
Dept: ONCOLOGY | Facility: CLINIC | Age: 45
End: 2020-07-29
Attending: INTERNAL MEDICINE
Payer: COMMERCIAL

## 2020-07-29 DIAGNOSIS — C50.911 MALIGNANT NEOPLASM OF RIGHT BREAST IN FEMALE, ESTROGEN RECEPTOR POSITIVE, UNSPECIFIED SITE OF BREAST (H): ICD-10-CM

## 2020-07-29 DIAGNOSIS — Z17.0 MALIGNANT NEOPLASM OF RIGHT BREAST IN FEMALE, ESTROGEN RECEPTOR POSITIVE, UNSPECIFIED SITE OF BREAST (H): ICD-10-CM

## 2020-07-29 PROCEDURE — 99214 OFFICE O/P EST MOD 30 MIN: CPT | Mod: 95 | Performed by: INTERNAL MEDICINE

## 2020-07-29 RX ORDER — LETROZOLE 2.5 MG/1
2.5 TABLET, FILM COATED ORAL DAILY
Qty: 90 TABLET | Refills: 3 | Status: SHIPPED | OUTPATIENT
Start: 2020-07-29 | End: 2021-02-15

## 2020-07-29 NOTE — LETTER
7/29/2020         RE: Aleena Escamilla  [address] [facility] [address]        Dear Colleague,    Thank you for referring your patient, Aleena Escamilla, to the [facility]. Please see a copy of my visit note below.    Visit Date:   07/29/2020      ONCOLOGY DIAGNOSIS:   1. January 2016: Diagnosed with stage II multifocal invasive lobular carcinoma of the right breast. Final pathology showed a grade 2 with the first foci measuring 36 x 16 x 8 mm and the second one 21 x 14 x 11 mm. LCIS classical type present, lymphovascular invasion not identified. Margins negative, estrogen, progesterone receptor positive by IHC and HER-2 not amplified performed on a core biopsy. 1/4 lymph nodes positive with the greatest dimension being 10 mm. No extranodal extension. Oncotype DX recurrence score 25.   2. Genetics: Variant of Uncertain Significance in BRCA2 gene.        THERAPY TO DATE:   1. January 19, 2016: Right breast partial mastectomy with wire localization and right axillary sentinel node biopsy.   2. February 2016 to June 2016. Weekly Taxol followed by dose-dense AC.    3.  August 2016 to September 2016. Radiotherapy.    4. October 26, 2016: Laparoscopic right oophorectomy, biopsy of pelvic nodule, removal of a portion of the right fallopian tube. Pathology: right pelvic nodule showed fibrous nodule with calcifications, endosalpinosis. Right ovary with serous cystadenoma, benign epithelial inclusion cyst, negative for malignancy. A portion of the right fallopian tube with no significant histologic abnormality.   5. May 2016 to Present. Letrozole.      INTERVAL HISTORY:  Aleena is a 44-year-old female diagnosed with pT2 N1a MX multifocal invasive lobular carcinoma of the right breast in 06/2016 after self-palpating a right breast mass.  The patient presents to clinic for followup of her malignancy via [employer] video visit.  On today's visit, the patient states that overall she is doing well,  does experience some right breast pain.  She has also noticed that she has gained about 10 pounds since COVID started.  She states that a lot of it is related to stress.  She also has alcoholic drinks.  Continues to work.  Denies any fevers, chills, nausea, vomiting, chest pain, shortness of breath, cough.  No new palpable masses.  Remaining comprehensive review of systems is negative.      PAST MEDICAL HISTORY:   1.  Multifocal right breast cancer.   2.  History of migraines.   3.  History of chronic pelvic pain.   4.  Bilateral carpal tunnel.      PAST SURGICAL HISTORY:(No new medical history since last visit per patient.)  1. Right breast lumpectomy 01/2016.   2. Pelvic laparoscopy.   3. Bilateral salpingectomy, left oophorectomy 02/2001 for pelvic pain, uterus intact.   4. 10/2016, right oophorectomy.   5. 11/2016, right carpal tunnel release.   6. Right foot bunion removed..      SOCIAL HISTORY:   with 2 children.  On [employer] video visit by herself.  No current tobacco use.  Continues to work for [employer].  No recent travel.      PHYSICAL EXAM:  GENERAL: Comfortable, no acute distress.   HEAD: Atraumatic/Normocephalic.  EYES: Sclera non-icteric. Grossly normal.  RESPIRATORY: Normal breathing, non-labored. No audible wheezes/cough.  SKIN: No jaundice, discoloration or obvious lesions.  NERUO: No tremors visible. Normal speech.  PSYCH: Alert, oriented. Answered questions appropriately.    The rest of a comprehensive physical examination is deferred due to [facility] (public health emergency) video visit restrictions.    LABORATORY DATA:  Alk phos 107, ALT 65, AST 29.  CA 27-29 is 17.        IMAGING:  Mammogram from 07/2020:  Breast conservation therapy on the right.  No suspicious finding.  No significant change.  DEXA scan from 01/2019 showed osteopenia.      ASSESSMENT AND PLAN:   1.  Stage II, pT2 N1a MX multifocal invasive lobular carcinoma.  No evidence of recurrence by recent imaging.  Continue Femara.   Return to clinic in 6 months to see MD with LFTs, CA 27-29.   2.  BRCA2 variant of undetermined significance.  The patient to continue to follow up with Genetics team to see whether she is a candidate for any future testing.   3.  History of elevated liver function tests.  This has been noticed in the past and normalized.  At most recent visit, her ALT is slightly elevated at 65.  In the past it has been as high as 193.  This is unlikely to be related to her cancer, more likely due to lifestyle.  The patient did admit to gaining 10 pounds as well as having several alcoholic drinks a week.  Discussed with the patient the importance of a healthy lifestyle.  Would also advise the patient to follow up with primary for any further workup if required.   4.  Osteopenia.  Encouraged calcium and vitamin D.  DEXA scan prior to next visit.   5.  Cardiac.  Check lipids annually through her primary while she is on aromatase inhibitor.   6.  Health care maintenance.  Discussed the importance of healthy lifestyle with diet and exercise.  Recommend 150 minutes of moderate exercise a week with diet rich in fruits, vegetables, fiber and avoiding processed meats.   7.  Right breast pain.  Mammogram is negative.  This may be related to recent weight gain, poor diet and lifestyle. Instructed patient to contact clinic if worsens or develops and new symptoms.   8.  COVID-19 Precautions. Discussed the importance of good hand washing techniques with soap and water for at least 20 seconds, avoid touching eyes, nose, mouth, avoiding crowds, social distancing.       Video-Visit Details    Type of service:  Video Visit    Video Start and End Time:  10:26 a.m. to 10:34 a.m.     Originating Location (pt. Location): Home    Distant Location (provider location):  [facility]     Mode of Communication:  Video Conference via [employer].        ALBERTO RENEE MD             D: 07/29/2020   T: 07/29/2020   MT: KAMAR      Name:      KEYUR GALVAN   MRN:      [id number]        Account:      [id number]   :      1975           Visit Date:   2020      Document: [id number]        Again, thank you for allowing me to participate in the care of your patient.        Sincerely,        Bessy Tate MD

## 2020-07-29 NOTE — PROGRESS NOTES
Visit Date:   07/29/2020      ONCOLOGY DIAGNOSIS:   1. January 2016: Diagnosed with stage II multifocal invasive lobular carcinoma of the right breast. Final pathology showed a grade 2 with the first foci measuring 36 x 16 x 8 mm and the second one 21 x 14 x 11 mm. LCIS classical type present, lymphovascular invasion not identified. Margins negative, estrogen, progesterone receptor positive by IHC and HER-2 not amplified performed on a core biopsy. 1/4 lymph nodes positive with the greatest dimension being 10 mm. No extranodal extension. Oncotype DX recurrence score 25.   2. Genetics: Variant of Uncertain Significance in BRCA2 gene.        THERAPY TO DATE:   1. January 19, 2016: Right breast partial mastectomy with wire localization and right axillary sentinel node biopsy.   2. February 2016 to June 2016. Weekly Taxol followed by dose-dense AC.    3.  August 2016 to September 2016. Radiotherapy.    4. October 26, 2016: Laparoscopic right oophorectomy, biopsy of pelvic nodule, removal of a portion of the right fallopian tube. Pathology: right pelvic nodule showed fibrous nodule with calcifications, endosalpinosis. Right ovary with serous cystadenoma, benign epithelial inclusion cyst, negative for malignancy. A portion of the right fallopian tube with no significant histologic abnormality.   5. May 2016 to Present. Letrozole.      INTERVAL HISTORY:  Aleena is a 44-year-old female diagnosed with pT2 N1a MX multifocal invasive lobular carcinoma of the right breast in 06/2016 after self-palpating a right breast mass.  The patient presents to clinic for followup of her malignancy via [employer] video visit.  On today's visit, the patient states that overall she is doing well, does experience some right breast pain.  She has also noticed that she has gained about 10 pounds since COVID started.  She states that a lot of it is related to stress.  She also has alcoholic drinks.  Continues to work.  Denies any fevers, chills,  nausea, vomiting, chest pain, shortness of breath, cough.  No new palpable masses.  Remaining comprehensive review of systems is negative.      PAST MEDICAL HISTORY:   1.  Multifocal right breast cancer.   2.  History of migraines.   3.  History of chronic pelvic pain.   4.  Bilateral carpal tunnel.      PAST SURGICAL HISTORY:(No new medical history since last visit per patient.)  1. Right breast lumpectomy 01/2016.   2. Pelvic laparoscopy.   3. Bilateral salpingectomy, left oophorectomy 02/2001 for pelvic pain, uterus intact.   4. 10/2016, right oophorectomy.   5. 11/2016, right carpal tunnel release.   6. Right foot bunion removed..      SOCIAL HISTORY:   with 2 children.  On [employer] video visit by herself.  No current tobacco use.  Continues to work for [employer].  No recent travel.      PHYSICAL EXAM:  GENERAL: Comfortable, no acute distress.   HEAD: Atraumatic/Normocephalic.  EYES: Sclera non-icteric. Grossly normal.  RESPIRATORY: Normal breathing, non-labored. No audible wheezes/cough.  SKIN: No jaundice, discoloration or obvious lesions.  NERUO: No tremors visible. Normal speech.  PSYCH: Alert, oriented. Answered questions appropriately.    The rest of a comprehensive physical examination is deferred due to [facility] (public health emergency) video visit restrictions.    LABORATORY DATA:  Alk phos 107, ALT 65, AST 29.  CA 27-29 is 17.        IMAGING:  Mammogram from 07/2020:  Breast conservation therapy on the right.  No suspicious finding.  No significant change.  DEXA scan from 01/2019 showed osteopenia.      ASSESSMENT AND PLAN:   1.  Stage II, pT2 N1a MX multifocal invasive lobular carcinoma.  No evidence of recurrence by recent imaging.  Continue Femara.  Return to clinic in 6 months to see MD with LFTs, CA 27-29.   2.  BRCA2 variant of undetermined significance.  The patient to continue to follow up with Genetics team to see whether she is a candidate for any future testing.   3.  History of elevated  liver function tests.  This has been noticed in the past and normalized.  At most recent visit, her ALT is slightly elevated at 65.  In the past it has been as high as 193.  This is unlikely to be related to her cancer, more likely due to lifestyle.  The patient did admit to gaining 10 pounds as well as having several alcoholic drinks a week.  Discussed with the patient the importance of a healthy lifestyle.  Would also advise the patient to follow up with primary for any further workup if required.   4.  Osteopenia.  Encouraged calcium and vitamin D.  DEXA scan prior to next visit.   5.  Cardiac.  Check lipids annually through her primary while she is on aromatase inhibitor.   6.  Health care maintenance.  Discussed the importance of healthy lifestyle with diet and exercise.  Recommend 150 minutes of moderate exercise a week with diet rich in fruits, vegetables, fiber and avoiding processed meats.   7.  Right breast pain.  Mammogram is negative.  This may be related to recent weight gain, poor diet and lifestyle. Instructed patient to contact clinic if worsens or develops and new symptoms.   8.  COVID-19 Precautions. Discussed the importance of good hand washing techniques with soap and water for at least 20 seconds, avoid touching eyes, nose, mouth, avoiding crowds, social distancing.       Video-Visit Details    Type of service:  Video Visit    Video Start and End Time:  10:26 a.m. to 10:34 a.m.     Originating Location (pt. Location): Home    Distant Location (provider location):  [facility]     Mode of Communication:  Video Conference via "[employer].        ALBERTO RENEE MD             D: 2020   T: 2020   MT: KAMAR      Name:     KEYUR GALVAN   MRN:      [id number]        Account:      [id number]   :      1975           Visit Date:   2020      Document: [id number]

## 2020-07-29 NOTE — NURSING NOTE
"Aleena Escamilla is a 44 year old female who is being evaluated via a billable telephone visit.      The patient has been notified of following:     \"This telephone visit will be conducted via a call between you and your physician/provider. We have found that certain health care needs can be provided without the need for a physical exam.  This service lets us provide the care you need with a short phone conversation.  If a prescription is necessary we can send it directly to your pharmacy.  If lab work is needed we can place an order for that and you can then stop by our lab to have the test done at a later time.    Telephone visits are billed at different rates depending on your insurance coverage. During this emergency period, for some insurers they may be billed the same as an in-person visit.  Please reach out to your insurance provider with any questions.    If during the course of the call the physician/provider feels a telephone visit is not appropriate, you will not be charged for this service.\"    Patient has given verbal consent for Telephone visit?  Yes    What phone number would you like to be contacted at? [phone]    How would you like to obtain your AVS? Sunnypatrice    Patient states that she has a little bit of pain in her breast.  Today she rates it at a 4.  She is using ibuprofen and heat.  No other symptoms or concerns.    She needs a refill of her letrozole    Ling Delgado CMA      "

## 2020-10-26 ENCOUNTER — ANCILLARY PROCEDURE (OUTPATIENT)
Dept: GENERAL RADIOLOGY | Facility: CLINIC | Age: 45
End: 2020-10-26
Attending: FAMILY MEDICINE
Payer: COMMERCIAL

## 2020-10-26 ENCOUNTER — OFFICE VISIT (OUTPATIENT)
Dept: FAMILY MEDICINE | Facility: CLINIC | Age: 45
End: 2020-10-26
Payer: COMMERCIAL

## 2020-10-26 VITALS
BODY MASS INDEX: 30.08 KG/M2 | SYSTOLIC BLOOD PRESSURE: 118 MMHG | HEART RATE: 73 BPM | RESPIRATION RATE: 16 BRPM | OXYGEN SATURATION: 96 % | DIASTOLIC BLOOD PRESSURE: 79 MMHG | WEIGHT: 154 LBS | TEMPERATURE: 97.1 F

## 2020-10-26 DIAGNOSIS — F33.0 MAJOR DEPRESSIVE DISORDER, RECURRENT EPISODE, MILD (H): ICD-10-CM

## 2020-10-26 DIAGNOSIS — E78.5 HYPERLIPIDEMIA LDL GOAL <130: ICD-10-CM

## 2020-10-26 DIAGNOSIS — D17.1 LIPOMA OF TORSO: ICD-10-CM

## 2020-10-26 DIAGNOSIS — M54.9 ACUTE UPPER BACK PAIN: ICD-10-CM

## 2020-10-26 DIAGNOSIS — K76.0 FATTY LIVER: ICD-10-CM

## 2020-10-26 DIAGNOSIS — M54.9 ACUTE UPPER BACK PAIN: Primary | ICD-10-CM

## 2020-10-26 DIAGNOSIS — C50.811 MALIGNANT NEOPLASM OF OVERLAPPING SITES OF RIGHT BREAST IN FEMALE, ESTROGEN RECEPTOR POSITIVE (H): ICD-10-CM

## 2020-10-26 DIAGNOSIS — Z79.899 HIGH RISK MEDICATION USE: ICD-10-CM

## 2020-10-26 DIAGNOSIS — Z17.0 MALIGNANT NEOPLASM OF OVERLAPPING SITES OF RIGHT BREAST IN FEMALE, ESTROGEN RECEPTOR POSITIVE (H): ICD-10-CM

## 2020-10-26 DIAGNOSIS — E55.9 VITAMIN D DEFICIENCY: ICD-10-CM

## 2020-10-26 LAB
ERYTHROCYTE [DISTWIDTH] IN BLOOD BY AUTOMATED COUNT: 13 % (ref 10–15)
ERYTHROCYTE [SEDIMENTATION RATE] IN BLOOD BY WESTERGREN METHOD: 10 MM/H (ref 0–20)
HCT VFR BLD AUTO: 45.9 % (ref 35–47)
HGB BLD-MCNC: 15.7 G/DL (ref 11.7–15.7)
MCH RBC QN AUTO: 29.6 PG (ref 26.5–33)
MCHC RBC AUTO-ENTMCNC: 34.2 G/DL (ref 31.5–36.5)
MCV RBC AUTO: 86 FL (ref 78–100)
PLATELET # BLD AUTO: 273 10E9/L (ref 150–450)
RBC # BLD AUTO: 5.31 10E12/L (ref 3.8–5.2)
WBC # BLD AUTO: 6.9 10E9/L (ref 4–11)

## 2020-10-26 PROCEDURE — 99214 OFFICE O/P EST MOD 30 MIN: CPT | Mod: 25 | Performed by: FAMILY MEDICINE

## 2020-10-26 PROCEDURE — 36415 COLL VENOUS BLD VENIPUNCTURE: CPT | Performed by: FAMILY MEDICINE

## 2020-10-26 PROCEDURE — 85027 COMPLETE CBC AUTOMATED: CPT | Performed by: FAMILY MEDICINE

## 2020-10-26 PROCEDURE — 96127 BRIEF EMOTIONAL/BEHAV ASSMT: CPT | Mod: 59 | Performed by: FAMILY MEDICINE

## 2020-10-26 PROCEDURE — 72072 X-RAY EXAM THORAC SPINE 3VWS: CPT | Performed by: RADIOLOGY

## 2020-10-26 PROCEDURE — 86140 C-REACTIVE PROTEIN: CPT | Performed by: FAMILY MEDICINE

## 2020-10-26 PROCEDURE — 85652 RBC SED RATE AUTOMATED: CPT | Performed by: FAMILY MEDICINE

## 2020-10-26 PROCEDURE — 82306 VITAMIN D 25 HYDROXY: CPT | Performed by: FAMILY MEDICINE

## 2020-10-26 PROCEDURE — 80053 COMPREHEN METABOLIC PANEL: CPT | Performed by: FAMILY MEDICINE

## 2020-10-26 ASSESSMENT — ANXIETY QUESTIONNAIRES
5. BEING SO RESTLESS THAT IT IS HARD TO SIT STILL: NOT AT ALL
2. NOT BEING ABLE TO STOP OR CONTROL WORRYING: SEVERAL DAYS
7. FEELING AFRAID AS IF SOMETHING AWFUL MIGHT HAPPEN: NOT AT ALL
1. FEELING NERVOUS, ANXIOUS, OR ON EDGE: NOT AT ALL
GAD7 TOTAL SCORE: 1
6. BECOMING EASILY ANNOYED OR IRRITABLE: NOT AT ALL
3. WORRYING TOO MUCH ABOUT DIFFERENT THINGS: NOT AT ALL

## 2020-10-26 ASSESSMENT — PATIENT HEALTH QUESTIONNAIRE - PHQ9
5. POOR APPETITE OR OVEREATING: NOT AT ALL
SUM OF ALL RESPONSES TO PHQ QUESTIONS 1-9: 0

## 2020-10-26 ASSESSMENT — PAIN SCALES - GENERAL: PAINLEVEL: MODERATE PAIN (5)

## 2020-10-26 NOTE — PROGRESS NOTES
Subjective     Aleena Escamilla is a 45 year old female who presents to clinic today for the following health issues:    HPI         Back Pain- mid upper back  Onset/Duration: ongoing   Description:   Location of pain: upper back middle with a bump  Character of pain: pressure   Pain radiation:  Both shoulders   New numbness or weakness in legs, not attributed to pain: no   Intensity: Currently 5/10  Progression of Symptoms: worsening  History:   Specific cause: none  Pain interferes with job: YES  History of back problems: no prior back problems  Any previous MRI or X-rays: None  Sees a specialist for back pain: yes, chiropractor for her low back.   Alleviating factors:   Improved by: none    Precipitating factors:  Worsened by: sleeping on side and back. Needs to tuck her head down to get comfortable and this causes shoulder pain.   Therapies tried and outcome: icing and ibuprofen     Accompanying Signs & Symptoms:  Risk of Fracture: None  Risk of Cauda Equina: None  Risk of Infection: None  Risk of Cancer: History of cancer  Risk of Ankylosing Spondylitis: Onset at age <35, male, AND morning back stiffness  no         Depression and Anxiety Follow-Up    How are you doing with your depression since your last visit? No change    How are you doing with your anxiety since your last visit?  No change    Are you having other symptoms that might be associated with depression or anxiety? No    Have you had a significant life event? Health Concerns     Do you have any concerns with your use of alcohol or other drugs? No    Social History     Tobacco Use     Smoking status: Never Smoker     Smokeless tobacco: Never Used   Substance Use Topics     Alcohol use: Yes     Comment: 1 drink per week.     Drug use: No     PHQ 5/16/2019 11/1/2019 10/26/2020   PHQ-9 Total Score 5 3 0   Q9: Thoughts of better off dead/self-harm past 2 weeks Not at all Not at all Not at all     VENKAT-7 SCORE 5/16/2019 11/1/2019 10/26/2020   Total Score -  - -   Total Score 0 1 1         Suicide Assessment Five-step Evaluation and Treatment (SAFE-T)      How many servings of fruits and vegetables do you eat daily?  2-3    On average, how many sweetened beverages do you drink each day (Examples: soda, juice, sweet tea, etc.  Do NOT count diet or artificially sweetened beverages)?   0    How many days per week do you exercise enough to make your heart beat faster? 5    How many minutes a day do you exercise enough to make your heart beat faster? 20 - 29    How many days per week do you miss taking your medication? 0      Review of Systems   Constitutional, HEENT, cardiovascular, pulmonary, gi and gu systems are negative, except as otherwise noted.      Objective    /79 (BP Location: Left arm, Patient Position: Chair, Cuff Size: Adult Regular)   Pulse 73   Temp 97.1  F (36.2  C) (Tympanic)   Resp 16   Wt 69.9 kg (154 lb)   LMP 03/01/2016 (Approximate)   SpO2 96%   BMI 30.08 kg/m    Body mass index is 30.08 kg/m .  Physical Exam   GENERAL APPEARANCE: healthy, alert and no distress  EYES: Eyes grossly normal to inspection, PERRL and conjunctivae and sclerae normal  HENT: ear canals and TM's normal, nose and mouth without ulcers or lesions, oropharynx clear and oral mucous membranes moist  NECK: no adenopathy, no asymmetry, masses, or scars and thyroid normal to palpation  RESP: lungs clear to auscultation - no rales, rhonchi or wheezes  CV: regular rates and rhythm, normal S1 S2, no S3 or S4, no murmur, click or rub, no peripheral edema and peripheral pulses strong  ABDOMEN: soft, nontender, no hepatosplenomegaly, no masses and bowel sounds normal  MS: no musculoskeletal defects are noted and gait is age appropriate without ataxia, tenderness over upper spine around T4-6. Soft mass above this area that is consistent with lipoma and not quite consistent with dorsocervical fat pad development.   SKIN: no suspicious lesions or rashes  NEURO: Normal strength and  tone, sensory exam grossly normal, mentation intact and speech normal  PSYCH: mentation appears normal and affect normal/bright     Results for orders placed or performed in visit on 10/26/20   CBC with platelets     Status: Abnormal   Result Value Ref Range    WBC 6.9 4.0 - 11.0 10e9/L    RBC Count 5.31 (H) 3.8 - 5.2 10e12/L    Hemoglobin 15.7 11.7 - 15.7 g/dL    Hematocrit 45.9 35.0 - 47.0 %    MCV 86 78 - 100 fl    MCH 29.6 26.5 - 33.0 pg    MCHC 34.2 31.5 - 36.5 g/dL    RDW 13.0 10.0 - 15.0 %    Platelet Count 273 150 - 450 10e9/L   Comprehensive metabolic panel     Status: Abnormal   Result Value Ref Range    Sodium 141 133 - 144 mmol/L    Potassium 3.9 3.4 - 5.3 mmol/L    Chloride 104 94 - 109 mmol/L    Carbon Dioxide 29 20 - 32 mmol/L    Anion Gap 8 3 - 14 mmol/L    Glucose 90 70 - 99 mg/dL    Urea Nitrogen 14 7 - 30 mg/dL    Creatinine 0.61 0.52 - 1.04 mg/dL    GFR Estimate >90 >60 mL/min/[1.73_m2]    GFR Estimate If Black >90 >60 mL/min/[1.73_m2]    Calcium 9.1 8.5 - 10.1 mg/dL    Bilirubin Total 0.4 0.2 - 1.3 mg/dL    Albumin 4.2 3.4 - 5.0 g/dL    Protein Total 7.9 6.8 - 8.8 g/dL    Alkaline Phosphatase 96 40 - 150 U/L    ALT 62 (H) 0 - 50 U/L    AST 27 0 - 45 U/L   CRP inflammation     Status: None   Result Value Ref Range    CRP Inflammation <2.9 0.0 - 8.0 mg/L   Erythrocyte sedimentation rate auto     Status: None   Result Value Ref Range    Sed Rate 10 0 - 20 mm/h           Assessment & Plan     Acute upper back pain  X ray of spine not available yet on Epic and will check later. Labs are reassuring. May be postural but will follow up due to history of breast cancer and risk of bone lesions.   - XR Thoracic Spine 3 Views  - CBC with platelets  - CRP inflammation  - Erythrocyte sedimentation rate auto    Lipoma of torso  May have lipoma or other fat deposit in upper back. Not sure if this is affecting upper back pain    Fatty liver  Liver function tests stable.   - Comprehensive metabolic  panel    Hyperlipidemia LDL goal <130  Stable     Malignant neoplasm of overlapping sites of right breast in female, estrogen receptor positive (H)  Follow up with oncology, on medication for maintenance.     Major depressive disorder, recurrent episode, mild (H)  stable    High risk medication use  recheck  - Comprehensive metabolic panel    Vitamin D deficiency  recheck  - Vitamin D Deficiency       BMI:   Estimated body mass index is 30.08 kg/m  as calculated from the following:    Height as of 1/16/20: 1.524 m (5').    Weight as of this encounter: 69.9 kg (154 lb).   Weight management plan: Discussed healthy diet and exercise guidelines         CONSULTATION/REFERRAL to oncology for follow up   FUTURE LABS:       - Schedule fasting labs in 6 months  FUTURE APPOINTMENTS:       - Follow-up visit in 1-2 months or sooner if any questions or concerns.   Work on weight loss  Regular exercise  See Patient Instructions    Return in about 4 weeks (around 11/23/2020) for with me, Follow up.    Concepcion Bocanegra MD  [facility]

## 2020-10-26 NOTE — PATIENT INSTRUCTIONS
At [facility], we strive to deliver an exceptional experience to you, every time we see you. If you receive a survey, please complete it as we do value your feedback.  If you have MyChart, you can expect to receive results automatically within 24 hours of their completion.  Your provider will send a note interpreting your results as well.   If you do not have MyChart, you should receive your results in about a week by mail.    Your care team:                            Family Medicine Internal Medicine   MD Antonio Mistry, MD Luis Casanova MD Katya Georgiev PA-C Megan Hill, APRN CNP    Narinder Yap, MD Pediatrics   Luis Fernando Rhoades, PAReneC  Thu Watson, CNP MD Cira Brandt APRN CNP   MD Haydee Sherman MD Deborah Mielke, MD Tana Luna, APRN CNP  Asuncion Villaseñor, PAHOA Hayden, CNP  MD Meghana Urena MD Angela Wermerskirchen, MD      Clinic hours: Monday - Thursday 7 am-7 pm; Fridays 7 am-5 pm.   Urgent care: Monday - Friday 11 am-9 pm; Saturday and Sunday 9 am-5 pm.    Clinic: [phone]       [facility]: Monday - Thursday 8 am - 7 pm; Friday 8 am - 6 pm  [facility]: [phone]     Use www.oncare.org for 24/7 diagnosis and treatment of dozens of conditions.    Patient Education     Understanding a Lipoma     A lipoma is a lump under the skin that s made of fat. It s not cancer (benign). It feels soft like rubber when you press it, and in most cases it doesn t hurt. Some people have more than one. A lipoma grows slowly over time and doesn t cause many problems. Lipomas occur most often in adults from ages 40 to 60. They are more common in men.   How to say it  Ly-[id number]-sherri  What causes a lipoma?  Experts don't know yet what causes lipomas. They are still learning more. They may be partly caused by a problem in a  Called spoke to patient and informed her RX was call into to [facility]. patient verbalize understanding. gene. They can run in families. Familial multiple lipomatosis is when 2 or more family members have many lipomas.  Symptoms of a lipoma  The main symptom of a lipoma is a soft lump under the skin that doesn t hurt unless it is pressing on a nerve. It may be small, around 1/4 inch across. Or it may be larger, up to 4 inches across or more.  There are different kinds of lipomas. The most common kind occurs under the skin of the shoulders, chest, back, belly, or under the arms. In some cases, a lipoma can occur on the legs. In rare cases, one may occur deeper in the body or in a muscle.  Treatment for a lipoma  In most cases, a lipoma doesn t need treatment. Your healthcare provider may look at it during regular checkups to see if it changes.  But if the lipoma is painful or you want it removed for cosmetic reasons, it can be removed with surgery. The surgery is called excision. The lipoma will most likely not grow back after surgery. During surgery, the area around the lipoma is numbed. If you have a deep lipoma, you may need medicine to numb a larger area (regional anesthesia). Or you may need medicine to put you to sleep during the procedure (general anesthesia). Then the doctor makes a cut over the area of the lipoma. He or she removes the lump of fat. The cut is then closed with stitches.  Possible complications of a lipoma  A large lipoma inside the body can press on organs, nerves, or other tissues and cause problems. For example, it can cause problems with breathing or digestion.  Living with a lipoma  Your healthcare provider may look at the lipoma during regular checkups to see if it changes or is causing problems.  When to call your healthcare provider  Call your healthcare provider right away if you have any of these:    Lipoma that grows quickly, causes pain, or feels hard    New lipomas  Date Last Reviewed: 11/1/2018 2000-2019 [employer] [address] [place] [address], [place] [address]. All  rights reserved. This information is not intended as a substitute for professional medical care. Always follow your healthcare professional's instructions.

## 2020-10-27 LAB
ALBUMIN SERPL-MCNC: 4.2 G/DL (ref 3.4–5)
ALP SERPL-CCNC: 96 U/L (ref 40–150)
ALT SERPL W P-5'-P-CCNC: 62 U/L (ref 0–50)
ANION GAP SERPL CALCULATED.3IONS-SCNC: 8 MMOL/L (ref 3–14)
AST SERPL W P-5'-P-CCNC: 27 U/L (ref 0–45)
BILIRUB SERPL-MCNC: 0.4 MG/DL (ref 0.2–1.3)
BUN SERPL-MCNC: 14 MG/DL (ref 7–30)
CALCIUM SERPL-MCNC: 9.1 MG/DL (ref 8.5–10.1)
CHLORIDE SERPL-SCNC: 104 MMOL/L (ref 94–109)
CO2 SERPL-SCNC: 29 MMOL/L (ref 20–32)
CREAT SERPL-MCNC: 0.61 MG/DL (ref 0.52–1.04)
CRP SERPL-MCNC: <2.9 MG/L (ref 0–8)
GFR SERPL CREATININE-BSD FRML MDRD: >90 ML/MIN/{1.73_M2}
GLUCOSE SERPL-MCNC: 90 MG/DL (ref 70–99)
POTASSIUM SERPL-SCNC: 3.9 MMOL/L (ref 3.4–5.3)
PROT SERPL-MCNC: 7.9 G/DL (ref 6.8–8.8)
SODIUM SERPL-SCNC: 141 MMOL/L (ref 133–144)

## 2020-10-27 ASSESSMENT — ANXIETY QUESTIONNAIRES: GAD7 TOTAL SCORE: 1

## 2020-10-28 LAB — DEPRECATED CALCIDIOL+CALCIFEROL SERPL-MC: 29 UG/L (ref 20–75)

## 2020-10-28 NOTE — RESULT ENCOUNTER NOTE
Dear Aleena    Your test results are attached. I am happy to let you know that they are stable.    The labs all look good. I am still waiting on the back x ray and have to call the radiologist to find out why the results are delayed.     Please contact me by [employer] if you have any questions about your labs or management. You may also call my office number [phone] for any questions.     Concepcion Bocanegra MD

## 2020-10-29 ENCOUNTER — OFFICE VISIT (OUTPATIENT)
Dept: DERMATOLOGY | Facility: CLINIC | Age: 45
End: 2020-10-29
Payer: COMMERCIAL

## 2020-10-29 DIAGNOSIS — L72.0 EPIDERMOID CYST: Primary | ICD-10-CM

## 2020-10-29 DIAGNOSIS — L65.9 HAIR LOSS: ICD-10-CM

## 2020-10-29 PROCEDURE — 99203 OFFICE O/P NEW LOW 30 MIN: CPT | Mod: GC | Performed by: DERMATOLOGY

## 2020-10-29 ASSESSMENT — PAIN SCALES - GENERAL: PAINLEVEL: NO PAIN (0)

## 2020-10-29 NOTE — NURSING NOTE
Dermatology Rooming Note    Aleena Escamilla's goals for this visit include:   Chief Complaint   Patient presents with     Hair/Scalp Problem     Aleena is here today because back in 2018 she hit her head on a metal stand which caused a cyst or infection      PATRICA Luna

## 2020-10-29 NOTE — LETTER
"10/29/2020       RE: Aleena Escamilla  [address] [facility] [address]     Dear Colleague,    Thank you for referring your patient, Aleena Escamilla, to the [facility] [place] at [facility]. Please see a copy of my visit note below.    [facility] Dermatology Note      Dermatology Problem List:    1. 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 Hair loss  - Hair labs pending as of 10/29/2020    Encounter Date: Oct 29, 2020    CC:   Chief Complaint   Patient presents with     Hair/Scalp Problem     Aleena is here today because back in 2018 she hit her head on a metal stand which caused a cyst or infection          History of Present Illness:  Ms. Aleena Escamilla is a 45 year old female who presents for evaluation of a scalp cyst at the right posterior parietal scalp. She says that it has become infected multiple times in the past. It can grow larger when it gets inflamed and will leak purulent fluid. The patient would like it removed, if possible. Otherwise, she says that she has noticed over the last several months progressive hair loss of the entire scalp. She says hair is falling out \"in clumps.\" She uses a variety of over the counter shampoos. She denies hair loss elsewhere on the body. She wonders if the hair loss could be related to the scalp cyst, as she says she notices it is worst on the right side of the scalp, where the scalp cyst is located. The patient denies any constitutional symptoms, lymphadenopathy, unintentional weight loss or decreased appetite. No other skin problems today.     Past Medical History:   Patient Active Problem List   Diagnosis     CARDIOVASCULAR SCREENING; LDL GOAL LESS THAN 160     Chronic pelvic pain in female     Chronic salpingitis and oophoritis     Major depressive disorder, " recurrent episode, mild (H)     Generalized anxiety disorder     Arthritis, multiple joint involvement     Primary osteoarthritis of both hands     Fibromyalgia     Malignant neoplasm of overlapping sites of right female breast (H)     BRCA gene positive     Carpal tunnel syndrome     Non morbid drug-induced obesity     Chronic right shoulder pain     Bunion, right     Onychomycosis     Elevated LFTs     Fatty liver     Hyperlipidemia LDL goal <130     High risk medication use     Past Medical History:   Diagnosis Date     Chronic pelvic pain in female      Malignant neoplasm of right breast, stage 2 (H) 1/2016 1/4 lymph nodes positive, Oncotype DX = 25.  Chemotherapy and radiation. Letrozole.     Migraine headaches      Past Surgical History:   Procedure Laterality Date     BIOPSY BREAST NEEDLE LOCALIZATION Right 1/19/2016    Procedure: BIOPSY BREAST NEEDLE LOCALIZATION;  Surgeon: Adelina Demarco MD;  Location:  OR     GYN SURGERY  2-    bilateral salpingectomy, left oophrectomy     LAPAROSCOPIC SALPINGO-OOPHORECTOMY Right 10/26/2016    Procedure: LAPAROSCOPIC SALPINGO-OOPHORECTOMY;  Surgeon: Bouchra Mayo MD;  Location: [facility] OR     LUMPECTOMY BREAST WITH SENTINEL NODE, COMBINED Right 1/19/2016    Procedure: COMBINED LUMPECTOMY BREAST WITH SENTINEL NODE;  Surgeon: Adelina Demarco MD;  Location:  OR     PELVIS LAPAROSCOPY,DX  12/28/1998     RELEASE CARPAL TUNNEL Left 12/23/2016    Procedure: RELEASE CARPAL TUNNEL;  Surgeon: Sam Florence MD;  Location:  OR       Social History:  Patient reports that she has never smoked. She has never used smokeless tobacco. She reports current alcohol use. She reports that she does not use drugs.    Family History:  Family History   Problem Relation Age of Onset     Diabetes Mother      Hypertension Mother      Cancer Maternal Grandmother         cervical CA     Cardiovascular Father         MI     Cancer Maternal Aunt         cervical cancer      Pancreatic Cancer Maternal Aunt         COD       Medications:  Current Outpatient Medications   Medication Sig Dispense Refill     Cholecalciferol (VITAMIN D3) 2000 units TABS Take 1 tablet by mouth daily 100 tablet 3     Evening Primrose Oil 1000 MG CAPS        fish oil-omega-3 fatty acids 1000 MG capsule Take 1 g by mouth daily       ibuprofen (ADVIL/MOTRIN) 600 MG tablet Take 1 tablet (600 mg) by mouth every 8 hours as needed for moderate pain 20 tablet 0     letrozole (FEMARA) 2.5 MG tablet Take 1 tablet (2.5 mg) by mouth daily 90 tablet 3     triamcinolone (KENALOG) 0.1 % external cream Apply topically 2 times daily 30 g 1        Allergies   Allergen Reactions     Latex Rash         Review of Systems:  -Skin Establ Pt: The patient denies any new rash, pruritus, or lesions that are symptomatic, changing or bleeding, except as per HPI.  -Constitutional: Otherwise feeling well today, in usual state of health.  -Skin: As above in HPI. No additional skin concerns.    Physical exam:  Vitals: LMP 03/01/2016 (Approximate)   GEN: This is a well developed, well-nourished female in no acute distress, in a pleasant mood.    SKIN: Focused examination of the head and neck was performed.  -Resendiz skin type: IV  - At the right posterior parietal scalp, there is a 7 mm soft pink nodule with overlying crust.  - Diffuse hair thinning noted, mostly in a bitemporal distribution. Negative hair pull test. Eyebrows and eyelashes appear WNL.  -No other lesions of concern on areas examined.                     Labs:  None.    Impression/Plan:  1. 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 Garcia or Dr. Tiwari. The patient agreed with this plan.    2. Hair loss:  - Her hair loss is not likely due to the cyst on the scalp.  - We  will obtain our basic hair loss lab panel, including iron studies, vitamin D, DHEA-S, testosterone, TSH, and anti-TPO Abs to make sure there is no underlying cause of her hair loss.    CC Referred MD Humphrey  No address on file on close of this encounter.  Follow-up PRN or pending today's lab results for hair loss.    Dr. Halley Escalante staffed the patient.    Staff Involved:  Resident(Dr. Ancelmo Carrasco)/Staff    Patient was seen and examined with the dermatology resident. I agree with the history, review of systems, physical examination, assessments and plan.    Halley Escalante MD  Professor and  Chair  Department of Dermatology  [facility]

## 2020-10-30 NOTE — PROGRESS NOTES
"[facility] Dermatology Note      Dermatology Problem List:    1. 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 Hair loss  - Hair labs pending as of 10/29/2020    Encounter Date: Oct 29, 2020    CC:   Chief Complaint   Patient presents with     Hair/Scalp Problem     Aleena is here today because back in 2018 she hit her head on a metal stand which caused a cyst or infection          History of Present Illness:  Ms. Aleena Escamilla is a 45 year old female who presents for evaluation of a scalp cyst at the right posterior parietal scalp. She says that it has become infected multiple times in the past. It can grow larger when it gets inflamed and will leak purulent fluid. The patient would like it removed, if possible. Otherwise, she says that she has noticed over the last several months progressive hair loss of the entire scalp. She says hair is falling out \"in clumps.\" She uses a variety of over the counter shampoos. She denies hair loss elsewhere on the body. She wonders if the hair loss could be related to the scalp cyst, as she says she notices it is worst on the right side of the scalp, where the scalp cyst is located. The patient denies any constitutional symptoms, lymphadenopathy, unintentional weight loss or decreased appetite. No other skin problems today.     Past Medical History:   Patient Active Problem List   Diagnosis     CARDIOVASCULAR SCREENING; LDL GOAL LESS THAN 160     Chronic pelvic pain in female     Chronic salpingitis and oophoritis     Major depressive disorder, recurrent episode, mild (H)     Generalized anxiety disorder     Arthritis, multiple joint involvement     Primary osteoarthritis of both hands     Fibromyalgia     Malignant neoplasm of overlapping sites of right female breast (H)     BRCA gene positive     Carpal tunnel syndrome     Non morbid drug-induced " obesity     Chronic right shoulder pain     Bunion, right     Onychomycosis     Elevated LFTs     Fatty liver     Hyperlipidemia LDL goal <130     High risk medication use     Past Medical History:   Diagnosis Date     Chronic pelvic pain in female      Malignant neoplasm of right breast, stage 2 (H) 1/2016    1/4 lymph nodes positive, Oncotype DX = 25.  Chemotherapy and radiation. Letrozole.     Migraine headaches      Past Surgical History:   Procedure Laterality Date     BIOPSY BREAST NEEDLE LOCALIZATION Right 1/19/2016    Procedure: BIOPSY BREAST NEEDLE LOCALIZATION;  Surgeon: Adelina Demarco MD;  Location: [facility] OR     GYN SURGERY  2-    bilateral salpingectomy, left oophrectomy     LAPAROSCOPIC SALPINGO-OOPHORECTOMY Right 10/26/2016    Procedure: LAPAROSCOPIC SALPINGO-OOPHORECTOMY;  Surgeon: Bouchra Mayo MD;  Location: [facility] OR     LUMPECTOMY BREAST WITH SENTINEL NODE, COMBINED Right 1/19/2016    Procedure: COMBINED LUMPECTOMY BREAST WITH SENTINEL NODE;  Surgeon: Adelina Demarco MD;  Location: [facility] OR     PELVIS LAPAROSCOPY,DX  12/28/1998     RELEASE CARPAL TUNNEL Left 12/23/2016    Procedure: RELEASE CARPAL TUNNEL;  Surgeon: Sam Florence MD;  Location:  OR       Social History:  Patient reports that she has never smoked. She has never used smokeless tobacco. She reports current alcohol use. She reports that she does not use drugs.    Family History:  Family History   Problem Relation Age of Onset     Diabetes Mother      Hypertension Mother      Cancer Maternal Grandmother         cervical CA     Cardiovascular Father         MI     Cancer Maternal Aunt         cervical cancer     Pancreatic Cancer Maternal Aunt         COD       Medications:  Current Outpatient Medications   Medication Sig Dispense Refill     Cholecalciferol (VITAMIN D3) 2000 units TABS Take 1 tablet by mouth daily 100 tablet 3     Evening Primrose Oil 1000 MG CAPS        fish oil-omega-3 fatty acids 1000 MG capsule  Take 1 g by mouth daily       ibuprofen (ADVIL/MOTRIN) 600 MG tablet Take 1 tablet (600 mg) by mouth every 8 hours as needed for moderate pain 20 tablet 0     letrozole (FEMARA) 2.5 MG tablet Take 1 tablet (2.5 mg) by mouth daily 90 tablet 3     triamcinolone (KENALOG) 0.1 % external cream Apply topically 2 times daily 30 g 1        Allergies   Allergen Reactions     Latex Rash         Review of Systems:  -Skin Establ Pt: The patient denies any new rash, pruritus, or lesions that are symptomatic, changing or bleeding, except as per HPI.  -Constitutional: Otherwise feeling well today, in usual state of health.  -Skin: As above in HPI. No additional skin concerns.    Physical exam:  Vitals: LMP 03/01/2016 (Approximate)   GEN: This is a well developed, well-nourished female in no acute distress, in a pleasant mood.    SKIN: Focused examination of the head and neck was performed.  -Resendiz skin type: IV  - At the right posterior parietal scalp, there is a 7 mm soft pink nodule with overlying crust.  - Diffuse hair thinning noted, mostly in a bitemporal distribution. Negative hair pull test. Eyebrows and eyelashes appear WNL.  -No other lesions of concern on areas examined.                     Labs:  None.    Impression/Plan:  1. 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 Garcia or Dr. Tiwari. The patient agreed with this plan.    2. Hair loss:  - Her hair loss is not likely due to the cyst on the scalp.  - We will obtain our basic hair loss lab panel, including iron studies, vitamin D, DHEA-S, testosterone, TSH, and anti-TPO Abs to make sure there is no underlying cause of her hair loss.    CC Referred MD Humphrey  No address on file on close of this encounter.  Follow-up PRN or pending today's lab results for hair  loss.    Dr. Halley Escalante staffed the patient.    Staff Involved:  Resident(Dr. Ancelmo Carrasco)/Staff    Patient was seen and examined with the dermatology resident. I agree with the history, review of systems, physical examination, assessments and plan.    Halley Escalante MD  Professor and  Chair  Department of Dermatology  [facility]

## 2020-10-31 NOTE — RESULT ENCOUNTER NOTE
Dear Aleena    Your test results are attached. I am happy to let you know that they are stable.    Your spine has a slight curvature that can cause some pain, but otherwise is normal. No signs of fracture or other issues. Nothing specific is showing up on the tests so far. I will send in a referral to the spine specialist to see if any other treatment is available for your pain. They will call you to set up an appointment.     Please contact me by [email or web address] if you have any questions about your labs or management. You may also call my office number [phone] for any questions.     Concepcion Bocanegra MD

## 2020-11-23 ENCOUNTER — TELEPHONE (OUTPATIENT)
Dept: ONCOLOGY | Facility: CLINIC | Age: 45
End: 2020-11-23

## 2020-11-23 NOTE — TELEPHONE ENCOUNTER
attempted to reach the patient and schedule the lab/dexa scan and an appointment with Dr. Tate a week later.    Writer grimes.    [facility] [place]   [phone]

## 2020-11-27 NOTE — TELEPHONE ENCOUNTER
2nd attempt to reach the patient and schedule lab/dexa and follow up virtual visit with Dr. Tate a week later.    Cristhian.    [facility]   [phone]

## 2020-12-07 ENCOUNTER — OFFICE VISIT (OUTPATIENT)
Dept: ORTHOPEDICS | Facility: CLINIC | Age: 45
End: 2020-12-07
Payer: COMMERCIAL

## 2020-12-07 VITALS
DIASTOLIC BLOOD PRESSURE: 72 MMHG | BODY MASS INDEX: 30.23 KG/M2 | SYSTOLIC BLOOD PRESSURE: 110 MMHG | HEIGHT: 60 IN | WEIGHT: 154 LBS

## 2020-12-07 DIAGNOSIS — R60.9 SOFT TISSUE SWELLING OF BACK: ICD-10-CM

## 2020-12-07 DIAGNOSIS — G89.29 CHRONIC BILATERAL THORACIC BACK PAIN: Primary | ICD-10-CM

## 2020-12-07 DIAGNOSIS — M54.6 CHRONIC BILATERAL THORACIC BACK PAIN: Primary | ICD-10-CM

## 2020-12-07 PROCEDURE — 99244 OFF/OP CNSLTJ NEW/EST MOD 40: CPT | Performed by: PEDIATRICS

## 2020-12-07 ASSESSMENT — MIFFLIN-ST. JEOR: SCORE: 1270.04

## 2020-12-07 NOTE — PATIENT INSTRUCTIONS
We discussed these other possible diagnosis: lipoma, mechanical back pain    Plan:  - Today's Plan of Care:  MRI of the Thoracic Spine - Call [phone] to schedule MRI    -We also discussed other future treatment options:  Referral to Physical Therapy    Follow Up: In clinic with Dr. Chino after MRI (wait at least 1-2 days)    If you have any further questions for your physician or physician s care team you can call [phone] and use option 3 to leave a voice message. Calls received during business hours will be returned same day.

## 2020-12-07 NOTE — LETTER
12/7/2020         RE: Aleena Escamilla  [address] [facility] [address]        Dear Colleague,    Thank you for referring your patient, Aleena Escamilla, to the [facility] [place]. Please see a copy of my visit note below.    Sports Medicine Clinic Visit    PCP: Concepcion Bocanegra    Aleena Escamilla is a 45 year old female who is seen  in consultation at the request of  Concepcion Bocanegra M.D. presenting with thoracic pain.    Injury: Gradual insidious onset of thoracic pain. Hurts throughout thoracic spine, no radiating pain into arms and legs.  Sometimes shoulder movement hurts as well. Has a soft tissue lump in upper thoracic spine, thinks this is getting bigger.    Location of Pain: right thoracic  Duration of Pain: January 2020  Rating of Pain at worst: 5/10  Rating of Pain Currently: 4/10  Symptoms are better with: Ice and Ibuprofen, standing desk, chiropractic  Symptoms are worse with: sleep, looking down  Additional Features:   Positive: Intermittent sharp pain worse with sleep, can't find position of comfort   Negative: popping, grinding, catching, locking, instability, paresthesias, numbness and weakness  Other evaluation and/or treatments so far consists of: Ice and Ibuprofen, chiropractic  Prior History of related problems: None    Social History: [employer] , lots of walking    Review of Systems  Skin: no bruising, yes swelling  Musculoskeletal: as above  Neurologic: no numbness, paresthesias  Remainder of review of systems is negative including constitutional, CV, pulmonary, GI, except as noted in HPI or medical history.    Patient's current problem list, past medical and surgical history, and family history were reviewed.    Patient Active Problem List   Diagnosis     CARDIOVASCULAR SCREENING; LDL GOAL LESS THAN 160     Chronic pelvic pain in female     Chronic salpingitis and oophoritis     Major depressive disorder, recurrent episode, mild (H)      Generalized anxiety disorder     Arthritis, multiple joint involvement     Primary osteoarthritis of both hands     Fibromyalgia     Malignant neoplasm of overlapping sites of right female breast (H)     BRCA gene positive     Carpal tunnel syndrome     Non morbid drug-induced obesity     Chronic right shoulder pain     Bunion, right     Onychomycosis     Elevated LFTs     Fatty liver     Hyperlipidemia LDL goal <130     High risk medication use     Past Medical History:   Diagnosis Date     Chronic pelvic pain in female      Malignant neoplasm of right breast, stage 2 (H) 1/2016    1/4 lymph nodes positive, Oncotype DX = 25.  Chemotherapy and radiation. Letrozole.     Migraine headaches      Past Surgical History:   Procedure Laterality Date     BIOPSY BREAST NEEDLE LOCALIZATION Right 1/19/2016    Procedure: BIOPSY BREAST NEEDLE LOCALIZATION;  Surgeon: Adelina Demarco MD;  Location: [facility] OR     GYN SURGERY  2-    bilateral salpingectomy, left oophrectomy     LAPAROSCOPIC SALPINGO-OOPHORECTOMY Right 10/26/2016    Procedure: LAPAROSCOPIC SALPINGO-OOPHORECTOMY;  Surgeon: Bouchra Mayo MD;  Location: [facility] OR     LUMPECTOMY BREAST WITH SENTINEL NODE, COMBINED Right 1/19/2016    Procedure: COMBINED LUMPECTOMY BREAST WITH SENTINEL NODE;  Surgeon: Adelina Demarco MD;  Location:  OR     PELVIS LAPAROSCOPY,DX  12/28/1998     RELEASE CARPAL TUNNEL Left 12/23/2016    Procedure: RELEASE CARPAL TUNNEL;  Surgeon: Sam Florence MD;  Location:  OR     Family History   Problem Relation Age of Onset     Diabetes Mother      Hypertension Mother      Cancer Maternal Grandmother         cervical CA     Cardiovascular Father         MI     Cancer Maternal Aunt         cervical cancer     Pancreatic Cancer Maternal Aunt         COD       Objective  /72 (BP Location: Left arm, Patient Position: Sitting, Cuff Size: Adult Regular)   Ht 1.524 m (5')   Wt 69.9 kg (154 lb)   LMP 03/01/2016 (Approximate)    BMI 30.08 kg/m      GENERAL APPEARANCE: healthy, alert and no distress   GAIT: NORMAL  SKIN: no suspicious lesions or rashes  HEENT: Sclera clear, anicteric  CV: no lower extremity edema, good peripheral pulses  RESP: Breathing not labored  NEURO: Normal strength and tone, mentation intact and speech normal  PSYCH:  mentation appears normal and affect normal/bright    Low back exam:  Inspection:    Does have upper thoracic soft tissue lump, does feel defined and circumcribed    Posture:      rounded shoulders and upper back    Foot Inspection:     no deformity noted    Tender:     Mild midline, sometime into left shoulder    Non Tender:     remainder of lumbar spine    Strength:     hip flexion 5/5 bilateral       knee extension 5/5 bilateral       ankle dorsiflexion 5/5 bilateral       ankle plantarflexion 5/5 bilateral       dorsiflexion of the great toe 5/5 bilateral    Reflexes:     patellar (L3, L4) symmetric normal       achilles tendons (S1) symmetric normal    Sensation:    grossly intact throughout lower extremities    Radiology  I  visualized and reviewed these images with the patient  THORACIC SPINE THREE VIEWS  10/26/2020 6:38 PM   HISTORY: Acute upper back pain.  COMPARISON: None.                                           IMPRESSION: Mild rightward lateral curvature to the thoracic spine is  present. Posterior alignment is normal. Vertebral body heights are  maintained. Disc spaces are relatively preserved.     QUINN ROME MD    Assessment:  1. Chronic bilateral thoracic back pain    2. Soft tissue swelling of back      Given soft tissue swelling, history of breast cancer and radiation will obtain MRI to evaluate for likely lipoma, bone problem.    We discussed these other possible diagnosis: lipoma, mechanical back pain    Plan:  - Today's Plan of Care:  MRI of the Thoracic Spine - Call [phone] to schedule MRI    -We also discussed other future treatment options:  Referral to Physical  Therapy    Follow Up: In clinic with Dr. Chino after MRI (wait at least 1-2 days)    Concerning signs and symptoms were reviewed.  The patient expressed understanding of this management plan and all questions were answered at this time.    Thanks for the opportunity to participate in the care of this patient, I will keep you updated on their progress.    CC: Concepcion Chino MD [facility]  Primary Care Sports Medicine  [facility]      Again, thank you for allowing me to participate in the care of your patient.        Sincerely,        Chloe Chino MD

## 2020-12-07 NOTE — PROGRESS NOTES
Sports Medicine Clinic Visit    PCP: Concepcion Bocanegra    Aleena Escamilla is a 45 year old female who is seen  in consultation at the request of  Concepcion Bocanegra M.D. presenting with thoracic pain.    Injury: Gradual insidious onset of thoracic pain. Hurts throughout thoracic spine, no radiating pain into arms and legs.  Sometimes shoulder movement hurts as well. Has a soft tissue lump in upper thoracic spine, thinks this is getting bigger.    Location of Pain: right thoracic  Duration of Pain: January 2020  Rating of Pain at worst: 5/10  Rating of Pain Currently: 4/10  Symptoms are better with: Ice and Ibuprofen, standing desk, chiropractic  Symptoms are worse with: sleep, looking down  Additional Features:   Positive: Intermittent sharp pain worse with sleep, can't find position of comfort   Negative: popping, grinding, catching, locking, instability, paresthesias, numbness and weakness  Other evaluation and/or treatments so far consists of: Ice and Ibuprofen, chiropractic  Prior History of related problems: None    Social History: [employer] , lots of walking    Review of Systems  Skin: no bruising, yes swelling  Musculoskeletal: as above  Neurologic: no numbness, paresthesias  Remainder of review of systems is negative including constitutional, CV, pulmonary, GI, except as noted in HPI or medical history.    Patient's current problem list, past medical and surgical history, and family history were reviewed.    Patient Active Problem List   Diagnosis     CARDIOVASCULAR SCREENING; LDL GOAL LESS THAN 160     Chronic pelvic pain in female     Chronic salpingitis and oophoritis     Major depressive disorder, recurrent episode, mild (H)     Generalized anxiety disorder     Arthritis, multiple joint involvement     Primary osteoarthritis of both hands     Fibromyalgia     Malignant neoplasm of overlapping sites of right female breast (H)     BRCA gene positive     Carpal tunnel syndrome     Non morbid  drug-induced obesity     Chronic right shoulder pain     Bunion, right     Onychomycosis     Elevated LFTs     Fatty liver     Hyperlipidemia LDL goal <130     High risk medication use     Past Medical History:   Diagnosis Date     Chronic pelvic pain in female      Malignant neoplasm of right breast, stage 2 (H) 1/2016 1/4 lymph nodes positive, Oncotype DX = 25.  Chemotherapy and radiation. Letrozole.     Migraine headaches      Past Surgical History:   Procedure Laterality Date     BIOPSY BREAST NEEDLE LOCALIZATION Right 1/19/2016    Procedure: BIOPSY BREAST NEEDLE LOCALIZATION;  Surgeon: Adelina Demarco MD;  Location: [facility]     GYN SURGERY  2-    bilateral salpingectomy, left oophrectomy     LAPAROSCOPIC SALPINGO-OOPHORECTOMY Right 10/26/2016    Procedure: LAPAROSCOPIC SALPINGO-OOPHORECTOMY;  Surgeon: Bouchra Mayo MD;  Location: [facility]     LUMPECTOMY BREAST WITH SENTINEL NODE, COMBINED Right 1/19/2016    Procedure: COMBINED LUMPECTOMY BREAST WITH SENTINEL NODE;  Surgeon: Adelina Demarco MD;  Location: [facility]     PELVIS LAPAROSCOPY,DX  12/28/1998     RELEASE CARPAL TUNNEL Left 12/23/2016    Procedure: RELEASE CARPAL TUNNEL;  Surgeon: Sam Florence MD;  Location: [facility]     Family History   Problem Relation Age of Onset     Diabetes Mother      Hypertension Mother      Cancer Maternal Grandmother         cervical CA     Cardiovascular Father         MI     Cancer Maternal Aunt         cervical cancer     Pancreatic Cancer Maternal Aunt         COD       Objective  /72 (BP Location: Left arm, Patient Position: Sitting, Cuff Size: Adult Regular)   Ht 1.524 m (5')   Wt 69.9 kg (154 lb)   LMP 03/01/2016 (Approximate)   BMI 30.08 kg/m      GENERAL APPEARANCE: healthy, alert and no distress   GAIT: NORMAL  SKIN: no suspicious lesions or rashes  HEENT: Sclera clear, anicteric  CV: no lower extremity edema, good peripheral pulses  RESP: Breathing not labored  NEURO: Normal strength  and tone, mentation intact and speech normal  PSYCH:  mentation appears normal and affect normal/bright    Low back exam:  Inspection:    Does have upper thoracic soft tissue lump, does feel defined and circumcribed    Posture:      rounded shoulders and upper back    Foot Inspection:     no deformity noted    Tender:     Mild midline, sometime into left shoulder    Non Tender:     remainder of lumbar spine    Strength:     hip flexion 5/5 bilateral       knee extension 5/5 bilateral       ankle dorsiflexion 5/5 bilateral       ankle plantarflexion 5/5 bilateral       dorsiflexion of the great toe 5/5 bilateral    Reflexes:     patellar (L3, L4) symmetric normal       achilles tendons (S1) symmetric normal    Sensation:    grossly intact throughout lower extremities    Radiology  I  visualized and reviewed these images with the patient  THORACIC SPINE THREE VIEWS  10/26/2020 6:38 PM   HISTORY: Acute upper back pain.  COMPARISON: None.                                           IMPRESSION: Mild rightward lateral curvature to the thoracic spine is  present. Posterior alignment is normal. Vertebral body heights are  maintained. Disc spaces are relatively preserved.     QUINN ROME MD    Assessment:  1. Chronic bilateral thoracic back pain    2. Soft tissue swelling of back      Given soft tissue swelling, history of breast cancer and radiation will obtain MRI to evaluate for likely lipoma, bone problem.    We discussed these other possible diagnosis: lipoma, mechanical back pain    Plan:  - Today's Plan of Care:  MRI of the Thoracic Spine - Call [phone] to schedule MRI    -We also discussed other future treatment options:  Referral to Physical Therapy    Follow Up: In clinic with Dr. Chino after MRI (wait at least 1-2 days)    Concerning signs and symptoms were reviewed.  The patient expressed understanding of this management plan and all questions were answered at this time.    Thanks for the opportunity to  participate in the care of this patient, I will keep you updated on their progress.    CC: Concepcion Chino MD CAQ  Primary Care Sports Medicine  [facility]

## 2020-12-10 ENCOUNTER — ANCILLARY PROCEDURE (OUTPATIENT)
Dept: MRI IMAGING | Facility: CLINIC | Age: 45
End: 2020-12-10
Attending: PEDIATRICS
Payer: COMMERCIAL

## 2020-12-10 DIAGNOSIS — L65.9 HAIR LOSS: ICD-10-CM

## 2020-12-10 DIAGNOSIS — G89.29 CHRONIC BILATERAL THORACIC BACK PAIN: ICD-10-CM

## 2020-12-10 DIAGNOSIS — M54.6 CHRONIC BILATERAL THORACIC BACK PAIN: ICD-10-CM

## 2020-12-10 LAB
BASOPHILS # BLD AUTO: 0 10E9/L (ref 0–0.2)
BASOPHILS NFR BLD AUTO: 0.7 %
DIFFERENTIAL METHOD BLD: NORMAL
EOSINOPHIL # BLD AUTO: 0.1 10E9/L (ref 0–0.7)
EOSINOPHIL NFR BLD AUTO: 1.5 %
ERYTHROCYTE [DISTWIDTH] IN BLOOD BY AUTOMATED COUNT: 13.2 % (ref 10–15)
HCT VFR BLD AUTO: 43.4 % (ref 35–47)
HGB BLD-MCNC: 14.6 G/DL (ref 11.7–15.7)
LYMPHOCYTES # BLD AUTO: 2.2 10E9/L (ref 0.8–5.3)
LYMPHOCYTES NFR BLD AUTO: 41.1 %
MCH RBC QN AUTO: 29.1 PG (ref 26.5–33)
MCHC RBC AUTO-ENTMCNC: 33.6 G/DL (ref 31.5–36.5)
MCV RBC AUTO: 87 FL (ref 78–100)
MONOCYTES # BLD AUTO: 0.4 10E9/L (ref 0–1.3)
MONOCYTES NFR BLD AUTO: 7 %
NEUTROPHILS # BLD AUTO: 2.7 10E9/L (ref 1.6–8.3)
NEUTROPHILS NFR BLD AUTO: 49.7 %
PLATELET # BLD AUTO: 273 10E9/L (ref 150–450)
RBC # BLD AUTO: 5.01 10E12/L (ref 3.8–5.2)
WBC # BLD AUTO: 5.4 10E9/L (ref 4–11)

## 2020-12-10 PROCEDURE — 82627 DEHYDROEPIANDROSTERONE: CPT | Performed by: DERMATOLOGY

## 2020-12-10 PROCEDURE — 36415 COLL VENOUS BLD VENIPUNCTURE: CPT | Performed by: DERMATOLOGY

## 2020-12-10 PROCEDURE — 85025 COMPLETE CBC W/AUTO DIFF WBC: CPT | Performed by: DERMATOLOGY

## 2020-12-10 PROCEDURE — 83550 IRON BINDING TEST: CPT | Performed by: DERMATOLOGY

## 2020-12-10 PROCEDURE — 99000 SPECIMEN HANDLING OFFICE-LAB: CPT | Performed by: DERMATOLOGY

## 2020-12-10 PROCEDURE — 84443 ASSAY THYROID STIM HORMONE: CPT | Performed by: DERMATOLOGY

## 2020-12-10 PROCEDURE — 82728 ASSAY OF FERRITIN: CPT | Performed by: DERMATOLOGY

## 2020-12-10 PROCEDURE — 72146 MRI CHEST SPINE W/O DYE: CPT | Mod: TC

## 2020-12-10 PROCEDURE — 86376 MICROSOMAL ANTIBODY EACH: CPT | Performed by: DERMATOLOGY

## 2020-12-10 PROCEDURE — 84403 ASSAY OF TOTAL TESTOSTERONE: CPT | Mod: 90 | Performed by: DERMATOLOGY

## 2020-12-10 PROCEDURE — 82306 VITAMIN D 25 HYDROXY: CPT | Performed by: DERMATOLOGY

## 2020-12-10 PROCEDURE — 84270 ASSAY OF SEX HORMONE GLOBUL: CPT | Performed by: DERMATOLOGY

## 2020-12-10 PROCEDURE — 83540 ASSAY OF IRON: CPT | Performed by: DERMATOLOGY

## 2020-12-11 LAB
DHEA-S SERPL-MCNC: 128 UG/DL (ref 35–430)
FERRITIN SERPL-MCNC: 70 NG/ML (ref 12–150)
IRON SATN MFR SERPL: 19 % (ref 15–46)
IRON SERPL-MCNC: 72 UG/DL (ref 35–180)
TIBC SERPL-MCNC: 383 UG/DL (ref 240–430)
TSH SERPL DL<=0.005 MIU/L-ACNC: 1.54 MU/L (ref 0.4–4)

## 2020-12-12 LAB — THYROPEROXIDASE AB SERPL-ACNC: <10 IU/ML

## 2020-12-13 ENCOUNTER — HEALTH MAINTENANCE LETTER (OUTPATIENT)
Age: 45
End: 2020-12-13

## 2020-12-14 ENCOUNTER — ANCILLARY PROCEDURE (OUTPATIENT)
Dept: GENERAL RADIOLOGY | Facility: CLINIC | Age: 45
End: 2020-12-14
Attending: PEDIATRICS
Payer: COMMERCIAL

## 2020-12-14 ENCOUNTER — OFFICE VISIT (OUTPATIENT)
Dept: ORTHOPEDICS | Facility: CLINIC | Age: 45
End: 2020-12-14
Payer: COMMERCIAL

## 2020-12-14 VITALS
WEIGHT: 154 LBS | SYSTOLIC BLOOD PRESSURE: 118 MMHG | HEIGHT: 60 IN | DIASTOLIC BLOOD PRESSURE: 70 MMHG | BODY MASS INDEX: 30.23 KG/M2

## 2020-12-14 DIAGNOSIS — M25.511 CHRONIC RIGHT SHOULDER PAIN: ICD-10-CM

## 2020-12-14 DIAGNOSIS — G89.29 CHRONIC RIGHT SHOULDER PAIN: ICD-10-CM

## 2020-12-14 DIAGNOSIS — M54.6 CHRONIC BILATERAL THORACIC BACK PAIN: Primary | ICD-10-CM

## 2020-12-14 DIAGNOSIS — R60.9 SOFT TISSUE SWELLING OF BACK: ICD-10-CM

## 2020-12-14 DIAGNOSIS — G89.29 CHRONIC BILATERAL THORACIC BACK PAIN: Primary | ICD-10-CM

## 2020-12-14 LAB — DEPRECATED CALCIDIOL+CALCIFEROL SERPL-MC: 29 UG/L (ref 20–75)

## 2020-12-14 PROCEDURE — 73030 X-RAY EXAM OF SHOULDER: CPT | Mod: RT | Performed by: RADIOLOGY

## 2020-12-14 PROCEDURE — 99214 OFFICE O/P EST MOD 30 MIN: CPT | Performed by: PEDIATRICS

## 2020-12-14 ASSESSMENT — MIFFLIN-ST. JEOR: SCORE: 1270.04

## 2020-12-14 NOTE — PATIENT INSTRUCTIONS
Plan:  - Today's Plan of Care:  X-rays of the Right Shoulder Today  Rehab: Physical Therapy: [facility] - [phone]  Referral to General Surgery    -We also discussed other future treatment options:  MRI of the right shoulder    Follow Up: 6 - 8 weeks    If you have any further questions for your physician or physician s care team you can call [phone] and use option 3 to leave a voice message. Calls received during business hours will be returned same day.

## 2020-12-14 NOTE — LETTER
12/14/2020         RE: Aleena Escamilla  [id number] [facility] [address]        Dear Colleague,    Thank you for referring your patient, Aleena Escamilla, to the [facility]. Please see a copy of my visit note below.    Sports Medicine Clinic Visit - Interim History December 14, 2020    PCP: Concepcion Bocanegra    Aleena Escamilla is a 45 year old female who is seen in f/u up for    Chronic bilateral thoracic back pain  Soft tissue swelling of back.  Since last visit on 12/7/20 patient has completed a thoracic spine MRI. She reports no significant change in her pain - mostly thoracic spine, some into right shoulder. She is treating her pain with ibuprofen and ice. She is able to work as long as she limits her activities. She feels there is a lump just below her neck, would like to consider removing this.    Symptoms are better with: Ice and Ibuprofen  Symptoms are worse with: bending forward  Additional Features:   Positive: weakness   Negative: swelling, bruising, popping, grinding, catching, locking, instability, paresthesias and numbness    Social History: [employer]    Review of Systems  Skin: no bruising, yes swelling  Musculoskeletal: as above  Neurologic: no numbness, paresthesias  Remainder of review of systems is negative including constitutional, CV, pulmonary, GI, except as noted in HPI or medical history.    Patient's current problem list, past medical and surgical history, and family history were reviewed.    Patient Active Problem List   Diagnosis     CARDIOVASCULAR SCREENING; LDL GOAL LESS THAN 160     Chronic pelvic pain in female     Chronic salpingitis and oophoritis     Major depressive disorder, recurrent episode, mild (H)     Generalized anxiety disorder     Arthritis, multiple joint involvement     Primary osteoarthritis of both hands     Fibromyalgia     Malignant neoplasm of overlapping sites of right female breast (H)     BRCA gene positive      Carpal tunnel syndrome     Non morbid drug-induced obesity     Chronic right shoulder pain     Bunion, right     Onychomycosis     Elevated LFTs     Fatty liver     Hyperlipidemia LDL goal <130     High risk medication use     Past Medical History:   Diagnosis Date     Chronic pelvic pain in female      Malignant neoplasm of right breast, stage 2 (H) 1/2016    1/4 lymph nodes positive, Oncotype DX = 25.  Chemotherapy and radiation. Letrozole.     Migraine headaches      Past Surgical History:   Procedure Laterality Date     BIOPSY BREAST NEEDLE LOCALIZATION Right 1/19/2016    Procedure: BIOPSY BREAST NEEDLE LOCALIZATION;  Surgeon: Adelina Demarco MD;  Location: [facility]     GYN SURGERY  2-    bilateral salpingectomy, left oophrectomy     LAPAROSCOPIC SALPINGO-OOPHORECTOMY Right 10/26/2016    Procedure: LAPAROSCOPIC SALPINGO-OOPHORECTOMY;  Surgeon: Bouchra Mayo MD;  Location: [facility]     LUMPECTOMY BREAST WITH SENTINEL NODE, COMBINED Right 1/19/2016    Procedure: COMBINED LUMPECTOMY BREAST WITH SENTINEL NODE;  Surgeon: Adelina Demarco MD;  Location: [facility]     PELVIS LAPAROSCOPY,DX  12/28/1998     RELEASE CARPAL TUNNEL Left 12/23/2016    Procedure: RELEASE CARPAL TUNNEL;  Surgeon: Sam Florence MD;  Location: [facility]     Family History   Problem Relation Age of Onset     Diabetes Mother      Hypertension Mother      Cancer Maternal Grandmother         cervical CA     Cardiovascular Father         MI     Cancer Maternal Aunt         cervical cancer     Pancreatic Cancer Maternal Aunt         COD       Objective  /70   Ht 1.524 m (5')   Wt 69.9 kg (154 lb)   LMP 03/01/2016 (Approximate)   BMI 30.08 kg/m      GENERAL APPEARANCE: healthy, alert and no distress   GAIT: NORMAL  SKIN: no suspicious lesions or rashes  HEENT: Sclera clear, anicteric  CV: no lower extremity edema  RESP: Breathing not labored  NEURO: Normal strength and tone, mentation intact and speech normal  PSYCH:  mentation  appears normal and affect normal/bright    Bilateral Shoulder exam    Inspection and Posture:       rounded shoulders and upper back - does have area of swelling upper thoracic spine    Skin:        no visible deformities    Tender:        periscapular region bilateral       upper trapezius bilateral    Non Tender:       remainder of shoulder bilateral    ROM:        Full active and passive ROM with flexion, extension, abduction right with asymmetric scapular motion, some decreased internal and external rotation on the right    Painful motions:       flexion right       internal rotation right    Strength:        abduction 5/5 bilateral       flexion 5/5 bilateral       internal rotation 5/5 bilateral       external rotation 5/5 bilateral    Impingement testing:       neg (-) Neer right       neg (-) Suarez right     neg (-) O'amanda right    Sensation:        normal sensation over shoulder and upper extremity       Radiology  I ordered, independently, visualized and reviewed these images with the patient  MR THORACIC SPINE WITHOUT CONTRAST  12/10/2020 8:30 PM      HISTORY: Evaluate upper thoracic soft tissue lump (?lipoma) and for  nerve/bone pain status post radiation for cancer. Chronic bilateral  thoracic back pain.     TECHNIQUE: Multiplanar multisequence images were obtained through the  thoracic spine without contrast.     COMPARISON: None.     FINDINGS: Sagittal images demonstrate normal posterior alignment. The  thoracic cord is normal in morphology and signal characteristics.  There is minimal disc space narrowing at several levels with some  minimal disc bulges. There is no evidence for any stenosis or any  focal disc protrusions. There is prominent adipose tissue in the  posterior upper thoracic region without any discrete focal mass.  Paraspinal soft tissues are otherwise unremarkable.                                                                      IMPRESSION:  1. Prominent normal-appearing adipose  tissue in upper dorsal thoracic  region at cervical thoracic junction without any discrete masses.  2. Minimal thoracic degenerative disc disease without any significant  stenosis.  3. No evidence for metastatic disease.     QUINN ROME MD    Assessment:  1. Chronic bilateral thoracic back pain    2. Soft tissue swelling of back    3. Chronic right shoulder pain      Soft tissue swelling - no discrete mass or lipoma. MRI shows normal adipose tissue.  Patient would like this removed, I discussed there is no discrete mass so I'm not sure this is possible. She would like to consult with surgery for another opinion which is reasonable as I'm not a surgeon, referral placed.    Thoracic and right shoulder pain, reassuring shoulder exam. Will obtain x-rays today. Recommend physical therapy. Would consider MRI pending clinical course. Thoracic MRI otherwise reassuring.    Plan:  - Today's Plan of Care:  X-rays of the Right Shoulder Today  Rehab: Physical Therapy: [facility] - [phone]  Referral to General Surgery    -We also discussed other future treatment options:  MRI of the right shoulder    Follow Up: 6 - 8 weeks    Concerning signs and symptoms were reviewed.  The patient expressed understanding of this management plan and all questions were answered at this time.    Chloe Chino MD CAQ  Primary Care Sports Medicine  [facility]      Again, thank you for allowing me to participate in the care of your patient.        Sincerely,        Chloe Chino MD

## 2020-12-14 NOTE — PROGRESS NOTES
Sports Medicine Clinic Visit - Interim History December 14, 2020    PCP: Concepcion Bocanegra    Aleena EPIFANIO Escamilla is a 45 year old female who is seen in f/u up for    Chronic bilateral thoracic back pain  Soft tissue swelling of back.  Since last visit on 12/7/20 patient has completed a thoracic spine MRI. She reports no significant change in her pain - mostly thoracic spine, some into right shoulder. She is treating her pain with ibuprofen and ice. She is able to work as long as she limits her activities. She feels there is a lump just below her neck, would like to consider removing this.    Symptoms are better with: Ice and Ibuprofen  Symptoms are worse with: bending forward  Additional Features:   Positive: weakness   Negative: swelling, bruising, popping, grinding, catching, locking, instability, paresthesias and numbness    Social History: [employer]    Review of Systems  Skin: no bruising, yes swelling  Musculoskeletal: as above  Neurologic: no numbness, paresthesias  Remainder of review of systems is negative including constitutional, CV, pulmonary, GI, except as noted in HPI or medical history.    Patient's current problem list, past medical and surgical history, and family history were reviewed.    Patient Active Problem List   Diagnosis     CARDIOVASCULAR SCREENING; LDL GOAL LESS THAN 160     Chronic pelvic pain in female     Chronic salpingitis and oophoritis     Major depressive disorder, recurrent episode, mild (H)     Generalized anxiety disorder     Arthritis, multiple joint involvement     Primary osteoarthritis of both hands     Fibromyalgia     Malignant neoplasm of overlapping sites of right female breast (H)     BRCA gene positive     Carpal tunnel syndrome     Non morbid drug-induced obesity     Chronic right shoulder pain     Bunion, right     Onychomycosis     Elevated LFTs     Fatty liver     Hyperlipidemia LDL goal <130     High risk medication use     Past Medical History:   Diagnosis Date      Chronic pelvic pain in female      Malignant neoplasm of right breast, stage 2 (H) 1/2016    1/4 lymph nodes positive, Oncotype DX = 25.  Chemotherapy and radiation. Letrozole.     Migraine headaches      Past Surgical History:   Procedure Laterality Date     BIOPSY BREAST NEEDLE LOCALIZATION Right 1/19/2016    Procedure: BIOPSY BREAST NEEDLE LOCALIZATION;  Surgeon: Adelina Demarco MD;  Location:  OR     GYN SURGERY  2-    bilateral salpingectomy, left oophrectomy     LAPAROSCOPIC SALPINGO-OOPHORECTOMY Right 10/26/2016    Procedure: LAPAROSCOPIC SALPINGO-OOPHORECTOMY;  Surgeon: Bouchra Mayo MD;  Location: [facility]     LUMPECTOMY BREAST WITH SENTINEL NODE, COMBINED Right 1/19/2016    Procedure: COMBINED LUMPECTOMY BREAST WITH SENTINEL NODE;  Surgeon: Adelina Demarco MD;  Location:  OR     PELVIS LAPAROSCOPY,DX  12/28/1998     RELEASE CARPAL TUNNEL Left 12/23/2016    Procedure: RELEASE CARPAL TUNNEL;  Surgeon: Sam Florence MD;  Location:  OR     Family History   Problem Relation Age of Onset     Diabetes Mother      Hypertension Mother      Cancer Maternal Grandmother         cervical CA     Cardiovascular Father         MI     Cancer Maternal Aunt         cervical cancer     Pancreatic Cancer Maternal Aunt         COD       Objective  /70   Ht 1.524 m (5')   Wt 69.9 kg (154 lb)   LMP 03/01/2016 (Approximate)   BMI 30.08 kg/m      GENERAL APPEARANCE: healthy, alert and no distress   GAIT: NORMAL  SKIN: no suspicious lesions or rashes  HEENT: Sclera clear, anicteric  CV: no lower extremity edema  RESP: Breathing not labored  NEURO: Normal strength and tone, mentation intact and speech normal  PSYCH:  mentation appears normal and affect normal/bright    Bilateral Shoulder exam    Inspection and Posture:       rounded shoulders and upper back - does have area of swelling upper thoracic spine    Skin:        no visible deformities    Tender:        periscapular region  bilateral       upper trapezius bilateral    Non Tender:       remainder of shoulder bilateral    ROM:        Full active and passive ROM with flexion, extension, abduction right with asymmetric scapular motion, some decreased internal and external rotation on the right    Painful motions:       flexion right       internal rotation right    Strength:        abduction 5/5 bilateral       flexion 5/5 bilateral       internal rotation 5/5 bilateral       external rotation 5/5 bilateral    Impingement testing:       neg (-) Neer right       neg (-) Suarez right     neg (-) O'amanda right    Sensation:        normal sensation over shoulder and upper extremity       Radiology  I ordered, independently, visualized and reviewed these images with the patient  MR THORACIC SPINE WITHOUT CONTRAST  12/10/2020 8:30 PM      HISTORY: Evaluate upper thoracic soft tissue lump (?lipoma) and for  nerve/bone pain status post radiation for cancer. Chronic bilateral  thoracic back pain.     TECHNIQUE: Multiplanar multisequence images were obtained through the  thoracic spine without contrast.     COMPARISON: None.     FINDINGS: Sagittal images demonstrate normal posterior alignment. The  thoracic cord is normal in morphology and signal characteristics.  There is minimal disc space narrowing at several levels with some  minimal disc bulges. There is no evidence for any stenosis or any  focal disc protrusions. There is prominent adipose tissue in the  posterior upper thoracic region without any discrete focal mass.  Paraspinal soft tissues are otherwise unremarkable.                                                                      IMPRESSION:  1. Prominent normal-appearing adipose tissue in upper dorsal thoracic  region at cervical thoracic junction without any discrete masses.  2. Minimal thoracic degenerative disc disease without any significant  stenosis.  3. No evidence for metastatic disease.     QUINN ROME MD    Assessment:  1.  Chronic bilateral thoracic back pain    2. Soft tissue swelling of back    3. Chronic right shoulder pain      Soft tissue swelling - no discrete mass or lipoma. MRI shows normal adipose tissue.  Patient would like this removed, I discussed there is no discrete mass so I'm not sure this is possible. She would like to consult with surgery for another opinion which is reasonable as I'm not a surgeon, referral placed.    Thoracic and right shoulder pain, reassuring shoulder exam. Will obtain x-rays today. Recommend physical therapy. Would consider MRI pending clinical course. Thoracic MRI otherwise reassuring.    Plan:  - Today's Plan of Care:  X-rays of the Right Shoulder Today  Rehab: Physical Therapy: [facility] - [phone]  Referral to General Surgery    -We also discussed other future treatment options:  MRI of the right shoulder    Follow Up: 6 - 8 weeks    Concerning signs and symptoms were reviewed.  The patient expressed understanding of this management plan and all questions were answered at this time.    Chloe Chino MD CA  Primary Care Sports Medicine  [facility]

## 2020-12-15 LAB
SHBG SERPL-SCNC: 29 NMOL/L (ref 30–135)
TESTOST FREE SERPL-MCNC: 0.21 NG/DL (ref 0.11–0.58)
TESTOST SERPL-MCNC: 11 NG/DL (ref 8–60)

## 2020-12-18 ENCOUNTER — OFFICE VISIT (OUTPATIENT)
Dept: FAMILY MEDICINE | Facility: CLINIC | Age: 45
End: 2020-12-18
Payer: COMMERCIAL

## 2020-12-18 VITALS
TEMPERATURE: 97.8 F | SYSTOLIC BLOOD PRESSURE: 103 MMHG | BODY MASS INDEX: 29.29 KG/M2 | WEIGHT: 150 LBS | HEART RATE: 66 BPM | OXYGEN SATURATION: 99 % | DIASTOLIC BLOOD PRESSURE: 66 MMHG

## 2020-12-18 DIAGNOSIS — C50.811 MALIGNANT NEOPLASM OF OVERLAPPING SITES OF RIGHT BREAST IN FEMALE, ESTROGEN RECEPTOR POSITIVE (H): ICD-10-CM

## 2020-12-18 DIAGNOSIS — F41.1 GENERALIZED ANXIETY DISORDER: ICD-10-CM

## 2020-12-18 DIAGNOSIS — M54.9 UPPER BACK PAIN, CHRONIC: Primary | ICD-10-CM

## 2020-12-18 DIAGNOSIS — G89.29 UPPER BACK PAIN, CHRONIC: Primary | ICD-10-CM

## 2020-12-18 DIAGNOSIS — Z17.0 MALIGNANT NEOPLASM OF OVERLAPPING SITES OF RIGHT BREAST IN FEMALE, ESTROGEN RECEPTOR POSITIVE (H): ICD-10-CM

## 2020-12-18 PROCEDURE — 99214 OFFICE O/P EST MOD 30 MIN: CPT | Performed by: FAMILY MEDICINE

## 2020-12-18 NOTE — PROGRESS NOTES
Subjective     Aleena Escamilla is a 45 year old female who presents to clinic today for the following health issues:    HPI         Depression and Anxiety Follow-Up    How are you doing with your depression since your last visit? Improved     How are you doing with your anxiety since your last visit?  No change    Are you having other symptoms that might be associated with depression or anxiety? No    Have you had a significant life event? No     Do you have any concerns with your use of alcohol or other drugs? No    Social History     Tobacco Use     Smoking status: Never Smoker     Smokeless tobacco: Never Used   Substance Use Topics     Alcohol use: Yes     Comment: 1 drink per week.     Drug use: No     PHQ 5/16/2019 11/1/2019 10/26/2020   PHQ-9 Total Score 5 3 0   Q9: Thoughts of better off dead/self-harm past 2 weeks Not at all Not at all Not at all     VENKAT-7 SCORE 5/16/2019 11/1/2019 10/26/2020   Total Score - - -   Total Score 0 1 1         Suicide Assessment Five-step Evaluation and Treatment (SAFE-T)    Chronic/Recurring Back Pain Follow Up      Where is your back pain located? (Select all that apply) shoulders right and at back of neck with a fat pad that is new since having chemotherapy for breast cancer.     How would you describe your back pain?  sharp    Where does your back pain spread? To lower back    Since your last clinic visit for back pain, how has your pain changed? unchanged    Does your back pain interfere with your job? No    Since your last visit, have you tried any new treatment? Yes -  Seeing specialist, MRI scan done and shows mild degenerative joint disease and normal fat pad.      How many servings of fruits and vegetables do you eat daily?  4 or more    On average, how many sweetened beverages do you drink each day (Examples: soda, juice, sweet tea, etc.  Do NOT count diet or artificially sweetened beverages)?   0-1    How many days per week do you exercise enough to make your heart  beat faster? 3 or less    How many minutes a day do you exercise enough to make your heart beat faster? 9 or less    How many days per week do you miss taking your medication? 0        Review of Systems   Constitutional, HEENT, cardiovascular, pulmonary, gi and gu systems are negative, except as otherwise noted.      Objective    /66 (BP Location: Left arm, Patient Position: Chair, Cuff Size: Adult Regular)   Pulse 66   Temp 97.8  F (36.6  C) (Oral)   Wt 68 kg (150 lb)   LMP 03/01/2016 (Approximate)   SpO2 99%   Breastfeeding No   BMI 29.29 kg/m    Body mass index is 29.29 kg/m .  Physical Exam   GENERAL: healthy, alert and no distress  EYES: Eyes grossly normal to inspection, PERRL and conjunctivae and sclerae normal  HENT: ear canals and TM's normal, nose and mouth without ulcers or lesions  NECK: no adenopathy, no asymmetry, masses, or scars and thyroid normal to palpation  RESP: lungs clear to auscultation - no rales, rhonchi or wheezes  BREAST: normal without masses, tenderness or nipple discharge and no palpable axillary masses or adenopathy  CV: regular rate and rhythm, normal S1 S2, no S3 or S4, no murmur, click or rub, no peripheral edema and peripheral pulses strong  ABDOMEN: soft, nontender, no hepatosplenomegaly, no masses and bowel sounds normal  MS: no gross musculoskeletal defects noted, no edema  SKIN: no suspicious lesions or rashes  NEURO: Normal strength and tone, mentation intact and speech normal  PSYCH: mentation appears normal, affect normal/bright    No results found for any visits on 12/18/20.        Assessment & Plan     Upper back pain, chronic  Discussed findings and symptoms. Need for really good posture and exercises. Recommend physical therapy as orthopedics has also recommended. May have issues with different breast weights after breast surgery and this could be corrected with surgery. Fat pad is normal but may have been increased after chemotherapy for some reason. Would  not recommend any surgery at this time. May cause more problems than it solves.     Generalized anxiety disorder  History of generalized anxiety and chronic pain. Address with medication and therapy.     Malignant neoplasm of overlapping sites of right breast in female, estrogen receptor positive (H)  Follow up with oncology in 1-2 months.           CONSULTATION/REFERRAL to physical therapy   FUTURE LABS:       - Schedule fasting labs in 6 months  FUTURE APPOINTMENTS:       - Follow-up visit in 3 months or sooner if any questions or concerns.   Work on weight loss  Regular exercise  See Patient Instructions    No follow-ups on file.    Concepcion Bocanegra MD  [facility]

## 2020-12-19 PROBLEM — M54.9 UPPER BACK PAIN, CHRONIC: Status: ACTIVE | Noted: 2020-12-19

## 2020-12-19 PROBLEM — G89.29 UPPER BACK PAIN, CHRONIC: Status: ACTIVE | Noted: 2020-12-19

## 2020-12-19 NOTE — PATIENT INSTRUCTIONS
Patient Education     Relieving Back Pain  Back pain is a common problem. You can strain back muscles by lifting too much weight or just by moving the wrong way. Back strain can be uncomfortable, even painful. And it can take weeks or months to improve. To help yourself feel better and prevent future back strains, try these tips.   Important: Don't give aspirin to children or teens without first discussing it with your child's healthcare provider.   Ice    Ice reduces muscle pain and swelling. It helps most during the first 24 to 48 hours after an injury.     Wrap an ice pack or a bag of frozen peas in a thin towel. Never put ice directly on your skin.    Place the ice where your back hurts the most.    Don t ice for more than 20 minutes at a time.    You can use ice several times a day.  Medicines  Over-the-counter pain relievers include acetaminophen and anti-inflammatory medicines, which includes aspirin, naproxen, or ibuprofen. They can help ease discomfort. Some also reduce swelling.     Tell your healthcare provider about any medicines you are already taking.    Take medicines only as directed.  Manipulation and massage  Having manipulation by an osteopathic doctor or chiropractor may be helpful. Getting a massage also may help.    Heat  After the first 48 hours, heat can relax sore muscles and improve blood flow.    Try a warm bath or shower. Or use a heating pad set on low. To prevent a burn, keep a cloth between you and the heating pad.    Don t use a heating pad for more than 15 minutes at a time. Never sleep on a heating pad.  [email or web address] last reviewed this educational content on 6/1/2018 2000-2020 [employer]. [address] [place] [address], [place] [address]. All rights reserved. This information is not intended as a substitute for professional medical care. Always follow your healthcare professional's instructions.

## 2021-01-13 ENCOUNTER — MYC MEDICAL ADVICE (OUTPATIENT)
Dept: FAMILY MEDICINE | Facility: CLINIC | Age: 46
End: 2021-01-13

## 2021-01-27 NOTE — ADDENDUM NOTE
Addended by: CORBY MEDINA on: 5/8/2017 10:35 AM     Modules accepted: Orders     Z Plasty Text: The lesion was extirpated to the level of the fat with a #15 scalpel blade.  Given the location of the defect, shape of the defect and the proximity to free margins a Z-plasty was deemed most appropriate for repair.  Using a sterile surgical marker, the appropriate transposition arms of the Z-plasty were drawn incorporating the defect and placing the expected incisions within the relaxed skin tension lines where possible.    The area thus outlined was incised deep to adipose tissue with a #15 scalpel blade.  The skin margins were undermined to an appropriate distance in all directions utilizing iris scissors.  The opposing transposition arms were then transposed into place in opposite direction and anchored with interrupted buried subcutaneous sutures.

## 2021-02-01 ENCOUNTER — DOCUMENTATION ONLY (OUTPATIENT)
Dept: LAB | Facility: CLINIC | Age: 46
End: 2021-02-01

## 2021-02-01 DIAGNOSIS — C50.811 MALIGNANT NEOPLASM OF OVERLAPPING SITES OF RIGHT BREAST IN FEMALE, ESTROGEN RECEPTOR POSITIVE (H): Primary | ICD-10-CM

## 2021-02-01 DIAGNOSIS — Z17.0 MALIGNANT NEOPLASM OF OVERLAPPING SITES OF RIGHT BREAST IN FEMALE, ESTROGEN RECEPTOR POSITIVE (H): Primary | ICD-10-CM

## 2021-02-01 NOTE — PROGRESS NOTES
Please order future labs for patient coming in on 02/05/2021, notes say they were supposed to be for [place]. Thank you -Ronit RODRIGUES

## 2021-02-05 ENCOUNTER — ANCILLARY PROCEDURE (OUTPATIENT)
Dept: BONE DENSITY | Facility: CLINIC | Age: 46
End: 2021-02-05
Attending: INTERNAL MEDICINE
Payer: COMMERCIAL

## 2021-02-05 DIAGNOSIS — Z17.0 MALIGNANT NEOPLASM OF RIGHT BREAST IN FEMALE, ESTROGEN RECEPTOR POSITIVE, UNSPECIFIED SITE OF BREAST (H): ICD-10-CM

## 2021-02-05 DIAGNOSIS — M89.9 DISORDER OF BONE: ICD-10-CM

## 2021-02-05 DIAGNOSIS — Z17.0 MALIGNANT NEOPLASM OF OVERLAPPING SITES OF RIGHT BREAST IN FEMALE, ESTROGEN RECEPTOR POSITIVE (H): ICD-10-CM

## 2021-02-05 DIAGNOSIS — C50.811 MALIGNANT NEOPLASM OF OVERLAPPING SITES OF RIGHT BREAST IN FEMALE, ESTROGEN RECEPTOR POSITIVE (H): ICD-10-CM

## 2021-02-05 DIAGNOSIS — C50.911 MALIGNANT NEOPLASM OF RIGHT BREAST IN FEMALE, ESTROGEN RECEPTOR POSITIVE, UNSPECIFIED SITE OF BREAST (H): ICD-10-CM

## 2021-02-05 LAB
ALBUMIN SERPL-MCNC: 4.3 G/DL (ref 3.4–5)
ALP SERPL-CCNC: 93 U/L (ref 40–150)
ALT SERPL W P-5'-P-CCNC: 33 U/L (ref 0–50)
AST SERPL W P-5'-P-CCNC: 12 U/L (ref 0–45)
BILIRUB DIRECT SERPL-MCNC: <0.1 MG/DL (ref 0–0.2)
BILIRUB SERPL-MCNC: 0.4 MG/DL (ref 0.2–1.3)
CANCER AG27-29 SERPL-ACNC: 19 U/ML (ref 0–39)
PROT SERPL-MCNC: 8.1 G/DL (ref 6.8–8.8)

## 2021-02-05 PROCEDURE — 36415 COLL VENOUS BLD VENIPUNCTURE: CPT | Performed by: NURSE PRACTITIONER

## 2021-02-05 PROCEDURE — 77080 DXA BONE DENSITY AXIAL: CPT | Performed by: RADIOLOGY

## 2021-02-05 PROCEDURE — 77081 DXA BONE DENSITY APPENDICULR: CPT | Mod: 59 | Performed by: RADIOLOGY

## 2021-02-05 PROCEDURE — 86300 IMMUNOASSAY TUMOR CA 15-3: CPT | Performed by: NURSE PRACTITIONER

## 2021-02-05 PROCEDURE — 80076 HEPATIC FUNCTION PANEL: CPT | Performed by: NURSE PRACTITIONER

## 2021-02-15 ENCOUNTER — VIRTUAL VISIT (OUTPATIENT)
Dept: ONCOLOGY | Facility: CLINIC | Age: 46
End: 2021-02-15
Attending: INTERNAL MEDICINE
Payer: COMMERCIAL

## 2021-02-15 VITALS — HEIGHT: 60 IN | BODY MASS INDEX: 29.45 KG/M2 | WEIGHT: 150 LBS

## 2021-02-15 DIAGNOSIS — Z17.0 MALIGNANT NEOPLASM OF OVERLAPPING SITES OF RIGHT BREAST IN FEMALE, ESTROGEN RECEPTOR POSITIVE (H): Primary | ICD-10-CM

## 2021-02-15 DIAGNOSIS — Z12.31 ENCOUNTER FOR SCREENING MAMMOGRAM FOR BREAST CANCER: ICD-10-CM

## 2021-02-15 DIAGNOSIS — C50.811 MALIGNANT NEOPLASM OF OVERLAPPING SITES OF RIGHT BREAST IN FEMALE, ESTROGEN RECEPTOR POSITIVE (H): Primary | ICD-10-CM

## 2021-02-15 DIAGNOSIS — Z17.0 MALIGNANT NEOPLASM OF RIGHT BREAST IN FEMALE, ESTROGEN RECEPTOR POSITIVE, UNSPECIFIED SITE OF BREAST (H): ICD-10-CM

## 2021-02-15 DIAGNOSIS — C50.911 MALIGNANT NEOPLASM OF RIGHT BREAST IN FEMALE, ESTROGEN RECEPTOR POSITIVE, UNSPECIFIED SITE OF BREAST (H): ICD-10-CM

## 2021-02-15 DIAGNOSIS — M85.80 OSTEOPENIA, UNSPECIFIED LOCATION: ICD-10-CM

## 2021-02-15 PROCEDURE — 99214 OFFICE O/P EST MOD 30 MIN: CPT | Mod: 95 | Performed by: NURSE PRACTITIONER

## 2021-02-15 RX ORDER — LETROZOLE 2.5 MG/1
2.5 TABLET, FILM COATED ORAL DAILY
Qty: 90 TABLET | Refills: 2 | Status: SHIPPED | OUTPATIENT
Start: 2021-02-15 | End: 2021-08-03

## 2021-02-15 ASSESSMENT — PAIN SCALES - GENERAL: PAINLEVEL: NO PAIN (0)

## 2021-02-15 ASSESSMENT — MIFFLIN-ST. JEOR: SCORE: 1251.9

## 2021-02-15 NOTE — PROGRESS NOTES
Oncology Follow Up Visit: February 15, 2021     Oncologist: Dr. Bessy Tate  PCP: Dr Concepcion Bocanegra    Diagnosis: Stage II Right Breast Cancer here today with c/o breast pain after treatment for breast infection to left breast  Aleena Escamilla is a 46 yo female that felt she has mass to right beast in 2014 but was told she was not eligible for mammogram but in 11/2015 felt breast was swollen and received imaging that did show a 1.2 x 1.3 x 0.7 cm mass at 12 oclock to breast and later found a secondary mass behind first mass. Biopsy proved invasive lobular carcinoma at 12 oclock and 9 oclock positions. Final pathology from partial mastectomy showed multifocal invasive lobular carcinoma, grade 2 with foci, the first one 36 x 16 x 8 mm and the second one 21 x 14 x 11 mm. LCIS classical type present, lymphovascular invasion not identified. Margins negative, ER/HI positive by IHC and HER-2 not amplified performed on prior core biopsy.[id number]  Oncotype Dx score =25.  Genetics - told pt she has variant of of uncertain significance in the BRCA 2 gene.  Treatment-   1/19/2016 Partial Right  Mastectomy  2/24/2016 - 6/2016 Weekly Taxol x 12 weeks followed by Dose Dense AC x4 cycles  8-9/2016 completed right breast XRT  10/2016 began Femara  10/26/2016 right laparoscopic salpingo-oophorectomy    Interval History: Ms Escamilla is contacted for follow up to her right breast cancer.  She is in her car and shares she has been feeling well with no new breast issues but random breast pains to lumpectomy site continue but very intermittently. She has been eating more healthy - cutting out all fast foods and has lost about 10 lbs she states. She is active at her job and some walking. She denies hot flashes or fever, infections. Denies new breast or chest issues.    Rest of complete and comprehensive ROS is reviewed and is negative.   Past Medical History:   Diagnosis Date     Chronic pelvic pain in female      Malignant neoplasm of  right breast, stage 2 (H) 1/2016 1/4 lymph nodes positive, Oncotype DX = 25.  Chemotherapy and radiation. Letrozole.     Migraine headaches      Current Outpatient Medications   Medication     Cholecalciferol (VITAMIN D3) 2000 units TABS     Evening Primrose Oil 1000 MG CAPS     fish oil-omega-3 fatty acids 1000 MG capsule     ibuprofen (ADVIL/MOTRIN) 600 MG tablet     letrozole (FEMARA) 2.5 MG tablet     triamcinolone (KENALOG) 0.1 % external cream     No current facility-administered medications for this visit.      Allergies   Allergen Reactions     Latex Rash     Physical Exam:  Ht 1.524 m (5')   Wt 68 kg (150 lb)   LMP 03/01/2016 (Approximate)   BMI 29.29 kg/m     GENERAL: Healthy, alert and no distress  EYES: Eyes grossly normal to inspection.  No discharge or erythema, or obvious scleral/conjunctival abnormalities.  RESP: No audible wheeze, cough, or visible cyanosis.  No visible retractions or increased work of breathing.    SKIN: Visible skin clear. No significant rash, abnormal pigmentation or lesions.  NEURO: Cranial nerves grossly intact.  Mentation and speech appropriate for age.  PSYCH: Mentation appears normal, affect normal/bright, judgement and insight intact, normal speech and appearance well-groomed.  The rest of a comprehensive physical examination is deferred due to PHE (public health emergency) video visit restrictions     Laboratory Results:    Ref. Range 2/5/2021 14:57   Albumin Latest Ref Range: 3.4 - 5.0 g/dL 4.3   Protein Total Latest Ref Range: 6.8 - 8.8 g/dL 8.1   Bilirubin Total Latest Ref Range: 0.2 - 1.3 mg/dL 0.4   Alkaline Phosphatase Latest Ref Range: 40 - 150 U/L 93   ALT Latest Ref Range: 0 - 50 U/L 33   AST Latest Ref Range: 0 - 45 U/L 12   Bilirubin Direct Latest Ref Range: 0.0 - 0.2 mg/dL <0.1   CA 27-29 Latest Ref Range: 0 - 39 U/mL 19   2/5/2021 DEXA scan noted to have ostepenia    Assessment and Plan:   Right breast cancer- stage II- Pt has completed treatment with  right partial mastectomy, Taxol weekly ×12, AC ×4 cycles, and XRTas well as laparoscopic salpingo-oophorectomy. She has been taking the Femara since 10/2016. .   She will have follow up with survivorship visit in 7 months.   Last mammogram was completed 7/22/2020 and was normal-Next mammogram due annually  Bone health/ Osteopenia- DEXA scan from 2/5/2021 T score of -1.7= mild osteopenia. Reviewed option of Prolia but pt was not interested. Reviewed need for calcium and vitamin D and weight exercise to keep bones strong.   Health maintenance- reviewed recommendation of regular exercise and healthy eating to working toward BMI of 20-25. Given praise for cutting out fast food and loss of 10 lbs over last year.   Covid 19 precautions- Reviewed precautions for prevention of transmission and encouraged vaccination as eligible.  The total time of this encounter amounted to 30 minutes. This time included video visit time spent with the patient, prep work, ordering tests, and performing post visit documentation.  Marisol Solitario,Cnp

## 2021-02-15 NOTE — NURSING NOTE
Aleena is a 45 year old who is being evaluated via a billable video visit.      How would you like to obtain your AVS? MyChart  If the video visit is dropped, the invitation should be resent by: Text to cell phone: [phone]  Will anyone else be joining your video visit? No    Video-Visit Details    Type of service:  Video Visi    Originating Location (pt. Location): Home    Distant Location (provider location):  [facility]     Platform used for Video Visit: [facility]     SYMPTOM QUESTIONNAIRE    Pain: NO    Nausea/Vomiting: NO    Mouth Sores: NO    Shortness of Breath: NO    Smoking: NO    Fever or Chills: NO    Hard Stools: NO    Soft Stools: NO    Weight Loss: NO    Weakness: NO    Burning, numbness or tingling in hands or feet: NO    Problems with skin or swelling: NO    Memory Loss: NO    Anxiety or Depression: NO    Trouble Sleeping: NO    Swetha Dexter CMA

## 2021-02-15 NOTE — LETTER
2/15/2021         RE: Aleena Escamilla  [id number] [facility] [address]        Dear Colleague,    Thank you for referring your patient, Aleena Escamilla, to the [facility]. Please see a copy of my visit note below.    Oncology Follow Up Visit: February 15, 2021     Oncologist: Dr. Bessy Tate  PCP: Dr Concepcion Bocanegra    Diagnosis: Stage II Right Breast Cancer here today with c/o breast pain after treatment for breast infection to left breast  Aleena Escamilla is a 44 yo female that felt she has mass to right beast in 2014 but was told she was not eligible for mammogram but in 11/2015 felt breast was swollen and received imaging that did show a 1.2 x 1.3 x 0.7 cm mass at 12 oclock to breast and later found a secondary mass behind first mass. Biopsy proved invasive lobular carcinoma at 12 oclock and 9 oclock positions. Final pathology from partial mastectomy showed multifocal invasive lobular carcinoma, grade 2 with foci, the first one 36 x 16 x 8 mm and the second one 21 x 14 x 11 mm. LCIS classical type present, lymphovascular invasion not identified. Margins negative, ER/ID positive by IHC and HER-2 not amplified performed on prior core biopsy.[id number]  Oncotype Dx score =25.  Genetics - told pt she has variant of of uncertain significance in the BRCA 2 gene.  Treatment-   1/19/2016 Partial Right  Mastectomy  2/24/2016 - 6/2016 Weekly Taxol x 12 weeks followed by Dose Dense AC x4 cycles  8-9/2016 completed right breast XRT  10/2016 began Femara  10/26/2016 right laparoscopic salpingo-oophorectomy    Interval History: Ms Escamilla is contacted for follow up to her right breast cancer.  She is in her car and shares she has been feeling well with no new breast issues but random breast pains to lumpectomy site continue but very intermittently. She has been eating more healthy - cutting out all fast foods and has lost about 10 lbs she states. She is active at her job and some  walking. She denies hot flashes or fever, infections. Denies new breast or chest issues.    Rest of complete and comprehensive ROS is reviewed and is negative.   Past Medical History:   Diagnosis Date     Chronic pelvic pain in female      Malignant neoplasm of right breast, stage 2 (H) 1/2016 1/4 lymph nodes positive, Oncotype DX = 25.  Chemotherapy and radiation. Letrozole.     Migraine headaches      Current Outpatient Medications   Medication     Cholecalciferol (VITAMIN D3) 2000 units TABS     Evening Primrose Oil 1000 MG CAPS     fish oil-omega-3 fatty acids 1000 MG capsule     ibuprofen (ADVIL/MOTRIN) 600 MG tablet     letrozole (FEMARA) 2.5 MG tablet     triamcinolone (KENALOG) 0.1 % external cream     No current facility-administered medications for this visit.      Allergies   Allergen Reactions     Latex Rash     Physical Exam:  Ht 1.524 m (5')   Wt 68 kg (150 lb)   LMP 03/01/2016 (Approximate)   BMI 29.29 kg/m     GENERAL: Healthy, alert and no distress  EYES: Eyes grossly normal to inspection.  No discharge or erythema, or obvious scleral/conjunctival abnormalities.  RESP: No audible wheeze, cough, or visible cyanosis.  No visible retractions or increased work of breathing.    SKIN: Visible skin clear. No significant rash, abnormal pigmentation or lesions.  NEURO: Cranial nerves grossly intact.  Mentation and speech appropriate for age.  PSYCH: Mentation appears normal, affect normal/bright, judgement and insight intact, normal speech and appearance well-groomed.  The rest of a comprehensive physical examination is deferred due to PHE (public health emergency) video visit restrictions     Laboratory Results:    Ref. Range 2/5/2021 14:57   Albumin Latest Ref Range: 3.4 - 5.0 g/dL 4.3   Protein Total Latest Ref Range: 6.8 - 8.8 g/dL 8.1   Bilirubin Total Latest Ref Range: 0.2 - 1.3 mg/dL 0.4   Alkaline Phosphatase Latest Ref Range: 40 - 150 U/L 93   ALT Latest Ref Range: 0 - 50 U/L 33   AST Latest  Ref Range: 0 - 45 U/L 12   Bilirubin Direct Latest Ref Range: 0.0 - 0.2 mg/dL <0.1   CA 27-29 Latest Ref Range: 0 - 39 U/mL 19   2/5/2021 DEXA scan noted to have ostepenia    Assessment and Plan:   Right breast cancer- stage II- Pt has completed treatment with right partial mastectomy, Taxol weekly ×12, AC ×4 cycles, and XRTas well as laparoscopic salpingo-oophorectomy. She has been taking the Femara since 10/2016. .   She will have follow up with survivorship visit in 7 months.   Last mammogram was completed 7/22/2020 and was normal-Next mammogram due annually  Bone health/ Osteopenia- DEXA scan from 2/5/2021 T score of -1.7= mild osteopenia. Reviewed option of Prolia but pt was not interested. Reviewed need for calcium and vitamin D and weight exercise to keep bones strong.   Health maintenance- reviewed recommendation of regular exercise and healthy eating to working toward BMI of 20-25. Given praise for cutting out fast food and loss of 10 lbs over last year.   Covid 19 precautions- Reviewed precautions for prevention of transmission and encouraged vaccination as eligible.  The total time of this encounter amounted to 30 minutes. This time included video visit time spent with the patient, prep work, ordering tests, and performing post visit documentation.  Marisol Solitario Cnp          Again, thank you for allowing me to participate in the care of your patient.        Sincerely,        Marisol Solitario NP, APRN CNP

## 2021-03-16 ENCOUNTER — OFFICE VISIT (OUTPATIENT)
Dept: URGENT CARE | Facility: URGENT CARE | Age: 46
End: 2021-03-16
Payer: COMMERCIAL

## 2021-03-16 VITALS
SYSTOLIC BLOOD PRESSURE: 111 MMHG | WEIGHT: 156.8 LBS | DIASTOLIC BLOOD PRESSURE: 77 MMHG | OXYGEN SATURATION: 96 % | RESPIRATION RATE: 14 BRPM | HEART RATE: 78 BPM | BODY MASS INDEX: 30.62 KG/M2 | TEMPERATURE: 97.9 F

## 2021-03-16 DIAGNOSIS — R30.0 DYSURIA: Primary | ICD-10-CM

## 2021-03-16 DIAGNOSIS — H69.93 DYSFUNCTION OF BOTH EUSTACHIAN TUBES: ICD-10-CM

## 2021-03-16 DIAGNOSIS — H92.01 OTALGIA, RIGHT: ICD-10-CM

## 2021-03-16 LAB
ALBUMIN UR-MCNC: NEGATIVE MG/DL
APPEARANCE UR: CLEAR
BILIRUB UR QL STRIP: NEGATIVE
COLOR UR AUTO: YELLOW
GLUCOSE UR STRIP-MCNC: NEGATIVE MG/DL
HGB UR QL STRIP: NEGATIVE
KETONES UR STRIP-MCNC: NEGATIVE MG/DL
LEUKOCYTE ESTERASE UR QL STRIP: NEGATIVE
NITRATE UR QL: NEGATIVE
PH UR STRIP: 8 PH (ref 5–7)
SOURCE: ABNORMAL
SP GR UR STRIP: 1.02 (ref 1–1.03)
UROBILINOGEN UR STRIP-ACNC: 0.2 EU/DL (ref 0.2–1)

## 2021-03-16 PROCEDURE — 81003 URINALYSIS AUTO W/O SCOPE: CPT | Performed by: PHYSICIAN ASSISTANT

## 2021-03-16 PROCEDURE — 99213 OFFICE O/P EST LOW 20 MIN: CPT | Performed by: PHYSICIAN ASSISTANT

## 2021-03-16 RX ORDER — PSEUDOEPHEDRINE HCL 30 MG
30 TABLET ORAL EVERY 4 HOURS PRN
Qty: 25 TABLET | Refills: 0 | Status: SHIPPED | OUTPATIENT
Start: 2021-03-16 | End: 2021-07-09

## 2021-03-16 RX ORDER — FLUTICASONE PROPIONATE 50 MCG
1 SPRAY, SUSPENSION (ML) NASAL DAILY
Qty: 1 G | Refills: 0 | Status: SHIPPED | OUTPATIENT
Start: 2021-03-16 | End: 2021-07-09

## 2021-03-16 ASSESSMENT — ENCOUNTER SYMPTOMS
SHORTNESS OF BREATH: 0
CARDIOVASCULAR NEGATIVE: 1
SORE THROAT: 0
RHINORRHEA: 0
FEVER: 0
HEADACHES: 1
COUGH: 0

## 2021-03-16 ASSESSMENT — PAIN SCALES - GENERAL: PAINLEVEL: MODERATE PAIN (5)

## 2021-03-16 NOTE — PATIENT INSTRUCTIONS
Patient Education     Anatomy of the Ear     The ear is a complex and delicate organ. It collects sound waves so you can hear the world around you. The ear also has a second function--it helps you keep your balance.  Your ear can be divided into 3 main parts. These are the outer ear, the middle ear, and the inner ear. The outer ear and middle ear help collect and amplify sound. The inner ear converts sound waves to messages that are sent to the brain. The inner ear also senses the movement and position of your head and body. It helps you maintain your balance and see clearly, even when you change positions.  Here is how the different parts of your ear work together to help you hear and stay balanced:    The mastoid bone surrounds the middle ear.    The external ear collects sound waves.    The ear canal carries those sound waves to the eardrum.    The eardrum vibrates from the sound waves, setting the middle ear bones in motion.    The middle ear bones (ossicles) vibrate, transmitting the sound waves to the inner ear. When the ear is healthy, air pressure remains balanced in the middle ear.    The eustachian tube helps control air pressure in the middle ear.     The semicircular canals help maintain balance.    The vestibular nerve carries balance signals to the brain.    The cochlea picks up the sound waves and makes nerve signals.     The auditory nerve carries the sound signals to the brain.    [employer] last reviewed this educational content on 12/1/2019 2000-2020 [employer] All rights reserved. This information is not intended as a substitute for professional medical care. Always follow your healthcare professional's instructions.

## 2021-03-16 NOTE — PROGRESS NOTES
SUBJECTIVE:   Aleena Escamilla is a 45 year old female presenting with a chief complaint of   Chief Complaint   Patient presents with     Ear Problem     Right ear pain x3 days     UTI     Yellow urine w/ strong odor        She is an established patient of [facility].  Patient presents with complaints of right ear pain and strong smelling urine x 3 days.  Some ear itching.  No recent chemo or radiation (2016).  States has had ear pain on and off since she was a child.  No dysuria.      Treatment:  Ibuprofen.    Review of Systems   Constitutional: Negative for fever.   HENT: Positive for ear discharge and ear pain. Negative for congestion, rhinorrhea and sore throat.    Respiratory: Negative for cough and shortness of breath.    Cardiovascular: Negative.    Neurological: Positive for headaches.   All other systems reviewed and are negative.      Past Medical History:   Diagnosis Date     Chronic pelvic pain in female      Malignant neoplasm of right breast, stage 2 (H) 1/2016 1/4 lymph nodes positive, Oncotype DX = 25.  Chemotherapy and radiation. Letrozole.     Migraine headaches      Family History   Problem Relation Age of Onset     Diabetes Mother      Hypertension Mother      Cancer Maternal Grandmother         cervical CA     Cardiovascular Father         MI     Cancer Maternal Aunt         cervical cancer     Pancreatic Cancer Maternal Aunt         COD     Current Outpatient Medications   Medication Sig Dispense Refill     CALCIUM PO        Cholecalciferol (VITAMIN D3) 2000 units TABS Take 1 tablet by mouth daily 100 tablet 3     Evening Primrose Oil 1000 MG CAPS        fish oil-omega-3 fatty acids 1000 MG capsule Take 1 g by mouth daily       fluticasone (FLONASE) 50 MCG/ACT nasal spray Spray 1 spray into both nostrils daily 1 g 0     ibuprofen (ADVIL/MOTRIN) 600 MG tablet Take 1 tablet (600 mg) by mouth every 8 hours as needed for moderate pain 20 tablet 0     letrozole (FEMARA) 2.5 MG tablet Take 1 tablet  (2.5 mg) by mouth daily 90 tablet 2     pseudoePHEDrine (SUDAFED) 30 MG tablet Take 1 tablet (30 mg) by mouth every 4 hours as needed for congestion 25 tablet 0     triamcinolone (KENALOG) 0.1 % external cream Apply topically 2 times daily (Patient not taking: Reported on 12/7/2020) 30 g 1     Social History     Tobacco Use     Smoking status: Never Smoker     Smokeless tobacco: Never Used   Substance Use Topics     Alcohol use: Yes     Comment: 1 drink per week.       OBJECTIVE  /77 (BP Location: Left arm, Patient Position: Sitting, Cuff Size: Adult Regular)   Pulse 78   Temp 97.9  F (36.6  C) (Tympanic)   Resp 14   Wt 71.1 kg (156 lb 12.8 oz)   LMP 03/01/2016 (Approximate)   SpO2 96%   BMI 30.62 kg/m      Physical Exam  Vitals signs and nursing note reviewed.   Constitutional:       Appearance: Normal appearance. She is normal weight.   HENT:      Head: Normocephalic and atraumatic.      Right Ear: Tympanic membrane, ear canal and external ear normal.      Left Ear: Tympanic membrane, ear canal and external ear normal.      Mouth/Throat:      Mouth: Mucous membranes are moist.      Pharynx: Oropharynx is clear.   Eyes:      Extraocular Movements: Extraocular movements intact.      Conjunctiva/sclera: Conjunctivae normal.   Neck:      Musculoskeletal: Neck supple.   Cardiovascular:      Rate and Rhythm: Normal rate and regular rhythm.      Pulses: Normal pulses.      Heart sounds: Normal heart sounds.   Pulmonary:      Effort: Pulmonary effort is normal.      Breath sounds: Normal breath sounds.   Skin:     General: Skin is warm and dry.      Capillary Refill: Capillary refill takes less than 2 seconds.   Neurological:      General: No focal deficit present.      Mental Status: She is alert.   Psychiatric:         Mood and Affect: Mood normal.         Behavior: Behavior normal.         Labs:  Results for orders placed or performed in visit on 03/16/21 (from the past 24 hour(s))   *UA reflex to  Microscopic and Culture ([facility] (except [place] and [place])    Specimen: Midstream Urine   Result Value Ref Range    Color Urine Yellow     Appearance Urine Clear     Glucose Urine Negative NEG^Negative mg/dL    Bilirubin Urine Negative NEG^Negative    Ketones Urine Negative NEG^Negative mg/dL    Specific Gravity Urine 1.020 1.003 - 1.035    Blood Urine Negative NEG^Negative    pH Urine 8.0 (H) 5.0 - 7.0 pH    Protein Albumin Urine Negative NEG^Negative mg/dL    Urobilinogen Urine 0.2 0.2 - 1.0 EU/dL    Nitrite Urine Negative NEG^Negative    Leukocyte Esterase Urine Negative NEG^Negative    Source Midstream Urine        X-Ray was not done.    ASSESSMENT:      ICD-10-CM    1. Dysuria  R30.0 *UA reflex to Microscopic and Culture ([facility] (except [facility])   2. Otalgia, right  H92.01 pseudoePHEDrine (SUDAFED) 30 MG tablet     fluticasone (FLONASE) 50 MCG/ACT nasal spray   3. Dysfunction of both eustachian tubes  H69.83 pseudoePHEDrine (SUDAFED) 30 MG tablet     fluticasone (FLONASE) 50 MCG/ACT nasal spray        Medical Decision Making:    Differential Diagnosis:  OM OE, dehydration, eustation tube dysfunction.      Serious Comorbid Conditions:  Adult:  as above    PLAN:  Rx for flonase and sudafed.  Discussed possible ENT referral if no improvement.  Recommended increasing water intake.     Followup:    If not improving or if condition worsens, follow up with your Primary Care Provider, If not improving or if conditions worsens over the next 12-24 hours, go to the Emergency Department    Patient Instructions     Patient Education     Anatomy of the Ear     The ear is a complex and delicate organ. It collects sound waves so you can hear the world around you. The ear also has a second function--it helps you keep your balance.  Your ear can be divided into 3 main parts. These are the outer ear, the middle ear, and the inner ear. The outer ear and middle ear help  collect and amplify sound. The inner ear converts sound waves to messages that are sent to the brain. The inner ear also senses the movement and position of your head and body. It helps you maintain your balance and see clearly, even when you change positions.  Here is how the different parts of your ear work together to help you hear and stay balanced:    The mastoid bone surrounds the middle ear.    The external ear collects sound waves.    The ear canal carries those sound waves to the eardrum.    The eardrum vibrates from the sound waves, setting the middle ear bones in motion.    The middle ear bones (ossicles) vibrate, transmitting the sound waves to the inner ear. When the ear is healthy, air pressure remains balanced in the middle ear.    The eustachian tube helps control air pressure in the middle ear.     The semicircular canals help maintain balance.    The vestibular nerve carries balance signals to the brain.    The cochlea picks up the sound waves and makes nerve signals.     The auditory nerve carries the sound signals to the brain.    [facility] last reviewed this educational content on 12/1/2019 2000-2020 [employer]. All rights reserved. This information is not intended as a substitute for professional medical care. Always follow your healthcare professional's instructions.

## 2021-03-25 ENCOUNTER — TELEPHONE (OUTPATIENT)
Dept: DERMATOLOGY | Facility: CLINIC | Age: 46
End: 2021-03-25

## 2021-03-25 NOTE — TELEPHONE ENCOUNTER
Called patient to schedule procedure with Dr. Garcia, there was no answer.  Left message with my direct line [phone].

## 2021-04-17 ENCOUNTER — HEALTH MAINTENANCE LETTER (OUTPATIENT)
Age: 46
End: 2021-04-17

## 2021-04-22 ENCOUNTER — OFFICE VISIT (OUTPATIENT)
Dept: FAMILY MEDICINE | Facility: CLINIC | Age: 46
End: 2021-04-22
Payer: COMMERCIAL

## 2021-04-22 VITALS
SYSTOLIC BLOOD PRESSURE: 105 MMHG | OXYGEN SATURATION: 96 % | HEIGHT: 60 IN | WEIGHT: 160 LBS | HEART RATE: 79 BPM | DIASTOLIC BLOOD PRESSURE: 22 MMHG | TEMPERATURE: 98.1 F | BODY MASS INDEX: 31.41 KG/M2

## 2021-04-22 DIAGNOSIS — M79.7 FIBROMYALGIA: Primary | ICD-10-CM

## 2021-04-22 DIAGNOSIS — C50.811 MALIGNANT NEOPLASM OF OVERLAPPING SITES OF RIGHT BREAST IN FEMALE, ESTROGEN RECEPTOR POSITIVE (H): ICD-10-CM

## 2021-04-22 DIAGNOSIS — N64.4 BREAST PAIN, RIGHT: ICD-10-CM

## 2021-04-22 DIAGNOSIS — Z17.0 MALIGNANT NEOPLASM OF OVERLAPPING SITES OF RIGHT BREAST IN FEMALE, ESTROGEN RECEPTOR POSITIVE (H): ICD-10-CM

## 2021-04-22 PROCEDURE — 99214 OFFICE O/P EST MOD 30 MIN: CPT | Performed by: FAMILY MEDICINE

## 2021-04-22 ASSESSMENT — MIFFLIN-ST. JEOR: SCORE: 1297.26

## 2021-04-22 ASSESSMENT — PAIN SCALES - GENERAL: PAINLEVEL: NO PAIN (0)

## 2021-04-22 NOTE — PROGRESS NOTES
Assessment & Plan     Fibromyalgia  May be having more pain with right breast post surgery and anesthesia due to underlying fibromyalgia.  - PAIN MANAGEMENT REFERRAL; Future    Breast pain, right  Due for mammogram in 2-3 months. Discussed pain management and she is interested in acupuncture and a pain clinic referral.   - PAIN MANAGEMENT REFERRAL; Future    Malignant neoplasm of overlapping sites of right breast in female, estrogen receptor positive (H)  Stable with no sign of recurrence.   - PAIN MANAGEMENT REFERRAL; Future             BMI:   Estimated body mass index is 31.25 kg/m  as calculated from the following:    Height as of this encounter: 1.524 m (5').    Weight as of this encounter: 72.6 kg (160 lb).   Weight management plan: Discussed healthy diet and exercise guidelines    CONSULTATION/REFERRAL to pain clinic, oncology as scheduled.   FUTURE LABS:       - Schedule fasting labs in 3 months  FUTURE APPOINTMENTS:       - Follow-up visit in 3 months or sooner if any questions or concerns.   Work on weight loss  Regular exercise  See Patient Instructions    No follow-ups on file.    Concepcion Bocanegra MD  [facility] MARIA ANTONIA Flood is a 45 year old who presents for the following health issues     HPI     Pt noticed pain on rt breast 1 month ago. No other symptoms    Very bad and had to take ibuprofen. Seen by surgeon and told that she had scar tissue. Feels cold. Nipple was stretched out for 3 weeks. Middle March. Right shoulder pain also but going to chiropractor and this getting better.     Depression and Anxiety Follow-Up    How are you doing with your depression since your last visit? No change    How are you doing with your anxiety since your last visit?  No change    Are you having other symptoms that might be associated with depression or anxiety? No    Have you had a significant life event? Health Concerns     Do you have any concerns with your use of alcohol  or other drugs? No    Social History     Tobacco Use     Smoking status: Never Smoker     Smokeless tobacco: Never Used   Substance Use Topics     Alcohol use: Yes     Comment: 1 drink per week.     Drug use: No     PHQ 5/16/2019 11/1/2019 10/26/2020   PHQ-9 Total Score 5 3 0   Q9: Thoughts of better off dead/self-harm past 2 weeks Not at all Not at all Not at all     VENKAT-7 SCORE 5/16/2019 11/1/2019 10/26/2020   Total Score - - -   Total Score 0 1 1         Suicide Assessment Five-step Evaluation and Treatment (SAFE-T)    Chronic Pain Follow-Up    Where in your body do you have pain? Pain from fibromyalgia all over but concerned about right breast pain today. Has been present off and on since surgery on right breast for cancer and radiation treatment. Seen by oncology and surgery. The other day the breast was cold and harder for about 3 weeks. Felt better with a heating pad. Now seeing a chiropractor for right shoulder pain.   How has your pain affected your ability to work? Not applicable  Which of these pain treatments have you tried since your last clinic visit? Chiropractor and Heat  How well are you sleeping? Fair  How has your mood been since your last visit? About the same  Have you had a significant life event? Health Concerns  Other aggravating factors: none  Taking medication as directed? Yes    PHQ-9 SCORE 5/16/2019 11/1/2019 10/26/2020   PHQ-9 Total Score - - -   PHQ-9 Total Score 5 3 0     VENKAT-7 SCORE 5/16/2019 11/1/2019 10/26/2020   Total Score - - -   Total Score 0 1 1     No flowsheet data found.  Encounter-Level CSA:    There are no encounter-level csa.     Patient-Level CSA:    There are no patient-level csa.         Review of Systems   Constitutional, HEENT, cardiovascular, pulmonary, gi and gu systems are negative, except as otherwise noted.      Objective    BP (!) 105/22 (BP Location: Left arm, Patient Position: Chair, Cuff Size: Adult Regular)   Pulse 79   Temp 98.1  F (36.7  C) (Tympanic)    Ht 1.524 m (5')   Wt 72.6 kg (160 lb)   LMP 03/01/2016 (Approximate)   SpO2 96%   Breastfeeding No   BMI 31.25 kg/m    Body mass index is 31.25 kg/m .  Physical Exam   GENERAL: healthy, alert, well nourished, well hydrated, no distress, obese  HENT: ear canals- normal; TMs- normal; Nose- normal; Mouth- no ulcers, no lesions, throat is clear with no erythema or exudate.   NECK: no tenderness, no adenopathy, no asymmetry, no masses, no stiffness; thyroid- normal to palpation  RESP: lungs clear to auscultation - no rales, no rhonchi, no wheezes  CV: regular rates and rhythm, normal S1 S2, no S3 or S4 and no murmur, no click or rub -  ABDOMEN: soft, no tenderness, no  hepatosplenomegaly, no masses, normal bowel sounds  MS: extremities- no gross deformities noted, no edema  SKIN: no suspicious lesions, no rashes  NEURO: strength and tone- normal, sensory exam- grossly normal, mentation- intact, speech- normal, reflexes- symmetric  BACK: no CVA tenderness, no paralumbar tenderness  PSYCH: Alert and oriented times 3; speech- coherent , normal rate and volume; able to articulate logical thoughts, able to abstract reason, no tangential thoughts, no hallucinations or delusions, affect- normal   BREAST: Right breast with a scar on the upper outer quadrant. No masses palpable and the breast tissue is soft. The nipple is slightly distorted compared to the left nipple, but no lesions or discharge. No adenopathy but the right axilla is a little thicker and firmer than the left. These changes are consistent with radiation changes. No signs of lymphedema.       No results found for this or any previous visit (from the past 24 hour(s)).

## 2021-04-23 ENCOUNTER — TELEPHONE (OUTPATIENT)
Dept: PALLIATIVE MEDICINE | Facility: CLINIC | Age: 46
End: 2021-04-23

## 2021-04-23 NOTE — PATIENT INSTRUCTIONS
Patient Education     Managing Fibromyalgia  SMALL: BIG: Fibromyalgia is a chronic illness that causes pain in specific points on the body, stiffness, and constant tiredness (fatigue). But it doesn t have to keep you from doing what you enjoy. You can feel better. You and your healthcare provider can work together to develop a plan.  Take medicines as directed  You may be given medicines to help reduce pain and improve sleep.  Several medications are approved to treat fibromyalgia. Two were originally designed to treat depression. They are duloxetine, and milnacipran. A third, called pregaballin, was developed to treat nerve pain. Other medicines include pain relievers such as acetaminophen or stronger opioids. These may be prescribed short term.  Nonsteroidal anti-inflammatory drugs (NSAIDs) are used to relieve pain. These include ibuprofen and naproxen.  Get exercise  Gentle exercise can help lessen your pain. Try these tips:    Choose activities that are gentle on your joints, such as walking, biking, and swimming or other water exercises.    Don t push yourself too hard. Build up your strength and endurance slowly, over time.    Stick to it. For the most relief, exercise should become part of your daily life.  Get a good night s rest  To help you get more sleep, try the tips below:    Sleep only in a bed, not on a couch or chair.    Don t watch TV, read, or work in bed.    Go to bed and get up at the same time each day.    Try not to take naps.    Don t use alcohol, caffeine, or tobacco for at least 3 hours before going to bed.    Don t drink fluids in the evening to avoid having to get up to urinate.  Other things you can do  No one knows what causes fibromyalgia. But stress, poor eating habits, and extra weight can make it worse. These tips may help you feel better:    Eat a balanced diet with plenty of fruits and vegetables, whole grains, lean protein, and low-fat or nonfat dairy products.    Maintain a  healthy weight.    Learn ways to reduce or manage the stress in your life.    Ask your healthcare provider for resources to help you make changes. Or check out the resources below.  Resources    Arthritis Foundation  www.arthritis.org    National Fibromyalgia Association  www.fmaware.org    American Fibromyalgia Syndrome Association  www.afsafund.org    [employer] last reviewed this educational content on 6/1/2018 2000-2021 [employer]. All rights reserved. This information is not intended as a substitute for professional medical care. Always follow your healthcare professional's instructions.

## 2021-04-23 NOTE — TELEPHONE ENCOUNTER

## 2021-04-27 ASSESSMENT — ANXIETY QUESTIONNAIRES
3. WORRYING TOO MUCH ABOUT DIFFERENT THINGS: NOT AT ALL
1. FEELING NERVOUS, ANXIOUS, OR ON EDGE: NOT AT ALL
IF YOU CHECKED OFF ANY PROBLEMS ON THIS QUESTIONNAIRE, HOW DIFFICULT HAVE THESE PROBLEMS MADE IT FOR YOU TO DO YOUR WORK, TAKE CARE OF THINGS AT HOME, OR GET ALONG WITH OTHER PEOPLE: NOT DIFFICULT AT ALL
5. BEING SO RESTLESS THAT IT IS HARD TO SIT STILL: NOT AT ALL
7. FEELING AFRAID AS IF SOMETHING AWFUL MIGHT HAPPEN: NOT AT ALL
2. NOT BEING ABLE TO STOP OR CONTROL WORRYING: NOT AT ALL
GAD7 TOTAL SCORE: 0
6. BECOMING EASILY ANNOYED OR IRRITABLE: NOT AT ALL

## 2021-04-27 ASSESSMENT — PATIENT HEALTH QUESTIONNAIRE - PHQ9
5. POOR APPETITE OR OVEREATING: NOT AT ALL
SUM OF ALL RESPONSES TO PHQ QUESTIONS 1-9: 0

## 2021-04-28 ASSESSMENT — ANXIETY QUESTIONNAIRES: GAD7 TOTAL SCORE: 0

## 2021-05-04 ENCOUNTER — VIRTUAL VISIT (OUTPATIENT)
Dept: PALLIATIVE MEDICINE | Facility: CLINIC | Age: 46
End: 2021-05-04
Payer: COMMERCIAL

## 2021-05-04 DIAGNOSIS — C50.811 MALIGNANT NEOPLASM OF OVERLAPPING SITES OF RIGHT BREAST IN FEMALE, ESTROGEN RECEPTOR POSITIVE (H): ICD-10-CM

## 2021-05-04 DIAGNOSIS — M79.7 FIBROMYALGIA: ICD-10-CM

## 2021-05-04 DIAGNOSIS — Z17.0 MALIGNANT NEOPLASM OF OVERLAPPING SITES OF RIGHT BREAST IN FEMALE, ESTROGEN RECEPTOR POSITIVE (H): ICD-10-CM

## 2021-05-04 DIAGNOSIS — M79.2 NEUROPATHIC PAIN: Primary | ICD-10-CM

## 2021-05-04 DIAGNOSIS — N64.4 BREAST PAIN, RIGHT: ICD-10-CM

## 2021-05-04 PROCEDURE — 99207 PR CONSULT E&M CHANGED TO SUBSEQUENT LEVEL: CPT | Performed by: NURSE PRACTITIONER

## 2021-05-04 PROCEDURE — 99215 OFFICE O/P EST HI 40 MIN: CPT | Mod: 95 | Performed by: NURSE PRACTITIONER

## 2021-05-04 RX ORDER — GABAPENTIN 300 MG/1
CAPSULE ORAL
Qty: 120 CAPSULE | Refills: 0 | Status: SHIPPED | OUTPATIENT
Start: 2021-05-04 | End: 2021-06-09

## 2021-05-04 ASSESSMENT — PAIN SCALES - GENERAL: PAINLEVEL: MILD PAIN (2)

## 2021-05-04 NOTE — PROGRESS NOTES
Aleena is a 45 year old who is being evaluated via a billable video visit.      How would you like to obtain your AVS? MyChart  If the video visit is dropped, the invitation should be resent by: Text to cell phone: [phone]  Will anyone else be joining your video visit? No         Tanvi Chu CMA ([facility])        Video-Visit Details    Type of service:  Video Visit  Video Start Time: 4:02 PM  Video End Time:4:49 PM    Originating Location (pt. Location): Home    Distant Location (provider location):  [facility]     Platform used for Video Visit: [facility] Consultation    Date of visit: 5/4/2021    Reason for consultation:    Aleena Escamilla is a 45 year old female who is seen in consultation today at the request of her provider, Dr. Baez (Primary Care Provider)  re: patient's right breast pain chronic post radiation therapy.       Primary Care Provider is Concepcion Bocanegra.  Pain medications are being prescribed by [place]  review no chronic opiates.     Please see the [facility] health questionnaire which the patient completed and reviewed with me in detail.    Chief Complaint:    Chief Complaint   Patient presents with     Pain       Pain history:  Aleena Escamilla is a 45 year old female who first started having problems with pain as follows:     Pain history obtained at Initial consultation 5/4/2021  Right breast pain   Cancer treatment November 2015  Lumpectomy January 2016 Feb 2016 Chemo and radiation  Gained weight and dry skin (she was prepared for this change)  - right breast area pain began beginning of 2017 and pain has increased over time  -no right breast reconstruction done  -right breast pain feels like ice, it is cold but also described as burning pain and like a lot of tiny little paper cuts  -in 2017 she began placing a warm towel on her breast and ibuprofen, used to help, no longer helpful  -cannot sleep because  "of pain when it is bad.  -comes every couple of months and stays longer and longer each time.this last month the bad pain lasted a full month.   -she is only on ibuprofen for pain.          Pain rating: intensity ranges from 5/10 to 10/10, and Averages 6/10 on a 0-10 scale.    Describes pain as \"ice cold, burning, like lots of little cuts.\"  Pain is constant.    Home self care includes: resting her arm, less pressure on her right arm    Aggravating factors include: \"reaching overhead with my right arm\"    Relieving factors include: heat    Any bowel or bladder incontinence: none    Current pain-related medication treatments include:  -ibuprofen 600mg Q 8 hours PRN pain (not helpful)    Other pertinent medications:  -none    Previous medication treatments included:  OPIATES: Norco (unsure), Oxycodone (unsure), Tramadol (helpful)  NSAIDS: Ibuprofen (not helpful), piroxicam (unsure), Naproxen Sodium (helpful, but felt weird)  MUSCLE RELAXANTS: Flexeril (helpful)  ANTI-MIGRAINE MEDS: Maxalt (unsure)  ANTI-DEPRESSANTS: Effexor (helpful for hot flashes)  SLEEP AIDS: trazodone (helpful)  ANTI-CONVULSANTS: gabapentin (helpful)  TOPICALS: Aspercreme with 4% lidocaine (not helpful)  Other meds: Tylenol (cannot use due to liver issues)      Other treatments have included:  Aleena Escamilla has not been seen at a pain clinic in the past.    PT: none  Chiropractic care: yes, helpful  Acupuncture: none      Injections: none    Past Medical History:  Past Medical History:   Diagnosis Date     Chronic pelvic pain in female      Malignant neoplasm of right breast, stage 2 (H) 1/2016    1/4 lymph nodes positive, Oncotype DX = 25.  Chemotherapy and radiation. Letrozole.     Migraine headaches      Past Surgical History:  Past Surgical History:   Procedure Laterality Date     BIOPSY BREAST NEEDLE LOCALIZATION Right 1/19/2016    Procedure: BIOPSY BREAST NEEDLE LOCALIZATION;  Surgeon: Adelina Demarco MD;  Location: [facility] OR     GYN " SURGERY  2-    bilateral salpingectomy, left oophrectomy     LAPAROSCOPIC SALPINGO-OOPHORECTOMY Right 10/26/2016    Procedure: LAPAROSCOPIC SALPINGO-OOPHORECTOMY;  Surgeon: Bouchra Mayo MD;  Location: [facility] OR     LUMPECTOMY BREAST WITH SENTINEL NODE, COMBINED Right 1/19/2016    Procedure: COMBINED LUMPECTOMY BREAST WITH SENTINEL NODE;  Surgeon: Adelina Demarco MD;  Location: [facility] OR     PELVIS LAPAROSCOPY,DX  12/28/1998     RELEASE CARPAL TUNNEL Left 12/23/2016    Procedure: RELEASE CARPAL TUNNEL;  Surgeon: Sam Florence MD;  Location: [facility] OR     Medications:  Current Outpatient Medications   Medication Sig Dispense Refill     CALCIUM PO        Cholecalciferol (VITAMIN D3) 2000 units TABS Take 1 tablet by mouth daily 100 tablet 3     Evening Primrose Oil 1000 MG CAPS        fish oil-omega-3 fatty acids 1000 MG capsule Take 1 g by mouth daily       ibuprofen (ADVIL/MOTRIN) 600 MG tablet Take 1 tablet (600 mg) by mouth every 8 hours as needed for moderate pain 20 tablet 0     letrozole (FEMARA) 2.5 MG tablet Take 1 tablet (2.5 mg) by mouth daily 90 tablet 2     fluticasone (FLONASE) 50 MCG/ACT nasal spray Spray 1 spray into both nostrils daily (Patient not taking: Reported on 5/4/2021) 1 g 0     pseudoePHEDrine (SUDAFED) 30 MG tablet Take 1 tablet (30 mg) by mouth every 4 hours as needed for congestion (Patient not taking: Reported on 5/4/2021) 25 tablet 0     triamcinolone (KENALOG) 0.1 % external cream Apply topically 2 times daily (Patient not taking: Reported on 5/4/2021) 30 g 1     Allergies:     Allergies   Allergen Reactions     Latex Rash     Social History:  Home situation: . 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  Tobacco use: none  Alcohol use: occasional use  Drug use: none  History of chemical dependency treatment: none    Family history:  Family History   Problem Relation Age of Onset     Diabetes Mother      Hypertension Mother       Cancer Maternal Grandmother         cervical CA     Cardiovascular Father         MI     Cancer Maternal Aunt         cervical cancer     Pancreatic Cancer Maternal Aunt         COD         Review of Systems:  The 14 system ROS was reviewed with the patient and is positive for:  Constitutional: fever/chills, fatigue, weight gain, weight loss  Eyes/Head: headache, dizziness  ENT: ringing in ears  Allergy/Immune: allergies  Skin: itching, rash, hives  Hematologic: easy bruising  Respiratory: cough, wheezing, shortness of breath  Cardiovascular: swelling in feet, fainting, palpitations, chest pain  GI: abdominal pain, nausea, vomiting, diarrhea, constipation  Endocrine: steroid use  Musculoskeletal:  joint pain, arthritis, stiffness, gout, back pain, neck pain  Urinary: frequency, urgency, incontinence, hesitancy  Neurologic: weakness, numbness/tingling, seizure, stroke, memory loss  Mental health: depression, anxiety, stress, suicidal ideation      Physical Exam:  There were no vitals filed for this visit.  Exam:  Constitutional: healthy, alert and no distress  Head: normocephalic. Atraumatic.   Eyes: no redness or jaundice noted   ENT:  Neck supple.    Respiratory:  Respirations easy and unlabored. Able to speak in full sentences without SOB or cough noted.    Skin: no suspicious lesions or rashes  Psychiatric: mentation appears normal and affect normal/bright    Musculoskeletal exam:  Gait/Station/Posture: fair posture, gait not assessed as was video visit    Neurologic exam:  CN:  Cranial nerves 2-12 are  Grossly normal        Diagnostic tests:  None    Other testing (labs, diagnostics):  10/26/2020  Cr. 0.61  Est GFR >90      Screening tools:     DIRE Score for ongoing opioid management is calculated as follows:    Diagnosis = 2    Intractability = 2    Risk: Psych = 2  Chem Hlth = 3  Reliability = 3  Social = 3    Efficacy = 2    Total DIRE Score = 17 (14 or higher predicts good candidate for ongoing opioid  management; 13 or lower predicts poor candidate for opioid management)         Assessment:  1. Neuropathic pain  2. Breast pain, right  3. Malignant neoplasm of overlapping sites of right breast in female, estrogen receptor positive  4. fibromyalgia  5. PMHx includes: migraines, chronic pelvic pain in female, malignant neoplasm of right breast, stage 2  6. PSHx includes: pelvic lap (1998), bilateral salpingectomy/left oophorectomy (2011), biopsy breast needle localization (1/19/20216), right lap salpingo-oophorectomy (10/2016), left carpal tunnel release (12/23/2016), right breast lumpectomy with sentinel node biopsy (1/19/2016)        Plan:  Diagnosis reviewed, treatment option addressed, and risk/benefits discussed.  Self-care instructions given.  I am recommending a multidisciplinary treatment plan to help this patient better manage her pain.      1. Physical Therapy: none  2. Clinical Health Psychologist to address issues of relaxation, behavioral change, coping style, and other factors important to improvement: none  3. Diagnostic Studies: none  4. Medication Management:   1. Start Take 300mg at bedtime for 7 days, then take 300mg twice daily for 7 days, then take 300mg three times daily for 7 days, then take 300mg in the morning and afternoon and 600mg (2 capsules) at bedtime. If side effects, reduce to last tolerable dosage.    2. I don't recommend opiates for her chronic neuropathic right breast neuropathic pain  5. Further procedures recommended: none  6. Acupuncture: none at present  7. Urine toxicology screen today: none   8. Recommendations/follow-up for PCP:  See above  9. Release of information: none  10. Follow up: 4 weeks video visit. Please call [phone] to make your follow-up appointment with me.      BILLING TIME DOCUMENTATION:   The total TIME spent on this patient on the day of the appointment was 73 minutes.      TOTAL TIME includes:   Time spent preparing to see the patient: 6 minutes  (reviewing records and tests)  Time spend face to face with the patient: 49 minutes  Time spent ordering tests, medications, procedures and referrals: 0 minutes  Time spent Referring and communicating with other healthcare professionals: 0 minutes  Documenting clinical information in Epic:  18 minutes      Jocelyne PALACIO RN CNP, FNP  [facility]  [facility]

## 2021-05-04 NOTE — PATIENT INSTRUCTIONS
PLAN:  1. Physical Therapy: none  2. Clinical Health Psychologist to address issues of relaxation, behavioral change, coping style, and other factors important to improvement: none  3. Diagnostic Studies: none  4. Medication Management:   1. Start Take 300mg at bedtime for 7 days, then take 300mg twice daily for 7 days, then take 300mg three times daily for 7 days, then take 300mg in the morning and afternoon and 600mg (2 capsules) at bedtime. If side effects, reduce to last tolerable dosage.    2. I don't recommend opiates for her chronic neuropathic right breast neuropathic pain  5. Further procedures recommended: none  6. Acupuncture: none at present  7. Urine toxicology screen today: none   8. Recommendations/follow-up for PCP:  See above  9. Release of information: none  10. Follow up: 4 weeks video visit. Please call [phone] to make your follow-up appointment with me.      ----------------------------------------------------------------  Clinic Number:  [phone]     Call with any questions about your care and for scheduling assistance.     Calls are returned Monday through Friday between 8 AM and 4:30 PM. We usually get back to you within 2 business days depending on the issue/request.    If we are prescribing your medications:    For opioid medication refills, call the clinic or send a smartclip message 7 days in advance.  Please include:    Name of requested medication    Name of the pharmacy.    For non-opioid medications, call your pharmacy directly to request a refill. Please allow 3-4 days to be processed.     Per [place] State Law:    All controlled substance prescriptions must be filled within 30 days of being written.      For those controlled substances allowing refills, pickup must occur within 30 days of last fill.      We believe regular attendance is key to your success in our program!      Any time you are unable to keep your appointment we ask that you call us at least 24 hours in advance to  cancel.This will allow us to offer the appointment time to another patient.     Multiple missed appointments may lead to dismissal from the clinic.

## 2021-06-03 DIAGNOSIS — N64.4 BREAST PAIN, RIGHT: ICD-10-CM

## 2021-06-03 DIAGNOSIS — Z17.0 MALIGNANT NEOPLASM OF OVERLAPPING SITES OF RIGHT BREAST IN FEMALE, ESTROGEN RECEPTOR POSITIVE (H): ICD-10-CM

## 2021-06-03 DIAGNOSIS — M79.2 NEUROPATHIC PAIN: ICD-10-CM

## 2021-06-03 DIAGNOSIS — C50.811 MALIGNANT NEOPLASM OF OVERLAPPING SITES OF RIGHT BREAST IN FEMALE, ESTROGEN RECEPTOR POSITIVE (H): ICD-10-CM

## 2021-06-03 NOTE — TELEPHONE ENCOUNTER
Pending Prescriptions:                       Disp   Refills    gabapentin (NEURONTIN) 300 MG capsule     120 ca*0            Sig: Take 300mg at bedtime for 7 days, then take 300mg           twice daily for 7 days, then take 300mg three           times daily for 7 days, then take 300mg in the           morning and afternoon and 600mg (2 capsules) at           bedtime. If side effects, reduce to last           tolerable dosage. If side effects, then reduce to           last tolerable dosage.    Last refill 05/04/2021  Last office visit 05/04/2021  Next appointment None     Subha Sanders MA

## 2021-06-04 NOTE — TELEPHONE ENCOUNTER
Nursing should call patient and find out how she is taking the gabapentin and if it is helpful prior to my sending in a refill.     Thanks.  Jocelyne PALACIO, RN CNP, FNP  [facility]

## 2021-06-04 NOTE — TELEPHONE ENCOUNTER
Writer attempted to call pt, No answer.  LVM for Pt to call writer back at [phone].    Malik Kinsey, BABAR  Patient Care Supervisor   [place] [facility] [place]

## 2021-06-07 NOTE — TELEPHONE ENCOUNTER
Writer attempted to call pt, No answer.  [facility] for Pt to call writer back at [phone].    _________________________________    Mychart sent to pt:    6/7/21 2:42 PM  Esteban Flood  We received a refill request for you gabapentin.   What is your current dose?   For example are you taking gabapentin 300mg : 1 tab twice daily and 2 tabs at night?     Is it helpful?  Are you having any side effects?     We will send your refill to the pharmacy once we get the above information.     Jennifer RN-BSN  [facility]

## 2021-06-09 RX ORDER — GABAPENTIN 300 MG/1
CAPSULE ORAL
Qty: 120 CAPSULE | Refills: 0 | Status: SHIPPED | OUTPATIENT
Start: 2021-06-09 | End: 2021-07-06

## 2021-06-09 NOTE — TELEPHONE ENCOUNTER
Since patient has not called back, will send in a 1 month refill for gabapentin as follows:     Signed Prescriptions:                        Disp   Refills    gabapentin (NEURONTIN) 300 MG capsule      120 ca*0        Sig: take 300mg in the morning and afternoon and 600mg (2           capsules) at bedtime. If side effects, reduce to           last tolerable dosage. If side effects, then           reduce to last tolerable dosage.  Authorizing Provider: MONSE HENRY, RN CNP, FNP  [facility]  [facility]

## 2021-07-06 DIAGNOSIS — M79.2 NEUROPATHIC PAIN: ICD-10-CM

## 2021-07-06 DIAGNOSIS — N64.4 BREAST PAIN, RIGHT: ICD-10-CM

## 2021-07-06 DIAGNOSIS — Z17.0 MALIGNANT NEOPLASM OF OVERLAPPING SITES OF RIGHT BREAST IN FEMALE, ESTROGEN RECEPTOR POSITIVE (H): ICD-10-CM

## 2021-07-06 DIAGNOSIS — C50.811 MALIGNANT NEOPLASM OF OVERLAPPING SITES OF RIGHT BREAST IN FEMALE, ESTROGEN RECEPTOR POSITIVE (H): ICD-10-CM

## 2021-07-06 RX ORDER — GABAPENTIN 300 MG/1
CAPSULE ORAL
Qty: 120 CAPSULE | Refills: 0 | Status: SHIPPED | OUTPATIENT
Start: 2021-07-06 | End: 2021-08-03

## 2021-07-06 NOTE — TELEPHONE ENCOUNTER
Signed Prescriptions:                        Disp   Refills    gabapentin (NEURONTIN) 300 MG capsule      120 ca*0        Sig: take 300mg in the morning and afternoon and 600mg (2           capsules) at bedtime. If side effects, reduce to           last tolerable dosage. If side effects, then           reduce to last tolerable dosage.  Authorizing Provider: MONSE HENRY, RN CNP, FNP  [facility]  [facility]

## 2021-07-06 NOTE — TELEPHONE ENCOUNTER
Received fax request from [facility] pharmacy requesting refill(s) for gabapentin (NEURONTIN) 300 MG capsule    Last refilled on 06/09/21    Pt last seen on 05/04/21  Next appt scheduled for : none    Will facilitate refill.

## 2021-07-09 ENCOUNTER — OFFICE VISIT (OUTPATIENT)
Dept: FAMILY MEDICINE | Facility: CLINIC | Age: 46
End: 2021-07-09
Payer: COMMERCIAL

## 2021-07-09 VITALS
TEMPERATURE: 96.7 F | SYSTOLIC BLOOD PRESSURE: 122 MMHG | HEIGHT: 60 IN | WEIGHT: 166 LBS | BODY MASS INDEX: 32.59 KG/M2 | DIASTOLIC BLOOD PRESSURE: 85 MMHG | OXYGEN SATURATION: 98 % | HEART RATE: 73 BPM

## 2021-07-09 DIAGNOSIS — N89.8 VAGINAL DISCHARGE: Primary | ICD-10-CM

## 2021-07-09 DIAGNOSIS — C50.811 MALIGNANT NEOPLASM OF OVERLAPPING SITES OF RIGHT BREAST IN FEMALE, ESTROGEN RECEPTOR POSITIVE (H): ICD-10-CM

## 2021-07-09 DIAGNOSIS — Z17.0 MALIGNANT NEOPLASM OF OVERLAPPING SITES OF RIGHT BREAST IN FEMALE, ESTROGEN RECEPTOR POSITIVE (H): ICD-10-CM

## 2021-07-09 DIAGNOSIS — N95.2 ATROPHIC VAGINITIS: ICD-10-CM

## 2021-07-09 LAB
SPECIMEN SOURCE: NORMAL
WET PREP SPEC: NORMAL

## 2021-07-09 PROCEDURE — 99214 OFFICE O/P EST MOD 30 MIN: CPT | Performed by: FAMILY MEDICINE

## 2021-07-09 PROCEDURE — 87210 SMEAR WET MOUNT SALINE/INK: CPT | Performed by: FAMILY MEDICINE

## 2021-07-09 ASSESSMENT — PAIN SCALES - GENERAL: PAINLEVEL: NO PAIN (0)

## 2021-07-09 ASSESSMENT — MIFFLIN-ST. JEOR: SCORE: 1324.47

## 2021-07-09 NOTE — PROGRESS NOTES
Assessment & Plan     Vaginal discharge  No infection seen  - Wet prep    Malignant neoplasm of overlapping sites of right breast in female, estrogen receptor positive (H)  On medication that can cause vaginal dryness    Atrophic vaginitis  Recommend different vaginal lubricants that should help with symptoms of dryness.                CONSULTATION/REFERRAL to obstetrics and gynecology if needed  FUTURE LABS:       - Schedule fasting labs in 6 months  FUTURE APPOINTMENTS:       - Follow-up visit in 6 months or sooner if any questions or concerns.   Work on weight loss  Regular exercise  See Patient Instructions    No follow-ups on file.    Concepcion Bocanegra MD  [facility] MARIA ANTONIA Flood is a 45 year old who presents for the following health issues     HPI     Vaginal odor and cramps. Irritation. No concern about sexually transmitted disease.     Vaginal Symptoms  Onset/Duration: 1-2 months  Description:  Vaginal Discharge: creamy   Itching (Pruritis): no  Burning sensation:  no  Odor: YES  Accompanying Signs & Symptoms:  Urinary symptoms: no  Abdominal pain: YES- cramping sometimes, dyspareunia with vaginal dryness  Fever: no  History:   Sexually active: YES  New Partner: no  Possibility of Pregnancy:  no  Recent antibiotic use: no  Previous vaginitis issues: no  Precipitating or alleviating factors: None  Therapies tried and outcome: none          Review of Systems   Constitutional, HEENT, cardiovascular, pulmonary, gi and gu systems are negative, except as otherwise noted.      Objective    /85 (BP Location: Right arm, Patient Position: Chair, Cuff Size: Adult Regular)   Pulse 73   Temp 96.7  F (35.9  C) (Tympanic)   Ht 1.524 m (5')   Wt 75.3 kg (166 lb)   LMP 03/01/2016 (Approximate)   SpO2 98%   Breastfeeding No   BMI 32.42 kg/m    Body mass index is 32.42 kg/m .  Physical Exam   GENERAL: healthy, alert, well nourished, well hydrated, no distress,  obese  HENT: ear canals- normal; TMs- normal; Nose- normal; Mouth- no ulcers, no lesions, throat is clear with no erythema or exudate.   NECK: no tenderness, no adenopathy, no asymmetry, no masses, no stiffness; thyroid- normal to palpation  RESP: lungs clear to auscultation - no rales, no rhonchi, no wheezes  CV: regular rates and rhythm, normal S1 S2, no S3 or S4 and no murmur, no click or rub -  ABDOMEN: soft, no tenderness, no  hepatosplenomegaly, no masses, normal bowel sounds  MS: extremities- no gross deformities noted, no edema  SKIN: no suspicious lesions, no rashes  NEURO: strength and tone- normal, sensory exam- grossly normal, mentation- intact, speech- normal, reflexes- symmetric  BACK: no CVA tenderness, no paralumbar tenderness  PSYCH: Alert and oriented times 3; speech- coherent , normal rate and volume; able to articulate logical thoughts, able to abstract reason, no tangential thoughts, no hallucinations or delusions, affect- normal   GYN: External genitalia normal, without rash or lesions, Cervix normal, vaginal mucosa dry with atrophic changes, with discharge, with odor, uterus normal size, shape and position, adnexa is not tender without masses.     Results for orders placed or performed in visit on 07/09/21 (from the past 24 hour(s))   Wet prep    Specimen: Vagina   Result Value Ref Range    Specimen Description Vagina     Wet Prep Few  WBC'S seen       Wet Prep No Trichomonas seen     Wet Prep No clue cells seen     Wet Prep No yeast seen

## 2021-07-09 NOTE — PATIENT INSTRUCTIONS
At [facility], we strive to deliver an exceptional experience to you, every time we see you. If you receive a survey, please complete it as we do value your feedback.  If you have MyChart, you can expect to receive results automatically within 24 hours of their completion.  Your provider will send a note interpreting your results as well.   If you do not have MyChart, you should receive your results in about a week by mail.    Your care team:                            Family Medicine Internal Medicine   MD Antonio Mistry MD Shantel Branch-Fleming, MD Srinivasa Vaka, MD Katya Belousova, PAHOA Jensen, APRN CNP    Narinder Yap, MD Pediatrics   Luis Fernando Rhoades, PAHOA Watson, CNP MD Cira Brandt APRN CNP   MD Haydee Sherman MD Deborah Mielke, MD Tana Luna, APRN [facility]      Clinic hours: Monday - Thursday 7 am-6 pm; Fridays 7 am-5 pm.   Urgent care: Monday - Friday 10 am- 8 pm; Saturday and Sunday 9 am-5 pm.    Clinic: [phone]       [facility]: Monday - Thursday 8 am - 7 pm; Friday 8 am - 6 pm  [facility]: [phone]     Use www.[email or web address].org for 24/7 diagnosis and treatment of dozens of conditions.  Patient Education     Atrophic Vaginitis    Atrophic vaginitis means the walls of your vagina have become thin. This happens when your body makes too little of the hormone estrogen. Menopause or surgical removal of the ovaries are the most common causes for a drop in estrogen. Breastfeeding can also cause the hormone level to drop.   Symptoms of atrophic vaginitis include:    Dryness, soreness, burning, or itching in the vagina    Vaginal discharge  Sex can be uncomfortable, even painful. After sex, you may have bleeding from your vagina. You may also have burning or pain when you urinate.   Home care  Your healthcare provider may recommend one or more of  these as treatment:     Vaginal creams, lotions, and lubricants. These products help relieve vaginal dryness. They don t need a prescription. They can be found in the personal care section of most pharmacies. Creams and lotions are used daily to help keep the vagina moist. Lubricants help reduce dryness and pain during sex. Choose water-based lubricants. Don t use petroleum jelly, mineral oil, or other oils. These increase the chance of infection.    Hormone therapy (HT). HT increases the amount of estrogen in your body. This can help manage or relieve symptoms. HT can be given in pill form. It may be given as a lotion, cream, ring put into the vagina, or a patch on the skin. The risks and benefits of HT vary for each woman. For instance, your risk may be higher if you have had breast cancer. Discuss this treatment with your healthcare provider. Not every woman can use HT.  You don t need to stop having sex. In fact, regular sex can help keep vaginal tissues healthy. Take steps to make sex more comfortable by using water-based lubricants.   Preventing infections  Atrophic vaginitis makes an infection of the vagina or the urinary tract more likely. To help reduce your risk:     Keep your genitals clean. When you bathe, wash the outside of your vagina with mild soap and water. Clean gently between the folds of your vagina.    Wipe from front to back after a bowel movement.    Don t douche unless your healthcare provider tells you to.    Don't use scented toilet paper, scented vaginal sprays, or scented tampons.    Don't wear clothes that are tight in the crotch. These include pantyhose, jeans, and leggings. Wear cotton underwear. Change it every day.  Follow-up care  Follow up with your healthcare provider, or as advised.  When to seek medical advice  Call your health care provider right away if any of these occur:    Fever of 100.4 F (38 C) or higher, or as directed by your healthcare provider    Symptoms don t go  away or get worse even with treatment    Vaginal area swells or becomes painful    Vaginal area bleeds, but not because of your period    Bad-smelling discharge from the vagina    Pain or burning when you urinate or you have trouble passing urine    Open sores develop around vagina   Shon last reviewed this educational content on 10/1/2019    3775-9480 [employer]. All rights reserved. This information is not intended as a substitute for professional medical care. Always follow your healthcare professional's instructions.

## 2021-07-11 NOTE — RESULT ENCOUNTER NOTE
Dear Aleena    Your test results are attached. I am happy to let you know that they are stable.    There is no sign of infection on the wet prep and with your exam. Everything is normal except the decreased estrogen effect from menopause and your medications.     Please contact me by CrossTxt if you have any questions about your labs or management. You may also call my office number [phone] for any questions.     Concepcion Bocanegra MD

## 2021-07-23 ENCOUNTER — ANCILLARY PROCEDURE (OUTPATIENT)
Dept: MAMMOGRAPHY | Facility: CLINIC | Age: 46
End: 2021-07-23
Attending: NURSE PRACTITIONER
Payer: COMMERCIAL

## 2021-07-23 DIAGNOSIS — Z12.31 ENCOUNTER FOR SCREENING MAMMOGRAM FOR BREAST CANCER: ICD-10-CM

## 2021-07-23 PROCEDURE — 77067 SCR MAMMO BI INCL CAD: CPT

## 2021-07-23 PROCEDURE — 77063 BREAST TOMOSYNTHESIS BI: CPT

## 2021-08-03 DIAGNOSIS — M79.2 NEUROPATHIC PAIN: ICD-10-CM

## 2021-08-03 DIAGNOSIS — C50.811 MALIGNANT NEOPLASM OF OVERLAPPING SITES OF RIGHT BREAST IN FEMALE, ESTROGEN RECEPTOR POSITIVE (H): ICD-10-CM

## 2021-08-03 DIAGNOSIS — C50.911 MALIGNANT NEOPLASM OF RIGHT BREAST IN FEMALE, ESTROGEN RECEPTOR POSITIVE, UNSPECIFIED SITE OF BREAST (H): ICD-10-CM

## 2021-08-03 DIAGNOSIS — Z17.0 MALIGNANT NEOPLASM OF RIGHT BREAST IN FEMALE, ESTROGEN RECEPTOR POSITIVE, UNSPECIFIED SITE OF BREAST (H): ICD-10-CM

## 2021-08-03 DIAGNOSIS — N64.4 BREAST PAIN, RIGHT: ICD-10-CM

## 2021-08-03 DIAGNOSIS — Z17.0 MALIGNANT NEOPLASM OF OVERLAPPING SITES OF RIGHT BREAST IN FEMALE, ESTROGEN RECEPTOR POSITIVE (H): ICD-10-CM

## 2021-08-03 RX ORDER — GABAPENTIN 300 MG/1
CAPSULE ORAL
Qty: 120 CAPSULE | Refills: 1 | Status: SHIPPED | OUTPATIENT
Start: 2021-08-03

## 2021-08-03 RX ORDER — LETROZOLE 2.5 MG/1
2.5 TABLET, FILM COATED ORAL DAILY
Qty: 90 TABLET | Refills: 2 | Status: SHIPPED | OUTPATIENT
Start: 2021-08-03 | End: 2022-01-03

## 2021-08-03 NOTE — TELEPHONE ENCOUNTER
Received fax request from [facility] pharmacy requesting refill(s) for gabapentin (NEURONTIN) 300 MG capsule    Last refilled on 07/06/21    Pt last seen on 05/04/21  Next appt scheduled for : none    Will facilitate refill.

## 2021-08-03 NOTE — TELEPHONE ENCOUNTER
Signed Prescriptions:                        Disp   Refills    gabapentin (NEURONTIN) 300 MG capsule      120 ca*1        Sig: take 300mg in the morning and afternoon and 600mg (2           capsules) at bedtime. If side effects, reduce to           last tolerable dosage. If side effects, then           reduce to last tolerable dosage.  Authorizing Provider: MONSE HENRY, RN CNP, FNP  [facility]  [facility]

## 2021-08-03 NOTE — TELEPHONE ENCOUNTER
SUBJECTIVE/OBJECTIVE:                                                    Refill request receive for:  Letrozole    Last refill per fax: 7/6/21  Date of LOV r/t Medication: 2/15/21  Future appt scheduled? Yes: 9/15/21   Date faxed to clinic: 8/3/21    RECENT LABS/VITALS                                                      Recent Labs   Lab Test 08/28/18  1128 12/19/16  1021   CHOL 210* 172   HDL 32* 30*   * 82   TRIG 201* 301*     Lab Results   Component Value Date    AST 12 02/05/2021     Lab Results   Component Value Date    ALT 33 02/05/2021     Lab Results   Component Value Date    A1C 5.7 12/19/2016     Creatinine   Date Value Ref Range Status   10/26/2020 0.61 0.52 - 1.04 mg/dL Final   ]  Potassium   Date Value Ref Range Status   10/26/2020 3.9 3.4 - 5.3 mmol/L Final   ]  TSH   Date Value Ref Range Status   12/10/2020 1.54 0.40 - 4.00 mU/L Final   06/07/2019 1.38 0.40 - 4.00 mU/L Final   11/07/2011 0.83 0.4 - 5.0 mU/L Final   05/19/2009 0.90 0.4 - 5.0 mU/L Final     BP Readings from Last 4 Encounters:   07/09/21 122/85   04/22/21 (!) 105/22   03/16/21 111/77   12/18/20 103/66       PLAN:                                                      Sent to provider to advise.

## 2021-09-15 ENCOUNTER — ONCOLOGY SURVIVORSHIP VISIT (OUTPATIENT)
Dept: ONCOLOGY | Facility: CLINIC | Age: 46
End: 2021-09-15
Attending: NURSE PRACTITIONER
Payer: COMMERCIAL

## 2021-09-15 VITALS
DIASTOLIC BLOOD PRESSURE: 76 MMHG | WEIGHT: 164.7 LBS | RESPIRATION RATE: 14 BRPM | TEMPERATURE: 98.7 F | HEART RATE: 92 BPM | SYSTOLIC BLOOD PRESSURE: 108 MMHG | OXYGEN SATURATION: 97 % | HEIGHT: 60 IN | BODY MASS INDEX: 32.34 KG/M2

## 2021-09-15 DIAGNOSIS — Z17.0 MALIGNANT NEOPLASM OF OVERLAPPING SITES OF RIGHT BREAST IN FEMALE, ESTROGEN RECEPTOR POSITIVE (H): ICD-10-CM

## 2021-09-15 DIAGNOSIS — M85.80 OSTEOPENIA, UNSPECIFIED LOCATION: ICD-10-CM

## 2021-09-15 DIAGNOSIS — N95.1 MENOPAUSAL SYNDROME (HOT FLASHES): Primary | ICD-10-CM

## 2021-09-15 DIAGNOSIS — Z12.31 ENCOUNTER FOR SCREENING MAMMOGRAM FOR BREAST CANCER: ICD-10-CM

## 2021-09-15 DIAGNOSIS — C50.811 MALIGNANT NEOPLASM OF OVERLAPPING SITES OF RIGHT BREAST IN FEMALE, ESTROGEN RECEPTOR POSITIVE (H): ICD-10-CM

## 2021-09-15 LAB
ALBUMIN SERPL-MCNC: 3.9 G/DL (ref 3.4–5)
ALP SERPL-CCNC: 102 U/L (ref 40–150)
ALT SERPL W P-5'-P-CCNC: 194 U/L (ref 0–50)
ANION GAP SERPL CALCULATED.3IONS-SCNC: 4 MMOL/L (ref 3–14)
AST SERPL W P-5'-P-CCNC: 80 U/L (ref 0–45)
BASOPHILS # BLD AUTO: 0 10E3/UL (ref 0–0.2)
BASOPHILS NFR BLD AUTO: 1 %
BILIRUB SERPL-MCNC: 0.4 MG/DL (ref 0.2–1.3)
BUN SERPL-MCNC: 11 MG/DL (ref 7–30)
CALCIUM SERPL-MCNC: 9 MG/DL (ref 8.5–10.1)
CHLORIDE BLD-SCNC: 108 MMOL/L (ref 94–109)
CO2 SERPL-SCNC: 29 MMOL/L (ref 20–32)
CREAT SERPL-MCNC: 0.64 MG/DL (ref 0.52–1.04)
EOSINOPHIL # BLD AUTO: 0.1 10E3/UL (ref 0–0.7)
EOSINOPHIL NFR BLD AUTO: 2 %
ERYTHROCYTE [DISTWIDTH] IN BLOOD BY AUTOMATED COUNT: 13.7 % (ref 10–15)
GFR SERPL CREATININE-BSD FRML MDRD: >90 ML/MIN/1.73M2
GLUCOSE BLD-MCNC: 151 MG/DL (ref 70–99)
HCT VFR BLD AUTO: 39.6 % (ref 35–47)
HGB BLD-MCNC: 13.8 G/DL (ref 11.7–15.7)
HOLD SPECIMEN: NORMAL
IMM GRANULOCYTES # BLD: 0 10E3/UL
IMM GRANULOCYTES NFR BLD: 0 %
LYMPHOCYTES # BLD AUTO: 1.9 10E3/UL (ref 0.8–5.3)
LYMPHOCYTES NFR BLD AUTO: 36 %
MCH RBC QN AUTO: 30.1 PG (ref 26.5–33)
MCHC RBC AUTO-ENTMCNC: 34.8 G/DL (ref 31.5–36.5)
MCV RBC AUTO: 87 FL (ref 78–100)
MONOCYTES # BLD AUTO: 0.4 10E3/UL (ref 0–1.3)
MONOCYTES NFR BLD AUTO: 7 %
NEUTROPHILS # BLD AUTO: 3 10E3/UL (ref 1.6–8.3)
NEUTROPHILS NFR BLD AUTO: 54 %
NRBC # BLD AUTO: 0 10E3/UL
NRBC BLD AUTO-RTO: 0 /100
PLATELET # BLD AUTO: 226 10E3/UL (ref 150–450)
POTASSIUM BLD-SCNC: 3.4 MMOL/L (ref 3.4–5.3)
PROT SERPL-MCNC: 7.6 G/DL (ref 6.8–8.8)
RBC # BLD AUTO: 4.58 10E6/UL (ref 3.8–5.2)
SODIUM SERPL-SCNC: 141 MMOL/L (ref 133–144)
WBC # BLD AUTO: 5.4 10E3/UL (ref 4–11)

## 2021-09-15 PROCEDURE — 99215 OFFICE O/P EST HI 40 MIN: CPT | Performed by: NURSE PRACTITIONER

## 2021-09-15 PROCEDURE — 36415 COLL VENOUS BLD VENIPUNCTURE: CPT | Performed by: NURSE PRACTITIONER

## 2021-09-15 PROCEDURE — 80053 COMPREHEN METABOLIC PANEL: CPT | Performed by: NURSE PRACTITIONER

## 2021-09-15 PROCEDURE — 85025 COMPLETE CBC W/AUTO DIFF WBC: CPT | Performed by: NURSE PRACTITIONER

## 2021-09-15 ASSESSMENT — PAIN SCALES - GENERAL: PAINLEVEL: NO PAIN (0)

## 2021-09-15 ASSESSMENT — MIFFLIN-ST. JEOR: SCORE: 1318.57

## 2021-09-15 NOTE — NURSING NOTE
Oncology Rooming Note    September 15, 2021 3:53 PM   Aleena Escamilla is a 45 year old female who presents for:    Chief Complaint   Patient presents with     Oncology Clinic Visit     Survivorship visit     Initial Vitals: /76 (BP Location: Left arm, Patient Position: Sitting)   Pulse 92   Temp 98.7  F (37.1  C) (Oral)   Resp 14   Ht 1.524 m (5')   Wt 74.7 kg (164 lb 11.2 oz)   LMP 03/01/2016 (Approximate)   SpO2 97%   BMI 32.17 kg/m   Estimated body mass index is 32.17 kg/m  as calculated from the following:    Height as of this encounter: 1.524 m (5').    Weight as of this encounter: 74.7 kg (164 lb 11.2 oz). Body surface area is 1.78 meters squared.  No Pain (0) Comment: Data Unavailable   Patient's last menstrual period was 03/01/2016 (approximate).  Allergies reviewed: Yes  Medications reviewed: Yes    Medications: Medication refills not needed today.  Pharmacy name entered into [employer]: [id number] - [place] - [id number] [facility]    Clinical concerns: None       Swetha Dexter CMA

## 2021-09-15 NOTE — PROGRESS NOTES
Oncology Survivorship Visit: September 15, 2021      Oncologist: / CHRISS  PCP: Dr Concepcion Bocanegra    Diagnosis: Stage II Right Breast  Aleena Escamilla is a 44 yo female that felt she has mass to right beast in 2014 but was told she was not eligible for mammogram but in 11/2015 felt breast was swollen and received imaging that did show a 1.2 x 1.3 x 0.7 cm mass at 12 oclock to breast and later found a secondary mass behind first mass. Biopsy proved invasive lobular carcinoma at 12 oclock and 9 oclock positions. Final pathology from partial mastectomy showed multifocal invasive lobular carcinoma, grade 2 with foci, the first one 36 x 16 x 8 mm and the second one 21 x 14 x 11 mm. LCIS classical type present, lymphovascular invasion not identified. Margins negative, ER/NJ positive by IHC and HER-2 not amplified performed on prior core biopsy.[id number]  Oncotype Dx score =25.  Genetics - told pt she has variant of of uncertain significance in the BRCA 2 gene.  Treatment-   1/19/2016 Partial Right  Mastectomy  2/24/2016 - 6/2016 Weekly Taxol x 12 weeks followed by Dose Dense AC x4 cycles  8-9/2016 completed right breast XRT  10/2016 began Femara  10/26/2016 right laparoscopic salpingo-oophorectomy    Interval History: Ms Escamilla is contacted for follow up to her right breast cancer and survivorship visit. Pt states she is worried about recurrence and wants to continue with endocrine therapy. She continues with hot flashes from the therapy but feels this is tolerable.she is worried about her daughters getting cancer. She reports change in smell since start of therapy as well. Is not exercising regularly but staing active. Always has some pain to lower right breast- not changed since surgery.   Rest of complete and comprehensive ROS is reviewed and is negative.   Past Medical History:   Diagnosis Date     Chronic pelvic pain in female      Malignant neoplasm of right breast, stage 2 (H) 1/2016 1/4 lymph nodes  positive, Oncotype DX = 25.  Chemotherapy and radiation. Letrozole.     Migraine headaches      Current Outpatient Medications   Medication     CALCIUM PO     Cholecalciferol (VITAMIN D3) 2000 units TABS     Evening Primrose Oil 1000 MG CAPS     fish oil-omega-3 fatty acids 1000 MG capsule     gabapentin (NEURONTIN) 300 MG capsule     ibuprofen (ADVIL/MOTRIN) 600 MG tablet     letrozole (FEMARA) 2.5 MG tablet     No current facility-administered medications for this visit.     Allergies   Allergen Reactions     Latex Rash     Physical Exam:  /76 (BP Location: Left arm, Patient Position: Sitting)   Pulse 92   Temp 98.7  F (37.1  C) (Oral)   Resp 14   Ht 1.524 m (5')   Wt 74.7 kg (164 lb 11.2 oz)   LMP 03/01/2016 (Approximate)   SpO2 97%   BMI 32.17 kg/m     Constitutional: Alert, healthy, and in no distress. Overweight.  ENT: Eyes bright , No mouth sores  Neck: Supple, No adenopathy.Thyroid symmetric  Cardiac: Heart rate and rhythm is regular and strong without murmur  Respiratory: Breathing easy. Lung sounds clear to auscultation  Breasts: Bilaterally no new masses but mild tenderness to the lower right side as previous.   Abdomen: Soft, non-tender, BS normal. No masses or organomegaly  MS: Muscle tone normal, extremities normal with no edema.   Skin: No suspicious lesions or rashes  Neuro: Sensory grossly WNL, gait normal.   Lymph: Normal ant/post cervical, axillary, supraclavicular nodes  Psych: Mentation appears normal and affect normal/bright and smiling    Laboratory Results:   Results for orders placed or performed in visit on 09/15/21   CBC with platelets and differential     Status: None   Result Value Ref Range    WBC Count 5.4 4.0 - 11.0 10e3/uL    RBC Count 4.58 3.80 - 5.20 10e6/uL    Hemoglobin 13.8 11.7 - 15.7 g/dL    Hematocrit 39.6 35.0 - 47.0 %    MCV 87 78 - 100 fL    MCH 30.1 26.5 - 33.0 pg    MCHC 34.8 31.5 - 36.5 g/dL    RDW 13.7 10.0 - 15.0 %    Platelet Count 226 150 - 450 10e3/uL     % Neutrophils 54 %    % Lymphocytes 36 %    % Monocytes 7 %    % Eosinophils 2 %    % Basophils 1 %    % Immature Granulocytes 0 %    NRBCs per 100 WBC 0 <1 /100    Absolute Neutrophils 3.0 1.6 - 8.3 10e3/uL    Absolute Lymphocytes 1.9 0.8 - 5.3 10e3/uL    Absolute Monocytes 0.4 0.0 - 1.3 10e3/uL    Absolute Eosinophils 0.1 0.0 - 0.7 10e3/uL    Absolute Basophils 0.0 0.0 - 0.2 10e3/uL    Absolute Immature Granulocytes 0.0 <=0.0 10e3/uL    Absolute NRBCs 0.0 10e3/uL   Extra Serum Separator Tube (SST)     Status: None   Result Value Ref Range    Hold Specimen [facility]    Comprehensive metabolic panel     Status: Abnormal   Result Value Ref Range    Sodium 141 133 - 144 mmol/L    Potassium 3.4 3.4 - 5.3 mmol/L    Chloride 108 94 - 109 mmol/L    Carbon Dioxide (CO2) 29 20 - 32 mmol/L    Anion Gap 4 3 - 14 mmol/L    Urea Nitrogen 11 7 - 30 mg/dL    Creatinine 0.64 0.52 - 1.04 mg/dL    Calcium 9.0 8.5 - 10.1 mg/dL    Glucose 151 (H) 70 - 99 mg/dL    Alkaline Phosphatase 102 40 - 150 U/L    AST 80 (H) 0 - 45 U/L     (H) 0 - 50 U/L    Protein Total 7.6 6.8 - 8.8 g/dL    Albumin 3.9 3.4 - 5.0 g/dL    Bilirubin Total 0.4 0.2 - 1.3 mg/dL    GFR Estimate >90 >60 mL/min/1.73m2   CBC with platelets differential     Status: None    Narrative    The following orders were created for panel order CBC with platelets differential.  Procedure                               Abnormality         Status                     ---------                               -----------         ------                     CBC with platelets and d...[[id number]]                      Final result                 Please view results for these tests on the individual orders.   Extra Tube     Status: None    Narrative    The following orders were created for panel order Extra Tube.  Procedure                               Abnormality         Status                     ---------                               -----------         ------                      Extra Serum Separator Tu...[[id number]]                      Final result                 Please view results for these tests on the individual orders.     Assessment and Plan:   It was my pleasure to see Aleena Escamilla in the Cancer Survivorship Clinic for her diagnosis of Right Breast Cancer. Given her diagnosis and treatment, she was given a survivorship care plan as resulted from [facility] care plan program and here is the recommendations provided to her:  Right breast cancer- stage II- Pt has completed treatment with right partial mastectomy, Taxol weekly ×12, AC ×4 cycles, and XRTas well as laparoscopic salpingo-oophorectomy. She has been taking the Femara since 10/2016. .   She is now at 5 years of use  But we did discuss moving out to 7 years of use    Last mammogram was completed 7/23/2021 and was normal-Next mammogram due annually  Genetics- previous testing 5 years- previous was negative. Will refer back to counselor for review.   Bone health/ Osteopenia- DEXA scan from 2/5/2021 T score of -1.7= mild osteopenia. Reviewed option of Prolia but pt was not interested. Reviewed need for calcium and vitamin D and weight exercise to keep bones strong.   Cardiac complications- Given her exposure to Doxorubicin, an anthracycline, she is ar risk for cardiomyopathy, arythmias, left ventricular dysfunction. She denies any new cardiac issues.     Cognitive changes- She denies any short term memory loss since recovery from chemotherapy.   Sexuality concerns- Vaginal dryness and painful intercourse is common after treatment or with use of aromatase inhibitors.   Urinary toxicity- due to her exposure to Cytoxan, should she develop hematuria, dysuria, urinary urgency or frequency, she is to contact clinic.  Risk of developing blood cancers- due to exposure to Doxorubicin she is a a small risk for Leukemia or MDS. For this reason she should have a CBC completed yearly.   Radiation side effects- Radiation to the right  breast would have included the skin, and she should watch for any new skin lesions in the area of the radiation and have them evaluated.  Her lung was in the field of radiation, but as long as she is asymptomatic there is no routine screening tests that needs to be obtained.  Should she develop hemoptysis, cough or shortness of breath, we could consider a CT scan and/or PFTs.  Her bones are at risk for fracture in the area of her radiation.    Lymphedema-  Given her sentinel lymph node biopsy she is at risk for lymphedema.  Should she note any swelling in her right arm or her bilateral lower extremities, she is to contact the clinic and we will consider a referral to the Lymphedema specialists.   Cancer screening-  She should continue to undergo cancer screening for women of her age group.  She will continue to see Gynecology as directed by guidelines.   Colonoscopy due at 51 years of age.    Healthy lifestyle-  She should refrain from tobacco.  She should have an exercise program of 150 minutes of cardiovascular exercise per week.  She should maintain a healthy weight with a BMI between 20 and 25.  Her diet should include low fats.  She should see her primary care provider for screening and, if needed, treatment of her cholesterol, blood pressure and glucose.  She should receive the annual influenza vaccine.  When age appropriate, she should receive the pneumococcal vaccine.  She should see her eye doctor and dentist routinely. She should limit her sun exposure and use sunscreens.   Covid 19 precautions- Pt has received covid vaccinations encouraged continuation of precautions.  The total time of this encounter amounted to  50 minutes. This time included face to face time spent with the patient, prep work, ordering tests, and performing post visit documentation.  Marisol Solitario,Cnp

## 2021-09-26 ENCOUNTER — HEALTH MAINTENANCE LETTER (OUTPATIENT)
Age: 46
End: 2021-09-26

## 2021-10-06 ENCOUNTER — TRANSFERRED RECORDS (OUTPATIENT)
Dept: HEALTH INFORMATION MANAGEMENT | Facility: CLINIC | Age: 46
End: 2021-10-06

## 2021-11-09 NOTE — PROGRESS NOTES
Assessment & Plan     Malignant neoplasm of overlapping sites of right breast in female, estrogen receptor positive (H)  Recommended she follow Oncology recommendation to stop the Letrozole which is indicated for 5 years of use. Advised to follow back with Oncology.    Menopausal syndrome (hot flashes)   She cannot take estrogen but there are other medications that can be used to help with them.she'll consider this.    Chronic pelvic pain in female  Referring to GYN.    Bleeding after intercourse  Referring to GYN.  Stopping the Letrozole should help with vaginal dryness, we discussed OTC products to use to help with lubrication.  - Ob/Gyn Referral    Major depressive disorder, recurrent episode, mild (H)  Stable, not on SSRI and declines this now. She does not feel counseling would be helpful for her at this time.      Influenza vaccine refused  Conscientious objector.      40 minutes spent on the date of the encounter doing chart review, history and exam, documentation and further activities per the note       BMI:   Estimated body mass index is 32.42 kg/m  as calculated from the following:    Height as of this encounter: 1.524 m (5').    Weight as of this encounter: 75.3 kg (166 lb).   Weight management plan: Discussed healthy diet and exercise guidelines    Work on weight loss  Regular exercise  See Patient Instructions    Return in about 3 months (around 2/16/2022), or if symptoms worsen or fail to improve, for Follow up.    PIA Edmond CNP  [facility]    Robbie Flood is a 46 year old who presents for the following health issues     History of Present Illness       She eats 4 or more servings of fruits and vegetables daily.She consumes 0 sweetened beverage(s) daily.She exercises with enough effort to increase her heart rate 9 or less minutes per day.  She exercises with enough effort to increase her heart rate 3 or less days per week.   She is taking medications  "regularly.     Patient comes in for recommendation regarding Letrozole which she has taken for 5 years.  Oncology provider has recommended that she stop taking it but she wants to continue for an additional 3 years. She has had bilateral laparascopic salpingo-oophorectomy and continues to have hot flashes and now has had 2 episodes of \"significant bleeding after intercourse\" which is alarming to both she and her .  She is asking about prescription for vaginal dryness. She continues to have chronic pelvic pain as well--takes Gabapentin for chronic neck pain and states the Gabapentin does not help with her pelvic pain at all.    Depression Followup    How are you doing with your depression since your last visit? No change    Are you having other symptoms that might be associated with depression? No    Have you had a significant life event?  Health Concerns     Are you feeling anxious or having panic attacks?   No    Do you have any concerns with your use of alcohol or other drugs? No    Social History     Tobacco Use     Smoking status: Never Smoker     Smokeless tobacco: Never Used   Substance Use Topics     Alcohol use: Yes     Comment: 1 drink per week.     Drug use: No     PHQ 10/26/2020 4/27/2021 11/16/2021   PHQ-9 Total Score 0 0 2   Q9: Thoughts of better off dead/self-harm past 2 weeks Not at all Not at all Not at all     VENKAT-7 SCORE 11/1/2019 10/26/2020 4/27/2021   Total Score - - -   Total Score 1 1 0     Last PHQ-9 11/16/2021   1.  Little interest or pleasure in doing things 0   2.  Feeling down, depressed, or hopeless 0   3.  Trouble falling or staying asleep, or sleeping too much 1   4.  Feeling tired or having little energy 1   5.  Poor appetite or overeating 0   6.  Feeling bad about yourself 0   7.  Trouble concentrating 0   8.  Moving slowly or restless 0   Q9: Thoughts of better off dead/self-harm past 2 weeks 0   PHQ-9 Total Score 2   Difficulty at work, home, or with people -     VENKAT-7  " 4/27/2021   1. Feeling nervous, anxious, or on edge 0   2. Not being able to stop or control worrying 0   3. Worrying too much about different things 0   4. Trouble relaxing 0   5. Being so restless that it is hard to sit still 0   6. Becoming easily annoyed or irritable 0   7. Feeling afraid, as if something awful might happen 0   VENKAT-7 Total Score 0   If you checked any problems, how difficult have they made it for you to do your work, take care of things at home, or get along with other people? Not difficult at all       Suicide Assessment Five-step Evaluation and Treatment (SAFE-T)      Review of Systems   Constitutional, HEENT, cardiovascular, pulmonary, gi and gu systems are negative, except as otherwise noted.      Objective    LMP 03/01/2016 (Approximate)   There is no height or weight on file to calculate BMI.  Physical Exam   GENERAL: healthy, alert and no distress  EYES: Eyes grossly normal to inspection, PERRL and conjunctivae and sclerae normal  HENT: ear canals and TM's normal, nose and mouth without ulcers or lesions  NECK: no adenopathy, no asymmetry, masses, or scars and thyroid normal to palpation  RESP: lungs clear to auscultation - no rales, rhonchi or wheezes  CV: regular rate and rhythm, normal S1 S2, no S3 or S4, no murmur, click or rub, no peripheral edema and peripheral pulses strong  ABDOMEN: soft, nontender, no hepatosplenomegaly, no masses and bowel sounds normal  MS: no gross musculoskeletal defects noted, no edema  SKIN: no suspicious lesions or rashes  NEURO: Normal strength and tone, mentation intact and speech normal  BACK: no CVA tenderness, no paralumbar tenderness  PSYCH: mentation appears normal, affect normal/bright  LYMPH: normal ant/post cervical, supraclavicular nodes        Answers for HPI/ROS submitted by the patient on 11/16/2021  If you checked off any problems, how difficult have these problems made it for you to do your work, take care of things at home, or get along  with other people?: Not difficult at all  PHQ9 TOTAL SCORE: 2

## 2021-11-09 NOTE — PATIENT INSTRUCTIONS
At [facility], we strive to deliver an exceptional experience to you, every time we see you. If you receive a survey, please complete it as we do value your feedback.  If you have MyChart, you can expect to receive results automatically within 24 hours of their completion.  Your provider will send a note interpreting your results as well.   If you do not have MyChart, you should receive your results in about a week by mail.    Your care team:                            Family Medicine Internal Medicine   MD Antonio Mistry MD Shantel Branch-Fleming, MD Srinivasa Vaka, MD Katya Belousova, PAReneC  Cathy Jensen, APRN CNP    Narinder Yap, MD Pediatrics   Luis Fernando Rhoades, PAHOA Watson, CNP MD Cira Brandt APRN CNP   MD Haydee Sherman MD Deborah Mielke, MD Tana Luna, APRN [facility]      Clinic hours: Monday - Thursday 7 am-6 pm; Fridays 7 am-5 pm.   Urgent care: Monday - Friday 10 am- 8 pm; Saturday and Sunday 9 am-5 pm.    Clinic: [phone]       [facility]: Monday - Thursday 8 am - 7 pm; Friday 8 am - 6 pm  [facility]: [phone]     Use www.[email or web address].org for 24/7 diagnosis and treatment of dozens of conditions.    Patient Education     Letrozole Oral tablet  What is this medicine?  LETROZOLE (LET aries zole) blocks the production of estrogen. Certain types of breast cancer grow under the influence of estrogen. Letrozole helps block tumor growth. This medicine is used to treat advanced breast cancer in postmenopausal women.  This medicine may be used for other purposes; ask your health care provider or pharmacist if you have questions.  What should I tell my health care provider before I take this medicine?  They need to know if you have any of these conditions:    liver disease    osteoporosis (weak bones)    an unusual or allergic reaction to letrozole, other  medicines, foods, dyes, or preservatives    pregnant or trying to get pregnant    breast-feeding  How should I use this medicine?  Take this medicine by mouth with a glass of water. You may take it with or without food. Follow the directions on the prescription label. Take your medicine at regular intervals. Do not take your medicine more often than directed. Do not stop taking except on your doctor's advice.  Talk to your pediatrician regarding the use of this medicine in children. Special care may be needed.  Overdosage: If you think you have taken too much of this medicine contact a poison control center or emergency room at once.  NOTE: This medicine is only for you. Do not share this medicine with others.  What if I miss a dose?  If you miss a dose, take it as soon as you can. If it is almost time for your next dose, take only that dose. Do not take double or extra doses.  What may interact with this medicine?  Do not take this medicine with any of the following medications:    estrogens, like hormone replacement therapy or birth control pills  This medicine may also interact with the following medications:    dietary supplements such as androstenedione or DHEA    prasterone    tamoxifen  This list may not describe all possible interactions. Give your health care provider a list of all the medicines, herbs, non-prescription drugs, or dietary supplements you use. Also tell them if you smoke, drink alcohol, or use illegal drugs. Some items may interact with your medicine.  What should I watch for while using this medicine?  Visit your doctor or health care professional for regular check-ups to monitor your condition.  Do not use this drug if you are pregnant. Serious side effects to an unborn child are possible. Talk to your doctor or pharmacist for more information.  You may get drowsy or dizzy. Do not drive, use machinery, or do anything that needs mental alertness until you know how this medicine affects you.  Do not stand or sit up quickly, especially if you are an older patient. This reduces the risk of dizzy or fainting spells.  What side effects may I notice from receiving this medicine?  Side effects that you should report to your doctor or health care professional as soon as possible:    allergic reactions like skin rash, itching, or hives    bone fracture    chest pain    difficulty breathing or shortness of breath    severe pain, swelling, warmth in the leg    unusually weak or tired    vaginal bleeding  Side effects that usually do not require medical attention (report to your doctor or health care professional if they continue or are bothersome):    bone, back, joint, or muscle pain    dizziness    fatigue    fluid retention    headache    hot flashes, night sweats    nausea    weight gain  This list may not describe all possible side effects. Call your doctor for medical advice about side effects. You may report side effects to FDA at 9-052-FDA-0342.  Where should I keep my medicine?  Keep out of the reach of children.  Store between 15 and 30 degrees C (59 and 86 degrees F). Throw away any unused medicine after the expiration date.  NOTE:This sheet is a summary. It may not cover all possible information. If you have questions about this medicine, talk to your doctor, pharmacist, or health care provider. Copyright  2016 [employer] Standard

## 2021-11-16 ENCOUNTER — OFFICE VISIT (OUTPATIENT)
Dept: FAMILY MEDICINE | Facility: CLINIC | Age: 46
End: 2021-11-16
Payer: COMMERCIAL

## 2021-11-16 VITALS
HEART RATE: 74 BPM | TEMPERATURE: 97.8 F | SYSTOLIC BLOOD PRESSURE: 112 MMHG | BODY MASS INDEX: 32.59 KG/M2 | OXYGEN SATURATION: 97 % | WEIGHT: 166 LBS | DIASTOLIC BLOOD PRESSURE: 76 MMHG | HEIGHT: 60 IN

## 2021-11-16 DIAGNOSIS — Z17.0 MALIGNANT NEOPLASM OF OVERLAPPING SITES OF RIGHT BREAST IN FEMALE, ESTROGEN RECEPTOR POSITIVE (H): Primary | ICD-10-CM

## 2021-11-16 DIAGNOSIS — Z28.21 INFLUENZA VACCINE REFUSED: ICD-10-CM

## 2021-11-16 DIAGNOSIS — N95.1 MENOPAUSAL SYNDROME (HOT FLASHES): ICD-10-CM

## 2021-11-16 DIAGNOSIS — R10.2 CHRONIC PELVIC PAIN IN FEMALE: ICD-10-CM

## 2021-11-16 DIAGNOSIS — G89.29 CHRONIC PELVIC PAIN IN FEMALE: ICD-10-CM

## 2021-11-16 DIAGNOSIS — C50.811 MALIGNANT NEOPLASM OF OVERLAPPING SITES OF RIGHT BREAST IN FEMALE, ESTROGEN RECEPTOR POSITIVE (H): Primary | ICD-10-CM

## 2021-11-16 DIAGNOSIS — F33.0 MAJOR DEPRESSIVE DISORDER, RECURRENT EPISODE, MILD (H): ICD-10-CM

## 2021-11-16 DIAGNOSIS — N93.0 BLEEDING AFTER INTERCOURSE: ICD-10-CM

## 2021-11-16 PROCEDURE — 99214 OFFICE O/P EST MOD 30 MIN: CPT | Performed by: NURSE PRACTITIONER

## 2021-11-16 RX ORDER — FLUTICASONE PROPIONATE 50 MCG
SPRAY, SUSPENSION (ML) NASAL
COMMUNITY
Start: 2021-11-09 | End: 2023-01-02

## 2021-11-16 ASSESSMENT — PATIENT HEALTH QUESTIONNAIRE - PHQ9
SUM OF ALL RESPONSES TO PHQ QUESTIONS 1-9: 2
10. IF YOU CHECKED OFF ANY PROBLEMS, HOW DIFFICULT HAVE THESE PROBLEMS MADE IT FOR YOU TO DO YOUR WORK, TAKE CARE OF THINGS AT HOME, OR GET ALONG WITH OTHER PEOPLE: NOT DIFFICULT AT ALL
SUM OF ALL RESPONSES TO PHQ QUESTIONS 1-9: 2

## 2021-11-16 ASSESSMENT — PAIN SCALES - GENERAL: PAINLEVEL: NO PAIN (0)

## 2021-11-16 ASSESSMENT — MIFFLIN-ST. JEOR: SCORE: 1319.47

## 2021-11-17 ASSESSMENT — PATIENT HEALTH QUESTIONNAIRE - PHQ9: SUM OF ALL RESPONSES TO PHQ QUESTIONS 1-9: 2

## 2022-02-18 DIAGNOSIS — Z12.31 ENCOUNTER FOR SCREENING MAMMOGRAM FOR BREAST CANCER: Primary | ICD-10-CM

## 2022-02-21 ENCOUNTER — TELEPHONE (OUTPATIENT)
Dept: ONCOLOGY | Facility: CLINIC | Age: 47
End: 2022-02-21
Payer: COMMERCIAL

## 2022-03-25 ENCOUNTER — OFFICE VISIT (OUTPATIENT)
Dept: OBGYN | Facility: CLINIC | Age: 47
End: 2022-03-25
Payer: COMMERCIAL

## 2022-03-25 VITALS
SYSTOLIC BLOOD PRESSURE: 122 MMHG | WEIGHT: 172.6 LBS | DIASTOLIC BLOOD PRESSURE: 83 MMHG | HEART RATE: 80 BPM | OXYGEN SATURATION: 100 % | BODY MASS INDEX: 33.71 KG/M2

## 2022-03-25 DIAGNOSIS — Z12.11 COLON CANCER SCREENING: ICD-10-CM

## 2022-03-25 DIAGNOSIS — N95.0 PMB (POSTMENOPAUSAL BLEEDING): ICD-10-CM

## 2022-03-25 DIAGNOSIS — N89.8 VAGINAL DISCHARGE: Primary | ICD-10-CM

## 2022-03-25 LAB
CLUE CELLS: ABNORMAL
TRICHOMONAS, WET PREP: ABNORMAL
WBC'S/HIGH POWER FIELD, WET PREP: ABNORMAL
YEAST, WET PREP: ABNORMAL

## 2022-03-25 PROCEDURE — 99203 OFFICE O/P NEW LOW 30 MIN: CPT | Performed by: OBSTETRICS & GYNECOLOGY

## 2022-03-25 PROCEDURE — 87210 SMEAR WET MOUNT SALINE/INK: CPT | Performed by: OBSTETRICS & GYNECOLOGY

## 2022-03-25 NOTE — PROGRESS NOTES
OB/GYN New       NAME:  Aleena Escamilla  PCP:  Avril Bocanegraorah Tana  MRN:  [id number]    Impression / Plan     46 year old  with:      ICD-10-CM    1. Vaginal discharge  N89.8 Wet prep - Clinic Collect     CANCELED: Wet prep - Clinic Collect   2. Colon cancer screening  Z12.11 Fecal colorectal cancer screen (FIT)     Fecal colorectal cancer screen (FIT)   3. PMB (postmenopausal bleeding)  N95.0 US Pelvic Complete with Transvaginal       Discussed of genitourinary syndrome of menopause.  I suspect her bleeding after intercourse is related to the low estrogen effect.  Plan ultrasound to assess the uterine lining.  Recommend vaginal moisturizers.  She should continue to use the lubricants with intercourse.  If she has not noted improvement in her symptoms over the next few weeks, then I will reach out to her oncologist to discuss Vagifem.  I will contact her with the results of the wet prep and the ultrasound.  I recommend she see a family medicine clinician for evaluation of her abdominal pain.  It does not seem to be gynecologic in origin at this time.    Chief Complaint     Chief Complaint   Patient presents with     Abnormal Uterine Bleeding     Pelvic Pain       HPI     Aleena Escamilla is a  46 year old female who is seen for vaginal bleeding, vaginal dryness.      She had two episodes of bleeding after intercourse.  It lasted a day.  A little pain but not really.  She has been using a good lubricant with intercourse.  She has not been using vaginal moisturizers.  She has noticed a vaginal odor at times, but no abnormal discharge.  No bleeding aside from those episodes after intercourse.    Abdominal pain for over a year.  It comes and goes.  Not necessarily related to intercourse.     Patient's last menstrual period was 2016 (approximate).     History is significant for estrogen receptor positive breast cancer.  She has completed treatment with right partial mastectomy, chemotherapy, and  laparoscopic salpingo-oophorectomy.  She is status post Femara use x5 years.  Oncology note reviewed from 1/3/2022.  Oncologist noted that the hot flashes will improve with the discontinuation of Femara.    Last ultrasound was in 2019 and normal.    Problem List     Patient Active Problem List    Diagnosis Date Noted     Upper back pain, chronic 12/19/2020     Priority: Medium     High risk medication use 10/26/2020     Priority: Medium     Hyperlipidemia LDL goal <130 08/29/2018     Priority: Medium     Fatty liver 07/23/2018     Priority: Medium     Elevated LFTs 12/29/2017     Priority: Medium     Onychomycosis 09/22/2017     Priority: Medium     Bunion, right 05/08/2017     Priority: Medium     Chronic right shoulder pain 03/06/2017     Priority: Medium     Non morbid drug-induced obesity 01/09/2017     Priority: Medium     Carpal tunnel syndrome 11/23/2016     Priority: Medium     BRCA gene positive 10/21/2016     Priority: Medium     Malignant neoplasm of overlapping sites of right female breast (H) 12/18/2015     Priority: Medium     Major depressive disorder, recurrent episode, mild (H) 07/31/2015     Priority: Medium     Generalized anxiety disorder 07/31/2015     Priority: Medium     Diagnosis updated by automated process. Provider to review and confirm.       Arthritis, multiple joint involvement 07/31/2015     Priority: Medium     Primary osteoarthritis of both hands 07/31/2015     Priority: Medium     Fibromyalgia 07/31/2015     Priority: Medium     Chronic pelvic pain in female 01/31/2011     Priority: Medium     Chronic salpingitis and oophoritis 01/31/2011     Priority: Medium     CARDIOVASCULAR SCREENING; LDL GOAL LESS THAN 160 10/31/2010     Priority: Medium       Medications     Current Outpatient Medications   Medication     CALCIUM PO     Cholecalciferol (VITAMIN D3) 2000 units TABS     Evening Primrose Oil 1000 MG CAPS     fish oil-omega-3 fatty acids 1000 MG capsule     gabapentin (NEURONTIN)  300 MG capsule     ibuprofen (ADVIL/MOTRIN) 600 MG tablet     fluticasone (FLONASE) 50 MCG/ACT nasal spray     No current facility-administered medications for this visit.        Allergies     Allergies   Allergen Reactions     Latex Rash       Past Medical/Surgical History     Past Medical History:   Diagnosis Date     Chronic pelvic pain in female      Malignant neoplasm of right breast, stage 2 (H) 1/2016    1/4 lymph nodes positive, Oncotype DX = 25.  Chemotherapy and radiation. Letrozole.     Migraine headaches        Past Surgical History:   Procedure Laterality Date     BIOPSY BREAST NEEDLE LOCALIZATION Right 1/19/2016    Procedure: BIOPSY BREAST NEEDLE LOCALIZATION;  Surgeon: Adelina Demarco MD;  Location: [facility]     GYN SURGERY  2-    bilateral salpingectomy, left oophrectomy     LAPAROSCOPIC SALPINGO-OOPHORECTOMY Right 10/26/2016    Procedure: LAPAROSCOPIC SALPINGO-OOPHORECTOMY;  Surgeon: Bouchra Mayo MD;  Location: [facility]     LUMPECTOMY BREAST WITH SENTINEL NODE, COMBINED Right 1/19/2016    Procedure: COMBINED LUMPECTOMY BREAST WITH SENTINEL NODE;  Surgeon: Adelina Demarco MD;  Location: [facility] OR     PELVIS LAPAROSCOPY,DX  12/28/1998     RELEASE CARPAL TUNNEL Left 12/23/2016    Procedure: RELEASE CARPAL TUNNEL;  Surgeon: Sam Florence MD;  Location: [facility]        Social History     Social History     Socioeconomic History     Marital status:      Spouse name: Not on file     Number of children: Not on file     Years of education: Not on file     Highest education level: Not on file   Occupational History     Not on file   Tobacco Use     Smoking status: Never Smoker     Smokeless tobacco: Never Used   Vaping Use     Vaping Use: Never used   Substance and Sexual Activity     Alcohol use: Yes     Comment: 1 drink per week.     Drug use: No     Sexual activity: Yes     Partners: Male     Birth control/protection: None, Female Surgical   Other Topics Concern     Parent/sibling  w/ CABG, MI or angioplasty before 65F 55M? No      Service No     Blood Transfusions No     Caffeine Concern No     Comment: coffee 2 cups per day     Occupational Exposure No     Hobby Hazards No     Sleep Concern No     Stress Concern No     Weight Concern No     Special Diet No     Back Care No     Exercise No     Bike Helmet No     Seat Belt Yes     Comment: 100%     Self-Exams No   Social History Narrative     Not on file     Social Determinants of Health     Financial Resource Strain: Not on file   Food Insecurity: Not on file   Transportation Needs: Not on file   Physical Activity: Not on file   Stress: Not on file   Social Connections: Not on file   Intimate Partner Violence: Not on file   Housing Stability: Not on file       Family History      Family History   Problem Relation Age of Onset     Diabetes Mother      Hypertension Mother      Cancer Maternal Grandmother         cervical CA     Cardiovascular Father         MI     Cancer Maternal Aunt         cervical cancer     Pancreatic Cancer Maternal Aunt         COD       ROS     Pertinent positives and negatives are listed in the HPI.     Physical Exam   Vitals: /83 (BP Location: Right arm, Cuff Size: Adult Regular)   Pulse 80   Wt 78.3 kg (172 lb 9.6 oz)   LMP 03/01/2016 (Approximate)   SpO2 100%   BMI 33.71 kg/m      General: Comfortable, no obvious distress  Psych: Alert. Appropriate affect,.  Normal speech.   : Normal female external genitalia.  No lesions.  Urethral meatus normal.  Speculum exam reveals a normal vaginal vault, normal cervix.  No lesions.  No abnormal discharge.  Bimanual exam reveals a normal, mobile, nontender uterus.  No cervical motion tenderness.  Adnexa nontender with no palpable masses.  Low estrogen effect.  Wet prep collected.      Labs/Imaging       I have personally reviewed the labs/imaging and findings were:    Ultrasound 8/14/2019: Normal uterus measuring 5.6 cm.  Endometrial stripe 8 mm.  No  adnexal masses.  Ovaries surgically absent.      32 min spent on the date of the encounter in chart review, patient visit, review of tests, documentation about the issues documented above.     Jennifer Ricks MD

## 2022-03-25 NOTE — PATIENT INSTRUCTIONS
You may try the following vaginal moisturizers to help with the vaginal dryness.  These are to be used a few times per week in addition to the lubrication you use with intercourse.  Examples include Replens and Revaree.  Please let me know if your symptoms do not improve over the next few weeks.  I will reach out to your oncologist to discuss possible use of a very low dose of vaginal estrogen for vaginal atrophy.    Please call [phone] to schedule your ultrasound.  I will contact you with the results and recommendations.  Your vaginal bleeding is likely due to vaginal atrophy, but we will assess the uterus to make sure the lining is normal.

## 2022-04-14 NOTE — NURSING NOTE
Aleena Escamilla is a 41 year old female who presents for:  Chief Complaint   Patient presents with     Oncology Clinic Visit     follow up        Initial Vitals:  LMP 03/01/2016 Estimated body mass index is 30.08 kg/(m^2) as calculated from the following:    Height as of 3/6/17: 1.524 m (5').    Weight as of 3/6/17: 69.9 kg (154 lb).. There is no height or weight on file to calculate BSA. BP completed using cuff size: regular  Data Unavailable Patient's last menstrual period was 03/01/2016. Allergies and medications reviewed.     Medications: Medication refills not needed today.  Pharmacy name entered into [facility]:    [facility] [place], [place] - [address] [facility] [place], [place] - [address] [facility] [place] - [id number]    Comments:     5 minutes for nursing intake (face to face time)   Sandra Tello LPN         Paramedian Forehead Flap Text: A decision was made to reconstruct the defect utilizing an interpolation axial flap and a staged reconstruction.  A telfa template was made of the defect.  This telfa template was then used to outline the paramedian forehead pedicle flap.  The donor area for the pedicle flap was then injected with anesthesia.  The flap was excised through the skin and subcutaneous tissue down to the layer of the underlying musculature.  The pedicle flap was carefully excised within this deep plane to maintain its blood supply.  The edges of the donor site were undermined.   The donor site was closed in a primary fashion.  The pedicle was then rotated into position and sutured.  Once the tube was sutured into place, adequate blood supply was confirmed with blanching and refill.  The pedicle was then wrapped with xeroform gauze and dressed appropriately with a telfa and gauze bandage to ensure continued blood supply and protect the attached pedicle.

## 2022-05-08 ENCOUNTER — HEALTH MAINTENANCE LETTER (OUTPATIENT)
Age: 47
End: 2022-05-08

## 2022-05-09 ENCOUNTER — OFFICE VISIT (OUTPATIENT)
Dept: FAMILY MEDICINE | Facility: CLINIC | Age: 47
End: 2022-05-09
Payer: COMMERCIAL

## 2022-05-09 VITALS
RESPIRATION RATE: 16 BRPM | HEART RATE: 66 BPM | TEMPERATURE: 97.6 F | WEIGHT: 173 LBS | OXYGEN SATURATION: 98 % | BODY MASS INDEX: 32.66 KG/M2 | SYSTOLIC BLOOD PRESSURE: 104 MMHG | DIASTOLIC BLOOD PRESSURE: 73 MMHG | HEIGHT: 61 IN

## 2022-05-09 DIAGNOSIS — Z12.11 SPECIAL SCREENING FOR MALIGNANT NEOPLASMS, COLON: ICD-10-CM

## 2022-05-09 DIAGNOSIS — Z28.21 DIPHTHERIA-TETANUS VACCINATION DECLINED: ICD-10-CM

## 2022-05-09 DIAGNOSIS — R74.8 ELEVATED LIVER ENZYMES: ICD-10-CM

## 2022-05-09 DIAGNOSIS — K76.0 FATTY LIVER: ICD-10-CM

## 2022-05-09 DIAGNOSIS — R00.2 PALPITATIONS: ICD-10-CM

## 2022-05-09 DIAGNOSIS — Z00.00 ENCOUNTER FOR ROUTINE HISTORY AND PHYSICAL EXAM IN FEMALE: Primary | ICD-10-CM

## 2022-05-09 DIAGNOSIS — F33.0 MAJOR DEPRESSIVE DISORDER, RECURRENT EPISODE, MILD (H): ICD-10-CM

## 2022-05-09 DIAGNOSIS — M79.7 FIBROMYALGIA: ICD-10-CM

## 2022-05-09 LAB
ALBUMIN SERPL-MCNC: 4.2 G/DL (ref 3.4–5)
ALP SERPL-CCNC: 107 U/L (ref 40–150)
ALT SERPL W P-5'-P-CCNC: 282 U/L (ref 0–50)
ANION GAP SERPL CALCULATED.3IONS-SCNC: 6 MMOL/L (ref 3–14)
AST SERPL W P-5'-P-CCNC: 134 U/L (ref 0–45)
BILIRUB SERPL-MCNC: 0.6 MG/DL (ref 0.2–1.3)
BUN SERPL-MCNC: 7 MG/DL (ref 7–30)
CALCIUM SERPL-MCNC: 9.8 MG/DL (ref 8.5–10.1)
CHLORIDE BLD-SCNC: 105 MMOL/L (ref 94–109)
CO2 SERPL-SCNC: 28 MMOL/L (ref 20–32)
CREAT SERPL-MCNC: 0.6 MG/DL (ref 0.52–1.04)
ERYTHROCYTE [DISTWIDTH] IN BLOOD BY AUTOMATED COUNT: 13.6 % (ref 10–15)
GFR SERPL CREATININE-BSD FRML MDRD: >90 ML/MIN/1.73M2
GLUCOSE BLD-MCNC: 98 MG/DL (ref 70–99)
HCT VFR BLD AUTO: 43.7 % (ref 35–47)
HGB BLD-MCNC: 14.6 G/DL (ref 11.7–15.7)
MCH RBC QN AUTO: 29.6 PG (ref 26.5–33)
MCHC RBC AUTO-ENTMCNC: 33.4 G/DL (ref 31.5–36.5)
MCV RBC AUTO: 89 FL (ref 78–100)
PLATELET # BLD AUTO: 239 10E3/UL (ref 150–450)
POTASSIUM BLD-SCNC: 3.8 MMOL/L (ref 3.4–5.3)
PROT SERPL-MCNC: 7.9 G/DL (ref 6.8–8.8)
RBC # BLD AUTO: 4.93 10E6/UL (ref 3.8–5.2)
SODIUM SERPL-SCNC: 139 MMOL/L (ref 133–144)
TSH SERPL DL<=0.005 MIU/L-ACNC: 1.12 MU/L (ref 0.4–4)
WBC # BLD AUTO: 5.2 10E3/UL (ref 4–11)

## 2022-05-09 PROCEDURE — 93000 ELECTROCARDIOGRAM COMPLETE: CPT | Performed by: NURSE PRACTITIONER

## 2022-05-09 PROCEDURE — 84443 ASSAY THYROID STIM HORMONE: CPT | Performed by: NURSE PRACTITIONER

## 2022-05-09 PROCEDURE — 85027 COMPLETE CBC AUTOMATED: CPT | Performed by: NURSE PRACTITIONER

## 2022-05-09 PROCEDURE — 99396 PREV VISIT EST AGE 40-64: CPT | Performed by: NURSE PRACTITIONER

## 2022-05-09 PROCEDURE — 80053 COMPREHEN METABOLIC PANEL: CPT | Performed by: NURSE PRACTITIONER

## 2022-05-09 PROCEDURE — 99214 OFFICE O/P EST MOD 30 MIN: CPT | Mod: 25 | Performed by: NURSE PRACTITIONER

## 2022-05-09 PROCEDURE — 36415 COLL VENOUS BLD VENIPUNCTURE: CPT | Performed by: NURSE PRACTITIONER

## 2022-05-09 ASSESSMENT — PATIENT HEALTH QUESTIONNAIRE - PHQ9
SUM OF ALL RESPONSES TO PHQ QUESTIONS 1-9: 2
SUM OF ALL RESPONSES TO PHQ QUESTIONS 1-9: 2
10. IF YOU CHECKED OFF ANY PROBLEMS, HOW DIFFICULT HAVE THESE PROBLEMS MADE IT FOR YOU TO DO YOUR WORK, TAKE CARE OF THINGS AT HOME, OR GET ALONG WITH OTHER PEOPLE: NOT DIFFICULT AT ALL

## 2022-05-09 ASSESSMENT — PAIN SCALES - GENERAL: PAINLEVEL: NO PAIN (0)

## 2022-05-09 NOTE — PROGRESS NOTES
SUBJECTIVE:   CC: Aleena Escamilla is an 46 year old woman who presents for preventive health visit.       Patient has been advised of split billing requirements and indicates understanding: Yes     Healthy Habits:     Getting at least 3 servings of Calcium per day:  Yes    Bi-annual eye exam:  Yes    Dental care twice a year:  Yes    Sleep apnea or symptoms of sleep apnea:  None    Diet:  Regular (no restrictions)    Frequency of exercise:  2-3 days/week    Duration of exercise:  15-30 minutes    Taking medications regularly:  Yes    PHQ-2 Total Score: 0    Additional concerns today:  Yes    Palpitations, chest pain, and felt shortness of breath 2 weeks ago, sx woke her up at night, episode lasted for about 3-4 hours. She gets episodes of palpitations that last less than 5 min, resolve on their own and are not associated with weakness or dizziness. She does endorse anxiety when symptoms occur, denies history of asthma.      Depression Followup    How are you doing with your depression since your last visit? No change    Are you having other symptoms that might be associated with depression? No    Have you had a significant life event?  Health Concerns     Are you feeling anxious or having panic attacks?   No    Do you have any concerns with your use of alcohol or other drugs? No      Fibromyalgia- tolerating Gabapenin but complains of gaining weight, pain is fairly well controlled. She is walking daily.    Social History     Tobacco Use     Smoking status: Never Smoker     Smokeless tobacco: Never Used   Vaping Use     Vaping Use: Never used   Substance Use Topics     Alcohol use: Yes     Drug use: No     PHQ 4/27/2021 11/16/2021 5/9/2022   PHQ-9 Total Score 0 2 2   Q9: Thoughts of better off dead/self-harm past 2 weeks Not at all Not at all Not at all     VENKAT-7 SCORE 11/1/2019 10/26/2020 4/27/2021   Total Score - - -   Total Score 1 1 0       Suicide Assessment Five-step Evaluation and Treatment  (SAFE-T)      Today's PHQ-2 Score:   PHQ-2 ( 1999 Pfizer) 5/9/2022   Q1: Little interest or pleasure in doing things -   Q2: Feeling down, depressed or hopeless -   PHQ-2 Score -   PHQ-2 Total Score (12-17 Years)- Positive if 3 or more points; Administer PHQ-A if positive -   Q1: Little interest or pleasure in doing things -   Q2: Feeling down, depressed or hopeless -   PHQ-2 Score Incomplete       Abuse: Current or Past (Physical, Sexual or Emotional) - No  Do you feel safe in your environment? Yes    Have you ever done Advance Care Planning? (For example, a Health Directive, POLST, or a discussion with a medical provider or your loved ones about your wishes): No, advance care planning information given to patient to review.  Patient declined advance care planning discussion at this time.    Social History     Tobacco Use     Smoking status: Never Smoker     Smokeless tobacco: Never Used   Substance Use Topics     Alcohol use: Yes     Comment: 1 drink per week.     If you drink alcohol do you typically have >3 drinks per day or >7 drinks per week? No    Alcohol Use 4/18/2016   Prescreen: >3 drinks/day or >7 drinks/week? The patient does not drink >3 drinks per day nor >7 drinks per week.       Reviewed orders with patient.  Reviewed health maintenance and updated orders accordingly - Yes  Labs reviewed in EPIC  BP Readings from Last 3 Encounters:   05/09/22 104/73   03/25/22 122/83   01/03/22 114/79    Wt Readings from Last 3 Encounters:   05/09/22 78.5 kg (173 lb)   03/25/22 78.3 kg (172 lb 9.6 oz)   01/03/22 77.1 kg (170 lb)                  Patient Active Problem List   Diagnosis     CARDIOVASCULAR SCREENING; LDL GOAL LESS THAN 160     Chronic pelvic pain in female     Chronic salpingitis and oophoritis     Major depressive disorder, recurrent episode, mild (H)     Generalized anxiety disorder     Arthritis, multiple joint involvement     Primary osteoarthritis of both hands     Fibromyalgia     Malignant  neoplasm of overlapping sites of right female breast (H)     BRCA gene positive     Carpal tunnel syndrome     Non morbid drug-induced obesity     Chronic right shoulder pain     Bunion, right     Onychomycosis     Elevated LFTs     Fatty liver     Hyperlipidemia LDL goal <130     High risk medication use     Upper back pain, chronic     Past Surgical History:   Procedure Laterality Date     BIOPSY BREAST NEEDLE LOCALIZATION Right 1/19/2016    Procedure: BIOPSY BREAST NEEDLE LOCALIZATION;  Surgeon: Adelina Demarco MD;  Location: [facility]     GYN SURGERY  2-    bilateral salpingectomy, left oophrectomy     LAPAROSCOPIC SALPINGO-OOPHORECTOMY Right 10/26/2016    Procedure: LAPAROSCOPIC SALPINGO-OOPHORECTOMY;  Surgeon: Bouchra Mayo MD;  Location: [facility] OR     LUMPECTOMY BREAST WITH SENTINEL NODE, COMBINED Right 1/19/2016    Procedure: COMBINED LUMPECTOMY BREAST WITH SENTINEL NODE;  Surgeon: Adelina Demarco MD;  Location: [facility] OR     PELVIS LAPAROSCOPY,DX  12/28/1998     RELEASE CARPAL TUNNEL Left 12/23/2016    Procedure: RELEASE CARPAL TUNNEL;  Surgeon: Sam Florence MD;  Location:  OR       Social History     Tobacco Use     Smoking status: Never Smoker     Smokeless tobacco: Never Used   Substance Use Topics     Alcohol use: Yes     Family History   Problem Relation Age of Onset     Diabetes Mother      Hypertension Mother      Cancer Maternal Grandmother         cervical CA     Cardiovascular Father         MI     Cancer Maternal Aunt         cervical cancer     Pancreatic Cancer Maternal Aunt         COD           Breast Cancer Screening:  Any new diagnosis of family breast, ovarian, or bowel cancer? No    FHS-7:   Breast CA Risk Assessment (FHS-7) 7/23/2021   Did any of your first-degree relatives have breast or ovarian cancer? No   Did any of your relatives have bilateral breast cancer? No   Did any man in your family have breast cancer? No   Did any woman in your family have breast and  ovarian cancer? No   Did any woman in your family have breast cancer before age 50 y? No   Do you have 2 or more relatives with breast and/or ovarian cancer? No   Do you have 2 or more relatives with breast and/or bowel cancer? No     click delete button to remove this line now  Mammogram Screening: Recommended annual mammography  Pertinent mammograms are reviewed under the imaging tab.    History of abnormal Pap smear: NO - age 30-65 PAP every 5 years with negative HPV co-testing recommended  PAP / HPV Latest Ref Rng & Units 5/16/2019 4/18/2016 1/22/2013   PAP (Historical) - NIL NIL NIL   HPV16 NEG:Negative Negative Negative -   HPV18 NEG:Negative Negative Negative -   HRHPV NEG:Negative Negative Negative -     Reviewed and updated as needed this visit by clinical staff                    Reviewed and updated as needed this visit by Provider                   Past Medical History:   Diagnosis Date     Chronic pelvic pain in female      Malignant neoplasm of right breast, stage 2 (H) 1/2016 1/4 lymph nodes positive, Oncotype DX = 25.  Chemotherapy and radiation. Letrozole.     Migraine headaches       Past Surgical History:   Procedure Laterality Date     BIOPSY BREAST NEEDLE LOCALIZATION Right 1/19/2016    Procedure: BIOPSY BREAST NEEDLE LOCALIZATION;  Surgeon: Adelina Demarco MD;  Location:  OR     GYN SURGERY  2-    bilateral salpingectomy, left oophrectomy     LAPAROSCOPIC SALPINGO-OOPHORECTOMY Right 10/26/2016    Procedure: LAPAROSCOPIC SALPINGO-OOPHORECTOMY;  Surgeon: Bouchra Mayo MD;  Location: [facility]     LUMPECTOMY BREAST WITH SENTINEL NODE, COMBINED Right 1/19/2016    Procedure: COMBINED LUMPECTOMY BREAST WITH SENTINEL NODE;  Surgeon: Adelina Demarco MD;  Location:  OR     PELVIS LAPAROSCOPY,DX  12/28/1998     RELEASE CARPAL TUNNEL Left 12/23/2016    Procedure: RELEASE CARPAL TUNNEL;  Surgeon: Sam Florence MD;  Location:  OR       Review of Systems   Cardiovascular:  Positive for chest pain.     CONSTITUTIONAL: NEGATIVE for fever, chills, change in weight  INTEGUMENTARU/SKIN: NEGATIVE for worrisome rashes, moles or lesions  EYES: NEGATIVE for vision changes or irritation  ENT: NEGATIVE for ear, mouth and throat problems  RESP: NEGATIVE for significant cough or SOB  BREAST: NEGATIVE for masses, tenderness or discharge  CV: NEGATIVE for chest pain, palpitations or peripheral edema  GI: NEGATIVE for nausea, abdominal pain, heartburn, or change in bowel habits  : NEGATIVE for unusual urinary or vaginal symptoms. Periods are regular.  MUSCULOSKELETAL: NEGATIVE for significant arthralgias or myalgia  NEURO: NEGATIVE for weakness, dizziness or paresthesias  PSYCHIATRIC: NEGATIVE for changes in mood or affect     OBJECTIVE:   LMP 03/01/2016 (Approximate)   Physical Exam  GENERAL: healthy, alert and no distress  EYES: Eyes grossly normal to inspection, PERRL and conjunctivae and sclerae normal  HENT: ear canals and TM's normal, nose and mouth without ulcers or lesions  NECK: no adenopathy, no asymmetry, masses, or scars and thyroid normal to palpation  RESP: lungs clear to auscultation - no rales, rhonchi or wheezes  CV: regular rate and rhythm, normal S1 S2, no S3 or S4, no murmur, click or rub, no peripheral edema and peripheral pulses strong  ABDOMEN: soft, nontender, no hepatosplenomegaly, no masses and bowel sounds normal   (female): deferred  MS: no gross musculoskeletal defects noted, no edema  SKIN: no suspicious lesions or rashes  NEURO: Normal strength and tone, mentation intact and speech normal  PSYCH: mentation appears normal, affect normal/bright    Diagnostic Test Results:  Labs reviewed in Epic  No results found for this or any previous visit (from the past 24 hour(s)).    ASSESSMENT/PLAN:   (Z00.00) Encounter for routine history and physical exam in female  (primary encounter diagnosis)  Comment:   Plan:     (R00.2) Palpitations  Comment: EKG is normal, checking  labs, suspect anxiety component to her symptoms.  Return to clinic if not improved, new, or worsening symptoms.   Plan: CBC with platelets, Comprehensive metabolic         panel (BMP + Alb, Alk Phos, ALT, AST, Total.         Bili, TP), TSH with free T4 reflex, EKG 12-lead        complete w/read - Clinics            (F33.0) Major depressive disorder, recurrent episode, mild (H)  Comment:   Plan: Stable, not on selective serotonin reuptake inhibitor and not in counseling, continue to monitor    (K76.0) Fatty liver  Comment:   Plan: checking LFT's today, avoid alcohol, encouraged weight loss efforts.     (M79.7) Fibromyalgia  Comment:   Plan: Stable, continue with tylenol prn pain.    (Z12.11) Special screening for malignant neoplasms, colon  Comment:   Plan: Fecal colorectal cancer screen (FIT)            (Z28.21) Diphtheria-tetanus vaccination declined  Comment:   Plan: Patient refused    Patient has been advised of split billing requirements and indicates understanding: Yes    COUNSELING:  Reviewed preventive health counseling, as reflected in patient instructions    Estimated body mass index is 33.71 kg/m  as calculated from the following:    Height as of 1/3/22: 1.524 m (5').    Weight as of 3/25/22: 78.3 kg (172 lb 9.6 oz).    Weight management plan: Discussed healthy diet and exercise guidelines    She reports that she has never smoked. She has never used smokeless tobacco.      Counseling Resources:  ATP IV Guidelines  Pooled Cohorts Equation Calculator  Breast Cancer Risk Calculator  BRCA-Related Cancer Risk Assessment: FHS-7 Tool  FRAX Risk Assessment  ICSI Preventive Guidelines  Dietary Guidelines for Americans, 2010  USDA's MyPlate  ASA Prophylaxis  Lung CA Screening    PIA Edmond CNP  [facility]

## 2022-05-09 NOTE — PATIENT INSTRUCTIONS
At [facility], we strive to deliver an exceptional experience to you, every time we see you. If you receive a survey, please complete it as we do value your feedback.  If you have MyChart, you can expect to receive results automatically within 24 hours of their completion.  Your provider will send a note interpreting your results as well.   If you do not have MyChart, you should receive your results in about a week by mail.    Your care team:                            Family Medicine Internal Medicine   MD Antonio Mistry MD Shantel Branch-Fleming, MD Srinivasa Vaka, MD Katya Belousova, PIA Mello CNP, MD ([facility]   Luis Fernando Rhoades, MD Shahla Young MD Amelia Massimini APRN CNP Kim Thein, APRN CNP Bethany Templen, MD             Clinic hours: Monday - Thursday 7 am-6 pm; Fridays 7 am-5 pm.   Urgent care: Monday - Friday 10 am- 8 pm; Saturday and Sunday 9 am-5 pm.    Clinic: [phone]       [facility]: Monday - Thursday 8 am - 7 pm; Friday 8 am - 6 pm  [facility]: [phone]

## 2022-05-10 ASSESSMENT — PATIENT HEALTH QUESTIONNAIRE - PHQ9: SUM OF ALL RESPONSES TO PHQ QUESTIONS 1-9: 2

## 2022-05-18 ENCOUNTER — OFFICE VISIT (OUTPATIENT)
Dept: URGENT CARE | Facility: URGENT CARE | Age: 47
End: 2022-05-18
Payer: OTHER MISCELLANEOUS

## 2022-05-18 ENCOUNTER — ANCILLARY PROCEDURE (OUTPATIENT)
Dept: GENERAL RADIOLOGY | Facility: CLINIC | Age: 47
End: 2022-05-18
Attending: PHYSICIAN ASSISTANT
Payer: COMMERCIAL

## 2022-05-18 VITALS
BODY MASS INDEX: 32.82 KG/M2 | HEART RATE: 76 BPM | SYSTOLIC BLOOD PRESSURE: 102 MMHG | TEMPERATURE: 98.6 F | WEIGHT: 171.6 LBS | OXYGEN SATURATION: 96 % | DIASTOLIC BLOOD PRESSURE: 75 MMHG

## 2022-05-18 DIAGNOSIS — M25.571 ACUTE RIGHT ANKLE PAIN: ICD-10-CM

## 2022-05-18 DIAGNOSIS — M54.50 ACUTE RIGHT-SIDED LOW BACK PAIN WITHOUT SCIATICA: ICD-10-CM

## 2022-05-18 DIAGNOSIS — S93.401A SPRAIN OF RIGHT ANKLE, UNSPECIFIED LIGAMENT, INITIAL ENCOUNTER: Primary | ICD-10-CM

## 2022-05-18 PROCEDURE — 73610 X-RAY EXAM OF ANKLE: CPT | Mod: TC | Performed by: RADIOLOGY

## 2022-05-18 PROCEDURE — 99213 OFFICE O/P EST LOW 20 MIN: CPT | Performed by: PHYSICIAN ASSISTANT

## 2022-05-18 ASSESSMENT — ENCOUNTER SYMPTOMS
BACK PAIN: 0
ALLERGIC/IMMUNOLOGIC NEGATIVE: 1
HEADACHES: 0
DIARRHEA: 0
JOINT SWELLING: 1
CARDIOVASCULAR NEGATIVE: 1
NECK STIFFNESS: 0
WEAKNESS: 0
MYALGIAS: 1
VOMITING: 0
WOUND: 0
PALPITATIONS: 0
SORE THROAT: 0
LIGHT-HEADEDNESS: 0
EYES NEGATIVE: 1
NECK PAIN: 0
CHILLS: 0
FEVER: 0
COUGH: 0
RHINORRHEA: 0
NAUSEA: 0
RESPIRATORY NEGATIVE: 1
ENDOCRINE NEGATIVE: 1
SHORTNESS OF BREATH: 0
ARTHRALGIAS: 1
DIZZINESS: 0

## 2022-05-18 NOTE — PROGRESS NOTES
Chief Complaint:    Chief Complaint   Patient presents with     Trauma     Yesterday pt left work around 6p m she was leaving out of the front door of work stepped on the concrete where their is a hole and cracks and twisted right ankle, pain, pain in heel also feels pinch in butt area. Pt has taken some ibuprofen it helps a little bit       Medical Decision Making:    Vital signs reviewed by Yves Hwang PA-C  /75   Pulse 76   Temp 98.6  F (37  C) (Axillary)   Wt 77.8 kg (171 lb 9.6 oz)   LMP 03/01/2016 (Approximate)   SpO2 96%   BMI 32.82 kg/m      Differential Diagnosis:  MS Injury Pain: sprain, fracture and contusion      ASSESSMENT:     1. Sprain of right ankle, unspecified ligament, initial encounter    2. Acute right-sided low back pain without sciatica    3. Acute right ankle pain         PLAN:     XR of the R ankle was negative for any acute fracture per my read.  RICE discussed.  Ibuprofen and or Tylenol for pain.  Order placed for ortho follow up for ankle sprain and low back pain with positive straight leg raise.  Worrisome symptoms discussed with instructions to go to the ED.  Patient verbalized understanding and agreed with this plan.    Labs:     Results for orders placed or performed in visit on 05/18/22   XR Ankle Right G/E 3 Views     Status: None    Narrative    EXAM: XR ANKLE RIGHT G/E 3 VIEWS  LOCATION: [facility]  DATE/TIME: 5/18/2022 7:13 PM    INDICATION: ankle pain after twisted it yesterday.  COMPARISON: None.      Impression    IMPRESSION: Normal joint spaces and alignment. No fracture. Ankle mortise is intact.       Current Meds:    Current Outpatient Medications:      CALCIUM PO, , Disp: , Rfl:      Cholecalciferol (VITAMIN D3) 2000 units TABS, Take 1 tablet by mouth daily, Disp: 100 tablet, Rfl: 3     Evening Primrose Oil 1000 MG CAPS, , Disp: , Rfl:      fish oil-omega-3 fatty acids 1000 MG capsule, Take 1 g by mouth daily, Disp: , Rfl:       gabapentin (NEURONTIN) 300 MG capsule, take 300mg in the morning and afternoon and 600mg (2 capsules) at bedtime. If side effects, reduce to last tolerable dosage. If side effects, then reduce to last tolerable dosage., Disp: 120 capsule, Rfl: 1     ibuprofen (ADVIL/MOTRIN) 600 MG tablet, Take 1 tablet (600 mg) by mouth every 8 hours as needed for moderate pain, Disp: 20 tablet, Rfl: 0     fluticasone (FLONASE) 50 MCG/ACT nasal spray, , Disp: , Rfl:     Allergies:  Allergies   Allergen Reactions     Latex Rash       SUBJECTIVE    HPI: Aleena Escamilla is an 46 year old female who presents for evaluation and treatment of right ankle injury.  Patient injured the R ankle at work when she stepped on a hole in the concrete and twisted her ankle. She has noticed pain and swelling around the lateral malleolus. She notes the pain is a 5/10 when she is walking. She has noticed a tingling sensation up her leg and over her lower back since the accident. The pain is not worsening and is partially alleviated with ibuprofen. She has tried icing the area which has reduced some of the swelling.   She denies numbness or changes to bowel or bladder function.     ROS:      Review of Systems   Constitutional: Negative for chills and fever.   HENT: Negative for congestion, ear pain, rhinorrhea and sore throat.    Eyes: Negative.    Respiratory: Negative.  Negative for cough and shortness of breath.    Cardiovascular: Negative.  Negative for chest pain and palpitations.   Gastrointestinal: Negative for diarrhea, nausea and vomiting.   Endocrine: Negative.    Genitourinary: Negative.    Musculoskeletal: Positive for arthralgias, joint swelling and myalgias. Negative for back pain, neck pain and neck stiffness.   Skin: Negative.  Negative for rash and wound.   Allergic/Immunologic: Negative.  Negative for immunocompromised state.   Neurological: Negative for dizziness, weakness, light-headedness and headaches.        Family History   Family  History   Problem Relation Age of Onset     Diabetes Mother      Hypertension Mother      Cancer Maternal Grandmother         cervical CA     Cardiovascular Father         MI     Cancer Maternal Aunt         cervical cancer     Pancreatic Cancer Maternal Aunt         COD       Social History  Social History     Socioeconomic History     Marital status:      Spouse name: Not on file     Number of children: Not on file     Years of education: Not on file     Highest education level: Not on file   Occupational History     Not on file   Tobacco Use     Smoking status: Never Smoker     Smokeless tobacco: Never Used   Vaping Use     Vaping Use: Never used   Substance and Sexual Activity     Alcohol use: Yes     Drug use: No     Sexual activity: Yes     Partners: Male     Birth control/protection: None, Female Surgical   Other Topics Concern     Parent/sibling w/ CABG, MI or angioplasty before 65F 55M? No      Service No     Blood Transfusions No     Caffeine Concern No     Comment: coffee 2 cups per day     Occupational Exposure No     Hobby Hazards No     Sleep Concern No     Stress Concern No     Weight Concern No     Special Diet No     Back Care No     Exercise No     Bike Helmet No     Seat Belt Yes     Comment: 100%     Self-Exams No   Social History Narrative     Not on file     Social Determinants of Health     Financial Resource Strain: Not on file   Food Insecurity: Not on file   Transportation Needs: Not on file   Physical Activity: Not on file   Stress: Not on file   Social Connections: Not on file   Intimate Partner Violence: Not on file   Housing Stability: Not on file        Surgical History:  Past Surgical History:   Procedure Laterality Date     BIOPSY BREAST NEEDLE LOCALIZATION Right 1/19/2016    Procedure: BIOPSY BREAST NEEDLE LOCALIZATION;  Surgeon: Adelina Demarco MD;  Location: [facility] OR     GYN SURGERY  2-    bilateral salpingectomy, left oophrectomy     LAPAROSCOPIC  SALPINGO-OOPHORECTOMY Right 10/26/2016    Procedure: LAPAROSCOPIC SALPINGO-OOPHORECTOMY;  Surgeon: Bouchra Mayo MD;  Location: [facility]     LUMPECTOMY BREAST WITH SENTINEL NODE, COMBINED Right 1/19/2016    Procedure: COMBINED LUMPECTOMY BREAST WITH SENTINEL NODE;  Surgeon: Adelina Demarco MD;  Location: [facility] OR     PELVIS LAPAROSCOPY,DX  12/28/1998     RELEASE CARPAL TUNNEL Left 12/23/2016    Procedure: RELEASE CARPAL TUNNEL;  Surgeon: Sam Florence MD;  Location: [facility] OR        Problem List:  Patient Active Problem List   Diagnosis     CARDIOVASCULAR SCREENING; LDL GOAL LESS THAN 160     Chronic pelvic pain in female     Chronic salpingitis and oophoritis     Major depressive disorder, recurrent episode, mild (H)     Generalized anxiety disorder     Arthritis, multiple joint involvement     Primary osteoarthritis of both hands     Fibromyalgia     Malignant neoplasm of overlapping sites of right female breast (H)     BRCA gene positive     Carpal tunnel syndrome     Non morbid drug-induced obesity     Chronic right shoulder pain     Bunion, right     Onychomycosis     Elevated LFTs     Fatty liver     Hyperlipidemia LDL goal <130     High risk medication use     Upper back pain, chronic           OBJECTIVE:     Vital signs noted and reviewed by Yves Hwang PA-C  /75   Pulse 76   Temp 98.6  F (37  C) (Axillary)   Wt 77.8 kg (171 lb 9.6 oz)   LMP 03/01/2016 (Approximate)   SpO2 96%   BMI 32.82 kg/m       PEFR:    Physical Exam  Vitals and nursing note reviewed.   Constitutional:       General: She is not in acute distress.     Appearance: She is well-developed. She is not ill-appearing, toxic-appearing or diaphoretic.   HENT:      Head: Normocephalic and atraumatic.      Right Ear: Tympanic membrane and external ear normal. No drainage, swelling or tenderness. Tympanic membrane is not perforated, erythematous, retracted or bulging.      Left Ear: Tympanic membrane and external ear normal.  No drainage, swelling or tenderness. Tympanic membrane is not perforated, erythematous, retracted or bulging.      Nose: No mucosal edema, congestion or rhinorrhea.      Right Sinus: No maxillary sinus tenderness or frontal sinus tenderness.      Left Sinus: No maxillary sinus tenderness or frontal sinus tenderness.      Mouth/Throat:      Pharynx: No pharyngeal swelling, oropharyngeal exudate, posterior oropharyngeal erythema or uvula swelling.      Tonsils: No tonsillar abscesses.   Eyes:      Pupils: Pupils are equal, round, and reactive to light.   Neck:      Trachea: Trachea normal.   Cardiovascular:      Rate and Rhythm: Normal rate and regular rhythm.      Heart sounds: Normal heart sounds, S1 normal and S2 normal. No murmur heard.    No friction rub. No gallop.   Pulmonary:      Effort: Pulmonary effort is normal. No respiratory distress.      Breath sounds: Normal breath sounds. No decreased breath sounds, wheezing, rhonchi or rales.   Abdominal:      General: Bowel sounds are normal. There is no distension.      Palpations: Abdomen is soft. Abdomen is not rigid. There is no mass.      Tenderness: There is no abdominal tenderness. There is no guarding or rebound.   Musculoskeletal:      Cervical back: Full passive range of motion without pain, normal range of motion and neck supple.      Lumbar back: Tenderness present. No bony tenderness. Positive right straight leg raise test. Negative left straight leg raise test.        Back:       Right ankle: Swelling present. No deformity or ecchymosis. Tenderness present over the lateral malleolus.      Left ankle: No swelling, deformity or ecchymosis. No tenderness.      Comments: Tenderness over right lower back  Swelling around right lateral malleolus    Lymphadenopathy:      Cervical: No cervical adenopathy.   Skin:     General: Skin is warm and dry.   Neurological:      Mental Status: She is alert and oriented to person, place, and time.      Cranial Nerves: No  cranial nerve deficit.      Deep Tendon Reflexes: Reflexes are normal and symmetric.      Reflex Scores:       Patellar reflexes are 2+ on the right side and 2+ on the left side.  Psychiatric:         Behavior: Behavior normal. Behavior is cooperative.         Thought Content: Thought content normal.         Judgment: Judgment normal.             Yves Hwang PA-C  5/18/2022, 6:32 PM

## 2022-05-24 ENCOUNTER — TELEPHONE (OUTPATIENT)
Dept: FAMILY MEDICINE | Facility: CLINIC | Age: 47
End: 2022-05-24
Payer: COMMERCIAL

## 2022-05-24 NOTE — TELEPHONE ENCOUNTER
Nutrition Education Scheduling Outreach #1:    Call to patient to schedule. Left message with phone number to call to schedule.    Plan for 2nd outreach attempt within 1 week.    [facility]  Diabetes and Nutrition Scheduling

## 2022-05-25 ENCOUNTER — OFFICE VISIT (OUTPATIENT)
Dept: PODIATRY | Facility: CLINIC | Age: 47
End: 2022-05-25
Payer: OTHER MISCELLANEOUS

## 2022-05-25 ENCOUNTER — TELEPHONE (OUTPATIENT)
Dept: NEUROSURGERY | Facility: CLINIC | Age: 47
End: 2022-05-25
Payer: COMMERCIAL

## 2022-05-25 VITALS
SYSTOLIC BLOOD PRESSURE: 95 MMHG | BODY MASS INDEX: 33.57 KG/M2 | WEIGHT: 171 LBS | HEIGHT: 60 IN | DIASTOLIC BLOOD PRESSURE: 60 MMHG

## 2022-05-25 DIAGNOSIS — S93.491A SPRAIN OF ANTERIOR TALOFIBULAR LIGAMENT OF RIGHT ANKLE, INITIAL ENCOUNTER: Primary | ICD-10-CM

## 2022-05-25 DIAGNOSIS — M76.61 TENDONITIS, ACHILLES, RIGHT: ICD-10-CM

## 2022-05-25 DIAGNOSIS — M76.71 PERONEAL TENDONITIS, RIGHT: ICD-10-CM

## 2022-05-25 PROCEDURE — 99204 OFFICE O/P NEW MOD 45 MIN: CPT | Performed by: PODIATRIST

## 2022-05-25 NOTE — PROGRESS NOTES
Assessment:      ICD-10-CM    1. Sprain of anterior talofibular ligament of right ankle, initial encounter  S93.491A diclofenac (VOLTAREN) 50 MG EC tablet     Physical Therapy Referral   2. Peroneal tendonitis, right  M76.71 Orthopedic  Referral     Physical Therapy Referral   3. Tendonitis, Achilles, right  M76.61 Orthopedic  Referral     Physical Therapy Referral       Plan:  Orders Placed This Encounter   Procedures     Physical Therapy Referral       Discussed the etiology and treatment of the condition with the patient.  Imaging studies reviewed and discussed with the patient.  Discussed surgical and conservative options.    Ankle sprain, some Achilles pain from limping / guarding  No appreciable instability  Still working    -PT referral  -Rx po diclofenac  -Continue ankle brace  -Re-eval in 1 month    Return:  Return in about 1 month (around 6/25/2022).    Fabiola Blakely DPM                Chief Complaint:     Patient presents with:  Right Ankle - Pain     right ankle injury.    HPI:  Aleena Escamilla is a 46 year old year old female who presents for evaluation of ankle injury.    Date of injury- May 17th, 2022  Mechanism of injury- tripping over a concrete hole leaving work.  Occurred while leaving work   Pain location- right ankle; lateral, posterior, lateral malleolus  Pain severity- 3/10, pain is improving.  Modifying factors- worsened by standing, walking and pushing off her foot and improved by Nothing, Rest, Ice and Ibuprofen, ankle brace  Associated symptoms- pain only .  Initial Treatment - Cold compresses, NSAIDS, Rest and ankle brace.    Still working.    Back of ankle (Achilles) has started to hurt since    Past Medical & Surgical History:  Past Medical History:   Diagnosis Date     Chronic pelvic pain in female      Malignant neoplasm of right breast, stage 2 (H) 1/2016 1/4 lymph nodes positive, Oncotype DX = 25.  Chemotherapy and radiation. Letrozole.     Migraine  headaches       Past Surgical History:   Procedure Laterality Date     BIOPSY BREAST NEEDLE LOCALIZATION Right 1/19/2016    Procedure: BIOPSY BREAST NEEDLE LOCALIZATION;  Surgeon: Adelina Demarco MD;  Location: [facility]     GYN SURGERY  2-    bilateral salpingectomy, left oophrectomy     LAPAROSCOPIC SALPINGO-OOPHORECTOMY Right 10/26/2016    Procedure: LAPAROSCOPIC SALPINGO-OOPHORECTOMY;  Surgeon: Bouchra Mayo MD;  Location: [facility]     LUMPECTOMY BREAST WITH SENTINEL NODE, COMBINED Right 1/19/2016    Procedure: COMBINED LUMPECTOMY BREAST WITH SENTINEL NODE;  Surgeon: Adelina Demarco MD;  Location: [facility]     PELVIS LAPAROSCOPY,DX  12/28/1998     RELEASE CARPAL TUNNEL Left 12/23/2016    Procedure: RELEASE CARPAL TUNNEL;  Surgeon: Sam Florence MD;  Location: [facility] OR      Family History   Problem Relation Age of Onset     Diabetes Mother      Hypertension Mother      Cancer Maternal Grandmother         cervical CA     Cardiovascular Father         MI     Cancer Maternal Aunt         cervical cancer     Pancreatic Cancer Maternal Aunt         COD        Social History:  ?  History   Smoking Status     Never Smoker   Smokeless Tobacco     Never Used     History   Drug Use No     Social History    Substance and Sexual Activity      Alcohol use: Yes      Allergies:  ?   Allergies   Allergen Reactions     Latex Rash        Medications:    Current Outpatient Medications   Medication     diclofenac (VOLTAREN) 50 MG EC tablet     CALCIUM PO     Cholecalciferol (VITAMIN D3) 2000 units TABS     Evening Primrose Oil 1000 MG CAPS     fish oil-omega-3 fatty acids 1000 MG capsule     fluticasone (FLONASE) 50 MCG/ACT nasal spray     gabapentin (NEURONTIN) 300 MG capsule     ibuprofen (ADVIL/MOTRIN) 600 MG tablet     No current facility-administered medications for this visit.       Physical Exam:  ?  Vitals:  BP 95/60   Ht 1.524 m (5')   Wt 77.6 kg (171 lb)   LMP 03/01/2016 (Approximate)   BMI 33.40 kg/m      General:  WD/WN, in NAD.  A&O x3.  Dermatologic:    Skin is intact, open lesions absent.   Bruising absent.  Skin texture, turgor is normal.  Vascular:  Pulses palpable right.  Digital capillary refill time normal right.  Skin temperature is normal right.  Generalized edema- trace bilateral..  Focal edema- mild anterolateral ankle right..  Neurologic:    Gross sensation normal.  Gait and balance normal.  Musculoskeletal:  Maximal pain to palpation of Achilles insertion, right.  Achilles continuity intact.  moderate pain to palpation of lateral ankle gutter, peroneals, right  Ankle ROM full, pain free right.  Anterior drawer stable, Talar tilt stable, right.    Manual Muscle Testing of peroneals  pain free 5/5  right.      Imaging:  x-ray independently reviewed and interpreted by myself today.  Non-Weight-bearing views right ankle dated 05/27/22, reveal no fracture or gross mortise widening.  NWB films.

## 2022-05-25 NOTE — TELEPHONE ENCOUNTER
Called patient to confirm she still needs to be seen in our clinic for back pain. Patient stated she is needing to be seen and will be coming for her appointment.

## 2022-05-25 NOTE — PATIENT INSTRUCTIONS
PATIENT INSTRUCTIONS - Podiatry / Foot & Ankle Surgery    Diclofenac- 1 pill twice daily x1 week.  Then take a 1 week break.  Repeat as needed.  Take with food & an acid blocker if stomach upset occurs.  Stop all other NSAIDs (aspirin, ibuprofen/Motrin, naproxen/ Aleve).      Physical Therapy  You have been referred to [facility].  Locations can be found online.  Please call to schedule an appointment:  [phone]      Ankle brace or ankle compression sleeve if needed

## 2022-05-25 NOTE — TELEPHONE ENCOUNTER
LVM for patient to call back to confirm appt. Due to patient referral is regarding to ankle, was wondering if patient wants to be seen for low back as well.     Will try again tomorrow morning.

## 2022-05-25 NOTE — LETTER
5/25/2022         RE: Aleena Escamilla  [id number] [address] [address]        Dear Colleague,    Thank you for referring your patient, Aleena Escamilla, to the [facility]. Please see a copy of my visit note below.    Assessment:      ICD-10-CM    1. Sprain of anterior talofibular ligament of right ankle, initial encounter  S93.491A diclofenac (VOLTAREN) 50 MG EC tablet     Physical Therapy Referral   2. Peroneal tendonitis, right  M76.71 Orthopedic  Referral     Physical Therapy Referral   3. Tendonitis, Achilles, right  M76.61 Orthopedic  Referral     Physical Therapy Referral       Plan:  Orders Placed This Encounter   Procedures     Physical Therapy Referral       Discussed the etiology and treatment of the condition with the patient.  Imaging studies reviewed and discussed with the patient.  Discussed surgical and conservative options.    Ankle sprain, some Achilles pain from limping / guarding  No appreciable instability  Still working    -PT referral  -Rx po diclofenac  -Continue ankle brace  -Re-eval in 1 month    Return:  Return in about 1 month (around 6/25/2022).    Fabiola Blakely DPM                Chief Complaint:     Patient presents with:  Right Ankle - Pain     right ankle injury.    HPI:  Aleena Escamilla is a 46 year old year old female who presents for evaluation of ankle injury.    Date of injury- May 17th, 2022  Mechanism of injury- tripping over a concrete hole leaving work.  Occurred while leaving work   Pain location- right ankle; lateral, posterior, lateral malleolus  Pain severity- 3/10, pain is improving.  Modifying factors- worsened by standing, walking and pushing off her foot and improved by Nothing, Rest, Ice and Ibuprofen, ankle brace  Associated symptoms- pain only .  Initial Treatment - Cold compresses, NSAIDS, Rest and ankle brace.    Still working.    Back of ankle (Achilles) has started to hurt since    Past Medical &  Surgical History:  Past Medical History:   Diagnosis Date     Chronic pelvic pain in female      Malignant neoplasm of right breast, stage 2 (H) 1/2016    1/4 lymph nodes positive, Oncotype DX = 25.  Chemotherapy and radiation. Letrozole.     Migraine headaches       Past Surgical History:   Procedure Laterality Date     BIOPSY BREAST NEEDLE LOCALIZATION Right 1/19/2016    Procedure: BIOPSY BREAST NEEDLE LOCALIZATION;  Surgeon: Adelina Demarco MD;  Location: [facility] OR     GYN SURGERY  2-    bilateral salpingectomy, left oophrectomy     LAPAROSCOPIC SALPINGO-OOPHORECTOMY Right 10/26/2016    Procedure: LAPAROSCOPIC SALPINGO-OOPHORECTOMY;  Surgeon: Bouchra Mayo MD;  Location: [facility]     LUMPECTOMY BREAST WITH SENTINEL NODE, COMBINED Right 1/19/2016    Procedure: COMBINED LUMPECTOMY BREAST WITH SENTINEL NODE;  Surgeon: Adelina Demarco MD;  Location: [facility] OR     PELVIS LAPAROSCOPY,DX  12/28/1998     RELEASE CARPAL TUNNEL Left 12/23/2016    Procedure: RELEASE CARPAL TUNNEL;  Surgeon: Sam Florence MD;  Location: [facility] OR      Family History   Problem Relation Age of Onset     Diabetes Mother      Hypertension Mother      Cancer Maternal Grandmother         cervical CA     Cardiovascular Father         MI     Cancer Maternal Aunt         cervical cancer     Pancreatic Cancer Maternal Aunt         COD        Social History:  ?  History   Smoking Status     Never Smoker   Smokeless Tobacco     Never Used     History   Drug Use No     Social History    Substance and Sexual Activity      Alcohol use: Yes      Allergies:  ?   Allergies   Allergen Reactions     Latex Rash        Medications:    Current Outpatient Medications   Medication     diclofenac (VOLTAREN) 50 MG EC tablet     CALCIUM PO     Cholecalciferol (VITAMIN D3) 2000 units TABS     Evening Primrose Oil 1000 MG CAPS     fish oil-omega-3 fatty acids 1000 MG capsule     fluticasone (FLONASE) 50 MCG/ACT nasal spray     gabapentin (NEURONTIN) 300 MG  capsule     ibuprofen (ADVIL/MOTRIN) 600 MG tablet     No current facility-administered medications for this visit.       Physical Exam:  ?  Vitals:  BP 95/60   Ht 1.524 m (5')   Wt 77.6 kg (171 lb)   LMP 03/01/2016 (Approximate)   BMI 33.40 kg/m     General:  WD/WN, in NAD.  A&O x3.  Dermatologic:    Skin is intact, open lesions absent.   Bruising absent.  Skin texture, turgor is normal.  Vascular:  Pulses palpable right.  Digital capillary refill time normal right.  Skin temperature is normal right.  Generalized edema- trace bilateral..  Focal edema- mild anterolateral ankle right..  Neurologic:    Gross sensation normal.  Gait and balance normal.  Musculoskeletal:  Maximal pain to palpation of Achilles insertion, right.  Achilles continuity intact.  moderate pain to palpation of lateral ankle gutter, peroneals, right  Ankle ROM full, pain free right.  Anterior drawer stable, Talar tilt stable, right.    Manual Muscle Testing of peroneals  pain free 5/5  right.      Imaging:  x-ray independently reviewed and interpreted by myself today.  Non-Weight-bearing views right ankle dated 05/27/22, reveal no fracture or gross mortise widening.  NWB films.          Again, thank you for allowing me to participate in the care of your patient.        Sincerely,        Fabiola Blakely DPM

## 2022-05-26 ENCOUNTER — OFFICE VISIT (OUTPATIENT)
Dept: NEUROSURGERY | Facility: CLINIC | Age: 47
End: 2022-05-26
Payer: OTHER MISCELLANEOUS

## 2022-05-26 VITALS — OXYGEN SATURATION: 97 % | HEART RATE: 72 BPM | SYSTOLIC BLOOD PRESSURE: 116 MMHG | DIASTOLIC BLOOD PRESSURE: 75 MMHG

## 2022-05-26 DIAGNOSIS — M53.3 TENDERNESS OF SACROILIAC JOINT: ICD-10-CM

## 2022-05-26 DIAGNOSIS — M54.16 LUMBAR RADICULOPATHY: Primary | ICD-10-CM

## 2022-05-26 PROCEDURE — 99203 OFFICE O/P NEW LOW 30 MIN: CPT | Performed by: NURSE PRACTITIONER

## 2022-05-26 ASSESSMENT — PAIN SCALES - GENERAL: PAINLEVEL: MILD PAIN (3)

## 2022-05-26 NOTE — PROGRESS NOTES
Dr. Tobi Espinosa   [facility] Neurosurgery Clinic Visit      CC: back pain, right leg pain     Primary Care Provider: No Ref-Primary, Physician    Reason For Visit:   I was asked by Yves Hwang PA-C to consult on the patient for: acute right sided low back pain       HPI: Aleena Escamilla is a 46 year old female who presents for evaluation of acute low back pain and right leg pain. Symptoms started on 5/17/22 after stepping on a hole in the concrete at work. Today, patient reports midline low back pain that radiates to right buttocks and posterior right leg to ankle. Note, patient is also following with podiatry for right ankle sprain. She denies leg numbness or weakness. Pain is worsened with sitting, heavy lifting, and lying flat. Patient is currently taking NSAIDS for pain control. Denies any falls, foot drop, saddle anesthesia, or bladder/bowel incontinence.     Current pain: 3/10     Past Medical History:   Diagnosis Date     Chronic pelvic pain in female      Malignant neoplasm of right breast, stage 2 (H) 1/2016 1/4 lymph nodes positive, Oncotype DX = 25.  Chemotherapy and radiation. Letrozole.     Migraine headaches        Past Medical History reviewed with patient during visit.    Past Surgical History:   Procedure Laterality Date     BIOPSY BREAST NEEDLE LOCALIZATION Right 1/19/2016    Procedure: BIOPSY BREAST NEEDLE LOCALIZATION;  Surgeon: Adelina Demarco MD;  Location:  OR     GYN SURGERY  2-    bilateral salpingectomy, left oophrectomy     LAPAROSCOPIC SALPINGO-OOPHORECTOMY Right 10/26/2016    Procedure: LAPAROSCOPIC SALPINGO-OOPHORECTOMY;  Surgeon: Bouchra Mayo MD;  Location: [facility] OR     LUMPECTOMY BREAST WITH SENTINEL NODE, COMBINED Right 1/19/2016    Procedure: COMBINED LUMPECTOMY BREAST WITH SENTINEL NODE;  Surgeon: Adelina Demarco MD;  Location: [facility] OR     PELVIS LAPAROSCOPY,DX  12/28/1998     RELEASE CARPAL TUNNEL Left 12/23/2016    Procedure: RELEASE CARPAL TUNNEL;   Surgeon: Sam Florence MD;  Location: [facility] OR     Past Surgical History reviewed with patient during visit.    Current Outpatient Medications   Medication     CALCIUM PO     Cholecalciferol (VITAMIN D3) 2000 units TABS     diclofenac (VOLTAREN) 50 MG EC tablet     Evening Primrose Oil 1000 MG CAPS     fish oil-omega-3 fatty acids 1000 MG capsule     fluticasone (FLONASE) 50 MCG/ACT nasal spray     gabapentin (NEURONTIN) 300 MG capsule     ibuprofen (ADVIL/MOTRIN) 600 MG tablet     No current facility-administered medications for this visit.       Allergies   Allergen Reactions     Latex Rash       Social History     Socioeconomic History     Marital status:    Tobacco Use     Smoking status: Never Smoker     Smokeless tobacco: Never Used   Vaping Use     Vaping Use: Never used   Substance and Sexual Activity     Alcohol use: Yes     Drug use: No     Sexual activity: Yes     Partners: Male     Birth control/protection: None, Female Surgical   Other Topics Concern     Parent/sibling w/ CABG, MI or angioplasty before 65F 55M? No      Service No     Blood Transfusions No     Caffeine Concern No     Comment: coffee 2 cups per day     Occupational Exposure No     Hobby Hazards No     Sleep Concern No     Stress Concern No     Weight Concern No     Special Diet No     Back Care No     Exercise No     Bike Helmet No     Seat Belt Yes     Comment: 100%     Self-Exams No       Family History   Problem Relation Age of Onset     Diabetes Mother      Hypertension Mother      Cancer Maternal Grandmother         cervical CA     Cardiovascular Father         MI     Cancer Maternal Aunt         cervical cancer     Pancreatic Cancer Maternal Aunt         COD       ROS: 10 point ROS neg other than the symptoms noted above in the HPI.    Vital Signs: /75   Pulse 72   LMP 03/01/2016 (Approximate)   SpO2 97%     Physical Examination:  Constitutional:  Alert, well nourished, NAD.  HEENT: Normocephalic,  atraumatic.   Pulmonary:  Without shortness of breath, normal effort.   Cardiovascular:  No pitting edema of BLE.      Neurological:  Awake  Alert  Oriented x 3  Speech clear  Cranial nerves II - XII grossly intact  PERRL  EOMI  Motor exam:  Iliopsoas  (hip flexion)               Right: 5/5  Left:  5/5  Quadriceps  (knee extension)       Right:  5/5  Left:  5/5  Hamstrings  (knee flexion)            Right:  5/5  Left:  5/5  Gastroc Soleus  (PF)                          Right:  5/5  Left:  5/5  Tibialis Ant  (DF)                          Right:  5/5  Left:  5/5  EHL                          Right:  5/5  Left:  5/5         Sensation normal to BLE     Reflexes are 2+ in the patellar and Achilles. There is no clonus.     Musculoskeletal:  Gait: Able to stand from a seated position. Normal non-antalgic, non-myelopathic gait. Able to heel/toe walk without loss of balance.    No tenderness of the spine or paraspinous muscles.  Hip height is symmetrical.  Positive SI joint tenderness on the right.   Positive straight leg raise on the right.     Imaging:   No imaging has been completed of the lumbar spine     Assessment/Plan:   46 year old female who presents for evaluation of midline low back pain that radiates to right buttocks and posterior right leg to ankle. She does have positive right sided straight leg raise and SI joint tenderness. No imaging has been completed of the lumbar spine. Recommend a lumbar spine MRI and referral to PT. Will call patient with MRI results and next steps.    Advised patient to call our clinic with any questions or concerns. Discussed red flag symptoms and advised to seek medical attention if these develop. Patient voiced understanding and agreement.      Mariola Lorenzo CNP  [facility] Neurosurgery  [address] [address]  Tel [phone]  Pager [phone]

## 2022-05-26 NOTE — NURSING NOTE
"Aleena Escamilla is a 46 year old female who presents for:  Chief Complaint   Patient presents with     Neurologic Problem     LBP        Initial Vitals:  /75   Pulse 72   LMP 03/01/2016 (Approximate)   SpO2 97%  Estimated body mass index is 33.4 kg/m  as calculated from the following:    Height as of 5/25/22: 5' (1.524 m).    Weight as of 5/25/22: 171 lb (77.6 kg).. There is no height or weight on file to calculate BSA. BP completed using cuff size: large  Mild Pain (3)    Nursing Comments: 3/10 pain. Pinch on lower back that radiates to R ankle. \"Feels like a pinch and stretchy feeling\"    Fabrice Lancaster MA    "

## 2022-05-26 NOTE — LETTER
5/26/2022         RE: Aleena Escamilla  [address] [facility] [address]        Dear Colleague,    Thank you for referring your patient, Aleena Escamilla, to the [facility]. Please see a copy of my visit note below.    Dr. Tobi Espinosa   [facility] Neurosurgery Clinic Visit      CC: back pain, right leg pain     Primary Care Provider: No Ref-Primary, Physician    Reason For Visit:   I was asked by Yves Hwang PA-C to consult on the patient for: acute right sided low back pain       HPI: Aleena Escamilla is a 46 year old female who presents for evaluation of acute low back pain and right leg pain. Symptoms started on 5/17/22 after stepping on a hole in the concrete at work. Today, patient reports midline low back pain that radiates to right buttocks and posterior right leg to ankle. Note, patient is also following with podiatry for right ankle sprain. She denies leg numbness or weakness. Pain is worsened with sitting, heavy lifting, and lying flat. Patient is currently taking NSAIDS for pain control. Denies any falls, foot drop, saddle anesthesia, or bladder/bowel incontinence.     Current pain: 3/10     Past Medical History:   Diagnosis Date     Chronic pelvic pain in female      Malignant neoplasm of right breast, stage 2 (H) 1/2016 1/4 lymph nodes positive, Oncotype DX = 25.  Chemotherapy and radiation. Letrozole.     Migraine headaches        Past Medical History reviewed with patient during visit.    Past Surgical History:   Procedure Laterality Date     BIOPSY BREAST NEEDLE LOCALIZATION Right 1/19/2016    Procedure: BIOPSY BREAST NEEDLE LOCALIZATION;  Surgeon: Adelina Demarco MD;  Location: [facility] OR     GYN SURGERY  2-    bilateral salpingectomy, left oophrectomy     LAPAROSCOPIC SALPINGO-OOPHORECTOMY Right 10/26/2016    Procedure: LAPAROSCOPIC SALPINGO-OOPHORECTOMY;  Surgeon: Bouchra Mayo MD;  Location: [facility] OR     LUMPECTOMY BREAST WITH  SENTINEL NODE, COMBINED Right 1/19/2016    Procedure: COMBINED LUMPECTOMY BREAST WITH SENTINEL NODE;  Surgeon: Adelina Demarco MD;  Location: [facility] OR     PELVIS LAPAROSCOPY,DX  12/28/1998     RELEASE CARPAL TUNNEL Left 12/23/2016    Procedure: RELEASE CARPAL TUNNEL;  Surgeon: Sam Florence MD;  Location: [facility] OR     Past Surgical History reviewed with patient during visit.    Current Outpatient Medications   Medication     CALCIUM PO     Cholecalciferol (VITAMIN D3) 2000 units TABS     diclofenac (VOLTAREN) 50 MG EC tablet     Evening Primrose Oil 1000 MG CAPS     fish oil-omega-3 fatty acids 1000 MG capsule     fluticasone (FLONASE) 50 MCG/ACT nasal spray     gabapentin (NEURONTIN) 300 MG capsule     ibuprofen (ADVIL/MOTRIN) 600 MG tablet     No current facility-administered medications for this visit.       Allergies   Allergen Reactions     Latex Rash       Social History     Socioeconomic History     Marital status:    Tobacco Use     Smoking status: Never Smoker     Smokeless tobacco: Never Used   Vaping Use     Vaping Use: Never used   Substance and Sexual Activity     Alcohol use: Yes     Drug use: No     Sexual activity: Yes     Partners: Male     Birth control/protection: None, Female Surgical   Other Topics Concern     Parent/sibling w/ CABG, MI or angioplasty before 65F 55M? No      Service No     Blood Transfusions No     Caffeine Concern No     Comment: coffee 2 cups per day     Occupational Exposure No     Hobby Hazards No     Sleep Concern No     Stress Concern No     Weight Concern No     Special Diet No     Back Care No     Exercise No     Bike Helmet No     Seat Belt Yes     Comment: 100%     Self-Exams No       Family History   Problem Relation Age of Onset     Diabetes Mother      Hypertension Mother      Cancer Maternal Grandmother         cervical CA     Cardiovascular Father         MI     Cancer Maternal Aunt         cervical cancer     Pancreatic Cancer Maternal Aunt          COD       ROS: 10 point ROS neg other than the symptoms noted above in the HPI.    Vital Signs: /75   Pulse 72   LMP 03/01/2016 (Approximate)   SpO2 97%     Physical Examination:  Constitutional:  Alert, well nourished, NAD.  HEENT: Normocephalic, atraumatic.   Pulmonary:  Without shortness of breath, normal effort.   Cardiovascular:  No pitting edema of BLE.      Neurological:  Awake  Alert  Oriented x 3  Speech clear  Cranial nerves II - XII grossly intact  PERRL  EOMI  Motor exam:  Iliopsoas  (hip flexion)               Right: 5/5  Left:  5/5  Quadriceps  (knee extension)       Right:  5/5  Left:  5/5  Hamstrings  (knee flexion)            Right:  5/5  Left:  5/5  Gastroc Soleus  (PF)                          Right:  5/5  Left:  5/5  Tibialis Ant  (DF)                          Right:  5/5  Left:  5/5  EHL                          Right:  5/5  Left:  5/5         Sensation normal to BLE     Reflexes are 2+ in the patellar and Achilles. There is no clonus.     Musculoskeletal:  Gait: Able to stand from a seated position. Normal non-antalgic, non-myelopathic gait. Able to heel/toe walk without loss of balance.    No tenderness of the spine or paraspinous muscles.  Hip height is symmetrical.  Positive SI joint tenderness on the right.   Positive straight leg raise on the right.     Imaging:   No imaging has been completed of the lumbar spine     Assessment/Plan:   46 year old female who presents for evaluation of midline low back pain that radiates to right buttocks and posterior right leg to ankle. She does have positive right sided straight leg raise and SI joint tenderness. No imaging has been completed of the lumbar spine. Recommend a lumbar spine MRI and referral to PT. Will call patient with MRI results and next steps.    Advised patient to call our clinic with any questions or concerns. Discussed red flag symptoms and advised to seek medical attention if these develop. Patient voiced understanding  and agreement.      Mariola Lorenzo CNP  [facility] Neurosurgery  [address]  [place] [address]  Tel [phone]  Pager [phone]          Again, thank you for allowing me to participate in the care of your patient.        Sincerely,        Mariola Lorenzo, NP

## 2022-05-26 NOTE — PATIENT INSTRUCTIONS
Order for lumbar spine MRI. You can call to schedule at [phone]. I will call with the results and next steps.     Referral to physical therapy. They will call you to schedule.     Please call our clinic if symptoms persist, change, or worsen at any time.    [facility] Neurosurgery  [address] [address]  Tel [phone]

## 2022-07-25 ENCOUNTER — OFFICE VISIT (OUTPATIENT)
Dept: URGENT CARE | Facility: URGENT CARE | Age: 47
End: 2022-07-25

## 2022-07-25 VITALS
SYSTOLIC BLOOD PRESSURE: 124 MMHG | OXYGEN SATURATION: 96 % | HEIGHT: 60 IN | BODY MASS INDEX: 33.81 KG/M2 | HEART RATE: 76 BPM | DIASTOLIC BLOOD PRESSURE: 85 MMHG | WEIGHT: 172.2 LBS | TEMPERATURE: 97.9 F

## 2022-07-25 DIAGNOSIS — J01.00 ACUTE MAXILLARY SINUSITIS, RECURRENCE NOT SPECIFIED: ICD-10-CM

## 2022-07-25 DIAGNOSIS — H66.001 ACUTE SUPPURATIVE OTITIS MEDIA OF RIGHT EAR WITHOUT SPONTANEOUS RUPTURE OF TYMPANIC MEMBRANE, RECURRENCE NOT SPECIFIED: Primary | ICD-10-CM

## 2022-07-25 PROCEDURE — 99213 OFFICE O/P EST LOW 20 MIN: CPT | Performed by: PHYSICIAN ASSISTANT

## 2022-07-25 ASSESSMENT — ENCOUNTER SYMPTOMS
COUGH: 1
DIARRHEA: 0
ALLERGIC/IMMUNOLOGIC NEGATIVE: 1
ARTHRALGIAS: 0
WOUND: 0
SHORTNESS OF BREATH: 0
NAUSEA: 0
VOMITING: 0
JOINT SWELLING: 0
NECK STIFFNESS: 0
HEADACHES: 0
LIGHT-HEADEDNESS: 0
SINUS PAIN: 1
SINUS PRESSURE: 1
CHILLS: 0
CARDIOVASCULAR NEGATIVE: 1
PALPITATIONS: 0
RHINORRHEA: 0
NECK PAIN: 0
FEVER: 0
DIZZINESS: 0
MYALGIAS: 0
SORE THROAT: 0
MUSCULOSKELETAL NEGATIVE: 1
BACK PAIN: 0
EYES NEGATIVE: 1
ENDOCRINE NEGATIVE: 1
WEAKNESS: 0

## 2022-07-25 NOTE — PROGRESS NOTES
Chief Complaint:     Chief Complaint   Patient presents with     Nasal Congestion     Sinus Pressure     Pressure around and nose.     Post Nasal Drip     Pt said that she's smelling smoke and rotten eggs     Otalgia     Right ear pain.       No results found for any visits on 07/25/22.    Medical Decision Making:    Vital signs reviewed by Yves Hwang PA-C  /85 (BP Location: Left arm, Patient Position: Sitting, Cuff Size: Adult Regular)   Pulse 76   Temp 97.9  F (36.6  C) (Tympanic)   Ht 1.524 m (5')   Wt 78.1 kg (172 lb 3.2 oz)   LMP 03/01/2016 (Approximate)   SpO2 96%   BMI 33.63 kg/m      Differential Diagnosis:  URI Adult/Peds:  Acute right otitis media, Sinusitis, Viral syndrome and Viral upper respiratory illness        ASSESSMENT    1. Acute suppurative otitis media of right ear without spontaneous rupture of tympanic membrane, recurrence not specified    2. Acute maxillary sinusitis, recurrence not specified        PLAN    Patient is in no acute distress.    Temp is 97.9 in clinic today, lung sounds were clear, and O2 sats at 96% on RA.    Rx for Augmentin for R ear infection and sinus infection.  Rest, Push fluids, vaporizer.  Ibuprofen and or Tylenol for any fever or body aches.  If symptoms worsen, recheck immediately otherwise follow up with your PCP in 1 week if symptoms are not improving.  Worrisome symptoms discussed with instructions to go to the ED.  Patient verbalized understanding and agreed with this plan.    Labs:    No results found for any visits on 07/25/22.     Vital signs reviewed by Yves Hwang PA-C  /85 (BP Location: Left arm, Patient Position: Sitting, Cuff Size: Adult Regular)   Pulse 76   Temp 97.9  F (36.6  C) (Tympanic)   Ht 1.524 m (5')   Wt 78.1 kg (172 lb 3.2 oz)   LMP 03/01/2016 (Approximate)   SpO2 96%   BMI 33.63 kg/m      Current Meds      Current Outpatient Medications:      amoxicillin-clavulanate (AUGMENTIN) 875-125 MG tablet, Take 1  tablet by mouth 2 times daily for 10 days, Disp: 20 tablet, Rfl: 0     CALCIUM PO, , Disp: , Rfl:      Cholecalciferol (VITAMIN D3) 2000 units TABS, Take 1 tablet by mouth daily, Disp: 100 tablet, Rfl: 3     Evening Primrose Oil 1000 MG CAPS, , Disp: , Rfl:      fish oil-omega-3 fatty acids 1000 MG capsule, Take 1 g by mouth daily, Disp: , Rfl:      fluticasone (FLONASE) 50 MCG/ACT nasal spray, , Disp: , Rfl:      ibuprofen (ADVIL/MOTRIN) 600 MG tablet, Take 1 tablet (600 mg) by mouth every 8 hours as needed for moderate pain, Disp: 20 tablet, Rfl: 0     diclofenac (VOLTAREN) 50 MG EC tablet, Take 1 tablet (50 mg) by mouth 2 times daily for 7 days, Disp: 28 tablet, Rfl: 1     gabapentin (NEURONTIN) 300 MG capsule, take 300mg in the morning and afternoon and 600mg (2 capsules) at bedtime. If side effects, reduce to last tolerable dosage. If side effects, then reduce to last tolerable dosage. (Patient not taking: Reported on 7/25/2022), Disp: 120 capsule, Rfl: 1      Respiratory History    occasional episodes of bronchitis      SUBJECTIVE    HPI: Aleena Escamilla is an 46 year old female who presents with ear pain right, facial pain/pressure, nasal congestion and nasal discharge clear.  Symptoms began 1  weeks ago and has unchanged.  There is no shortness of breath, wheezing and chest pain.  Patient is eating and drinking well.  No fever, nausea, vomiting, or diarrhea.    Patient denies any recent travel or exposure to known COVID positive tested individual.      ROS:     Review of Systems   Constitutional: Negative for chills and fever.   HENT: Positive for congestion, ear pain, sinus pressure and sinus pain. Negative for rhinorrhea and sore throat.    Eyes: Negative.    Respiratory: Positive for cough. Negative for shortness of breath.    Cardiovascular: Negative.  Negative for chest pain and palpitations.   Gastrointestinal: Negative for diarrhea, nausea and vomiting.   Endocrine: Negative.    Genitourinary:  Negative.    Musculoskeletal: Negative.  Negative for arthralgias, back pain, joint swelling, myalgias, neck pain and neck stiffness.   Skin: Negative.  Negative for rash and wound.   Allergic/Immunologic: Negative.  Negative for immunocompromised state.   Neurological: Negative for dizziness, weakness, light-headedness and headaches.         Family History   Family History   Problem Relation Age of Onset     Diabetes Mother      Hypertension Mother      Cancer Maternal Grandmother         cervical CA     Cardiovascular Father         MI     Cancer Maternal Aunt         cervical cancer     Pancreatic Cancer Maternal Aunt         COD        Problem history  Patient Active Problem List   Diagnosis     CARDIOVASCULAR SCREENING; LDL GOAL LESS THAN 160     Chronic pelvic pain in female     Chronic salpingitis and oophoritis     Major depressive disorder, recurrent episode, mild (H)     Generalized anxiety disorder     Arthritis, multiple joint involvement     Primary osteoarthritis of both hands     Fibromyalgia     Malignant neoplasm of overlapping sites of right female breast (H)     BRCA gene positive     Carpal tunnel syndrome     Non morbid drug-induced obesity     Chronic right shoulder pain     Bunion, right     Onychomycosis     Elevated LFTs     Fatty liver     Hyperlipidemia LDL goal <130     High risk medication use     Upper back pain, chronic        Allergies  Allergies   Allergen Reactions     Latex Rash        Social History  Social History     Socioeconomic History     Marital status:      Spouse name: Not on file     Number of children: Not on file     Years of education: Not on file     Highest education level: Not on file   Occupational History     Not on file   Tobacco Use     Smoking status: Never Smoker     Smokeless tobacco: Never Used   Vaping Use     Vaping Use: Never used   Substance and Sexual Activity     Alcohol use: Yes     Drug use: No     Sexual activity: Yes     Partners: Male      Birth control/protection: None, Female Surgical   Other Topics Concern     Parent/sibling w/ CABG, MI or angioplasty before 65F 55M? No      Service No     Blood Transfusions No     Caffeine Concern No     Comment: coffee 2 cups per day     Occupational Exposure No     Hobby Hazards No     Sleep Concern No     Stress Concern No     Weight Concern No     Special Diet No     Back Care No     Exercise No     Bike Helmet No     Seat Belt Yes     Comment: 100%     Self-Exams No   Social History Narrative     Not on file     Social Determinants of Health     Financial Resource Strain: Not on file   Food Insecurity: Not on file   Transportation Needs: Not on file   Physical Activity: Not on file   Stress: Not on file   Social Connections: Not on file   Intimate Partner Violence: Not on file   Housing Stability: Not on file        OBJECTIVE     Vital signs reviewed by Yves Hwang PA-C  /85 (BP Location: Left arm, Patient Position: Sitting, Cuff Size: Adult Regular)   Pulse 76   Temp 97.9  F (36.6  C) (Tympanic)   Ht 1.524 m (5')   Wt 78.1 kg (172 lb 3.2 oz)   LMP 03/01/2016 (Approximate)   SpO2 96%   BMI 33.63 kg/m       Physical Exam  Vitals and nursing note reviewed.   Constitutional:       General: She is not in acute distress.     Appearance: She is well-developed. She is not ill-appearing, toxic-appearing or diaphoretic.   HENT:      Head: Normocephalic and atraumatic.      Right Ear: Hearing, ear canal and external ear normal. Tympanic membrane is erythematous and bulging. Tympanic membrane is not perforated or retracted.      Left Ear: Hearing, tympanic membrane, ear canal and external ear normal. Tympanic membrane is not perforated, erythematous, retracted or bulging.      Nose: Congestion and rhinorrhea present. No mucosal edema.      Right Sinus: Maxillary sinus tenderness present. No frontal sinus tenderness.      Left Sinus: Maxillary sinus tenderness present. No frontal sinus  tenderness.      Mouth/Throat:      Pharynx: No pharyngeal swelling, oropharyngeal exudate, posterior oropharyngeal erythema or uvula swelling.      Tonsils: No tonsillar exudate or tonsillar abscesses. 0 on the right. 0 on the left.   Eyes:      General:         Right eye: No discharge.         Left eye: No discharge.      Pupils: Pupils are equal, round, and reactive to light.   Cardiovascular:      Rate and Rhythm: Normal rate and regular rhythm.      Heart sounds: Normal heart sounds. No murmur heard.    No friction rub. No gallop.   Pulmonary:      Effort: Pulmonary effort is normal. No respiratory distress.      Breath sounds: Normal breath sounds. No decreased breath sounds, wheezing, rhonchi or rales.   Chest:      Chest wall: No tenderness.   Abdominal:      General: Bowel sounds are normal. There is no distension.      Palpations: Abdomen is soft. There is no mass.      Tenderness: There is no abdominal tenderness. There is no guarding.   Musculoskeletal:      Cervical back: Normal range of motion and neck supple.   Lymphadenopathy:      Head:      Right side of head: No submental, submandibular, tonsillar, preauricular or posterior auricular adenopathy.      Left side of head: No submental, submandibular, tonsillar, preauricular or posterior auricular adenopathy.      Cervical: No cervical adenopathy.      Right cervical: No superficial or posterior cervical adenopathy.     Left cervical: No superficial or posterior cervical adenopathy.   Skin:     General: Skin is warm and dry.      Findings: No rash.   Neurological:      Mental Status: She is alert and oriented to person, place, and time.      Cranial Nerves: No cranial nerve deficit.      Deep Tendon Reflexes: Reflexes are normal and symmetric.   Psychiatric:         Behavior: Behavior normal. Behavior is cooperative.         Thought Content: Thought content normal.         Judgment: Judgment normal.           Yves Hwang PA-C  7/25/2022, 3:02  PM

## 2022-07-28 ENCOUNTER — ANCILLARY PROCEDURE (OUTPATIENT)
Dept: MAMMOGRAPHY | Facility: CLINIC | Age: 47
End: 2022-07-28
Attending: NURSE PRACTITIONER
Payer: COMMERCIAL

## 2022-07-28 DIAGNOSIS — Z12.31 ENCOUNTER FOR SCREENING MAMMOGRAM FOR BREAST CANCER: ICD-10-CM

## 2022-07-28 PROCEDURE — 77063 BREAST TOMOSYNTHESIS BI: CPT | Performed by: RADIOLOGY

## 2022-07-28 PROCEDURE — 77067 SCR MAMMO BI INCL CAD: CPT | Performed by: RADIOLOGY

## 2022-08-01 ENCOUNTER — HOSPITAL ENCOUNTER (OUTPATIENT)
Dept: NUTRITION | Facility: CLINIC | Age: 47
Discharge: HOME | End: 2022-08-01
Attending: NURSE PRACTITIONER | Admitting: NURSE PRACTITIONER
Payer: COMMERCIAL

## 2022-08-01 DIAGNOSIS — R74.8 ELEVATED LIVER ENZYMES: ICD-10-CM

## 2022-08-01 DIAGNOSIS — K76.0 FATTY LIVER: ICD-10-CM

## 2022-08-01 PROCEDURE — 97802 MEDICAL NUTRITION INDIV IN: CPT | Mod: TEL,95 | Performed by: DIETITIAN, REGISTERED

## 2022-08-01 NOTE — PROGRESS NOTES
Originating Location (pt. Location): Other work setting     Distant Location (provider location):  [facility] NUTRITION SERVICES     Mode of Communication: Telephone     OUTPATIENT NUTRITION ASSESSMENT (TELEPHONE VISIT DUE TO COVID-19)  REASON FOR ASSESSMENT  Aleena Escamilla referred by PIA Weber CNP for MNT related to    Fatty liver [K76.0]       Elevated liver enzymes [R74.8]         Patient accompanied by self.    ASSESSMENT   Nutrition History:  - Information obtained from patient.  - Patient is on a regular diet at home.  Patient has inconsistent eating pattern as eats 1-2 meals per day.      Patient uses avocado oil as main fat source and occasionally olive oil.     Doesn't drink milk    Limiting juice  Using juicer -1/2 pineapple, 1/2 apple and yovana or spinach, cucumbers, yovana   Doesn't like candy, chocolate or cookies   Likes oranges, limes, maya, grapefruit  Monk fruit or stevia   Dislikes water     Works 7:00-3:30    Diet Recall:  Breakfast: coffee-stevia and creamer -no foods (on occasion banana)   Lunch: 2 PM chicken or steak with rice and vegetables-zucchini, yellow sauash, cabbage, green beans, broccoli and cauliflower, spinach, brussel sprouts; every 2 weeks doughnuts (11 AM)   Dinner: eats 2 times per week 7 or 8 PM chicken/steak + salad or tacos (5) hard shells or soft tacos  Snack: 1 time per month -sunflower seeds; apple/orange or cucumber with lemon or lime   Beverages: [place] water (2 bottles)-crystal light; Coke/Pepsi previously drank; alcohol margaritas 2 times per week -reduced alcohol intake 1 month ago   Dining out: 1 time per month     Exercise: Patient does not have an exercise regimen.     NUTRITION FOCUSED PHYSICAL ASSESSMENT (NFPA) FOR DIAGNOSING MALNUTRITION  No due to telephone visit due to COVID-19 pandemic           Obtained from Chart/Interdisciplinary Team:  None noted     LABS  Labs reviewed    MEDICATIONS  Medications  "reviewed    ANTHROPOMETRICS   Height: 5'0\"  Weight: 172 lbs   BMI (kg/m2): 33.5 kg/m2  Weight Status:  Obesity Grade I BMI 30-34.9  %IBW: 172%  Weight History:   Wt Readings from Last 10 Encounters:   07/25/22 78.1 kg (172 lb 3.2 oz)   05/25/22 77.6 kg (171 lb)   05/18/22 77.8 kg (171 lb 9.6 oz)   05/09/22 78.5 kg (173 lb)   03/25/22 78.3 kg (172 lb 9.6 oz)   01/03/22 77.1 kg (170 lb)   11/16/21 75.3 kg (166 lb)   09/15/21 74.7 kg (164 lb 11.2 oz)   07/09/21 75.3 kg (166 lb)   04/22/21 72.6 kg (160 lb)     ASSESSED NUTRITION NEEDS  Estimated Energy Needs: 1206-7429 kcals/day (15-20 Kcal/Kg)  Justification:  (obese)  Estimated Protein Needs: 45-55 grams protein/day (1-1.2 g pro/Kg)  Justification:  (preservation of lean body mass)  Estimated Fluid Needs: 8645-9545 mL/day (30-35 mL/kg)    ASSESSED MALNUTRITION STATUS  % Weight Loss:  None noted  % Intake:  No decreased intake noted  Subcutaneous Fat Loss:  None observed due to telephone visit due to COVID-19 pandemic   Loss of Muscle Mass:  None observed due to telephone visit due to COVID-19 pandemic   Fluid Retention:  None noted due to telephone visit due to COVID-19 pandemic     Malnutrition Diagnosis:  Patient does not meet two of the above criteria necessary for diagnosing malnutrition    DIAGNOSIS   Nutrition Diagnosis:  Food and nutrition-related knowledge deficit related to lack of prior exposure as evidenced by no previous need for food and nutrition related recommendations      INTERVENTIONS   Nutrition Prescription   Recommend modified diet with <24 grams added sugar and low fat diet due to fatty liver      IMPLEMENTATION   Assessed learning needs and learning preference  Teaching Method(s) used: Booklet / Handout  Explanation  Diet Education:  Provided education on low fat, carbohydrate counting weight loss diet   Nutrition Education (Content):   a)  Discussed portion sizes, label reading, carbohydrate counting, added sugar, high fat foods, exercise and " behavior modification.  Instructed patient on label reading using soda label.  Encouraged patient to limit added sugars <24 grams.  Patient consuming 38 grams added sugar when drinks soda.  Recommend low fat foods due to fatty liver.  Reviewed high fat foods patient consumes.  Encouraged gradual diet changes for long term weight loss and diet success.  Supported patient with the challenge of changing diet.    b)  Provided Academy [employer] Count Your Carbs booklet.   Nutrition Education (Application):   a)  Discussed current eating plans and offered suggestions as ways to lower fat and calorie intake.  Discussed limiting juicing due to higher concentration of carbohydrates and suggest emphasis on vegetables juices if desires juicing.      b)  Patient verbalizes understanding of diet by stating will reduce portion sizes.  Expected patient engagement: good    GOALS (Patient determined)   Reduce portion sizes at meals  <24 grams added sugar per day     FOLLOW UP/MONITORING   Progress towards goals will be monitored and evaluated per protocol and Practice Guidelines  Patient to call if has further questions or if desires follow up appointment  RD name and number provided     Time Spent with Patient  37 minutes    Sydney Villalba, RD, LD  [facility] Outpatient Dietitian  [phone] (office phone)

## 2022-08-30 ENCOUNTER — OFFICE VISIT (OUTPATIENT)
Dept: FAMILY MEDICINE | Facility: CLINIC | Age: 47
End: 2022-08-30
Payer: COMMERCIAL

## 2022-08-30 VITALS
SYSTOLIC BLOOD PRESSURE: 118 MMHG | BODY MASS INDEX: 31.94 KG/M2 | WEIGHT: 173.6 LBS | HEIGHT: 62 IN | HEART RATE: 85 BPM | TEMPERATURE: 98.2 F | DIASTOLIC BLOOD PRESSURE: 80 MMHG | RESPIRATION RATE: 17 BRPM | OXYGEN SATURATION: 98 %

## 2022-08-30 DIAGNOSIS — Z28.21 COVID-19 VACCINATION REFUSED: ICD-10-CM

## 2022-08-30 DIAGNOSIS — Z12.11 SPECIAL SCREENING FOR MALIGNANT NEOPLASMS, COLON: ICD-10-CM

## 2022-08-30 DIAGNOSIS — Z28.21 TETANUS, DIPHTHERIA, AND ACELLULAR PERTUSSIS (TDAP) VACCINATION DECLINED: ICD-10-CM

## 2022-08-30 DIAGNOSIS — H66.91 RIGHT ACUTE OTITIS MEDIA: Primary | ICD-10-CM

## 2022-08-30 PROCEDURE — 99214 OFFICE O/P EST MOD 30 MIN: CPT | Performed by: NURSE PRACTITIONER

## 2022-08-30 RX ORDER — CEFDINIR 300 MG/1
300 CAPSULE ORAL 2 TIMES DAILY
Qty: 10 CAPSULE | Refills: 0 | Status: SHIPPED | OUTPATIENT
Start: 2022-08-30 | End: 2022-10-26

## 2022-08-30 ASSESSMENT — PAIN SCALES - GENERAL: PAINLEVEL: MILD PAIN (3)

## 2022-08-30 NOTE — PROGRESS NOTES
Assessment & Plan     Right acute otitis media  Treating with Omnicef, follow with ENT as scheduled.  - cefdinir (OMNICEF) 300 MG capsule  Dispense: 10 capsule; Refill: 0    COVID-19 vaccination refused  Will get at a later date    Special screening for malignant neoplasms, colon    - Fecal colorectal cancer screen (FIT)  - Fecal colorectal cancer screen (FIT)    Tetanus, diphtheria, and acellular pertussis (Tdap) vaccination declined  Will get at a later date.      Ordering of each unique test  Prescription drug management  15  minutes spent on the date of the encounter doing chart review, history and exam, documentation and further activities per the note       Work on weight loss  Regular exercise  See Patient Instructions    No follow-ups on file.    PIA Edmond CNP  [facility] is a 46 year old presenting for the following health issues:  Ear Problem and Nose Problem      History of Present Illness       Reason for visit:  Smells smoke and rotten eggs, ear pain  Symptom onset:  More than a month  Symptoms include:  Today mild  Symptom intensity:  Mild  Symptom progression:  Improving    She eats 4 or more servings of fruits and vegetables daily.She consumes 0 sweetened beverage(s) daily.She exercises with enough effort to increase her heart rate 20 to 29 minutes per day.  She exercises with enough effort to increase her heart rate 3 or less days per week.   She is taking medications regularly.       Concern - Right ear pain and smell of smoke and rotten eggs  Onset: 1.5 months  Description: Right ear pain and Urgent care gave antibiotics for ear and patient felt that it helped a little bit. Ear is wet feeling and running. The smell of smoke was super strong and now is not occurring as often.  Intensity: 4/10- consisently painful  Progression of Symptoms:  Improving a little, and smelling has improved.  Accompanying Signs & Symptoms: some dizziness  "with the ear pain. No fever and no cough.   Previous history of similar problem: No  Precipitating factors:        Worsened by: Cold air- fan blowing on her ear.  Alleviating factors:        Improved by: NA  Therapies tried and outcome: antibiotics from urgent care visit 7/25/22  She will see ENT next month.    Due for FIT testing, declines TDAP and COVID vaccines today    Review of Systems   Constitutional, HEENT, cardiovascular, pulmonary, gi and gu systems are negative, except as otherwise noted.      Objective    /80 (BP Location: Left arm, Patient Position: Sitting, Cuff Size: Adult Regular)   Pulse 85   Temp 98.2  F (36.8  C) (Tympanic)   Resp 17   Ht 1.568 m (5' 1.75\")   Wt 78.7 kg (173 lb 9.6 oz)   LMP 03/01/2016 (Approximate)   SpO2 98%   BMI 32.01 kg/m    Body mass index is 32.01 kg/m .  Physical Exam   GENERAL: healthy, alert and no distress  EYES: Eyes grossly normal to inspection, PERRL and conjunctivae and sclerae normal  HENT:left- ear canal and TM normal, right TM- erythematous, bulging, no TM rupture, canal is clear, nose and mouth without ulcers or lesions  NECK: no adenopathy, no asymmetry, masses, or scars and thyroid normal to palpation  RESP: lungs clear to auscultation - no rales, rhonchi or wheezes  CV: regular rate and rhythm, normal S1 S2, no S3 or S4, no murmur, click or rub, no peripheral edema and peripheral pulses strong  ABDOMEN: soft, nontender, no hepatosplenomegaly, no masses and bowel sounds normal  MS: no gross musculoskeletal defects noted, no edema  SKIN: no suspicious lesions or rashes  NEURO: Normal strength and tone, mentation intact and speech normal  PSYCH: mentation appears normal, affect normal/bright  LYMPH: normal ant/post cervical, supraclavicular nodes                .  ..  "

## 2022-09-21 ENCOUNTER — OFFICE VISIT (OUTPATIENT)
Dept: OTOLARYNGOLOGY | Facility: CLINIC | Age: 47
End: 2022-09-21
Payer: COMMERCIAL

## 2022-09-21 ENCOUNTER — OFFICE VISIT (OUTPATIENT)
Dept: AUDIOLOGY | Facility: CLINIC | Age: 47
End: 2022-09-21
Payer: COMMERCIAL

## 2022-09-21 VITALS
HEART RATE: 80 BPM | RESPIRATION RATE: 16 BRPM | SYSTOLIC BLOOD PRESSURE: 112 MMHG | BODY MASS INDEX: 31.83 KG/M2 | WEIGHT: 173 LBS | DIASTOLIC BLOOD PRESSURE: 75 MMHG | HEIGHT: 62 IN | OXYGEN SATURATION: 99 %

## 2022-09-21 DIAGNOSIS — H90.6 MIXED HEARING LOSS, BILATERAL: Primary | ICD-10-CM

## 2022-09-21 DIAGNOSIS — R09.81 NASAL CONGESTION: ICD-10-CM

## 2022-09-21 DIAGNOSIS — H92.01 OTALGIA, RIGHT: ICD-10-CM

## 2022-09-21 DIAGNOSIS — H92.01 OTALGIA, RIGHT: Primary | ICD-10-CM

## 2022-09-21 PROCEDURE — 99204 OFFICE O/P NEW MOD 45 MIN: CPT | Performed by: OTOLARYNGOLOGY

## 2022-09-21 PROCEDURE — 92550 TYMPANOMETRY & REFLEX THRESH: CPT | Performed by: AUDIOLOGIST

## 2022-09-21 PROCEDURE — 92557 COMPREHENSIVE HEARING TEST: CPT | Performed by: AUDIOLOGIST

## 2022-09-21 RX ORDER — CYCLOBENZAPRINE HCL 5 MG
5-10 TABLET ORAL
Qty: 40 TABLET | Refills: 0 | Status: SHIPPED | OUTPATIENT
Start: 2022-09-21

## 2022-09-21 RX ORDER — PREDNISONE 20 MG/1
TABLET ORAL
Qty: 16 TABLET | Refills: 0 | Status: SHIPPED | OUTPATIENT
Start: 2022-09-21 | End: 2022-10-26

## 2022-09-21 ASSESSMENT — PAIN SCALES - GENERAL: PAINLEVEL: MODERATE PAIN (5)

## 2022-09-21 NOTE — PROGRESS NOTES
Chief Complaint - ear pain    History of Present Illness - Aleena Escamilla is a 46 year old female who presents with right ear pain. She feels right ear is plugged. She tries to pop ear. Also has clear mucous in nose, but a foul smell sometimes. Smoke sometimes. She was given oral antibiotics, but it doesn't help. She has tried ibuprofen. No ear drops have been tried.  I saw her in 2018 for ear pain that seemed like TMJ. Although she had bilateral low-frequency hearing loss and a lot of negative middle ear pressure.  Dentist has noticed evidence of grinding. She only chews on the right side as she needs left-sided dental implants, and this appt is coming up.    Tests personally reviewed today for this visit:   1.) audiogram shows mixed hearing loss, low frequency bilaterally. This is similar to 2018, but more of a conductive component today.  2.) tympanogram type As/B bilaterally  3.) TSH 1.12 on 5/9/22  4.) CMP on 5/9/22 showed elevated AST and ALT  5.) CBC 5/9/22 was normal    Past Medical History -   Patient Active Problem List   Diagnosis     CARDIOVASCULAR SCREENING; LDL GOAL LESS THAN 160     Chronic pelvic pain in female     Chronic salpingitis and oophoritis     Major depressive disorder, recurrent episode, mild (H)     Generalized anxiety disorder     Arthritis, multiple joint involvement     Primary osteoarthritis of both hands     Fibromyalgia     Malignant neoplasm of overlapping sites of right female breast (H)     BRCA gene positive     Carpal tunnel syndrome     Non morbid drug-induced obesity     Chronic right shoulder pain     Bunion, right     Onychomycosis     Elevated LFTs     Fatty liver     Hyperlipidemia LDL goal <130     High risk medication use     Upper back pain, chronic       Current Medications -   Current Outpatient Medications:      CALCIUM PO, , Disp: , Rfl:      cefdinir (OMNICEF) 300 MG capsule, Take 1 capsule (300 mg) by mouth 2 times daily, Disp: 10 capsule, Rfl: 0      Cholecalciferol (VITAMIN D3) 2000 units TABS, Take 1 tablet by mouth daily, Disp: 100 tablet, Rfl: 3     diclofenac (VOLTAREN) 50 MG EC tablet, Take 1 tablet (50 mg) by mouth 2 times daily for 7 days, Disp: 28 tablet, Rfl: 1     Evening Primrose Oil 1000 MG CAPS, , Disp: , Rfl:      fish oil-omega-3 fatty acids 1000 MG capsule, Take 1 g by mouth daily, Disp: , Rfl:      fluticasone (FLONASE) 50 MCG/ACT nasal spray, , Disp: , Rfl:      gabapentin (NEURONTIN) 300 MG capsule, take 300mg in the morning and afternoon and 600mg (2 capsules) at bedtime. If side effects, reduce to last tolerable dosage. If side effects, then reduce to last tolerable dosage. (Patient not taking: No sig reported), Disp: 120 capsule, Rfl: 1     ibuprofen (ADVIL/MOTRIN) 600 MG tablet, Take 1 tablet (600 mg) by mouth every 8 hours as needed for moderate pain, Disp: 20 tablet, Rfl: 0    Allergies -   Allergies   Allergen Reactions     Latex Rash       Social History -   Social History     Socioeconomic History     Marital status:    Tobacco Use     Smoking status: Never Smoker     Smokeless tobacco: Never Used   Vaping Use     Vaping Use: Never used   Substance and Sexual Activity     Alcohol use: Yes     Drug use: No     Sexual activity: Yes     Partners: Male     Birth control/protection: None, Female Surgical   Other Topics Concern     Parent/sibling w/ CABG, MI or angioplasty before 65F 55M? No      Service No     Blood Transfusions No     Caffeine Concern No     Comment: coffee 2 cups per day     Occupational Exposure No     Hobby Hazards No     Sleep Concern No     Stress Concern No     Weight Concern No     Special Diet No     Back Care No     Exercise No     Bike Helmet No     Seat Belt Yes     Comment: 100%     Self-Exams No       Family History -   Family History   Problem Relation Age of Onset     Diabetes Mother      Hypertension Mother      Cancer Maternal Grandmother         cervical CA     Cardiovascular Father        "  MI     Cancer Maternal Aunt         cervical cancer     Pancreatic Cancer Maternal Aunt         COD       Review of Systems - As per HPI and PMHx, otherwise 7 system review is negative.    Physical Exam  /75   Pulse 80   Resp 16   Ht 1.568 m (5' 1.75\")   Wt 78.5 kg (173 lb)   LMP 03/01/2016 (Approximate)   SpO2 99%   BMI 31.90 kg/m    General - The patient is in no distress.  Alert and oriented to person and place, answers questions and cooperates with examination appropriately.   Neurologic - CN II-XII are grossly intact, no focal neurologic deficits. HB 1 out of 6 bilaterally.  Voice and Breathing - The patient was breathing comfortably without the use of accessory muscles. There was no wheezing, stridor, or stertor.  The patients voice was clear and strong.  Eyes - Extraocular movements intact. Sclera were not icteric or injected, conjunctiva were pink and moist.  Ears - Clean canals. The tympanic membranes are normal in appearance, bony landmarks are intact. She has some retraction, but can pop her ears. No fluid or purulence was seen in the external canal or the middle ear. No evidence of infection of the middle ear or external canal, cerumen was normal in appearance.  Mouth - Dentition in fair condition, no masses, ulcerations, or erosions noted on examination of mucosa. The tongue is mobile and midline, and the uvula is midline on elevation. The dental exam was notable for wear facets that were consistent with bruxism.  Palpation of the right TMJs was tender.    Throat - The walls of the oropharynx were smooth, symmetric, and had no lesions or ulcerations. The uvula was midline on elevation.    Neck - Palpation of the occipital, submental, submandibular, internal jugular chain, and supraclavicular nodes did not demonstrate any abnormal lymph nodes or masses. Palpation of the thyroid was soft and smooth, with no nodules or goiter appreciated.  The trachea was mobile and midline.        A/P - " Aleena Escamilla is a 46 year old female who presents with has right otalgia.  She has no ear infection or middle ear effusion.  Her hearing is mostly stable when compared to 4 years ago but does have this low-frequency mixed hearing loss.  She has significant negative middle ear pressure.  Interestingly she can pop her ears relatively easily.  Its possible some of this pain is eustachian tube dysfunction, but I think more likely the pain is the result of TMJ.  She only chews on her right side as she is in the process of getting left-sided dental implants for lack of molars on her left side.  Her dentist has noted evidence of grinding or clenching at night.  I will have her try a course of prednisone, popping her ears, hot packs, massage.  I encouraged her to try to chew more on both sides with a soft diet.  I prescribed flexeril to be used at night as a muscle relaxant to prevent grinding.  If these things fail she will notify me.      Roberto Brown MD  Otolaryngology  [facility]

## 2022-09-21 NOTE — LETTER
9/21/2022         RE: Aleena Escamilla  [address] [address] [facility] [address]        Dear Colleague,    Thank you for referring your patient, Aleena Escamilla, to the [facility]. Please see a copy of my visit note below.    Chief Complaint - ear pain    History of Present Illness - Aleena Escamilla is a 46 year old female who presents with right ear pain. She feels right ear is plugged. She tries to pop ear. Also has clear mucous in nose, but a foul smell sometimes. Smoke sometimes. She was given oral antibiotics, but it doesn't help. She has tried ibuprofen. No ear drops have been tried.  I saw her in 2018 for ear pain that seemed like TMJ. Although she had bilateral low-frequency hearing loss and a lot of negative middle ear pressure.  Dentist has noticed evidence of grinding. She only chews on the right side as she needs left-sided dental implants, and this appt is coming up.    Tests personally reviewed today for this visit:   1.) audiogram shows mixed hearing loss, low frequency bilaterally. This is similar to 2018, but more of a conductive component today.  2.) tympanogram type As/B bilaterally  3.) TSH 1.12 on 5/9/22  4.) CMP on 5/9/22 showed elevated AST and ALT  5.) CBC 5/9/22 was normal    Past Medical History -   Patient Active Problem List   Diagnosis     CARDIOVASCULAR SCREENING; LDL GOAL LESS THAN 160     Chronic pelvic pain in female     Chronic salpingitis and oophoritis     Major depressive disorder, recurrent episode, mild (H)     Generalized anxiety disorder     Arthritis, multiple joint involvement     Primary osteoarthritis of both hands     Fibromyalgia     Malignant neoplasm of overlapping sites of right female breast (H)     BRCA gene positive     Carpal tunnel syndrome     Non morbid drug-induced obesity     Chronic right shoulder pain     Bunion, right     Onychomycosis     Elevated LFTs     Fatty liver     Hyperlipidemia LDL goal <130     High risk medication  use     Upper back pain, chronic       Current Medications -   Current Outpatient Medications:      CALCIUM PO, , Disp: , Rfl:      cefdinir (OMNICEF) 300 MG capsule, Take 1 capsule (300 mg) by mouth 2 times daily, Disp: 10 capsule, Rfl: 0     Cholecalciferol (VITAMIN D3) 2000 units TABS, Take 1 tablet by mouth daily, Disp: 100 tablet, Rfl: 3     diclofenac (VOLTAREN) 50 MG EC tablet, Take 1 tablet (50 mg) by mouth 2 times daily for 7 days, Disp: 28 tablet, Rfl: 1     Evening Primrose Oil 1000 MG CAPS, , Disp: , Rfl:      fish oil-omega-3 fatty acids 1000 MG capsule, Take 1 g by mouth daily, Disp: , Rfl:      fluticasone (FLONASE) 50 MCG/ACT nasal spray, , Disp: , Rfl:      gabapentin (NEURONTIN) 300 MG capsule, take 300mg in the morning and afternoon and 600mg (2 capsules) at bedtime. If side effects, reduce to last tolerable dosage. If side effects, then reduce to last tolerable dosage. (Patient not taking: No sig reported), Disp: 120 capsule, Rfl: 1     ibuprofen (ADVIL/MOTRIN) 600 MG tablet, Take 1 tablet (600 mg) by mouth every 8 hours as needed for moderate pain, Disp: 20 tablet, Rfl: 0    Allergies -   Allergies   Allergen Reactions     Latex Rash       Social History -   Social History     Socioeconomic History     Marital status:    Tobacco Use     Smoking status: Never Smoker     Smokeless tobacco: Never Used   Vaping Use     Vaping Use: Never used   Substance and Sexual Activity     Alcohol use: Yes     Drug use: No     Sexual activity: Yes     Partners: Male     Birth control/protection: None, Female Surgical   Other Topics Concern     Parent/sibling w/ CABG, MI or angioplasty before 65F 55M? No      Service No     Blood Transfusions No     Caffeine Concern No     Comment: coffee 2 cups per day     Occupational Exposure No     Hobby Hazards No     Sleep Concern No     Stress Concern No     Weight Concern No     Special Diet No     Back Care No     Exercise No     Bike Helmet No     Seat  "Belt Yes     Comment: 100%     Self-Exams No       Family History -   Family History   Problem Relation Age of Onset     Diabetes Mother      Hypertension Mother      Cancer Maternal Grandmother         cervical CA     Cardiovascular Father         MI     Cancer Maternal Aunt         cervical cancer     Pancreatic Cancer Maternal Aunt         COD       Review of Systems - As per HPI and PMHx, otherwise 7 system review is negative.    Physical Exam  /75   Pulse 80   Resp 16   Ht 1.568 m (5' 1.75\")   Wt 78.5 kg (173 lb)   LMP 03/01/2016 (Approximate)   SpO2 99%   BMI 31.90 kg/m    General - The patient is in no distress.  Alert and oriented to person and place, answers questions and cooperates with examination appropriately.   Neurologic - CN II-XII are grossly intact, no focal neurologic deficits. HB 1 out of 6 bilaterally.  Voice and Breathing - The patient was breathing comfortably without the use of accessory muscles. There was no wheezing, stridor, or stertor.  The patients voice was clear and strong.  Eyes - Extraocular movements intact. Sclera were not icteric or injected, conjunctiva were pink and moist.  Ears - Clean canals. The tympanic membranes are normal in appearance, bony landmarks are intact. She has some retraction, but can pop her ears. No fluid or purulence was seen in the external canal or the middle ear. No evidence of infection of the middle ear or external canal, cerumen was normal in appearance.  Mouth - Dentition in fair condition, no masses, ulcerations, or erosions noted on examination of mucosa. The tongue is mobile and midline, and the uvula is midline on elevation. The dental exam was notable for wear facets that were consistent with bruxism.  Palpation of the right TMJs was tender.    Throat - The walls of the oropharynx were smooth, symmetric, and had no lesions or ulcerations. The uvula was midline on elevation.    Neck - Palpation of the occipital, submental, " submandibular, internal jugular chain, and supraclavicular nodes did not demonstrate any abnormal lymph nodes or masses. Palpation of the thyroid was soft and smooth, with no nodules or goiter appreciated.  The trachea was mobile and midline.        A/P - Aleena Escamilla is a 46 year old female who presents with has right otalgia.  She has no ear infection or middle ear effusion.  Her hearing is mostly stable when compared to 4 years ago but does have this low-frequency mixed hearing loss.  She has significant negative middle ear pressure.  Interestingly she can pop her ears relatively easily.  Its possible some of this pain is eustachian tube dysfunction, but I think more likely the pain is the result of TMJ.  She only chews on her right side as she is in the process of getting left-sided dental implants for lack of molars on her left side.  Her dentist has noted evidence of grinding or clenching at night.  I will have her try a course of prednisone, popping her ears, hot packs, massage.  I encouraged her to try to chew more on both sides with a soft diet.  I prescribed flexeril to be used at night as a muscle relaxant to prevent grinding.  If these things fail she will notify me.      Roberto Brown MD  Otolaryngology  [facility]        Again, thank you for allowing me to participate in the care of your patient.        Sincerely,        Roberto Brown MD

## 2022-09-21 NOTE — LETTER
[address]  [facility] [address]  Phone: [phone]    September 21, 2022        Aleena Escamilla  [address] [facility] [address]        To whom it may concern:      Patient was seen and treated today at our ENT (Otolaryngology) clinic for right ear pain.     Please contact me for questions or concerns.      Sincerely,      Provider Electronic Signature (as below)  Roberto Brown MD

## 2022-09-21 NOTE — PROGRESS NOTES
AUDIOLOGY REPORT:    Patient was referred from ENT by Walker Brown MD for audiology evaluation. Patient was last evaluated in this clinic on 3/13/18, at which time there was a mild bilateral low frequency hearing loss. Patient reports recent right ear otalgia and drainage with some tinnitus.  She reports a history of middle ear infections.    Testing:    Otoscopy:   Otoscopic exam indicates ears are clear of cerumen bilaterally     Tympanograms:    RIGHT: restricted eardrum mobility      LEFT:   restricted eardrum mobility     Reflexes (reported by stimulus ear):  RIGHT: Ipsilateral is present at normal levels  RIGHT: Contralateral is absent at frequencies tested  LEFT:   Ipsilateral is absent at frequencies tested  LEFT:   Contralateral is present at normal levels    Thresholds:   Pure Tone Thresholds assessed using conventional audiometry with good reliability from 250-8000 Hz bilaterally using insert earphones and circumaural headphones      RIGHT:  mild-moderate from 250-1000Hz rising to normal, borderline-normal and mild mixed hearing loss    LEFT:    mild-moderate from 250-1000Hz rising to normal, mixed hearing loss    Speech Reception Threshold:    RIGHT: 35 dB HL    LEFT:   40 dB HL  Speech Reception Thresholds are in good agreement with pure tone thresholds.    Word Recognition Score:     RIGHT: 100% at 75 dB HL using NU-6 recorded word list.    LEFT:   100% at 80 dB HL using NU-6 recorded word list.    Discussed results with the patient. We discussed hearing aids and, as patient is having some communication difficulties I recommended a hearing aid consultation.    Patient was returned to ENT for follow up.     Cristino Cherry MA, CCC-A  Licensed Audiologist #[id number]  9/21/2022

## 2022-09-21 NOTE — LETTER
[address]  [place] [address]  Phone: [phone]    September 21, 2022        Aleena Escamilla  [address] [facility] [address]          To whom it may concern:    RE: Aleena Escamilla    Patient was seen and treated today at our ENT (Otolaryngology) clinic for right ear pain.     Please contact me for questions or concerns.      Sincerely,      Provider Electronic Signature (as below)  Roberto Brown MD

## 2022-10-10 ENCOUNTER — OFFICE VISIT (OUTPATIENT)
Dept: FAMILY MEDICINE | Facility: CLINIC | Age: 47
End: 2022-10-10
Payer: COMMERCIAL

## 2022-10-10 VITALS
BODY MASS INDEX: 32.51 KG/M2 | HEIGHT: 61 IN | DIASTOLIC BLOOD PRESSURE: 78 MMHG | OXYGEN SATURATION: 96 % | TEMPERATURE: 98.3 F | RESPIRATION RATE: 11 BRPM | HEART RATE: 75 BPM | WEIGHT: 172.2 LBS | SYSTOLIC BLOOD PRESSURE: 120 MMHG

## 2022-10-10 DIAGNOSIS — N62 LARGE BREASTS: ICD-10-CM

## 2022-10-10 DIAGNOSIS — Z28.21 INFLUENZA VACCINATION DECLINED: ICD-10-CM

## 2022-10-10 DIAGNOSIS — L70.0 ACNE VULGARIS: ICD-10-CM

## 2022-10-10 DIAGNOSIS — C50.811 MALIGNANT NEOPLASM OF OVERLAPPING SITES OF RIGHT BREAST IN FEMALE, ESTROGEN RECEPTOR POSITIVE (H): ICD-10-CM

## 2022-10-10 DIAGNOSIS — Z17.0 MALIGNANT NEOPLASM OF OVERLAPPING SITES OF RIGHT BREAST IN FEMALE, ESTROGEN RECEPTOR POSITIVE (H): ICD-10-CM

## 2022-10-10 DIAGNOSIS — E78.5 HYPERLIPIDEMIA LDL GOAL <130: ICD-10-CM

## 2022-10-10 DIAGNOSIS — M54.9 UPPER BACK PAIN: ICD-10-CM

## 2022-10-10 DIAGNOSIS — K76.0 FATTY LIVER: ICD-10-CM

## 2022-10-10 DIAGNOSIS — Z00.00 ROUTINE GENERAL MEDICAL EXAMINATION AT A HEALTH CARE FACILITY: Primary | ICD-10-CM

## 2022-10-10 DIAGNOSIS — Z28.21 COVID-19 VACCINATION DECLINED: ICD-10-CM

## 2022-10-10 DIAGNOSIS — R79.89 ELEVATED LFTS: ICD-10-CM

## 2022-10-10 DIAGNOSIS — H92.01 OTALGIA, RIGHT: ICD-10-CM

## 2022-10-10 PROCEDURE — 80076 HEPATIC FUNCTION PANEL: CPT | Performed by: NURSE PRACTITIONER

## 2022-10-10 PROCEDURE — 80061 LIPID PANEL: CPT | Performed by: NURSE PRACTITIONER

## 2022-10-10 PROCEDURE — 36415 COLL VENOUS BLD VENIPUNCTURE: CPT | Performed by: NURSE PRACTITIONER

## 2022-10-10 PROCEDURE — 99214 OFFICE O/P EST MOD 30 MIN: CPT | Mod: 25 | Performed by: NURSE PRACTITIONER

## 2022-10-10 PROCEDURE — 99396 PREV VISIT EST AGE 40-64: CPT | Performed by: NURSE PRACTITIONER

## 2022-10-10 RX ORDER — CLINDAMYCIN PHOSPHATE 10 UG/ML
LOTION TOPICAL
Qty: 30 ML | Refills: 6 | Status: SHIPPED | OUTPATIENT
Start: 2022-10-10 | End: 2023-12-12

## 2022-10-10 RX ORDER — TRETINOIN 0.5 MG/G
CREAM TOPICAL
Qty: 45 G | Refills: 3 | Status: SHIPPED | OUTPATIENT
Start: 2022-10-10 | End: 2023-05-02

## 2022-10-10 ASSESSMENT — ENCOUNTER SYMPTOMS: BREAST MASS: 0

## 2022-10-10 ASSESSMENT — PAIN SCALES - GENERAL: PAINLEVEL: MODERATE PAIN (4)

## 2022-10-10 NOTE — PROGRESS NOTES
SUBJECTIVE:   CC: Aleena is an 46 year old who presents for preventive health visit.       Patient has been advised of split billing requirements and indicates understanding: Yes  Healthy Habits:     Getting at least 3 servings of Calcium per day:  Yes    Bi-annual eye exam:  Yes    Dental care twice a year:  Yes    Sleep apnea or symptoms of sleep apnea:  None    Diet:  Regular (no restrictions)    Frequency of exercise:  1 day/week    Duration of exercise:  15-30 minutes    Taking medications regularly:  Yes    Medication side effects:  Not applicable    PHQ-2 Total Score: 0    Additional concerns today:  Yes    She is still having  Right ear pain- was seen by ENT and given Prednisone which helped a little but she continues to have pain. She is planning on getting dental  implants on the left.    Hyperlipidemia Follow-Up      Are you regularly taking any medication or supplement to lower your cholesterol?   Yes- Fish oil 1 Gm daily    Are you having muscle aches or other side effects that you think could be caused by your cholesterol lowering medication?  No    She also requests referral for possible breast reduction.  She has longstanding upper back pain that she attributes to her large breasts.  She is also s/p right lumpectomy for breast cancer and her breasts are asymmetric.    Acne- was on Topical Clinda and RetinA prior to her breast cancer diagnosis and this was stopped during her treatment but acne has returned and she is stable  Post right partial mastectomy, Taxol weekly X 12, AC X 4 cycles and XRT as well as laparoscopicsalpingo-oophorectomy.    Today's PHQ-2 Score:   PHQ-2 ( 1999 [employer]) 10/10/2022   Q1: Little interest or pleasure in doing things 0   Q2: Feeling down, depressed or hopeless 0   PHQ-2 Score 0   PHQ-2 Total Score (12-17 Years)- Positive if 3 or more points; Administer PHQ-A if positive -   Q1: Little interest or pleasure in doing things Not at all   Q2: Feeling down, depressed or  hopeless Not at all   PHQ-2 Score 0       Abuse: Current or Past (Physical, Sexual or Emotional) - No  Do you feel safe in your environment? Yes        Social History     Tobacco Use     Smoking status: Never     Smokeless tobacco: Never   Substance Use Topics     Alcohol use: Yes     If you drink alcohol do you typically have >3 drinks per day or >7 drinks per week? No    Alcohol Use 10/10/2022   Prescreen: >3 drinks/day or >7 drinks/week? No   Prescreen: >3 drinks/day or >7 drinks/week? -       Reviewed orders with patient.  Reviewed health maintenance and updated orders accordingly - Yes  Labs reviewed in EPIC  BP Readings from Last 3 Encounters:   10/10/22 120/78   09/21/22 112/75   08/30/22 118/80    Wt Readings from Last 3 Encounters:   10/10/22 78.1 kg (172 lb 3.2 oz)   09/21/22 78.5 kg (173 lb)   08/30/22 78.7 kg (173 lb 9.6 oz)                  Patient Active Problem List   Diagnosis     CARDIOVASCULAR SCREENING; LDL GOAL LESS THAN 160     Chronic pelvic pain in female     Chronic salpingitis and oophoritis     Major depressive disorder, recurrent episode, mild (H)     Generalized anxiety disorder     Arthritis, multiple joint involvement     Primary osteoarthritis of both hands     Fibromyalgia     Malignant neoplasm of overlapping sites of right female breast (H)     BRCA gene positive     Carpal tunnel syndrome     Non morbid drug-induced obesity     Chronic right shoulder pain     Bunion, right     Onychomycosis     Elevated LFTs     Fatty liver     Hyperlipidemia LDL goal <130     High risk medication use     Upper back pain, chronic     Past Surgical History:   Procedure Laterality Date     BIOPSY BREAST NEEDLE LOCALIZATION Right 1/19/2016    Procedure: BIOPSY BREAST NEEDLE LOCALIZATION;  Surgeon: Adelina Demarco MD;  Location: [facility]     GYN SURGERY  2-    bilateral salpingectomy, left oophrectomy     LAPAROSCOPIC SALPINGO-OOPHORECTOMY Right 10/26/2016    Procedure: LAPAROSCOPIC  SALPINGO-OOPHORECTOMY;  Surgeon: Bouchra Mayo MD;  Location: [facility]     LUMPECTOMY BREAST WITH SENTINEL NODE, COMBINED Right 1/19/2016    Procedure: COMBINED LUMPECTOMY BREAST WITH SENTINEL NODE;  Surgeon: Adelina Demarco MD;  Location: [facility] OR     PELVIS LAPAROSCOPY,DX  12/28/1998     RELEASE CARPAL TUNNEL Left 12/23/2016    Procedure: RELEASE CARPAL TUNNEL;  Surgeon: Sma Florence MD;  Location: [facility] OR       Social History     Tobacco Use     Smoking status: Never     Smokeless tobacco: Never   Substance Use Topics     Alcohol use: Yes     Family History   Problem Relation Age of Onset     Diabetes Mother      Hypertension Mother      Cancer Maternal Grandmother         cervical CA     Cardiovascular Father         MI     Cancer Maternal Aunt         cervical cancer     Pancreatic Cancer Maternal Aunt         COD           Breast Cancer Screening:    Breast CA Risk Assessment (FHS-7) 5/9/2022   Do you have a family history of breast, colon, or ovarian cancer? No / Unknown       Mammogram Screening - Offered annual screening and updated Health Maintenance for mutual plan based on risk factor consideration    Pertinent mammograms are reviewed under the imaging tab.    History of abnormal Pap smear: NO - age 30-65 PAP every 5 years with negative HPV co-testing recommended  PAP / HPV Latest Ref Rng & Units 5/16/2019 4/18/2016 1/22/2013   PAP (Historical) - NIL NIL NIL   HPV16 NEG:Negative Negative Negative -   HPV18 NEG:Negative Negative Negative -   HRHPV NEG:Negative Negative Negative -     Reviewed and updated as needed this visit by clinical staff   Tobacco  Allergies  Meds   Med Hx  Surg Hx  Fam Hx  Soc Hx        Reviewed and updated as needed this visit by Provider                 Past Medical History:   Diagnosis Date     Breast cancer (H)      Chronic pelvic pain in female      Malignant neoplasm of right breast, stage 2 (H) 01/01/2016    1/4 lymph nodes positive, Oncotype DX = 25.   Chemotherapy and radiation. Letrozole.     Migraine headaches       Past Surgical History:   Procedure Laterality Date     BIOPSY BREAST NEEDLE LOCALIZATION Right 1/19/2016    Procedure: BIOPSY BREAST NEEDLE LOCALIZATION;  Surgeon: Adelina Demarco MD;  Location:  OR     GYN SURGERY  2-    bilateral salpingectomy, left oophrectomy     LAPAROSCOPIC SALPINGO-OOPHORECTOMY Right 10/26/2016    Procedure: LAPAROSCOPIC SALPINGO-OOPHORECTOMY;  Surgeon: Bouchra Mayo MD;  Location: [facility]     LUMPECTOMY BREAST WITH SENTINEL NODE, COMBINED Right 1/19/2016    Procedure: COMBINED LUMPECTOMY BREAST WITH SENTINEL NODE;  Surgeon: Adelina Demarco MD;  Location: [facility]     PELVIS LAPAROSCOPY,DX  12/28/1998     RELEASE CARPAL TUNNEL Left 12/23/2016    Procedure: RELEASE CARPAL TUNNEL;  Surgeon: Sam Florence MD;  Location:  OR       Review of Systems   HENT: Positive for ear pain.    Breasts:  Positive for tenderness. Negative for breast mass and discharge.   Genitourinary: Negative for pelvic pain, vaginal bleeding and vaginal discharge.     CONSTITUTIONAL: NEGATIVE for fever, chills, change in weight  INTEGUMENTARU/SKIN: NEGATIVE for worrisome rashes, moles or lesions  EYES: NEGATIVE for vision changes or irritation  ENT: NEGATIVE for ear, mouth and throat problems  RESP: NEGATIVE for significant cough or SOB  BREAST: NEGATIVE for masses, tenderness or discharge  CV: NEGATIVE for chest pain, palpitations or peripheral edema  GI: NEGATIVE for nausea, abdominal pain, heartburn, or change in bowel habits  : NEGATIVE for unusual urinary or vaginal symptoms. Periods are regular.  MUSCULOSKELETAL: NEGATIVE for significant arthralgias or myalgia  NEURO: NEGATIVE for weakness, dizziness or paresthesias  ENDOCRINE: NEGATIVE for temperature intolerance, skin/hair changes  PSYCHIATRIC: NEGATIVE for changes in mood or affect     OBJECTIVE:   /78 (BP Location: Left arm, Patient Position: Sitting, Cuff Size:  "Adult Regular)   Pulse 75   Temp 98.3  F (36.8  C) (Oral)   Resp 11   Ht 1.54 m (5' 0.63\")   Wt 78.1 kg (172 lb 3.2 oz)   LMP 03/01/2016 (Approximate)   SpO2 96%   BMI 32.94 kg/m    Physical Exam  GENERAL: healthy, alert and no distress  EYES: Eyes grossly normal to inspection, PERRL and conjunctivae and sclerae normal  HENT: ear canals and TM's normal, nose and mouth without ulcers or lesions  NECK: no adenopathy, no asymmetry, masses, or scars and thyroid normal to palpation  RESP: lungs clear to auscultation - no rales, rhonchi or wheezes  CV: regular rate and rhythm, normal S1 S2, no S3 or S4, no murmur, click or rub, no peripheral edema and peripheral pulses strong  ABDOMEN: soft, nontender, no hepatosplenomegaly, no masses and bowel sounds normal  MS: no gross musculoskeletal defects noted, no edema  SKIN: no suspicious lesions or rashes  NEURO: Normal strength and tone, mentation intact and speech normal  PSYCH: mentation appears normal, affect normal/bright  LYMPH: no cervical, supraclavicular, axillary, or inguinal adenopathy    Diagnostic Test Results:  Labs reviewed in Epic  No results found for this or any previous visit (from the past 24 hour(s)).    ASSESSMENT/PLAN:   (Z00.00) Routine general medical examination at a health care facility  (primary encounter diagnosis)  Comment:   Plan:     (C50.811,  Z17.0) Malignant neoplasm of overlapping sites of right breast in female, estrogen receptor positive (H)  Comment: followed by Oncology  Plan: Stable    (R79.89) Elevated LFTs  Comment: Recheck LFT's, lipid panel, + history fatty liver. Not having pain or GI symptoms.  Plan: Hepatic panel (Albumin, ALT, AST, Bili, Alk         Phos, TP)            (K76.0) Fatty liver  Comment: Checking labs  Plan: Hepatic panel (Albumin, ALT, AST, Bili, Alk         Phos, TP)            (N62) Large breasts  Comment:   Plan: Referring to Gen surgery for possible breast reduction    (M54.9) Upper back pain  Comment: " "Referring to Gen surgery- patient attributes her chronic pain to large breasts  Plan: Gen Surgryr referral    (E78.5) Hyperlipidemia LDL goal <130  Comment: Continue on Lipitor, low chol diet, encouraged weight loss, regular exercise.  Plan: Hepatic panel (Albumin, ALT, AST, Bili, Alk         Phos, TP), Lipid panel reflex to direct LDL         Non-fasting            (L70.0) Acne vulgaris  Comment: Refilled Cleocin and RetinA, reviewed general skin care management  Plan: tretinoin (RETIN-A) 0.05 % external cream,         clindamycin (CLEOCIN T) 1 % external lotion            (Z28.21) Influenza vaccination declined  Comment:   Plan: Will get at a later date    (Z28.21) COVID-19 vaccination declined  Comment:   Plan: Will get at a later date        Patient has been advised of split billing requirements and indicates understanding: Yes    COUNSELING:  Reviewed preventive health counseling, as reflected in patient instructions    Estimated body mass index is 32.94 kg/m  as calculated from the following:    Height as of this encounter: 1.54 m (5' 0.63\").    Weight as of this encounter: 78.1 kg (172 lb 3.2 oz).    Weight management plan: Discussed healthy diet and exercise guidelines    She reports that she has never smoked. She has never used smokeless tobacco.      Counseling Resources:  ATP IV Guidelines  Pooled Cohorts Equation Calculator  Breast Cancer Risk Calculator  BRCA-Related Cancer Risk Assessment: FHS-7 Tool  FRAX Risk Assessment  ICSI Preventive Guidelines  Dietary Guidelines for Americans, 2010  USDA's MyPlate  ASA Prophylaxis  Lung CA Screening    Doris Luna, PIA [facility]  "

## 2022-10-11 LAB
ALBUMIN SERPL-MCNC: 4.2 G/DL (ref 3.4–5)
ALP SERPL-CCNC: 126 U/L (ref 40–150)
ALT SERPL W P-5'-P-CCNC: 273 U/L (ref 0–50)
AST SERPL W P-5'-P-CCNC: 150 U/L (ref 0–45)
BILIRUB DIRECT SERPL-MCNC: 0.1 MG/DL (ref 0–0.2)
BILIRUB SERPL-MCNC: 0.4 MG/DL (ref 0.2–1.3)
CHOLEST SERPL-MCNC: 214 MG/DL
FASTING STATUS PATIENT QL REPORTED: NO
HDLC SERPL-MCNC: 36 MG/DL
LDLC SERPL CALC-MCNC: 138 MG/DL
NONHDLC SERPL-MCNC: 178 MG/DL
PROT SERPL-MCNC: 8 G/DL (ref 6.8–8.8)
TRIGL SERPL-MCNC: 200 MG/DL

## 2022-10-31 ENCOUNTER — OFFICE VISIT (OUTPATIENT)
Dept: ORTHOPEDICS | Facility: CLINIC | Age: 47
End: 2022-10-31
Payer: COMMERCIAL

## 2022-10-31 ENCOUNTER — ANCILLARY PROCEDURE (OUTPATIENT)
Dept: GENERAL RADIOLOGY | Facility: CLINIC | Age: 47
End: 2022-10-31
Attending: ORTHOPAEDIC SURGERY
Payer: COMMERCIAL

## 2022-10-31 VITALS
SYSTOLIC BLOOD PRESSURE: 101 MMHG | BODY MASS INDEX: 32.45 KG/M2 | HEART RATE: 86 BPM | HEIGHT: 61 IN | DIASTOLIC BLOOD PRESSURE: 69 MMHG | WEIGHT: 171.9 LBS

## 2022-10-31 DIAGNOSIS — G89.29 WRIST PAIN, CHRONIC, LEFT: Primary | ICD-10-CM

## 2022-10-31 DIAGNOSIS — M79.642 LEFT HAND PAIN: ICD-10-CM

## 2022-10-31 DIAGNOSIS — S69.82XA TFCC (TRIANGULAR FIBROCARTILAGE COMPLEX) INJURY, LEFT, INITIAL ENCOUNTER: ICD-10-CM

## 2022-10-31 DIAGNOSIS — M25.532 WRIST PAIN, CHRONIC, LEFT: Primary | ICD-10-CM

## 2022-10-31 PROCEDURE — 73130 X-RAY EXAM OF HAND: CPT | Mod: TC | Performed by: RADIOLOGY

## 2022-10-31 PROCEDURE — 20550 NJX 1 TENDON SHEATH/LIGAMENT: CPT | Mod: LT | Performed by: ORTHOPAEDIC SURGERY

## 2022-10-31 PROCEDURE — 99203 OFFICE O/P NEW LOW 30 MIN: CPT | Mod: 25 | Performed by: ORTHOPAEDIC SURGERY

## 2022-10-31 RX ORDER — BUPIVACAINE HYDROCHLORIDE 5 MG/ML
0.5 INJECTION, SOLUTION PERINEURAL
Status: DISCONTINUED | OUTPATIENT
Start: 2022-10-31 | End: 2024-01-02

## 2022-10-31 RX ORDER — TRIAMCINOLONE ACETONIDE 40 MG/ML
80 INJECTION, SUSPENSION INTRA-ARTICULAR; INTRAMUSCULAR
Status: DISCONTINUED | OUTPATIENT
Start: 2022-10-31 | End: 2023-11-03

## 2022-10-31 RX ADMIN — BUPIVACAINE HYDROCHLORIDE 0.5 ML: 5 INJECTION, SOLUTION PERINEURAL at 15:06

## 2022-10-31 RX ADMIN — TRIAMCINOLONE ACETONIDE 80 MG: 40 INJECTION, SUSPENSION INTRA-ARTICULAR; INTRAMUSCULAR at 15:06

## 2022-10-31 ASSESSMENT — PAIN SCALES - GENERAL: PAINLEVEL: MILD PAIN (3)

## 2022-10-31 NOTE — LETTER
10/31/2022         RE: Aleena Escamilla  [address] [facility] [address]        Dear Colleague,    Thank you for referring your patient, Aleena Escamilla, to the [facility]. Please see a copy of my visit note below.    Chief Complaint:   Chief Complaint   Patient presents with     Left Hand - Pain     Onset: about 6 months ago. NKI. Pain just came on and has gotten worse. Pain is on the ulnar side of wrist. She is unable to even lift a pound without pain. No tx. Moving/rotating the hand/wrist or lifting cause increased pain.        HPI: Aleena Escamilla is a 47 year old female , right -hand dominant, who presents for evaluation and management of a left wrist pain, no known injury. Pain has been present for 6 months.pain just came on one day and getting worse. Locates pain to the outer aspect of the wrist/hand. Aggravated with lifting, twisting/rotating. Can't lift anything more than a pound. Since that time, pain has worsened.    Treatment has been ice, ibuprofen, decreased activities.    She is status post bilateral carpal tunnel release surgery , left 12/23/2016 and right 11/29/2016 and has done well. No issues. No numbness and tingling.      She reports having mild pain/discomfort around the wrist site. She denies associated numbness or tingling. She denies any other injuries to her upper extremity.   Symptoms: pain, swelling.  Location of symptoms: ulnar wrist.  Pain severity: 3/10  Pain quality: sharp  Frequency of symptoms: occasionally  Aggravating factors: lifting, twisting.  Relieving factors: rest.    Previous treatment: ice, ibuprofen    Past medical history:  has a past medical history of Breast cancer (H), Chronic pelvic pain in female, Malignant neoplasm of right breast, stage 2 (H) (01/01/2016), and Migraine headaches.     Past surgical history:  has a past surgical history that includes pelvis laparoscopy,dx (12/28/1998); GYN surgery (2-); Biopsy  breast needle localization (Right, 1/19/2016); Laparoscopic salpingo-oophorectomy (Right, 10/26/2016); Release carpal tunnel (Left, 12/23/2016); and Lumpectomy breast with sentinel node, combined (Right, 1/19/2016).     Medications:    Current Outpatient Medications   Medication Sig Dispense Refill     CALCIUM PO        Cholecalciferol (VITAMIN D3) 2000 units TABS Take 1 tablet by mouth daily 100 tablet 3     clindamycin (CLEOCIN T) 1 % external lotion APPLY TOPICALLY TO FACE EVERY MORNING, Strength: 1 % 30 mL 6     Evening Primrose Oil 1000 MG CAPS        fish oil-omega-3 fatty acids 1000 MG capsule Take 1 g by mouth daily       fluticasone (FLONASE) 50 MCG/ACT nasal spray        ibuprofen (ADVIL/MOTRIN) 600 MG tablet Take 1 tablet (600 mg) by mouth every 8 hours as needed for moderate pain 20 tablet 0     tretinoin (RETIN-A) 0.05 % external cream APPLY TO FACE AT BEDTIME FOR ACNE 45 g 3     cyclobenzaprine (FLEXERIL) 5 MG tablet Take 1-2 tablets (5-10 mg) by mouth nightly as needed for muscle spasms (Patient not taking: Reported on 10/10/2022) 40 tablet 0     diclofenac (VOLTAREN) 50 MG EC tablet Take 1 tablet (50 mg) by mouth 2 times daily for 7 days 28 tablet 1     gabapentin (NEURONTIN) 300 MG capsule take 300mg in the morning and afternoon and 600mg (2 capsules) at bedtime. If side effects, reduce to last tolerable dosage. If side effects, then reduce to last tolerable dosage. (Patient not taking: Reported on 10/10/2022) 120 capsule 1        Allergies:     Allergies   Allergen Reactions     Latex Rash        Family History: family history includes Cancer in her maternal aunt and maternal grandmother; Cardiovascular in her father; Diabetes in her mother; Hypertension in her mother; Pancreatic Cancer in her maternal aunt.     Social History: .  reports that she has never smoked. She has never used smokeless tobacco. She reports current alcohol use. She reports that she does not use  "drugs.    Review of Systems:  ROS: 10 point ROS neg other than the symptoms noted above in the HPI and past medical history.    Physical Exam  GENERAL APPEARANCE: healthy, alert, no distress.   SKIN: no suspicious lesions or rashes  NEURO: Normal strength and tone, mentation intact and speech normal  PSYCH:  mentation appears normal and affect normal. Not anxious.  RESPIRATORY: No increased work of breathing.    /69   Pulse 86   Ht 1.54 m (5' 0.63\")   Wt 78 kg (171 lb 14.4 oz)   LMP 03/01/2016 (Approximate)   BMI 32.88 kg/m       left HAND / WRIST EXAM:    Skin intact. No abnormal skin discoloration, erythema or ecchymosis.   Normal wear pattern, color and tone.  Healed volar carpal tunnel syndrome surgical scar.    There is mild swelling in the distal ulna of the wrist, some fullness noted.  There is mild tenderness in the distal ulna, TFCC, ECU of the wrist.  Nontender to palpation radial wrist, anatomical snuff box, dorsal / volar wrist..  There is no ecchymosis.  There is no erythema of the surrounding skin.  There is no maceration of the skin.  There is no gross deformity in the area.  Intact extensors. No extensor lag.  Mild discomfort with radial/ulnar deviation, resisted wrist ulnar deviation.    Intact sensation light touch median, radial, ulnar nerves of the hand  Intact sensation to the radial and ulnar digital nerves of the fingers, as well as the finger tips.  Intact epl fpl fdp edc wrist flexion/extension biceps/triceps deltoid  Brisk capillary refill to all fingers.   Palpable radial pulse, 2+.    X-rays:  3 views left wrist from 10/31/2022 were reviewed in clinic today. On my review, No obvious fracture or dislocation. No obvious bony abnormality or lesion. Advanced left thumb CMC degenerative arthrosis, progressed.  Mild fifth DIP degenerative arthrosis, slightly progressed.    Assessment: 46yo RHD female with chronic ulnar sided wrist pain, either TFCC , ECU tendinitis, possible ganglion " cyst.    Plan:  * exam, images reviewed  * appears to be TFCC/ECU pain, possible cyst given some fullness in the area  * discussed treatment with ice, heat, over the counter pain medications, wrist brace, topical ointments, cortisone injection  * she elects for cortisone injection. See procedure note below  * consider MRI/MRarthrogram in future as needed.    * return to clinic as needed.    * All questions were addressed and answered prior to discharge from clinic today. The patient acknowledges an understanding of and agreement with the plan set forth during today's visit. Patient was advised to call our office or MyChart us if any further questions arise upon leaving our office today.          Sam Florence M.D., M.S.  Dept. of Orthopaedic Surgery  [facility]    Hand / Upper Extremity Injection/Arthrocentesis    Date/Time: 10/31/2022 3:06 PM  Performed by: Toñito Phillips PA  Authorized by: Sam Florence MD     Indications:  Pain  Needle Size:  25 G  Guidance: landmark    Approach:  Ulnar   Condition comment:  TFCC - triangular fibrocartilage complex    Medications:  80 mg triamcinolone 40 MG/ML; 0.5 mL bupivacaine 0.5 %  Outcome:  Tolerated well, no immediate complications  Procedure discussed: discussed risks, benefits, and alternatives    Prep: patient was prepped and draped in usual sterile fashion              Again, thank you for allowing me to participate in the care of your patient.        Sincerely,        Sam Florence MD

## 2022-10-31 NOTE — PROGRESS NOTES
Chief Complaint:   Chief Complaint   Patient presents with     Left Hand - Pain     Onset: about 6 months ago. NKI. Pain just came on and has gotten worse. Pain is on the ulnar side of wrist. She is unable to even lift a pound without pain. No tx. Moving/rotating the hand/wrist or lifting cause increased pain.        HPI: Aleena Escamilla is a 47 year old female , right -hand dominant, who presents for evaluation and management of a left wrist pain, no known injury. Pain has been present for 6 months.pain just came on one day and getting worse. Locates pain to the outer aspect of the wrist/hand. Aggravated with lifting, twisting/rotating. Can't lift anything more than a pound. Since that time, pain has worsened.    Treatment has been ice, ibuprofen, decreased activities.    She is status post bilateral carpal tunnel release surgery , left 12/23/2016 and right 11/29/2016 and has done well. No issues. No numbness and tingling.      She reports having mild pain/discomfort around the wrist site. She denies associated numbness or tingling. She denies any other injuries to her upper extremity.   Symptoms: pain, swelling.  Location of symptoms: ulnar wrist.  Pain severity: 3/10  Pain quality: sharp  Frequency of symptoms: occasionally  Aggravating factors: lifting, twisting.  Relieving factors: rest.    Previous treatment: ice, ibuprofen    Past medical history:  has a past medical history of Breast cancer (H), Chronic pelvic pain in female, Malignant neoplasm of right breast, stage 2 (H) (01/01/2016), and Migraine headaches.     Past surgical history:  has a past surgical history that includes pelvis laparoscopy,dx (12/28/1998); GYN surgery (2-); Biopsy breast needle localization (Right, 1/19/2016); Laparoscopic salpingo-oophorectomy (Right, 10/26/2016); Release carpal tunnel (Left, 12/23/2016); and Lumpectomy breast with sentinel node, combined (Right, 1/19/2016).     Medications:    Current Outpatient Medications    Medication Sig Dispense Refill     CALCIUM PO        Cholecalciferol (VITAMIN D3) 2000 units TABS Take 1 tablet by mouth daily 100 tablet 3     clindamycin (CLEOCIN T) 1 % external lotion APPLY TOPICALLY TO FACE EVERY MORNING, Strength: 1 % 30 mL 6     Evening Primrose Oil 1000 MG CAPS        fish oil-omega-3 fatty acids 1000 MG capsule Take 1 g by mouth daily       fluticasone (FLONASE) 50 MCG/ACT nasal spray        ibuprofen (ADVIL/MOTRIN) 600 MG tablet Take 1 tablet (600 mg) by mouth every 8 hours as needed for moderate pain 20 tablet 0     tretinoin (RETIN-A) 0.05 % external cream APPLY TO FACE AT BEDTIME FOR ACNE 45 g 3     cyclobenzaprine (FLEXERIL) 5 MG tablet Take 1-2 tablets (5-10 mg) by mouth nightly as needed for muscle spasms (Patient not taking: Reported on 10/10/2022) 40 tablet 0     diclofenac (VOLTAREN) 50 MG EC tablet Take 1 tablet (50 mg) by mouth 2 times daily for 7 days 28 tablet 1     gabapentin (NEURONTIN) 300 MG capsule take 300mg in the morning and afternoon and 600mg (2 capsules) at bedtime. If side effects, reduce to last tolerable dosage. If side effects, then reduce to last tolerable dosage. (Patient not taking: Reported on 10/10/2022) 120 capsule 1        Allergies:     Allergies   Allergen Reactions     Latex Rash        Family History: family history includes Cancer in her maternal aunt and maternal grandmother; Cardiovascular in her father; Diabetes in her mother; Hypertension in her mother; Pancreatic Cancer in her maternal aunt.     Social History: .  reports that she has never smoked. She has never used smokeless tobacco. She reports current alcohol use. She reports that she does not use drugs.    Review of Systems:  ROS: 10 point ROS neg other than the symptoms noted above in the HPI and past medical history.    Physical Exam  GENERAL APPEARANCE: healthy, alert, no distress.   SKIN: no suspicious lesions or rashes  NEURO: Normal strength and tone, mentation  "intact and speech normal  PSYCH:  mentation appears normal and affect normal. Not anxious.  RESPIRATORY: No increased work of breathing.    /69   Pulse 86   Ht 1.54 m (5' 0.63\")   Wt 78 kg (171 lb 14.4 oz)   LMP 03/01/2016 (Approximate)   BMI 32.88 kg/m       left HAND / WRIST EXAM:    Skin intact. No abnormal skin discoloration, erythema or ecchymosis.   Normal wear pattern, color and tone.  Healed volar carpal tunnel syndrome surgical scar.    There is mild swelling in the distal ulna of the wrist, some fullness noted.  There is mild tenderness in the distal ulna, TFCC, ECU of the wrist.  Nontender to palpation radial wrist, anatomical snuff box, dorsal / volar wrist..  There is no ecchymosis.  There is no erythema of the surrounding skin.  There is no maceration of the skin.  There is no gross deformity in the area.  Intact extensors. No extensor lag.  Mild discomfort with radial/ulnar deviation, resisted wrist ulnar deviation.    Intact sensation light touch median, radial, ulnar nerves of the hand  Intact sensation to the radial and ulnar digital nerves of the fingers, as well as the finger tips.  Intact epl fpl fdp edc wrist flexion/extension biceps/triceps deltoid  Brisk capillary refill to all fingers.   Palpable radial pulse, 2+.    X-rays:  3 views left wrist from 10/31/2022 were reviewed in clinic today. On my review, No obvious fracture or dislocation. No obvious bony abnormality or lesion. Advanced left thumb CMC degenerative arthrosis, progressed.  Mild fifth DIP degenerative arthrosis, slightly progressed.    Assessment: 46yo RHD female with chronic ulnar sided wrist pain, either TFCC , ECU tendinitis, possible ganglion cyst.    Plan:  * exam, images reviewed  * appears to be TFCC/ECU pain, possible cyst given some fullness in the area  * discussed treatment with ice, heat, over the counter pain medications, wrist brace, topical ointments, cortisone injection  * she elects for cortisone " injection. See procedure note below  * consider MRI/MRarthrogram in future as needed.    * return to clinic as needed.    * All questions were addressed and answered prior to discharge from clinic today. The patient acknowledges an understanding of and agreement with the plan set forth during today's visit. Patient was advised to call our office or MyChart us if any further questions arise upon leaving our office today.          Sam Florence M.D., M.S.  Dept. of Orthopaedic Surgery  [facility]    Hand / Upper Extremity Injection/Arthrocentesis    Date/Time: 10/31/2022 3:06 PM  Performed by: Toñito Phillips PA  Authorized by: Sam Florence MD     Indications:  Pain  Needle Size:  25 G  Guidance: landmark    Approach:  Ulnar   Condition comment:  TFCC - triangular fibrocartilage complex    Medications:  80 mg triamcinolone 40 MG/ML; 0.5 mL bupivacaine 0.5 %  Outcome:  Tolerated well, no immediate complications  Procedure discussed: discussed risks, benefits, and alternatives    Prep: patient was prepped and draped in usual sterile fashion

## 2023-01-02 ENCOUNTER — ONCOLOGY SURVIVORSHIP VISIT (OUTPATIENT)
Dept: ONCOLOGY | Facility: CLINIC | Age: 48
End: 2023-01-02
Payer: COMMERCIAL

## 2023-01-02 VITALS
OXYGEN SATURATION: 94 % | RESPIRATION RATE: 16 BRPM | BODY MASS INDEX: 33.7 KG/M2 | TEMPERATURE: 98.3 F | DIASTOLIC BLOOD PRESSURE: 72 MMHG | WEIGHT: 176.2 LBS | HEART RATE: 70 BPM | SYSTOLIC BLOOD PRESSURE: 108 MMHG

## 2023-01-02 DIAGNOSIS — N95.1 MENOPAUSAL SYNDROME (HOT FLASHES): ICD-10-CM

## 2023-01-02 DIAGNOSIS — M85.80 OSTEOPENIA, UNSPECIFIED LOCATION: ICD-10-CM

## 2023-01-02 DIAGNOSIS — Z12.31 ENCOUNTER FOR SCREENING MAMMOGRAM FOR BREAST CANCER: ICD-10-CM

## 2023-01-02 DIAGNOSIS — N64.4 BREAST PAIN: Primary | ICD-10-CM

## 2023-01-02 DIAGNOSIS — Z17.0 MALIGNANT NEOPLASM OF OVERLAPPING SITES OF RIGHT BREAST IN FEMALE, ESTROGEN RECEPTOR POSITIVE (H): ICD-10-CM

## 2023-01-02 DIAGNOSIS — C50.811 MALIGNANT NEOPLASM OF OVERLAPPING SITES OF RIGHT BREAST IN FEMALE, ESTROGEN RECEPTOR POSITIVE (H): ICD-10-CM

## 2023-01-02 DIAGNOSIS — R74.8 ELEVATED LIVER ENZYMES: ICD-10-CM

## 2023-01-02 DIAGNOSIS — N93.9 VAGINAL BLEEDING: ICD-10-CM

## 2023-01-02 PROCEDURE — 99215 OFFICE O/P EST HI 40 MIN: CPT | Performed by: NURSE PRACTITIONER

## 2023-01-02 ASSESSMENT — PAIN SCALES - GENERAL: PAINLEVEL: MODERATE PAIN (4)

## 2023-01-02 NOTE — NURSING NOTE
"Oncology Rooming Note    January 2, 2023 3:36 PM   Aleena Escamilla is a 47 year old female who presents for:    Chief Complaint   Patient presents with     Oncology Clinic Visit     Survivorship     Initial Vitals: /72 (BP Location: Left arm)   Pulse 70   Temp 98.3  F (36.8  C) (Oral)   Resp 16   Wt 79.9 kg (176 lb 3.2 oz)   LMP 03/01/2016 (Approximate)   SpO2 94%   BMI 33.70 kg/m   Estimated body mass index is 33.7 kg/m  as calculated from the following:    Height as of 10/31/22: 1.54 m (5' 0.63\").    Weight as of this encounter: 79.9 kg (176 lb 3.2 oz). Body surface area is 1.85 meters squared.  Moderate Pain (4) Comment: Data Unavailable   Patient's last menstrual period was 03/01/2016 (approximate).  Allergies reviewed: Yes  Medications reviewed: Yes    Medications: Medication refills not needed today.  Pharmacy name entered into Vo[facility]: [facility] #[id number] - [place] - [address] [address] [facility]    Clinical concerns: constipation x 3 weeks. Taking fiber dissolvable powder which helps a little.       Anastasia Manzo LPN            "

## 2023-01-02 NOTE — PROGRESS NOTES
Oncology Survivorship Visit: January 2, 2023      Oncologist: / CHRISS  PCP: Dr Concepcion Bocanegra    Diagnosis: Stage II Right Breast  Aleena Escamilla is a 46 yo female that felt she has mass to right beast in 2014 but was told she was not eligible for mammogram but in 11/2015 felt breast was swollen and received imaging that did show a 1.2 x 1.3 x 0.7 cm mass at 12 oclock to breast and later found a secondary mass behind first mass. Biopsy proved invasive lobular carcinoma at 12 oclock and 9 oclock positions. Final pathology from partial mastectomy showed multifocal invasive lobular carcinoma, grade 2 with foci, the first one 36 x 16 x 8 mm and the second one 21 x 14 x 11 mm. LCIS classical type present, lymphovascular invasion not identified. Margins negative, ER/LA positive by IHC and HER-2 not amplified performed on prior core biopsy.[id number]  Oncotype Dx score =25.  Genetics - told pt she has variant of of uncertain significance in the BRCA 2 gene.  Treatment-   1/19/2016 Partial Right  Mastectomy  2/24/2016 - 6/2016 Weekly Taxol x 12 weeks followed by Dose Dense AC x4 cycles  8-9/2016 completed right breast XRT  10/2016 began Femara  10/26/2016 right laparoscopic salpingo-oophorectomy    Interval History: Ms Escamilla is seen in clinic for follow up to her right breast cancer.Pt states she continues with pain to the right breast that had the lumpectomy. She has been using some ibuprofen and another med a provider gave her for help with the pain but shares that heat can also be helpful for the pain. She ranks 4/10 today for pain. No other new breast issues. Reports family is thinking of new plan for weight loss with exercise and diet changes.   Continues with hot flashes.   Rest of complete and comprehensive ROS is reviewed and is negative.   Past Medical History:   Diagnosis Date     Breast cancer (H)      Chronic pelvic pain in female      Malignant neoplasm of right breast, stage 2 (H) 01/01/2016 1/4  lymph nodes positive, Oncotype DX = 25.  Chemotherapy and radiation. Letrozole.     Migraine headaches      Current Outpatient Medications   Medication     CALCIUM PO     Cholecalciferol (VITAMIN D3) 2000 units TABS     clindamycin (CLEOCIN T) 1 % external lotion     Evening Primrose Oil 1000 MG CAPS     fish oil-omega-3 fatty acids 1000 MG capsule     ibuprofen (ADVIL/MOTRIN) 600 MG tablet     tretinoin (RETIN-A) 0.05 % external cream     cyclobenzaprine (FLEXERIL) 5 MG tablet     diclofenac (VOLTAREN) 50 MG EC tablet     fluticasone (FLONASE) 50 MCG/ACT nasal spray     gabapentin (NEURONTIN) 300 MG capsule     Current Facility-Administered Medications   Medication     0.5 mL bupivacaine (MARCAINE) 0.5% injection (50 mL vial)     triamcinolone (KENALOG-40) injection 80 mg     Allergies   Allergen Reactions     Latex Rash     Physical Exam:  /72 (BP Location: Left arm)   Pulse 70   Temp 98.3  F (36.8  C) (Oral)   Resp 16   Wt 79.9 kg (176 lb 3.2 oz)   LMP 03/01/2016 (Approximate)   SpO2 94%   BMI 33.70 kg/m   - gained 6 lbs this year.   Constitutional: Alert and in no distress. No obese  ENT: Eyes bright, No mouth sores  Neck: Supple, No adenopathy.  Cardiac: Heart rate and rhythm is regular and strong without murmur  Respiratory: Breathing easy. Lung sounds clear to auscultation  Breasts: Bilaterally no new masses but has tenderness to the lower right side of right breast as previous.   Abdomen: Soft, non-tender, BS normal. No masses or organomegaly  MS: Muscle tone normal, extremities normal with no edema. Also has right shoulder limitations and has had this worked on by chiropractor before.   Skin: No suspicious lesions or rashes  Neuro: Sensory grossly WNL, gait normal.   Lymph: Normal ant/post cervical, axillary, supraclavicular nodes  Psych: Mentation appears normal and affect normal/bright with good conversation.     Laboratory Results:   No results found for any visits on 01/02/23. Reviewed  10/10/2022 labs with elevated liver enzymes which is now new.    Assessment and Plan:   It was my pleasure to see Aleena Escamilla in the Cancer Survivorship Clinic for her diagnosis of Right Breast Cancer. Given her diagnosis and treatment, she was given a survivorship care plan previously and here are the areas of discussion today:  Right breast cancer- stage II- Pt has completed treatment with right partial mastectomy, Taxol weekly ×12, AC ×4 cycles, and XRTas well as laparoscopic salpingo-oophorectomy. She took Femara from 10/2016 to 10/2021.     Hot flashes continue though no longer on the AI.   Last mammogram was completed 7/28/2022 and was normal-Next mammogram due annually- 7/2023  Right Breast pain- continues to right breast- not improving with time. Using ibuprofen and has tried another medication- gabapentin but is out of this medication. Heat helps. Suggested review with lymphedema specialist to see if this would help since had 4 nodes removed with surgery and may be fluid related.   Genetics- previous testing 5 years- previous was negative. No new family history of cancer.   Bone health/ Osteopenia- DEXA scan from 2/5/2021 T score of -1.7= mild osteopenia. Pt had refused Prolia. Reviewed need for calcium and vitamin D and weight exercise to keep bones strong. Would recommend DEXA in 10 year- age 56.   Cardiac complications- Given her exposure to Doxorubicin, an anthracycline, she is ar risk for cardiomyopathy, arythmias, left ventricular dysfunction. She denies any new cardiac symptoms..     Cognitive changes- She denies any short term memory loss since recovery from chemotherapy.   Sexuality concerns/ vaginal bleeding after sex- Vaginal dryness and painful intercourse is common after treatment or with use of aromatase inhibitors. Signs of vaginal bleeding may be related to thin vaginal walls after use of AI. Suggested seeing Gynecologist for follow up on this .   Urinary toxicity- due to her exposure to  Cytoxan, should she develop hematuria, dysuria, urinary urgency or frequency, she is to contact clinic.  Risk of developing blood cancers- due to exposure to Doxorubicin she is a a small risk for Leukemia or MDS. For this reason she should have a CBC completed yearly.   Radiation side effects- Radiation to the right breast would have included the skin, and she should watch for any new skin lesions in the area of the radiation and have them evaluated.  Her lung was in the field of radiation, but as long as she is asymptomatic there is no routine screening tests that needs to be obtained.  Should she develop hemoptysis, cough or shortness of breath, we could consider a CT scan and/or PFTs.  Her bones are at risk for fracture in the area of her radiation.    Cancer screening-  She should continue to undergo cancer screening for women of her age group.  She will continue to see Gynecology as directed by guidelines.   Colonoscopy due at 46 years of age- will encourage for now.    Healthy lifestyle-  She should refrain from tobacco.  She should have an exercise program of 150 minutes of cardiovascular exercise per week.  She should maintain a healthy weight with a BMI between 20 and 25.  Her diet should include low fats.  She should see her primary care provider for screening and, if needed, treatment of her cholesterol, blood pressure and glucose.  She should receive the annual influenza vaccine.  When age appropriate, she should receive the pneumococcal vaccine.  She should see her eye doctor and dentist routinely. She should limit her sun exposure and use sunscreens.   Covid 19 precautions- Pt shares she will not get vaccinations as  is against this.     Overall recommendations for PCP.  - yearly clinical breast exam  - annual mammogram-order in place and to be set up for late July.  - annual CBC due to history of anthracycline to look for blood cancers.   - suggest DEXA scan at age 56 due to early menopause and  use of AI from 40-46 yo.   The total time of this encounter amounted to 40minutes. This time included face to face time spent with the patient, prep work, ordering tests, and performing post visit documentation.  Marisol Solitario,Cnp

## 2023-01-09 ENCOUNTER — HOSPITAL ENCOUNTER (OUTPATIENT)
Dept: OCCUPATIONAL THERAPY | Facility: CLINIC | Age: 48
Setting detail: THERAPIES SERIES
Discharge: HOME | End: 2023-01-09
Attending: NURSE PRACTITIONER
Payer: COMMERCIAL

## 2023-01-09 DIAGNOSIS — Z17.0 MALIGNANT NEOPLASM OF OVERLAPPING SITES OF RIGHT BREAST IN FEMALE, ESTROGEN RECEPTOR POSITIVE (H): ICD-10-CM

## 2023-01-09 DIAGNOSIS — C50.811 MALIGNANT NEOPLASM OF OVERLAPPING SITES OF RIGHT BREAST IN FEMALE, ESTROGEN RECEPTOR POSITIVE (H): ICD-10-CM

## 2023-01-09 DIAGNOSIS — N64.4 BREAST PAIN: ICD-10-CM

## 2023-01-09 PROCEDURE — 97140 MANUAL THERAPY 1/> REGIONS: CPT | Mod: GO

## 2023-01-09 PROCEDURE — 97165 OT EVAL LOW COMPLEX 30 MIN: CPT | Mod: GO

## 2023-01-09 NOTE — PROGRESS NOTES
01/09/23 0900   Rehab Discipline   Discipline OT   Type of Visit   Type of visit Initial Edema Evaluation   General Information   Start of care 01/09/23   Referring physician Marisol Solitario APRN CNP   Orders Evaluate and treat as indicated   Order date 01/02/23   Medical diagnosis C50.811, Z17.0 (ICD-10-CM) - Malignant neoplasm of overlapping sites of right breast in female, estrogen receptor positive (H)  N64.4 (ICD-10-CM) - Breast pain   Onset of illness / date of surgery 01/19/16   Edema onset 01/01/17   Affected body parts RUE;Trunk   Edema etiology Cancer with lymph node dissection;Radiation;Chemo   Location - Cancer with lymph node dissection R breast, 4 nodes removed   Location - Radiation RUQ   Pertinent history of current problem (PT: include personal factors and/or comorbidities that impact the POC; OT: include additional occupational profile info) Pt is a 46 yo female referred to lymphedema therapy to address RUQ fullness/heaviness/swelling, pain and decreased ROM s/p treatment for breast cancer. Pt reported that she notices swelling in the R breast and upper arm that causes her clothes to fit differently. She is having severe pain in the shoulder/neck from tissue changes/muscle tightness.   Surgical / medical history reviewed Yes   Prior level of functional mobility indep   Prior treatment   (heat, chiropractor)   Community support Family / friend caregiver   Patient role / employment history Employed  (full time, desk job)   Living environment House / [facility]   Living environment comments lives with , family, no concerns getting around the house   Fall Risk Screen   Fall screen completed by OT   Have you fallen 2 or more times in the past year? No   Have you fallen and had an injury in the past year? No   Is patient a fall risk? No   Abuse Screen (yes response referral indicated)   Feels Unsafe at Home or Work/School no   Feels Threatened by Someone no   Does Anyone Try to Keep You From  Having Contact with Others or Doing Things Outside Your Home? no   Physical Signs of Abuse Present no   Functional Scales   Shoulder Pain and Disability Index (score out of 100). A higher score indicates greater impairment. 66.15   Cognitive Status   Orientation Orientation to person, place and time   Level of consciousness Alert   Edema Exam / Assessment   Skin condition comments skin intact, lumpectomy scar healed with no adhesion, some thickness to the scar. No cording observed, tightness in pec muscle with shoulder flexion. Mild non pitting edema present in RUQ, on RUE mainly on the upper arm.   Girth Measurements   Girth Measurements Refer to separate girth measurement flowsheet   Volume UE   Right UE (mL) 1933   Left UE (mL) 2422   Range of Motion   ROM comments WFL, pain with full R shoulder flexion   Posture   Posture Forward head position   Activities of Daily Living   Activities of Daily Living indep   Bed Mobility   Bed mobility indep   Transfers   Transfers indep   Gait / Locomotion   Gait / Locomotion indep   Sensory   Sensory perception comments intact   Planned Edema Interventions   Planned edema interventions Manual lymph drainage;Gradient compression bandaging;Fit for compression garment;Exercises;Precautions to prevent infection / exacerbation;Education;Manual therapy;ADL training;Skin care / precautions;Scar mobilization;Soft tissue mobilization;Myofascial release;Home management program development   Clinical Impression   Criteria for skilled therapeutic intervention met Yes   Therapy diagnosis RUQ lymphedema   Assessment of Occupational Performance 1-3 Performance Deficits   Identified Performance Deficits impaired fit of clothing, impaired RUE ROM affecting ADLs and IADLs.   Clinical Decision Making (Complexity) Low complexity   Treatment Frequency 2x/week;3x/week   Treatment duration 8 weeks, reducing as needed   Patient / family and/or staff in agreement with plan of care Yes   Risks and  benefits of therapy have been explained Yes   Clinical impression comments Pt presents with RUQ edema and tissue/skin changes from cancer treatment including lumpectomy and radiation. Pt is experiencing symptoms that are affecting her ability to complete ADLs/IADLs and would benefit from skilled treatment to reduce edema, fit for compresssion garment and provide long term management education.   Goals   Edema Eval Goals 1;2;3   Goal 1   Goal identifier SPADI   Goal description Pt will demonstrate an improvement in SPADI score by at least 10 pts to improve ROM/pain for ADLs   Target date 04/09/23   Goal 2   Goal identifier Garments   Goal description Pt will be fit for and demonstrate independence using new compression garments for long term edema management as evidenced by a no more than 50 mL increase in volume after transitioning into garments.   Target date 04/09/23   Goal 3   Goal identifier Home mangement   Goal description Pt will verbalize understanding of long term edema/lipedema management including following recommended wear schedule for compression garments, manual techniques, skin care and exercise.   Target date 04/09/23   Total Evaluation Time   OT Tiffany, Low Complexity Minutes (05308) 20

## 2023-01-14 ENCOUNTER — HEALTH MAINTENANCE LETTER (OUTPATIENT)
Age: 48
End: 2023-01-14

## 2023-01-30 ENCOUNTER — HOSPITAL ENCOUNTER (OUTPATIENT)
Dept: OCCUPATIONAL THERAPY | Facility: CLINIC | Age: 48
Setting detail: THERAPIES SERIES
Discharge: HOME | End: 2023-01-30
Attending: NURSE PRACTITIONER
Payer: COMMERCIAL

## 2023-01-30 PROCEDURE — 97140 MANUAL THERAPY 1/> REGIONS: CPT | Mod: GO

## 2023-01-31 ENCOUNTER — HOSPITAL ENCOUNTER (OUTPATIENT)
Dept: OCCUPATIONAL THERAPY | Facility: CLINIC | Age: 48
Setting detail: THERAPIES SERIES
Discharge: HOME | End: 2023-01-31
Attending: NURSE PRACTITIONER
Payer: COMMERCIAL

## 2023-01-31 PROCEDURE — 97140 MANUAL THERAPY 1/> REGIONS: CPT | Mod: GO

## 2023-02-01 ENCOUNTER — HOSPITAL ENCOUNTER (OUTPATIENT)
Dept: OCCUPATIONAL THERAPY | Facility: CLINIC | Age: 48
Setting detail: THERAPIES SERIES
Discharge: HOME | End: 2023-02-01
Attending: NURSE PRACTITIONER
Payer: COMMERCIAL

## 2023-02-01 PROCEDURE — 97140 MANUAL THERAPY 1/> REGIONS: CPT | Mod: GO

## 2023-02-07 ENCOUNTER — HOSPITAL ENCOUNTER (OUTPATIENT)
Dept: OCCUPATIONAL THERAPY | Facility: CLINIC | Age: 48
Setting detail: THERAPIES SERIES
Discharge: HOME | End: 2023-02-07
Attending: NURSE PRACTITIONER
Payer: COMMERCIAL

## 2023-02-07 PROCEDURE — 97140 MANUAL THERAPY 1/> REGIONS: CPT | Mod: GO

## 2023-02-08 ENCOUNTER — HOSPITAL ENCOUNTER (OUTPATIENT)
Dept: OCCUPATIONAL THERAPY | Facility: CLINIC | Age: 48
Setting detail: THERAPIES SERIES
Discharge: HOME | End: 2023-02-08
Attending: NURSE PRACTITIONER
Payer: COMMERCIAL

## 2023-02-08 PROCEDURE — 97140 MANUAL THERAPY 1/> REGIONS: CPT | Mod: GO

## 2023-02-13 ENCOUNTER — HOSPITAL ENCOUNTER (OUTPATIENT)
Dept: OCCUPATIONAL THERAPY | Facility: CLINIC | Age: 48
Setting detail: THERAPIES SERIES
Discharge: HOME | End: 2023-02-13
Attending: NURSE PRACTITIONER
Payer: COMMERCIAL

## 2023-02-13 PROCEDURE — 97140 MANUAL THERAPY 1/> REGIONS: CPT | Mod: GO

## 2023-02-17 ENCOUNTER — HOSPITAL ENCOUNTER (OUTPATIENT)
Dept: OCCUPATIONAL THERAPY | Facility: CLINIC | Age: 48
Setting detail: THERAPIES SERIES
Discharge: HOME | End: 2023-02-17
Attending: NURSE PRACTITIONER
Payer: COMMERCIAL

## 2023-02-17 PROCEDURE — 97140 MANUAL THERAPY 1/> REGIONS: CPT | Mod: GO

## 2023-02-20 ENCOUNTER — HOSPITAL ENCOUNTER (OUTPATIENT)
Dept: OCCUPATIONAL THERAPY | Facility: CLINIC | Age: 48
Setting detail: THERAPIES SERIES
Discharge: HOME | End: 2023-02-20
Attending: NURSE PRACTITIONER
Payer: COMMERCIAL

## 2023-02-20 PROCEDURE — 97140 MANUAL THERAPY 1/> REGIONS: CPT | Mod: GO

## 2023-02-20 NOTE — PROGRESS NOTES
02/20/23 1400   Signing Clinician's Name / Credentials   Signing clinician's name / credentials Any Maloney OTR/L, CLT   Session Number   Session Number 9 [facility]   Progress/Recertification   Recertification Due 04/09/23   Adult OT Eval Goals   Edema Eval Goals 1;2;3   Goal 2   Goal identifier Garments   Goal description Pt will be fit for and demonstrate independence using new compression garments for long term edema management as evidenced by a no more than 50 mL increase in volume after transitioning into garments.   Goal Progress Pt used new compression bra over the weekend, liked the fit and felt it contained edema well.   Target date 04/09/23   Goal 3   Goal identifier Home mangement   Goal description Pt will verbalize understanding of long term edema/lipedema management including following recommended wear schedule for compression garments, manual techniques, skin care and exercise.   Goal Progress Educated pt on pneumatic pump for long term management.   Target date 04/09/23   Subjective Report   Subjective Report Pt reported she wore her new compression bra this weekend and did not experience any increased fullness or heaviness in RUQ   Therapeutic Interventions   Therapeutic Interventions Manual Therapy   Manual Therapy   Manual Therapy Minutes ([id number]) 48   Treatment Detail With pt lying supine, CLT completed manual edema mobilization techniques to clear RUQ as follows deep breathing, clearing of SC nodes and shoulder collectors, clearing of L axillary LNs, clear pathway from R axilla to L axilla, clear R inguinal nodes and create pathway from R axilla to R groin, with extra time spent edema in R breast down lateral trunk to inguinal nodes also worked on upper arm/shoulder directing fluid across the chest. Lymphatouch used today as well to get a better skin stretch. Pt tolerated treatment well with a noticable softening of tissue. CLT also educated pt on an MLD sequence to promote moving fluid  out of the head/neck/right ear. Pt reported that since starting lymphedema therapy, her chronic ear pain/pressure has significantly improved. CLT educated pt on sequence to stimulate SCF, cervical nodes, auricular nodes, occipital nodes, clearing cheek medial to lateral and down to SCF, clearing around the ear down to SCF.   Progress Pt used her new compression bra this weekend and did not experience any increased fullness in the R breast while using the garment. Pt agreeable to reduce visits to once a week so she can become more independent with edema management. Pt has completed over 4 weeks of conservative treatment, and still experiences increased RUQ and trunk swelling. Pt would benefit from a pneumatic pump for long term management.   Education   Learner Patient   Readiness Acceptance   Method Explanation;Demonstration   Response Verbalizes understanding   Plan   Homework continue self MLD, HEP, use bra   Plan for next session MLD   Total Session Time   Timed Code Treatment Minutes 48   Total Treatment Time (sum of timed and untimed services) 48   Medicare Claim Information   Medical diagnosis C50.811, Z17.0 (ICD-10-CM) - Malignant neoplasm of overlapping sites of right breast in female, estrogen receptor positive (H)  N64.4 (ICD-10-CM) - Breast pain   Therapy diagnosis RUQ lymphedema   Start of care 01/09/23   Onset of illness / date of surgery 01/19/16

## 2023-02-27 ENCOUNTER — HOSPITAL ENCOUNTER (OUTPATIENT)
Dept: OCCUPATIONAL THERAPY | Facility: CLINIC | Age: 48
Setting detail: THERAPIES SERIES
Discharge: HOME | End: 2023-02-27
Attending: NURSE PRACTITIONER
Payer: COMMERCIAL

## 2023-02-27 PROCEDURE — 97140 MANUAL THERAPY 1/> REGIONS: CPT | Mod: GO

## 2023-04-03 ENCOUNTER — HOSPITAL ENCOUNTER (OUTPATIENT)
Dept: OCCUPATIONAL THERAPY | Facility: CLINIC | Age: 48
Setting detail: THERAPIES SERIES
Discharge: HOME | End: 2023-04-03
Attending: NURSE PRACTITIONER
Payer: COMMERCIAL

## 2023-04-03 PROCEDURE — 97140 MANUAL THERAPY 1/> REGIONS: CPT | Mod: GO

## 2023-04-03 NOTE — PROGRESS NOTES
04/03/23 1400   Signing Clinician's Name / Credentials   Signing clinician's name / credentials Any Maloney OTR/L, CLT   Session Number   Session Number 11 [facility]   Progress/Recertification   Recertification Due 04/09/23   Adult OT Eval Goals   Edema Eval Goals 1;2;3   Goal 2   Goal identifier Garments   Goal description Pt will be fit for and demonstrate independence using new compression garments for long term edema management as evidenced by a no more than 50 mL increase in volume after transitioning into garments.   Goal Progress Pt continues to use compression bra, feels it is effective in managing edema. Goal met   Target date 04/09/23   Date met 04/03/23   Goal 3   Goal identifier Home mangement   Goal description Pt will verbalize understanding of long term edema/lipedema management including following recommended wear schedule for compression garments, manual techniques, skin care and exercise.   Goal Progress Pt with pump demo today, will add to her long term management routine. Verbalized understanding of using compression bra and completing self MLD daily. Goal met   Target date 04/09/23   Date met 04/03/23   Subjective Report   Subjective Report Pt reported that she has been using her compression bra, mostly on weekends so she can wear a normal/more supportive bra. Over the past month she has only noticed breast fullness 1 or 2 times.   Objective Measures   Objective Measures Objective Measure 1   Objective Measure 1   Objective Measure Basic pump trial measurements   Details PRE/POST  Wrist: 17.8 cm, 17.5 cm  Forearm: 28.3 cm, 27.6 cm  Bicep: 33.1 cm, 33.2 cm    CHEST  124 cm, 124.5 cm    HIPS  106 cm, 106 cm   Therapeutic Interventions   Therapeutic Interventions Manual Therapy   Manual Therapy   Manual Therapy Minutes ([id number]) 53   Treatment Detail Lesvia from [facility] attended todays session to do a pump demo, a tool for pt to use for long term edema management. Lesvia and CLT  educated pt on how the pump would be used with her current edema management routine, and would be an addition to compression and exercise. Pt would benefit from a pneumatic pump, specifically the FlexiTouch, as she has RUE as well as breast and chest lymphedema. Pt completed a trial with the basic pump and measurements demonstrated a decrease of edema in the wrist and forearm, but increased measurements on the upper arm and chest. As most of pts edema in her R breast, the basic pump would not be recommended as it does not address/provide treatment for the affected area. Pt has fibrosis in R breast that would benefit from the pneumatic treatment as well.   Progress Since evaluation, pts RUE and truncal lymphedema has decreased using MLD, exercise and compression garments. Pt has been fit for a new compression bra and has been using it regularly. She has been completing self massage daily and also is pursuing a pneumatic pump device for better long term management. Pt has reported minimal fullness/heaviness in her R breast over the past month as well as decreased tissue rubbing in the axilla. Pt has met all goals for lymphedema therapy and is ready for and agreeable with discharge today.   Education   Learner Patient   Readiness Acceptance   Method Explanation;Demonstration   Response Verbalizes understanding   Plan   Home program use of compression bra, daily self massage/pump, exercise, skin care   Updates to plan of care Goals met, discharge today   Total Session Time   Timed Code Treatment Minutes 53   Total Treatment Time (sum of timed and untimed services) 53   Medicare Claim Information   Medical diagnosis C50.811, Z17.0 (ICD-10-CM) - Malignant neoplasm of overlapping sites of right breast in female, estrogen receptor positive (H)  N64.4 (ICD-10-CM) - Breast pain   Therapy diagnosis RUQ lymphedema   Start of care 01/09/23   Onset of illness / date of surgery 01/19/16

## 2023-04-03 NOTE — PROGRESS NOTES
[facility]    Outpatient Occupational Therapy Discharge Note  Patient: Aleena Escamilla  : 1975    Beginning/End Dates of Reporting Period:  23 to 4/3/23    Referring Provider: Marisol PALACIO CNP    Therapy Diagnosis: RUQ lymphedema    Client Self Report: Pt reported that she has been using her compression bra, mostly on weekends so she can wear a normal/more supportive bra. Over the past month she has only noticed breast fullness 1 or 2 times.    Objective Measurements:     Objective Measure: Basic pump trial measurements   Details: PRE/POST  Wrist: 17.8 cm, 17.5 cm  Forearm: 28.3 cm, 27.6 cm  Bicep: 33.1 cm, 33.2 cm    CHEST  124 cm, 124.5 cm    HIPS  106 cm, 106 cm    Goals:   Goal Identifier     Goal Description     Target Date     Date Met      Progress (detail required for progress note):       Goal Identifier Garments   Goal Description Pt will be fit for and demonstrate independence using new compression garments for long term edema management as evidenced by a no more than 50 mL increase in volume after transitioning into garments.   Target Date 23   Date Met  23   Progress (detail required for progress note): Pt continues to use compression bra, feels it is effective in managing edema. Goal met     Goal Identifier Home mangement   Goal Description Pt will verbalize understanding of long term edema/lipedema management including following recommended wear schedule for compression garments, manual techniques, skin care and exercise.   Target Date 23   Date Met  23   Progress (detail required for progress note): Pt with pump demo today, will add to her long term management routine. Verbalized understanding of using compression bra and completing self MLD daily. Goal met     Plan:  Discharge from therapy.    Reason for Discharge: Patient has met all goals.  Since  evaluation, pts RUE and truncal lymphedema has decreased using MLD, exercise and compression garments. Pt has been fit for a new compression bra and has been using it regularly. She has been completing self massage daily and also is pursuing a pneumatic pump device for better long term management. Pt has reported minimal fullness/heaviness in her R breast over the past month as well as decreased tissue rubbing in the axilla. Pt has met all goals for lymphedema therapy and is ready for and agreeable with discharge today.    Equipment Issued: compression bra    Discharge Plan: Patient to continue home program.

## 2023-04-06 ENCOUNTER — MEDICAL CORRESPONDENCE (OUTPATIENT)
Dept: HEALTH INFORMATION MANAGEMENT | Facility: CLINIC | Age: 48
End: 2023-04-06
Payer: COMMERCIAL

## 2023-05-02 DIAGNOSIS — L70.0 ACNE VULGARIS: ICD-10-CM

## 2023-05-02 RX ORDER — TRETINOIN 0.5 MG/G
CREAM TOPICAL
Qty: 45 G | Refills: 3 | Status: SHIPPED | OUTPATIENT
Start: 2023-05-02 | End: 2023-12-12

## 2023-05-08 ENCOUNTER — TELEPHONE (OUTPATIENT)
Dept: FAMILY MEDICINE | Facility: CLINIC | Age: 48
End: 2023-05-08
Payer: COMMERCIAL

## 2023-05-08 NOTE — TELEPHONE ENCOUNTER
Patient Quality Outreach    Patient is due for the following:   Colon Cancer Screening  Depression  -  PHQ-9 needed      Topic Date Due     Hepatitis B Vaccine (1 of 3 - 3-dose series) Never done     COVID-19 Vaccine (1) Never done     Diptheria Tetanus Pertussis (DTAP/TDAP/TD) Vaccine (2 - Td or Tdap) 01/16/2022     Flu Vaccine (1) 09/01/2022       Next Steps:   Schedule a lab only visit for colonoscopy    Type of outreach:    Sent [employer] in message.      Questions for provider review:    None           Sara Li

## 2023-07-24 DIAGNOSIS — Z12.31 ENCOUNTER FOR SCREENING MAMMOGRAM FOR BREAST CANCER: Primary | ICD-10-CM

## 2023-07-31 ENCOUNTER — ANCILLARY PROCEDURE (OUTPATIENT)
Dept: MAMMOGRAPHY | Facility: CLINIC | Age: 48
End: 2023-07-31
Attending: NURSE PRACTITIONER
Payer: COMMERCIAL

## 2023-07-31 ENCOUNTER — ANCILLARY ORDERS (OUTPATIENT)
Dept: ONCOLOGY | Facility: CLINIC | Age: 48
End: 2023-07-31

## 2023-07-31 DIAGNOSIS — Z12.31 ENCOUNTER FOR SCREENING MAMMOGRAM FOR BREAST CANCER: ICD-10-CM

## 2023-07-31 DIAGNOSIS — Z12.31 ENCOUNTER FOR SCREENING MAMMOGRAM FOR BREAST CANCER: Primary | ICD-10-CM

## 2023-07-31 PROCEDURE — 77067 SCR MAMMO BI INCL CAD: CPT | Mod: GC | Performed by: STUDENT IN AN ORGANIZED HEALTH CARE EDUCATION/TRAINING PROGRAM

## 2023-07-31 PROCEDURE — 77063 BREAST TOMOSYNTHESIS BI: CPT | Mod: GC | Performed by: STUDENT IN AN ORGANIZED HEALTH CARE EDUCATION/TRAINING PROGRAM

## 2023-08-10 ENCOUNTER — TELEPHONE (OUTPATIENT)
Dept: FAMILY MEDICINE | Facility: CLINIC | Age: 48
End: 2023-08-10
Payer: COMMERCIAL

## 2023-08-10 NOTE — TELEPHONE ENCOUNTER
Patient Quality Outreach    Patient is due for the following:   Colon Cancer Screening      Topic Date Due    Hepatitis B Vaccine (1 of 3 - 3-dose series) Never done    COVID-19 Vaccine (1) Never done    Diptheria Tetanus Pertussis (DTAP/TDAP/TD) Vaccine (2 - Td or Tdap) 01/16/2022       Next Steps:   Schedule a nurse only visit for colonoscopy    Type of outreach:    Sent [employer] message.      Questions for provider review:    None           Sara Li

## 2023-08-16 ENCOUNTER — OFFICE VISIT (OUTPATIENT)
Dept: URGENT CARE | Facility: URGENT CARE | Age: 48
End: 2023-08-16
Payer: COMMERCIAL

## 2023-08-16 VITALS
BODY MASS INDEX: 33.13 KG/M2 | DIASTOLIC BLOOD PRESSURE: 78 MMHG | TEMPERATURE: 97 F | SYSTOLIC BLOOD PRESSURE: 111 MMHG | OXYGEN SATURATION: 96 % | RESPIRATION RATE: 16 BRPM | WEIGHT: 173.2 LBS | HEART RATE: 68 BPM

## 2023-08-16 DIAGNOSIS — M79.10 MYALGIA: ICD-10-CM

## 2023-08-16 DIAGNOSIS — L29.9 ITCHING: ICD-10-CM

## 2023-08-16 DIAGNOSIS — B35.0 TINEA CAPITIS: Primary | ICD-10-CM

## 2023-08-16 LAB
BASOPHILS # BLD AUTO: 0 10E3/UL (ref 0–0.2)
BASOPHILS NFR BLD AUTO: 1 %
EOSINOPHIL # BLD AUTO: 0.1 10E3/UL (ref 0–0.7)
EOSINOPHIL NFR BLD AUTO: 2 %
ERYTHROCYTE [DISTWIDTH] IN BLOOD BY AUTOMATED COUNT: 13.7 % (ref 10–15)
HCT VFR BLD AUTO: 43.3 % (ref 35–47)
HGB BLD-MCNC: 14.6 G/DL (ref 11.7–15.7)
IMM GRANULOCYTES # BLD: 0 10E3/UL
IMM GRANULOCYTES NFR BLD: 0 %
LYMPHOCYTES # BLD AUTO: 2.5 10E3/UL (ref 0.8–5.3)
LYMPHOCYTES NFR BLD AUTO: 42 %
MCH RBC QN AUTO: 29.2 PG (ref 26.5–33)
MCHC RBC AUTO-ENTMCNC: 33.7 G/DL (ref 31.5–36.5)
MCV RBC AUTO: 87 FL (ref 78–100)
MONOCYTES # BLD AUTO: 0.5 10E3/UL (ref 0–1.3)
MONOCYTES NFR BLD AUTO: 8 %
NEUTROPHILS # BLD AUTO: 2.8 10E3/UL (ref 1.6–8.3)
NEUTROPHILS NFR BLD AUTO: 48 %
PLATELET # BLD AUTO: 240 10E3/UL (ref 150–450)
RBC # BLD AUTO: 5 10E6/UL (ref 3.8–5.2)
WBC # BLD AUTO: 5.9 10E3/UL (ref 4–11)

## 2023-08-16 PROCEDURE — 86618 LYME DISEASE ANTIBODY: CPT | Performed by: PHYSICIAN ASSISTANT

## 2023-08-16 PROCEDURE — 80053 COMPREHEN METABOLIC PANEL: CPT | Performed by: PHYSICIAN ASSISTANT

## 2023-08-16 PROCEDURE — 85025 COMPLETE CBC W/AUTO DIFF WBC: CPT | Performed by: PHYSICIAN ASSISTANT

## 2023-08-16 PROCEDURE — 99214 OFFICE O/P EST MOD 30 MIN: CPT | Performed by: PHYSICIAN ASSISTANT

## 2023-08-16 PROCEDURE — 36415 COLL VENOUS BLD VENIPUNCTURE: CPT | Performed by: PHYSICIAN ASSISTANT

## 2023-08-16 RX ORDER — KETOCONAZOLE 20 MG/ML
SHAMPOO TOPICAL
Qty: 120 ML | Refills: 0 | Status: SHIPPED | OUTPATIENT
Start: 2023-08-16 | End: 2024-04-01

## 2023-08-16 NOTE — PATIENT INSTRUCTIONS
You can take over the counter antihistamines like Allegra, Claritin, Zyrtec or Xyzal for the itching. Take 1-2 x a day

## 2023-08-16 NOTE — PROGRESS NOTES
Chief Complaint   Patient presents with    Abdominal Cramping    Derm Problem     Feeling itchy all over body. Pt took benadryl couple of times, helped a little bit. Onset- 1 month ago     Insect Bites     Pt found couple of bumps on scalp on Sunday.     Joint Pain       ASSESSMENT/PLAN:  Aleena was seen today for abdominal cramping, derm problem, insect bites and joint pain.    Diagnoses and all orders for this visit:    Tinea capitis  -     ketoconazole (NIZORAL) 2 % external shampoo; Five to 10 mL of shampoo should be left on for three to five minutes before rinsing off. Apply to wet hair, lather, and rinse thoroughly; repeat. Use every 3 to 4 days for up to 8 weeks    Itching  -     Primary Care Referral; Future  -     CBC with platelets and differential; Future  -     Lyme Disease Total Abs Bld with Reflex to Confirm CLIA; Future  -     Comprehensive metabolic panel (BMP + Alb, Alk Phos, ALT, AST, Total. Bili, TP); Future  -     CBC with platelets and differential  -     Lyme Disease Total Abs Bld with Reflex to Confirm CLIA  -     Comprehensive metabolic panel (BMP + Alb, Alk Phos, ALT, AST, Total. Bili, TP)    Myalgia  -     Lyme Disease Total Abs Bld with Reflex to Confirm CLIA; Future  -     Lyme Disease Total Abs Bld with Reflex to Confirm CLIA    The scalp itching seems most consistent with tinea capitis.  Overall and has a reassuring exam but will give a trial of ketoconazole above.  Her generalized pruritus is more recent along with myalgias and scratchy throat.  This may be a simple viral syndrome such as COVID-19 and I do recommend she test for this at home.  However, could also be Lyme or other tickborne disease.  We will test for Lyme as well.  Her CBC came back within normal limits.  Comp is pending.    If labs are normal and patient still experiencing her symptoms I recommend she follow-up with her primary care provider within the next 2 to 4 weeks.    For the generalized itching she can take  Zyrtec as needed.  Return to clinic sooner if any new or worsening symptoms    Jules Hawkins PA-C      SUBJECTIVE:  Aleena is a 47 year old female who presents to urgent care with 3 to 4 months of scalp itching and also noticed some burning when she recently dyed her hair.  She did noticed to raised bumps over the last day or 2 but they seem to have resolved.  No flaking or dandruff.    For the last 2 or 3 days she has noticed generalized itching of her skin without any rash, scratchy throat, watery eyes and some joint pain mainly in her elbows and fingers.  She does not garden often and is not sure if she has been bit by a tick.  She has not noticed any rash but does have some patches of dry skin    ROS: Pertinent ROS neg other than the symptoms noted above in the HPI.     OBJECTIVE:  /78 (BP Location: Left arm, Patient Position: Sitting, Cuff Size: Adult Regular)   Pulse 68   Temp 97  F (36.1  C) (Tympanic)   Resp 16   Wt 78.6 kg (173 lb 3.2 oz)   LMP 03/01/2016 (Approximate)   SpO2 96%   BMI 33.13 kg/m     GENERAL: healthy, alert and no distress  EYES: Eyes grossly normal to inspection, PERRL and conjunctivae and sclerae normal  HENT: ear canals and TM's normal, nose and mouth without ulcers or lesions, no redness, flaking or lesions of the scalp  RESP: lungs clear to auscultation - no rales, rhonchi or wheezes  CV: regular rate and rhythm, normal S1 S2, no S3 or S4, no murmur, click or rub, no peripheral edema and peripheral pulses strong  MS: no gross musculoskeletal defects noted, no edema  SKIN: no suspicious lesions or rashes  NEURO: Normal strength and tone, mentation intact and speech normal, cranial nerves II through XII grossly intact, normal gait    DIAGNOSTICS    Results for orders placed or performed in visit on 08/16/23   CBC with platelets and differential     Status: None   Result Value Ref Range    WBC Count 5.9 4.0 - 11.0 10e3/uL    RBC Count 5.00 3.80 - 5.20 10e6/uL    Hemoglobin  14.6 11.7 - 15.7 g/dL    Hematocrit 43.3 35.0 - 47.0 %    MCV 87 78 - 100 fL    MCH 29.2 26.5 - 33.0 pg    MCHC 33.7 31.5 - 36.5 g/dL    RDW 13.7 10.0 - 15.0 %    Platelet Count 240 150 - 450 10e3/uL    % Neutrophils 48 %    % Lymphocytes 42 %    % Monocytes 8 %    % Eosinophils 2 %    % Basophils 1 %    % Immature Granulocytes 0 %    Absolute Neutrophils 2.8 1.6 - 8.3 10e3/uL    Absolute Lymphocytes 2.5 0.8 - 5.3 10e3/uL    Absolute Monocytes 0.5 0.0 - 1.3 10e3/uL    Absolute Eosinophils 0.1 0.0 - 0.7 10e3/uL    Absolute Basophils 0.0 0.0 - 0.2 10e3/uL    Absolute Immature Granulocytes 0.0 <=0.4 10e3/uL   CBC with platelets and differential     Status: None    Narrative    The following orders were created for panel order CBC with platelets and differential.  Procedure                               Abnormality         Status                     ---------                               -----------         ------                     CBC with platelets and d...[[id number]]                      Final result                 Please view results for these tests on the individual orders.        Current Outpatient Medications   Medication    CALCIUM PO    Cholecalciferol (VITAMIN D3) 2000 units TABS    clindamycin (CLEOCIN T) 1 % external lotion    Evening Primrose Oil 1000 MG CAPS    fish oil-omega-3 fatty acids 1000 MG capsule    ibuprofen (ADVIL/MOTRIN) 600 MG tablet    tretinoin (RETIN-A) 0.05 % external cream    cyclobenzaprine (FLEXERIL) 5 MG tablet    diclofenac (VOLTAREN) 50 MG EC tablet    gabapentin (NEURONTIN) 300 MG capsule     Current Facility-Administered Medications   Medication    0.5 mL bupivacaine (MARCAINE) 0.5% injection (50 mL vial)    triamcinolone (KENALOG-40) injection 80 mg      Patient Active Problem List   Diagnosis    CARDIOVASCULAR SCREENING; LDL GOAL LESS THAN 160    Chronic pelvic pain in female    Chronic salpingitis and oophoritis    Major depressive disorder, recurrent episode, mild (H)     Generalized anxiety disorder    Arthritis, multiple joint involvement    Primary osteoarthritis of both hands    Fibromyalgia    Malignant neoplasm of overlapping sites of right female breast (H)    BRCA gene positive    Carpal tunnel syndrome    Non morbid drug-induced obesity    Chronic right shoulder pain    Bunion, right    Onychomycosis    Elevated LFTs    Fatty liver    Hyperlipidemia LDL goal <130    High risk medication use    Upper back pain, chronic      Past Medical History:   Diagnosis Date    Breast cancer (H)     Chronic pelvic pain in female     Malignant neoplasm of right breast, stage 2 (H) 01/01/2016    1/4 lymph nodes positive, Oncotype DX = 25.  Chemotherapy and radiation. Letrozole.    Migraine headaches      Past Surgical History:   Procedure Laterality Date    BIOPSY BREAST NEEDLE LOCALIZATION Right 1/19/2016    Procedure: BIOPSY BREAST NEEDLE LOCALIZATION;  Surgeon: Adelina Demarco MD;  Location: [facility] OR    GYN SURGERY  2-    bilateral salpingectomy, left oophrectomy    LAPAROSCOPIC SALPINGO-OOPHORECTOMY Right 10/26/2016    Procedure: LAPAROSCOPIC SALPINGO-OOPHORECTOMY;  Surgeon: Bouchra Mayo MD;  Location: [facility] OR    LUMPECTOMY BREAST WITH SENTINEL NODE, COMBINED Right 1/19/2016    Procedure: COMBINED LUMPECTOMY BREAST WITH SENTINEL NODE;  Surgeon: Adelina Demarco MD;  Location: [facility] OR    PELVIS LAPAROSCOPY,DX  12/28/1998    RELEASE CARPAL TUNNEL Left 12/23/2016    Procedure: RELEASE CARPAL TUNNEL;  Surgeon: Sam Florence MD;  Location:  OR     Family History   Problem Relation Age of Onset    Diabetes Mother     Hypertension Mother     Cancer Maternal Grandmother         cervical CA    Cardiovascular Father         MI    Cancer Maternal Aunt         cervical cancer    Pancreatic Cancer Maternal Aunt         COD     Social History     Tobacco Use    Smoking status: Never    Smokeless tobacco: Never   Substance Use Topics    Alcohol use: Yes              The plan of  care was discussed with the patient. They understand and agree with the course of treatment prescribed. A printed summary was given including instructions and medications.  The use of Dragon/[employer] dictation services may have been used to construct the content in this note; any grammatical or spelling errors are non-intentional. Please contact the author of this note directly if you are in need of any clarification.

## 2023-08-17 LAB
ALBUMIN SERPL BCG-MCNC: 4.8 G/DL (ref 3.5–5.2)
ALP SERPL-CCNC: 105 U/L (ref 35–104)
ALT SERPL W P-5'-P-CCNC: 175 U/L (ref 0–50)
ANION GAP SERPL CALCULATED.3IONS-SCNC: 12 MMOL/L (ref 7–15)
AST SERPL W P-5'-P-CCNC: 115 U/L (ref 0–45)
B BURGDOR IGG+IGM SER QL: 0.05
BILIRUB SERPL-MCNC: 0.5 MG/DL
BUN SERPL-MCNC: 10.3 MG/DL (ref 6–20)
CALCIUM SERPL-MCNC: 9.6 MG/DL (ref 8.6–10)
CHLORIDE SERPL-SCNC: 103 MMOL/L (ref 98–107)
CREAT SERPL-MCNC: 0.59 MG/DL (ref 0.51–0.95)
DEPRECATED HCO3 PLAS-SCNC: 25 MMOL/L (ref 22–29)
GFR SERPL CREATININE-BSD FRML MDRD: >90 ML/MIN/1.73M2
GLUCOSE SERPL-MCNC: 102 MG/DL (ref 70–99)
POTASSIUM SERPL-SCNC: 4.2 MMOL/L (ref 3.4–5.3)
PROT SERPL-MCNC: 7.9 G/DL (ref 6.4–8.3)
SODIUM SERPL-SCNC: 140 MMOL/L (ref 136–145)

## 2023-09-20 NOTE — NURSING NOTE
Oncology Rooming Note    July 13, 2017 8:47 AM   Aleena Escamilla is a 41 year old female who presents for:    Chief Complaint   Patient presents with     Oncology Clinic Visit     3 month follow up     Initial Vitals: BP 96/70  Pulse 83  Temp 97.4  F (36.3  C)  Resp 15  Ht 1.524 m (5')  Wt 70.8 kg (156 lb)  LMP 03/01/2016  SpO2 97%  BMI 30.47 kg/m2 Estimated body mass index is 30.47 kg/(m^2) as calculated from the following:    Height as of this encounter: 1.524 m (5').    Weight as of this encounter: 70.8 kg (156 lb). Body surface area is 1.73 meters squared.  No Pain (0) Comment: Data Unavailable   Patient's last menstrual period was 03/01/2016.  Allergies reviewed: Yes  Medications reviewed: Yes    Medications: Medication refills not needed today.  Pharmacy name entered into [facility]:    [facility] [place] - [address] [facility] [place] - [address] AT [facility] [place] - [place], [place] - [address], [address]        5 minutes for nursing intake (face to face time)     Sandra Tello LPN              
negative - no fever

## 2023-10-09 ENCOUNTER — OFFICE VISIT (OUTPATIENT)
Dept: FAMILY MEDICINE | Facility: CLINIC | Age: 48
End: 2023-10-09
Payer: COMMERCIAL

## 2023-10-09 VITALS
SYSTOLIC BLOOD PRESSURE: 103 MMHG | RESPIRATION RATE: 18 BRPM | HEART RATE: 81 BPM | OXYGEN SATURATION: 96 % | HEIGHT: 61 IN | WEIGHT: 168.4 LBS | TEMPERATURE: 98 F | DIASTOLIC BLOOD PRESSURE: 73 MMHG | BODY MASS INDEX: 31.79 KG/M2

## 2023-10-09 DIAGNOSIS — L29.9 GENERALIZED PRURITUS: ICD-10-CM

## 2023-10-09 DIAGNOSIS — M79.7 FIBROMYALGIA: ICD-10-CM

## 2023-10-09 DIAGNOSIS — Z28.20 VACCINE REFUSED BY PATIENT: ICD-10-CM

## 2023-10-09 DIAGNOSIS — K64.4 EXTERNAL HEMORRHOIDS: Primary | ICD-10-CM

## 2023-10-09 DIAGNOSIS — Z12.11 SCREEN FOR COLON CANCER: ICD-10-CM

## 2023-10-09 PROCEDURE — 99214 OFFICE O/P EST MOD 30 MIN: CPT | Performed by: NURSE PRACTITIONER

## 2023-10-09 RX ORDER — FLUCONAZOLE 150 MG/1
TABLET ORAL
Qty: 4 TABLET | Refills: 0 | Status: SHIPPED | OUTPATIENT
Start: 2023-10-09 | End: 2024-04-01

## 2023-10-09 ASSESSMENT — PAIN SCALES - GENERAL: PAINLEVEL: NO PAIN (0)

## 2023-10-09 NOTE — PATIENT INSTRUCTIONS
At [facility], we strive to deliver an exceptional experience to you, every time we see you. If you receive a survey, please complete it as we do value your feedback.  If you have MyChart, you can expect to receive results automatically within 24 hours of their completion.  Your provider will send a note interpreting your results as well.   If you do not have MyChart, you should receive your results in about a week by mail.    Your care team:                            Family Medicine Internal Medicine   MD Antonio Mistry MD Shantel Branch-Fleming, MD Srinivasa Vaka, MD Katya Belousova, PAHOA Yap, MD Pediatrics   Luis Fernando Rhoades, PAMD Shahla Sanchez MD Amelia Massimini APRN CNP   PIA Matta CNP, MD Charanya Pasupathi, MD Kathleen Widmer, NP coming October 2023 Same-Day (No follow up visit)    DRAGAN Yuen PA coming Oct 2023     Clinic hours: Monday - Thursday 7 am-6 pm; Fridays 7 am-5 pm.   Urgent care: Monday - Friday 10 am- 8 pm; Saturday and Sunday 9 am-5 pm.    Clinic: [phone]       [facility]: Monday - Thursday 8 am - 7 pm; Friday 8 am - 6 pm  [facility]: [phone]

## 2023-10-09 NOTE — PROGRESS NOTES
"  Assessment & Plan     External hemorrhoids  Home care reviewed in detail, avoid constipation- dietary recommendations discussed as with benefits of regular exercise, staying well hydrated,, return to clinic if not improved, new, or worsening symptoms.   - REVIEW OF HEALTH MAINTENANCE PROTOCOL ORDERS    Generalized pruritus  Treating as for tinea infection with diflucan given large area of rash, follow back if not improved  Home tae discussed- washing linens, avoid prolonged wearing of spandex, if symptoms persist, could check glucose. She can take OTC zyrtec daily for itching and advised use of good emollient cream for skin moisturization.  - fluconazole (DIFLUCAN) 150 MG tablet  Dispense: 4 tablet; Refill: 0    Screen for colon cancer    - Fecal colorectal cancer screen (FIT)  - Fecal colorectal cancer screen (FIT)    Fibromyalgia  Muscle aches are at baseline.    Vaccine refused by patient  Declined per patient.  Ordering of each unique test  Prescription drug management  33 minutes spent by me on the date of the encounter doing chart review, history and exam, documentation and further activities per the note       BMI:   Estimated body mass index is 32.21 kg/m  as calculated from the following:    Height as of this encounter: 1.54 m (5' 0.63\").    Weight as of this encounter: 76.4 kg (168 lb 6.4 oz).   Weight management plan: Discussed healthy diet and exercise guidelines    Work on weight loss  Regular exercise  See Patient Instructions    [facility]    Robbie Flood is a 47 year old, presenting for the following health issues:  UC Follow-Up (Itching scalp)        10/9/2023     3:38 PM   Additional Questions   Roomed by Maxine       History of Present Illness       Reason for visit:  Itch on scalp and body not improving    She eats 4 or more servings of fruits and vegetables daily.She consumes 1 sweetened beverage(s) daily.She exercises with enough " "effort to increase her heart rate 20 to 29 minutes per day.  She exercises with enough effort to increase her heart rate 4 days per week.   She is taking medications regularly.         ED/UC Followup:    Facility:  [facility]   Date of visit: 8/16/2023  Reason for visit: Tinea capitis; Itching; Myalgia  Current Status: Patient states itching scalp is not improving and thinks it is worsening. Symptoms first started around June or July. Patient was working in the garden and symptoms progressively worsened. The itching has spread to back and feels like there are bites or bumps around her head. She has tried ketoconazole shampoo and it has not helped. Skin is dry and red where she has the itch and will get some relief from scratching but it is constant. Patient denies any new exposures to food, medications or environment. Patient denies any recent travel.     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  Symptoms last all day. She has not taken anything for it other than the  Ketoconazole shampoo for her scalp.    She also notes generalized skin itching, most prominent in her rectal area, no recent travel, no ill contacts, no new soaps, lotions, detergents, no known hemorrhoids. She has 2 dogs but they are not itching.        Review of Systems   Constitutional, HEENT, cardiovascular, pulmonary, gi and gu systems are negative, except as otherwise noted.      Objective    /73 (BP Location: Left arm, Patient Position: Sitting, Cuff Size: Adult Regular)   Pulse 81   Temp 98  F (36.7  C) (Tympanic)   Resp 18   Ht 1.54 m (5' 0.63\")   Wt 76.4 kg (168 lb 6.4 oz)   LMP 03/01/2016 (Approximate)   SpO2 96%   BMI 32.21 kg/m    Body mass index is 32.21 kg/m .  Physical Exam   GENERAL: healthy, alert and no distress  EYES: Eyes grossly normal to inspection, PERRL and conjunctivae and sclerae normal  HENT: ear " canals and TM's normal, nose and mouth without ulcers or lesions  NECK: no adenopathy, no asymmetry, masses, or scars and thyroid normal to palpation  RESP: lungs clear to auscultation - no rales, rhonchi or wheezes  CV: regular rate and rhythm, normal S1 S2, no S3 or S4, no murmur, click or rub, no peripheral edema and peripheral pulses strong  ABDOMEN: soft, nontender, no hepatosplenomegaly, no masses and bowel sounds normal  RECTAL:  external non thrombosed hemorrhoids present  MS: no gross musculoskeletal defects noted, no edema  SKIN: No scalp lesions noted, no excessive broken hairs/hair loss. There are faint light pink annular lesions on upper back along her bra line with central clearing suggestive of tinea infection.

## 2023-11-03 ENCOUNTER — OFFICE VISIT (OUTPATIENT)
Dept: URGENT CARE | Facility: URGENT CARE | Age: 48
End: 2023-11-03
Payer: COMMERCIAL

## 2023-11-03 VITALS
HEART RATE: 86 BPM | OXYGEN SATURATION: 95 % | RESPIRATION RATE: 17 BRPM | BODY MASS INDEX: 31.94 KG/M2 | SYSTOLIC BLOOD PRESSURE: 113 MMHG | DIASTOLIC BLOOD PRESSURE: 77 MMHG | WEIGHT: 167 LBS | TEMPERATURE: 100.5 F

## 2023-11-03 DIAGNOSIS — J02.9 SORETHROAT: ICD-10-CM

## 2023-11-03 DIAGNOSIS — H66.001 ACUTE SUPPURATIVE OTITIS MEDIA OF RIGHT EAR WITHOUT SPONTANEOUS RUPTURE OF TYMPANIC MEMBRANE, RECURRENCE NOT SPECIFIED: Primary | ICD-10-CM

## 2023-11-03 LAB
DEPRECATED S PYO AG THROAT QL EIA: NEGATIVE
GROUP A STREP BY PCR: NOT DETECTED

## 2023-11-03 PROCEDURE — 99213 OFFICE O/P EST LOW 20 MIN: CPT | Performed by: PHYSICIAN ASSISTANT

## 2023-11-03 PROCEDURE — 87651 STREP A DNA AMP PROBE: CPT | Performed by: PHYSICIAN ASSISTANT

## 2023-11-03 RX ORDER — AMOXICILLIN 875 MG
875 TABLET ORAL 2 TIMES DAILY
Qty: 20 TABLET | Refills: 0 | Status: SHIPPED | OUTPATIENT
Start: 2023-11-03 | End: 2023-11-13

## 2023-11-03 ASSESSMENT — ENCOUNTER SYMPTOMS
COUGH: 0
VOMITING: 0
SORE THROAT: 1
CHILLS: 0
RESPIRATORY NEGATIVE: 1
JOINT SWELLING: 0
FEVER: 1
DIARRHEA: 0
PALPITATIONS: 0
ALLERGIC/IMMUNOLOGIC NEGATIVE: 1
ARTHRALGIAS: 0
BACK PAIN: 0
HEADACHES: 0
NECK STIFFNESS: 0
LIGHT-HEADEDNESS: 0
RHINORRHEA: 0
NECK PAIN: 0
MYALGIAS: 0
SHORTNESS OF BREATH: 0
DIZZINESS: 0
NAUSEA: 0
ENDOCRINE NEGATIVE: 1
WOUND: 0
WEAKNESS: 0
MUSCULOSKELETAL NEGATIVE: 1
CARDIOVASCULAR NEGATIVE: 1
EYES NEGATIVE: 1

## 2023-11-03 NOTE — PROGRESS NOTES
Chief Complaint:     Chief Complaint   Patient presents with    Pharyngitis    Fever    Otalgia     Right ear       Results for orders placed or performed in visit on 11/03/23   Streptococcus A Rapid Screen w/Reflex to PCR - Clinic Collect     Status: Normal    Specimen: Throat; Swab   Result Value Ref Range    Group A Strep antigen Negative Negative       Medical Decision Making:    Vital signs reviewed by Yves Hwang PA-C  /77   Pulse 86   Temp (!) 100.5  F (38.1  C) (Tympanic)   Resp 17   Wt 75.8 kg (167 lb)   LMP 03/01/2016 (Approximate)   SpO2 95%   BMI 31.94 kg/m      Differential Diagnosis:  URI Adult/Peds:  Acute right otitis media, Sinusitis, Strep pharyngitis, Tonsilitis, Viral pharyngitis, and Viral syndrome        ASSESSMENT    1. Acute suppurative otitis media of right ear without spontaneous rupture of tympanic membrane, recurrence not specified    2. Sorethroat        PLAN    Patient is in no acute distress.    Temp is 100.5 in clinic today, lung sounds were clear, and O2 sats at 95% on RA.    RST was negative.  We will call with PCR results only if positive.  Rx for Amoxicillin for ear infection.  Rest, Push fluids, vaporizer, elevation of head of bed.  Ibuprofen and or Tylenol for any fever or body aches.  If symptoms worsen, recheck immediately otherwise follow up with your PCP in 1 week if symptoms are not improving.  Worrisome symptoms discussed with instructions to go to the ED.  Patient verbalized understanding and agreed with this plan.    Labs:    Results for orders placed or performed in visit on 11/03/23   Streptococcus A Rapid Screen w/Reflex to PCR - Clinic Collect     Status: Normal    Specimen: Throat; Swab   Result Value Ref Range    Group A Strep antigen Negative Negative        Vital signs reviewed by Yves Hwang PA-C  /77   Pulse 86   Temp (!) 100.5  F (38.1  C) (Tympanic)   Resp 17   Wt 75.8 kg (167 lb)   LMP 03/01/2016 (Approximate)   SpO2 95%    BMI 31.94 kg/m      Current Meds      Current Outpatient Medications:     amoxicillin (AMOXIL) 875 MG tablet, Take 1 tablet (875 mg) by mouth 2 times daily for 10 days, Disp: 20 tablet, Rfl: 0    CALCIUM PO, , Disp: , Rfl:     Cholecalciferol (VITAMIN D3) 2000 units TABS, Take 1 tablet by mouth daily, Disp: 100 tablet, Rfl: 3    clindamycin (CLEOCIN T) 1 % external lotion, APPLY TOPICALLY TO FACE EVERY MORNING, Strength: 1 %, Disp: 30 mL, Rfl: 6    cyclobenzaprine (FLEXERIL) 5 MG tablet, Take 1-2 tablets (5-10 mg) by mouth nightly as needed for muscle spasms, Disp: 40 tablet, Rfl: 0    Evening Primrose Oil 1000 MG CAPS, , Disp: , Rfl:     fish oil-omega-3 fatty acids 1000 MG capsule, Take 1 g by mouth daily, Disp: , Rfl:     gabapentin (NEURONTIN) 300 MG capsule, take 300mg in the morning and afternoon and 600mg (2 capsules) at bedtime. If side effects, reduce to last tolerable dosage. If side effects, then reduce to last tolerable dosage., Disp: 120 capsule, Rfl: 1    ibuprofen (ADVIL/MOTRIN) 600 MG tablet, Take 1 tablet (600 mg) by mouth every 8 hours as needed for moderate pain, Disp: 20 tablet, Rfl: 0    tretinoin (RETIN-A) 0.05 % external cream, APPLY TOPICALLY TO FACE AT BEDTIME FOR ACNE, Disp: 45 g, Rfl: 3    diclofenac (VOLTAREN) 50 MG EC tablet, Take 1 tablet (50 mg) by mouth 2 times daily for 7 days, Disp: 28 tablet, Rfl: 1    fluconazole (DIFLUCAN) 150 MG tablet, One tablet weekly for 4 weeks (Patient not taking: Reported on 11/3/2023), Disp: 4 tablet, Rfl: 0    ketoconazole (NIZORAL) 2 % external shampoo, Five to 10 mL of shampoo should be left on for three to five minutes before rinsing off. Apply to wet hair, lather, and rinse thoroughly; repeat. Use every 3 to 4 days for up to 8 weeks (Patient not taking: Reported on 11/3/2023), Disp: 120 mL, Rfl: 0    Current Facility-Administered Medications:     0.5 mL bupivacaine (MARCAINE) 0.5% injection (50 mL vial), 0.5 mL, , , Sam Florence MD, 0.5 mL at  10/31/22 1506      Respiratory History    occasional episodes of bronchitis      SUBJECTIVE    HPI: Aleena Escamilla is an 48 year old female who presents with ear pain right, fever, and sore throat.  Symptoms began 2  days ago and has unchanged.  There is no shortness of breath, wheezing, and chest pain.  Patient is eating and drinking well.  No fever, nausea, vomiting, or diarrhea.    Patient denies any recent travel or exposure to known COVID positive tested individual.      ROS:     Review of Systems   Constitutional:  Positive for fever. Negative for chills.   HENT:  Positive for ear pain and sore throat. Negative for congestion, ear discharge and rhinorrhea.    Eyes: Negative.    Respiratory: Negative.  Negative for cough and shortness of breath.    Cardiovascular: Negative.  Negative for chest pain and palpitations.   Gastrointestinal:  Negative for diarrhea, nausea and vomiting.   Endocrine: Negative.    Genitourinary: Negative.    Musculoskeletal: Negative.  Negative for arthralgias, back pain, joint swelling, myalgias, neck pain and neck stiffness.   Skin: Negative.  Negative for rash and wound.   Allergic/Immunologic: Negative.  Negative for immunocompromised state.   Neurological:  Negative for dizziness, weakness, light-headedness and headaches.         Family History   Family History   Problem Relation Age of Onset    Diabetes Mother     Hypertension Mother     Cancer Maternal Grandmother         cervical CA    Cardiovascular Father         MI    Cancer Maternal Aunt         cervical cancer    Pancreatic Cancer Maternal Aunt         COD        Problem history  Patient Active Problem List   Diagnosis    CARDIOVASCULAR SCREENING; LDL GOAL LESS THAN 160    Chronic pelvic pain in female    Chronic salpingitis and oophoritis    Major depressive disorder, recurrent episode, mild (H24)    Generalized anxiety disorder    Arthritis, multiple joint involvement    Primary osteoarthritis of both hands     Fibromyalgia    Malignant neoplasm of overlapping sites of right female breast (H)    BRCA gene positive    Carpal tunnel syndrome    Non morbid drug-induced obesity    Chronic right shoulder pain    Bunion, right    Onychomycosis    Elevated LFTs    Fatty liver    Hyperlipidemia LDL goal <130    High risk medication use    Upper back pain, chronic        Allergies  Allergies   Allergen Reactions    Latex Rash        Social History  Social History     Socioeconomic History    Marital status:      Spouse name: Not on file    Number of children: Not on file    Years of education: Not on file    Highest education level: Not on file   Occupational History    Not on file   Tobacco Use    Smoking status: Never    Smokeless tobacco: Never   Vaping Use    Vaping Use: Never used   Substance and Sexual Activity    Alcohol use: Yes    Drug use: No    Sexual activity: Yes     Partners: Male     Birth control/protection: None, Female Surgical   Other Topics Concern    Parent/sibling w/ CABG, MI or angioplasty before 65F 55M? No     Service No    Blood Transfusions No    Caffeine Concern No     Comment: coffee 2 cups per day    Occupational Exposure No    Hobby Hazards No    Sleep Concern No    Stress Concern No    Weight Concern No    Special Diet No    Back Care No    Exercise No    Bike Helmet No    Seat Belt Yes     Comment: 100%    Self-Exams No   Social History Narrative    Not on file     Social Determinants of Health     Financial Resource Strain: Unknown (10/9/2023)    Financial Resource Strain     Within the past 12 months, have you or your family members you live with been unable to get utilities (heat, electricity) when it was really needed?: Patient refused   Food Insecurity: Unknown (10/9/2023)    Food Insecurity     Within the past 12 months, did you worry that your food would run out before you got money to buy more?: Patient refused     Within the past 12 months, did the food you bought just not  last and you didn t have money to get more?: Patient refused   Transportation Needs: Unknown (10/9/2023)    Transportation Needs     Within the past 12 months, has lack of transportation kept you from medical appointments, getting your medicines, non-medical meetings or appointments, work, or from getting things that you need?: Patient refused   Physical Activity: Not on file   Stress: Not on file   Social Connections: Not on file   Interpersonal Safety: Low Risk  (10/9/2023)    Interpersonal Safety     Do you feel physically and emotionally safe where you currently live?: Yes     Within the past 12 months, have you been hit, slapped, kicked or otherwise physically hurt by someone?: No     Within the past 12 months, have you been humiliated or emotionally abused in other ways by your partner or ex-partner?: No   Housing Stability: Low Risk  (10/9/2023)    Housing Stability     Do you have housing? : Yes     Are you worried about losing your housing?: No        OBJECTIVE     Vital signs reviewed by Yves Hwang PA-C  /77   Pulse 86   Temp (!) 100.5  F (38.1  C) (Tympanic)   Resp 17   Wt 75.8 kg (167 lb)   LMP 03/01/2016 (Approximate)   SpO2 95%   BMI 31.94 kg/m       Physical Exam  Vitals and nursing note reviewed.   Constitutional:       General: She is not in acute distress.     Appearance: She is well-developed. She is not ill-appearing, toxic-appearing or diaphoretic.   HENT:      Head: Normocephalic and atraumatic.      Right Ear: Hearing, ear canal and external ear normal. No drainage, swelling or tenderness. Tympanic membrane is erythematous. Tympanic membrane is not perforated, retracted or bulging.      Left Ear: Hearing, tympanic membrane, ear canal and external ear normal. No drainage, swelling or tenderness. Tympanic membrane is not perforated, erythematous, retracted or bulging.      Nose: Congestion present. No mucosal edema or rhinorrhea.      Right Sinus: No maxillary sinus tenderness  or frontal sinus tenderness.      Left Sinus: No maxillary sinus tenderness or frontal sinus tenderness.      Mouth/Throat:      Pharynx: Posterior oropharyngeal erythema present. No pharyngeal swelling, oropharyngeal exudate or uvula swelling.      Tonsils: No tonsillar exudate or tonsillar abscesses. 0 on the right. 0 on the left.   Eyes:      General:         Right eye: No discharge.         Left eye: No discharge.      Pupils: Pupils are equal, round, and reactive to light.   Cardiovascular:      Rate and Rhythm: Normal rate and regular rhythm.      Heart sounds: Normal heart sounds. No murmur heard.     No friction rub. No gallop.   Pulmonary:      Effort: Pulmonary effort is normal. No respiratory distress.      Breath sounds: Normal breath sounds. No decreased breath sounds, wheezing, rhonchi or rales.   Chest:      Chest wall: No tenderness.   Abdominal:      General: Bowel sounds are normal. There is no distension.      Palpations: Abdomen is soft. There is no mass.      Tenderness: There is no abdominal tenderness. There is no guarding.   Musculoskeletal:      Cervical back: Normal range of motion and neck supple.   Lymphadenopathy:      Head:      Right side of head: No submental, submandibular, tonsillar, preauricular or posterior auricular adenopathy.      Left side of head: No submental, submandibular, tonsillar, preauricular or posterior auricular adenopathy.      Cervical: No cervical adenopathy.      Right cervical: No superficial or posterior cervical adenopathy.     Left cervical: No superficial or posterior cervical adenopathy.   Skin:     General: Skin is warm and dry.      Findings: No rash.   Neurological:      Mental Status: She is alert and oriented to person, place, and time.      Cranial Nerves: No cranial nerve deficit.      Deep Tendon Reflexes: Reflexes are normal and symmetric.   Psychiatric:         Behavior: Behavior normal. Behavior is cooperative.         Thought Content: Thought  content normal.         Judgment: Judgment normal.           Yves Hwang PA-C  11/3/2023, 1:25 PM

## 2023-11-28 ENCOUNTER — TELEPHONE (OUTPATIENT)
Dept: FAMILY MEDICINE | Facility: CLINIC | Age: 48
End: 2023-11-28
Payer: COMMERCIAL

## 2023-11-28 ENCOUNTER — MYC MEDICAL ADVICE (OUTPATIENT)
Dept: FAMILY MEDICINE | Facility: CLINIC | Age: 48
End: 2023-11-28

## 2023-11-28 NOTE — TELEPHONE ENCOUNTER
Patient Quality Outreach    Patient is due for the following:   Colon Cancer Screening  Depression  -  PHQ-9 needed  Physical Preventive Adult Physical      Topic Date Due    Hepatitis B Vaccine (1 of 3 - 3-dose series) Never done    COVID-19 Vaccine (1) Never done    Diptheria Tetanus Pertussis (DTAP/TDAP/TD) Vaccine (2 - Td or Tdap) 01/16/2022    Flu Vaccine (1) 09/01/2023       Next Steps:   Schedule a Adult Preventative    Type of outreach:    Sent [employer] message.      Questions for provider review:    None           Sara Li        
abdomen

## 2023-12-03 ENCOUNTER — HEALTH MAINTENANCE LETTER (OUTPATIENT)
Age: 48
End: 2023-12-03

## 2023-12-12 DIAGNOSIS — L70.0 ACNE VULGARIS: ICD-10-CM

## 2023-12-12 RX ORDER — TRETINOIN 0.5 MG/G
CREAM TOPICAL
Qty: 45 G | Refills: 0 | Status: SHIPPED | OUTPATIENT
Start: 2023-12-12 | End: 2024-02-21

## 2023-12-12 RX ORDER — CLINDAMYCIN PHOSPHATE 10 UG/ML
LOTION TOPICAL
Qty: 60 ML | Refills: 0 | Status: SHIPPED | OUTPATIENT
Start: 2023-12-12 | End: 2024-02-21

## 2023-12-31 NOTE — PROGRESS NOTES
Oncology Survivorship Visit: January 2, 2023      Oncologist: / CHRISS  PCP: Dr Concepcion Bocanegra    Diagnosis: Stage II Right Breast  Aleena Escamilla is a 47 yo female that felt she has mass to right beast in 2014 but was told she was not eligible for mammogram but in 11/2015 felt breast was swollen and received imaging that did show a 1.2 x 1.3 x 0.7 cm mass at 12 oclock to breast and later found a secondary mass behind first mass. Biopsy proved invasive lobular carcinoma at 12 oclock and 9 oclock positions. Final pathology from partial mastectomy showed multifocal invasive lobular carcinoma, grade 2 with foci, the first one 36 x 16 x 8 mm and the second one 21 x 14 x 11 mm. LCIS classical type present, lymphovascular invasion not identified. Margins negative, ER/KY positive by IHC and HER-2 not amplified performed on prior core biopsy.[id number]  Oncotype Dx score =25.  Genetics - told pt she has variant of of uncertain significance in the BRCA 2 gene.  Treatment-   1/19/2016 Partial Right  Mastectomy  2/24/2016 - 6/2016 Weekly Taxol x 12 weeks followed by Dose Dense AC x4 cycles  8-9/2016 completed right breast XRT  10/2016 - 10/2021 use of Femara  10/26/2016 right laparoscopic salpingo-oophorectomy    Interval History: Ms Escamilla is seen in clinic for follow up to her right breast cancer. Noting no new breast issues and her previous Lymphedema therapy was very successful and is now feeling the fullness has resolved- has pump to help with this. No new masses or tenderness.   Continues with some hot flashes.  Denies weight loss, new skin changes, new pains or recent illness.    Reports return of left wrist pain- h/o injection to the wrist  Rest of complete and comprehensive ROS is reviewed and is negative.   Past Medical History:   Diagnosis Date    Breast cancer (H)     Chronic pelvic pain in female     Malignant neoplasm of right breast, stage 2 (H) 01/01/2016 1/4 lymph nodes positive, Oncotype DX = 25.   "Chemotherapy and radiation. Letrozole.    Migraine headaches      Current Outpatient Medications   Medication    CALCIUM PO    Cholecalciferol (VITAMIN D3) 2000 units TABS    clindamycin (CLEOCIN T) 1 % external lotion    cyclobenzaprine (FLEXERIL) 5 MG tablet    diclofenac (VOLTAREN) 50 MG EC tablet    Evening Primrose Oil 1000 MG CAPS    fish oil-omega-3 fatty acids 1000 MG capsule    fluconazole (DIFLUCAN) 150 MG tablet    gabapentin (NEURONTIN) 300 MG capsule    ibuprofen (ADVIL/MOTRIN) 600 MG tablet    ketoconazole (NIZORAL) 2 % external shampoo    tretinoin (RETIN-A) 0.05 % external cream     No current facility-administered medications for this visit.     Allergies   Allergen Reactions    Latex Rash     Physical Exam:  BP 98/71 (BP Location: Right arm, Patient Position: Chair, Cuff Size: Adult Large)   Pulse 72   Ht 1.54 m (5' 0.63\")   Wt 77.6 kg (171 lb)   LMP 03/01/2016 (Approximate)   SpO2 95%   BMI 32.71 kg/m   - gained 6 lbs this year.   Constitutional: Alert and in no distress. Overweight  ENT: Eyes bright, No mouth sores  Neck: Supple, No adenopathy.  Cardiac: Heart rate and rhythm is regular and strong without murmur  Respiratory: Breathing easy. Lung sounds clear to auscultation  Breasts: Bilaterally no new masses with no sign of lymphedema. No tenderness noted with palpation.   Abdomen: Soft, non-tender, BS normal. No masses or organomegaly  MS: Muscle tone normal, extremities normal with no edema.   Skin: No suspicious lesions or rashes  Neuro: Sensory grossly WNL, gait normal.   Lymph: Normal ant/post cervical, axillary, supraclavicular nodes  Psych: Mentation appears normal and affect normal/bright with good conversation.     Laboratory Results:   No results found for any visits on 01/02/24. Reviewed CMP and Cbc completed 8/16/2023 by Jules Hawkins.     Assessment and Plan:   It was my pleasure to see Aleena Escamilla in the Cancer Survivorship Clinic for her diagnosis of Right Breast " Cancer. Given her diagnosis and treatment, she was given a survivorship care plan previously and here are the areas of discussion today:  Right breast cancer- stage II- Pt completed right partial mastectomy, Taxol weekly ×12, AC ×4 cycles, and XRT as well as laparoscopic salpingo-oophorectomy. She completed use of Femara from 10/2016 to 10/2021.     Hot flashes continue though no longer on the AI.   Last mammogram was completed 7/2023 and was normal-Next mammogram due annually- 7/2024   No new sign of recurrence of the breast cancer is found today.   Pt to have mammogram this summer- order placed.   She will return in 1 year with DEXA prior to visit.   Right Breast pain- resolved after treatment for lymphedema.    Genetics- previously told pt she has variant of of uncertain significance in the BRCA 2 gene though unable to verify with 12/2015 documentation. No new family history of cancer today.   Bone health/ Osteopenia- DEXA scan from 2/5/2021 T score of -1.7= mild osteopenia. Pt had refused Prolia. Reviewed need for calcium and vitamin D and weight exercise to keep bones strong. Would recommend DEXA prior to next visit- order placed.    Cardiac complications- Given her exposure to Doxorubicin, an anthracycline, she is at risk for cardiomyopathy, arythmias, left ventricular dysfunction. No new cardiac symptoms..     Elevated liver enzymes- followed by PCP- 8/2023 results showing continued elevation.   Sexuality concerns- Vaginal dryness and painful intercourse is common after treatment or with use of aromatase inhibitors. May need to see gynecology if dryness progressing but discussed lubricants.  Urinary toxicity- due to her exposure to Cytoxan, should she develop hematuria, dysuria, urinary urgency or frequency, she is to contact clinic.No current symptoms.  Risk of developing blood cancers- due to exposure to Doxorubicin she is a a small risk for Leukemia or MDS. For this reason she should have a CBC completed  yearly- reviewed 8/2023= normal    Radiation side effects- Radiation to the right breast would have included the skin, and she should watch for any new skin lesions in the area of the radiation and have them evaluated.  Her lung was in the field of radiation, but as long as she is asymptomatic there is no routine screening tests that needs to be obtained.  Should she develop hemoptysis, cough or shortness of breath, we could consider a CT scan and/or PFTs.  Her bones are at risk for fracture in the area of her radiation.    Cancer screening-  She should continue to undergo cancer screening for women of her age group.  She will continue to see Gynecology as directed by guidelines.   Colonoscopy due at 46 years of age- order given by PCP for FIT testing but not yet completed.   Healthy lifestyle-  She is not a smoker. She should have an exercise program of 150 minutes of cardiovascular exercise per week.  She should work toward a healthy weight with a BMI between 20 and 25.    -She should see her primary care provider annually for screening and, if needed, treatment of her cholesterol, blood pressure and glucose.  She should receive the annual influenza vaccine.  When age appropriate, she should receive the pneumococcal vaccine.  She should see her eye doctor and dentist routinely. She should limit her sun exposure and use sunscreens.   The total time of this encounter amounted to 45 minutes. This time included face to face time spent with the patient, prep work, ordering tests, and performing post visit documentation.  Marisol Solitario,Cnp

## 2024-01-02 ENCOUNTER — ONCOLOGY VISIT (OUTPATIENT)
Dept: ONCOLOGY | Facility: CLINIC | Age: 49
End: 2024-01-02
Attending: NURSE PRACTITIONER
Payer: COMMERCIAL

## 2024-01-02 VITALS
BODY MASS INDEX: 32.28 KG/M2 | HEART RATE: 72 BPM | OXYGEN SATURATION: 95 % | HEIGHT: 61 IN | DIASTOLIC BLOOD PRESSURE: 71 MMHG | WEIGHT: 171 LBS | SYSTOLIC BLOOD PRESSURE: 98 MMHG

## 2024-01-02 DIAGNOSIS — N95.1 MENOPAUSAL SYNDROME (HOT FLASHES): ICD-10-CM

## 2024-01-02 DIAGNOSIS — Z17.0 MALIGNANT NEOPLASM OF OVERLAPPING SITES OF RIGHT BREAST IN FEMALE, ESTROGEN RECEPTOR POSITIVE (H): ICD-10-CM

## 2024-01-02 DIAGNOSIS — M85.80 OSTEOPENIA, UNSPECIFIED LOCATION: ICD-10-CM

## 2024-01-02 DIAGNOSIS — R74.8 ELEVATED LIVER ENZYMES: ICD-10-CM

## 2024-01-02 DIAGNOSIS — N64.4 BREAST PAIN: ICD-10-CM

## 2024-01-02 DIAGNOSIS — Z12.31 ENCOUNTER FOR SCREENING MAMMOGRAM FOR BREAST CANCER: ICD-10-CM

## 2024-01-02 DIAGNOSIS — C50.811 MALIGNANT NEOPLASM OF OVERLAPPING SITES OF RIGHT BREAST IN FEMALE, ESTROGEN RECEPTOR POSITIVE (H): ICD-10-CM

## 2024-01-02 DIAGNOSIS — Z78.0 POST-MENOPAUSAL: Primary | ICD-10-CM

## 2024-01-02 DIAGNOSIS — N93.9 VAGINAL BLEEDING: ICD-10-CM

## 2024-01-02 PROCEDURE — 99215 OFFICE O/P EST HI 40 MIN: CPT | Performed by: NURSE PRACTITIONER

## 2024-01-02 PROCEDURE — 99214 OFFICE O/P EST MOD 30 MIN: CPT | Performed by: NURSE PRACTITIONER

## 2024-01-02 ASSESSMENT — PAIN SCALES - GENERAL: PAINLEVEL: NO PAIN (0)

## 2024-01-02 NOTE — LETTER
1/2/2024         RE: Aleena Escamilla  [address] [facility] [address]        Dear Colleague,    Thank you for referring your patient, Aleena Escamilla, to the [facility]. Please see a copy of my visit note below.    Oncology Survivorship Visit: January 2, 2023      Oncologist: / CHRISS  PCP: Dr Concepcion Bocanegra    Diagnosis: Stage II Right Breast  Aleena Escamilla is a 47 yo female that felt she has mass to right beast in 2014 but was told she was not eligible for mammogram but in 11/2015 felt breast was swollen and received imaging that did show a 1.2 x 1.3 x 0.7 cm mass at 12 oclock to breast and later found a secondary mass behind first mass. Biopsy proved invasive lobular carcinoma at 12 oclock and 9 oclock positions. Final pathology from partial mastectomy showed multifocal invasive lobular carcinoma, grade 2 with foci, the first one 36 x 16 x 8 mm and the second one 21 x 14 x 11 mm. LCIS classical type present, lymphovascular invasion not identified. Margins negative, ER/TX positive by IHC and HER-2 not amplified performed on prior core biopsy.[id number]  Oncotype Dx score =25.  Genetics - told pt she has variant of of uncertain significance in the BRCA 2 gene.  Treatment-   1/19/2016 Partial Right  Mastectomy  2/24/2016 - 6/2016 Weekly Taxol x 12 weeks followed by Dose Dense AC x4 cycles  8-9/2016 completed right breast XRT  10/2016 - 10/2021 use of Femara  10/26/2016 right laparoscopic salpingo-oophorectomy    Interval History: Ms Escamilla is seen in clinic for follow up to her right breast cancer. Noting no new breast issues and her previous Lymphedema therapy was very successful and is now feeling the fullness has resolved- has pump to help with this. No new masses or tenderness.   Continues with some hot flashes.  Denies weight loss, new skin changes, new pains or recent illness.    Reports return of left wrist pain- h/o injection to the wrist  Rest of  "complete and comprehensive ROS is reviewed and is negative.   Past Medical History:   Diagnosis Date     Breast cancer (H)      Chronic pelvic pain in female      Malignant neoplasm of right breast, stage 2 (H) 01/01/2016    1/4 lymph nodes positive, Oncotype DX = 25.  Chemotherapy and radiation. Letrozole.     Migraine headaches      Current Outpatient Medications   Medication     CALCIUM PO     Cholecalciferol (VITAMIN D3) 2000 units TABS     clindamycin (CLEOCIN T) 1 % external lotion     cyclobenzaprine (FLEXERIL) 5 MG tablet     diclofenac (VOLTAREN) 50 MG EC tablet     Evening Primrose Oil 1000 MG CAPS     fish oil-omega-3 fatty acids 1000 MG capsule     fluconazole (DIFLUCAN) 150 MG tablet     gabapentin (NEURONTIN) 300 MG capsule     ibuprofen (ADVIL/MOTRIN) 600 MG tablet     ketoconazole (NIZORAL) 2 % external shampoo     tretinoin (RETIN-A) 0.05 % external cream     No current facility-administered medications for this visit.     Allergies   Allergen Reactions     Latex Rash     Physical Exam:  BP 98/71 (BP Location: Right arm, Patient Position: Chair, Cuff Size: Adult Large)   Pulse 72   Ht 1.54 m (5' 0.63\")   Wt 77.6 kg (171 lb)   LMP 03/01/2016 (Approximate)   SpO2 95%   BMI 32.71 kg/m   - gained 6 lbs this year.   Constitutional: Alert and in no distress. Overweight  ENT: Eyes bright, No mouth sores  Neck: Supple, No adenopathy.  Cardiac: Heart rate and rhythm is regular and strong without murmur  Respiratory: Breathing easy. Lung sounds clear to auscultation  Breasts: Bilaterally no new masses with no sign of lymphedema. No tenderness noted with palpation.   Abdomen: Soft, non-tender, BS normal. No masses or organomegaly  MS: Muscle tone normal, extremities normal with no edema.   Skin: No suspicious lesions or rashes  Neuro: Sensory grossly WNL, gait normal.   Lymph: Normal ant/post cervical, axillary, supraclavicular nodes  Psych: Mentation appears normal and affect normal/bright with good " conversation.     Laboratory Results:   No results found for any visits on 01/02/24. Reviewed CMP and Cbc completed 8/16/2023 by Jules Hawkins.     Assessment and Plan:   It was my pleasure to see Aleena Escamilla in the Cancer Survivorship Clinic for her diagnosis of Right Breast Cancer. Given her diagnosis and treatment, she was given a survivorship care plan previously and here are the areas of discussion today:  Right breast cancer- stage II- Pt completed right partial mastectomy, Taxol weekly ×12, AC ×4 cycles, and XRT as well as laparoscopic salpingo-oophorectomy. She completed use of Femara from 10/2016 to 10/2021.     Hot flashes continue though no longer on the AI.   Last mammogram was completed 7/2023 and was normal-Next mammogram due annually- 7/2024   No new sign of recurrence of the breast cancer is found today.   Pt to have mammogram this summer- order placed.   She will return in 1 year with DEXA prior to visit.   Right Breast pain- resolved after treatment for lymphedema.    Genetics- previously told pt she has variant of of uncertain significance in the BRCA 2 gene though unable to verify with 12/2015 documentation. No new family history of cancer today.   Bone health/ Osteopenia- DEXA scan from 2/5/2021 T score of -1.7= mild osteopenia. Pt had refused Prolia. Reviewed need for calcium and vitamin D and weight exercise to keep bones strong. Would recommend DEXA prior to next visit- order placed.    Cardiac complications- Given her exposure to Doxorubicin, an anthracycline, she is at risk for cardiomyopathy, arythmias, left ventricular dysfunction. No new cardiac symptoms..     Elevated liver enzymes- followed by PCP- 8/2023 results showing continued elevation.   Sexuality concerns- Vaginal dryness and painful intercourse is common after treatment or with use of aromatase inhibitors. May need to see gynecology if dryness progressing but discussed lubricants.  Urinary toxicity- due to her exposure to  Cytoxan, should she develop hematuria, dysuria, urinary urgency or frequency, she is to contact clinic.No current symptoms.  Risk of developing blood cancers- due to exposure to Doxorubicin she is a a small risk for Leukemia or MDS. For this reason she should have a CBC completed yearly- reviewed 8/2023= normal    Radiation side effects- Radiation to the right breast would have included the skin, and she should watch for any new skin lesions in the area of the radiation and have them evaluated.  Her lung was in the field of radiation, but as long as she is asymptomatic there is no routine screening tests that needs to be obtained.  Should she develop hemoptysis, cough or shortness of breath, we could consider a CT scan and/or PFTs.  Her bones are at risk for fracture in the area of her radiation.    Cancer screening-  She should continue to undergo cancer screening for women of her age group.  She will continue to see Gynecology as directed by guidelines.   Colonoscopy due at 46 years of age- order given by PCP for FIT testing but not yet completed.   Healthy lifestyle-  She is not a smoker. She should have an exercise program of 150 minutes of cardiovascular exercise per week.  She should work toward a healthy weight with a BMI between 20 and 25.    -She should see her primary care provider annually for screening and, if needed, treatment of her cholesterol, blood pressure and glucose.  She should receive the annual influenza vaccine.  When age appropriate, she should receive the pneumococcal vaccine.  She should see her eye doctor and dentist routinely. She should limit her sun exposure and use sunscreens.   The total time of this encounter amounted to 45 minutes. This time included face to face time spent with the patient, prep work, ordering tests, and performing post visit documentation.  Marisol Solitario,Cnp                Again, thank you for allowing me to participate in the care of your patient.         Sincerely,        Marisol Solitario, NP, APRN CNP

## 2024-01-02 NOTE — NURSING NOTE
"Oncology Rooming Note    January 2, 2024 1:56 PM   Aleena Escamilla is a 48 year old female who presents for:    Chief Complaint   Patient presents with    Oncology Clinic Visit     Follow up     Initial Vitals: BP 98/71 (BP Location: Right arm, Patient Position: Chair, Cuff Size: Adult Large)   Pulse 72   Ht 1.54 m (5' 0.63\")   Wt 77.6 kg (171 lb)   LMP 03/01/2016 (Approximate)   SpO2 95%   BMI 32.71 kg/m   Estimated body mass index is 32.71 kg/m  as calculated from the following:    Height as of this encounter: 1.54 m (5' 0.63\").    Weight as of this encounter: 77.6 kg (171 lb). Body surface area is 1.82 meters squared.  No Pain (0) Comment: Data Unavailable   Patient's last menstrual period was 03/01/2016 (approximate).  Allergies reviewed: Yes  Medications reviewed: Yes    Medications: Medication refills not needed today.  Pharmacy name entered into [employer]: [id number] - [place] - [address] [facility] [address] [address] [facility] [address] [facility]    Frailty Screening:   Is the patient here for a new oncology consult visit in cancer care? 2. No      Clinical concerns: NO       Ling Delgado CMA              "

## 2024-02-21 DIAGNOSIS — L70.0 ACNE VULGARIS: ICD-10-CM

## 2024-02-21 RX ORDER — TRETINOIN 0.5 MG/G
CREAM TOPICAL
Qty: 45 G | Refills: 0 | Status: SHIPPED | OUTPATIENT
Start: 2024-02-21 | End: 2024-04-29

## 2024-02-21 RX ORDER — CLINDAMYCIN PHOSPHATE 10 UG/ML
LOTION TOPICAL
Qty: 60 ML | Refills: 0 | Status: SHIPPED | OUTPATIENT
Start: 2024-02-21 | End: 2024-04-29

## 2024-03-27 ENCOUNTER — OFFICE VISIT (OUTPATIENT)
Dept: URGENT CARE | Facility: URGENT CARE | Age: 49
End: 2024-03-27
Payer: COMMERCIAL

## 2024-03-27 VITALS
TEMPERATURE: 97.7 F | HEART RATE: 72 BPM | OXYGEN SATURATION: 98 % | BODY MASS INDEX: 32.55 KG/M2 | DIASTOLIC BLOOD PRESSURE: 84 MMHG | SYSTOLIC BLOOD PRESSURE: 122 MMHG | WEIGHT: 170.2 LBS | RESPIRATION RATE: 16 BRPM

## 2024-03-27 DIAGNOSIS — N93.9 VAGINAL BLEEDING: ICD-10-CM

## 2024-03-27 DIAGNOSIS — R10.84 ABDOMINAL PAIN, GENERALIZED: Primary | ICD-10-CM

## 2024-03-27 DIAGNOSIS — R30.0 DYSURIA: ICD-10-CM

## 2024-03-27 LAB
ALBUMIN UR-MCNC: NEGATIVE MG/DL
APPEARANCE UR: CLEAR
BILIRUB UR QL STRIP: NEGATIVE
CLUE CELLS: ABNORMAL
COLOR UR AUTO: YELLOW
GLUCOSE UR STRIP-MCNC: NEGATIVE MG/DL
HCG UR QL: NEGATIVE
HGB UR QL STRIP: NEGATIVE
KETONES UR STRIP-MCNC: NEGATIVE MG/DL
LEUKOCYTE ESTERASE UR QL STRIP: NEGATIVE
NITRATE UR QL: NEGATIVE
PH UR STRIP: 5.5 [PH] (ref 5–7)
SP GR UR STRIP: 1.02 (ref 1–1.03)
TRICHOMONAS, WET PREP: ABNORMAL
UROBILINOGEN UR STRIP-ACNC: 0.2 E.U./DL
WBC'S/HIGH POWER FIELD, WET PREP: ABNORMAL
YEAST, WET PREP: ABNORMAL

## 2024-03-27 PROCEDURE — 81003 URINALYSIS AUTO W/O SCOPE: CPT | Performed by: STUDENT IN AN ORGANIZED HEALTH CARE EDUCATION/TRAINING PROGRAM

## 2024-03-27 PROCEDURE — 81025 URINE PREGNANCY TEST: CPT | Performed by: STUDENT IN AN ORGANIZED HEALTH CARE EDUCATION/TRAINING PROGRAM

## 2024-03-27 PROCEDURE — 99214 OFFICE O/P EST MOD 30 MIN: CPT | Performed by: STUDENT IN AN ORGANIZED HEALTH CARE EDUCATION/TRAINING PROGRAM

## 2024-03-27 PROCEDURE — 87210 SMEAR WET MOUNT SALINE/INK: CPT | Performed by: STUDENT IN AN ORGANIZED HEALTH CARE EDUCATION/TRAINING PROGRAM

## 2024-03-27 ASSESSMENT — PAIN SCALES - GENERAL: PAINLEVEL: SEVERE PAIN (7)

## 2024-03-27 NOTE — PROGRESS NOTES
ASSESSMENT & PLAN:   Diagnoses and all orders for this visit:  Abdominal pain, generalized  Dysuria  -     UA Macroscopic with reflex to Microscopic and Culture - Lab Collect; Future  -     Wet prep - Clinic Collect  Vaginal bleeding  -     HCG qualitative urine; Future    Lower abdominal pain x 1 day. On exam has epigastric, periumbilical, hypogastric tenderness. Urine hcg negative. UA negative. Wet prep negative. Patient recommended to go to ER for further workup and evaluation.    At the end of the encounter, I discussed results, diagnosis, medications. Discussed red flags for immediate return to clinic/ER, as well as indications for follow up if no improvement. Patient and/or caregiver understood and agreed to plan. Patient was stable for discharge.    Patient Instructions   Go to ER at [address] [place] [address]    ------------------------------------------------------------------------  SUBJECTIVE  History was obtained from patient.    Patient presents with:  Pelvic Pain: Onset- Monday   Back Pain: Pain is radiating to lower back.   UTI: Cloudy urine.   Vaginal Bleeding: In the past two weeks, pt has been experiencing vaginal bleeding (not menstruating).     HPI  Aleena Escamilla is a(n) 48 year old female presenting to urgent care for lower abdominal pain that began yesterday. Is becoming more severe since onset. Reports slight dysuria 3 days ago. Also had vaginal bleeding last week, none now. No nausea, vomiting, diarrhea, constipation.    Review of Systems    Current Outpatient Medications   Medication Sig Dispense Refill    CALCIUM PO       Cholecalciferol (VITAMIN D3) 2000 units TABS Take 1 tablet by mouth daily 100 tablet 3    clindamycin (CLEOCIN T) 1 % external lotion APPLY TOPICALLY TO FACE EVERY MORNING 60 mL 0    cyclobenzaprine (FLEXERIL) 5 MG tablet Take 1-2 tablets (5-10 mg) by mouth nightly as needed for muscle spasms 40 tablet 0    Evening Primrose Oil 1000 MG  CAPS       fish oil-omega-3 fatty acids 1000 MG capsule Take 1 g by mouth daily      gabapentin (NEURONTIN) 300 MG capsule take 300mg in the morning and afternoon and 600mg (2 capsules) at bedtime. If side effects, reduce to last tolerable dosage. If side effects, then reduce to last tolerable dosage. 120 capsule 1    ibuprofen (ADVIL/MOTRIN) 600 MG tablet Take 1 tablet (600 mg) by mouth every 8 hours as needed for moderate pain 20 tablet 0    tretinoin (RETIN-A) 0.05 % external cream APPLY TOPICALLY TO FACE AT BEDTIME FOR ACNE 45 g 0    diclofenac (VOLTAREN) 50 MG EC tablet Take 1 tablet (50 mg) by mouth 2 times daily for 7 days 28 tablet 1    fluconazole (DIFLUCAN) 150 MG tablet One tablet weekly for 4 weeks (Patient not taking: Reported on 11/3/2023) 4 tablet 0    ketoconazole (NIZORAL) 2 % external shampoo Five to 10 mL of shampoo should be left on for three to five minutes before rinsing off. Apply to wet hair, lather, and rinse thoroughly; repeat. Use every 3 to 4 days for up to 8 weeks (Patient not taking: Reported on 11/3/2023) 120 mL 0     Problem List:  2020-12: Upper back pain, chronic  2020-10: High risk medication use  2018-08: Hyperlipidemia LDL goal <130  2018-07: Fatty liver  2017-12: Elevated LFTs  2017-09: Onychomycosis  2017-05: Bunion, right  2017-03: Chronic right shoulder pain  2017-01: Non morbid drug-induced obesity  2016-11: Carpal tunnel syndrome  2016-10: BRCA gene positive  2016-06: Chemotherapy-induced neutropenia  (H24)  2016-03: Periodic headache syndrome, not intractable  2015-12: Malignant neoplasm of overlapping sites of right female   breast (H)  2015-07: Major depressive disorder, recurrent episode, mild (H24)  2015-07: Generalized anxiety disorder  2015-07: Arthritis, multiple joint involvement  2015-07: Primary osteoarthritis of both hands  2015-07: Fibromyalgia  2011-01: Chronic pelvic pain in female  2011-01: Chronic salpingitis and oophoritis  2010-10: CARDIOVASCULAR  SCREENING; LDL GOAL LESS THAN 160    Allergies   Allergen Reactions    Latex Rash         OBJECTIVE  Vitals:    03/27/24 1105   BP: 122/84   BP Location: Left arm   Patient Position: Sitting   Cuff Size: Adult Regular   Pulse: 72   Resp: 16   Temp: 97.7  F (36.5  C)   TempSrc: Tympanic   SpO2: 98%   Weight: 77.2 kg (170 lb 3.2 oz)     Physical Exam   GENERAL: healthy, alert, no acute distress.   PSYCH: mentation appears normal. Normal affect  ABDOMEN: soft, nondistended. Hypogastric, periumbilical, epigastric tenderness. No guarding or rebound tenderness. No CVA tenderness bilaterally.      Results for orders placed or performed in visit on 03/27/24   UA Macroscopic with reflex to Microscopic and Culture - Lab Collect     Status: Normal    Specimen: Urine, Midstream   Result Value Ref Range    Color Urine Yellow Colorless, Straw, Light Yellow, Yellow    Appearance Urine Clear Clear    Glucose Urine Negative Negative mg/dL    Bilirubin Urine Negative Negative    Ketones Urine Negative Negative mg/dL    Specific Gravity Urine 1.025 1.003 - 1.035    Blood Urine Negative Negative    pH Urine 5.5 5.0 - 7.0    Protein Albumin Urine Negative Negative mg/dL    Urobilinogen Urine 0.2 0.2, 1.0 E.U./dL    Nitrite Urine Negative Negative    Leukocyte Esterase Urine Negative Negative    Narrative    Microscopic not indicated   HCG qualitative urine     Status: Normal   Result Value Ref Range    hCG Urine Qualitative Negative Negative   Wet prep - Clinic Collect     Status: Abnormal    Specimen: Vagina; Swab   Result Value Ref Range    Trichomonas Absent Absent    Yeast Absent Absent    Clue Cells Absent Absent    WBCs/high power field 1+ (A) None

## 2024-04-02 ENCOUNTER — OFFICE VISIT (OUTPATIENT)
Dept: FAMILY MEDICINE | Facility: CLINIC | Age: 49
End: 2024-04-02
Payer: COMMERCIAL

## 2024-04-02 VITALS
HEIGHT: 61 IN | DIASTOLIC BLOOD PRESSURE: 69 MMHG | BODY MASS INDEX: 32.55 KG/M2 | HEART RATE: 75 BPM | WEIGHT: 172.4 LBS | OXYGEN SATURATION: 98 % | TEMPERATURE: 97.5 F | RESPIRATION RATE: 16 BRPM | SYSTOLIC BLOOD PRESSURE: 111 MMHG

## 2024-04-02 DIAGNOSIS — Z28.21 REFUSED INFLUENZA VACCINE: ICD-10-CM

## 2024-04-02 DIAGNOSIS — Z28.21 COVID-19 VACCINATION REFUSED: ICD-10-CM

## 2024-04-02 DIAGNOSIS — Z12.11 SCREEN FOR COLON CANCER: ICD-10-CM

## 2024-04-02 DIAGNOSIS — Z01.83 BLOOD TYPING ENCOUNTER: ICD-10-CM

## 2024-04-02 DIAGNOSIS — E78.5 HYPERLIPIDEMIA LDL GOAL <130: Primary | ICD-10-CM

## 2024-04-02 DIAGNOSIS — M79.7 FIBROMYALGIA: ICD-10-CM

## 2024-04-02 DIAGNOSIS — K76.0 FATTY LIVER: ICD-10-CM

## 2024-04-02 DIAGNOSIS — Z17.0 MALIGNANT NEOPLASM OF OVERLAPPING SITES OF RIGHT BREAST IN FEMALE, ESTROGEN RECEPTOR POSITIVE (H): ICD-10-CM

## 2024-04-02 DIAGNOSIS — F33.0 MAJOR DEPRESSIVE DISORDER, RECURRENT EPISODE, MILD (H): ICD-10-CM

## 2024-04-02 DIAGNOSIS — R06.83 LOUD SNORING: ICD-10-CM

## 2024-04-02 DIAGNOSIS — F41.1 GENERALIZED ANXIETY DISORDER: ICD-10-CM

## 2024-04-02 DIAGNOSIS — C50.811 MALIGNANT NEOPLASM OF OVERLAPPING SITES OF RIGHT BREAST IN FEMALE, ESTROGEN RECEPTOR POSITIVE (H): ICD-10-CM

## 2024-04-02 LAB
ABO/RH(D): NORMAL
SPECIMEN EXPIRATION DATE: NORMAL

## 2024-04-02 PROCEDURE — 96127 BRIEF EMOTIONAL/BEHAV ASSMT: CPT | Performed by: NURSE PRACTITIONER

## 2024-04-02 PROCEDURE — 86901 BLOOD TYPING SEROLOGIC RH(D): CPT | Performed by: NURSE PRACTITIONER

## 2024-04-02 PROCEDURE — 80076 HEPATIC FUNCTION PANEL: CPT | Performed by: NURSE PRACTITIONER

## 2024-04-02 PROCEDURE — 99214 OFFICE O/P EST MOD 30 MIN: CPT | Performed by: NURSE PRACTITIONER

## 2024-04-02 PROCEDURE — 80061 LIPID PANEL: CPT | Performed by: NURSE PRACTITIONER

## 2024-04-02 PROCEDURE — 86900 BLOOD TYPING SEROLOGIC ABO: CPT | Performed by: NURSE PRACTITIONER

## 2024-04-02 PROCEDURE — 36415 COLL VENOUS BLD VENIPUNCTURE: CPT | Performed by: NURSE PRACTITIONER

## 2024-04-02 ASSESSMENT — PATIENT HEALTH QUESTIONNAIRE - PHQ9
SUM OF ALL RESPONSES TO PHQ QUESTIONS 1-9: 3
10. IF YOU CHECKED OFF ANY PROBLEMS, HOW DIFFICULT HAVE THESE PROBLEMS MADE IT FOR YOU TO DO YOUR WORK, TAKE CARE OF THINGS AT HOME, OR GET ALONG WITH OTHER PEOPLE: NOT DIFFICULT AT ALL
SUM OF ALL RESPONSES TO PHQ QUESTIONS 1-9: 3

## 2024-04-02 ASSESSMENT — PAIN SCALES - GENERAL: PAINLEVEL: MILD PAIN (3)

## 2024-04-02 NOTE — PROGRESS NOTES
"  Assessment & Plan     Hyperlipidemia LDL goal <130  Triglycerides 366 today, HDL 32, LDL at goal.  Starting Crestor, referring to Nutrition, advised low chol diet, weight loss, regular exercise  - Lipid panel reflex to direct LDL Non-fasting  - Lipid panel reflex to direct LDL Non-fasting  - rosuvastatin (CRESTOR) 10 MG tablet  Dispense: 90 tablet; Refill: 3  - Hepatic panel (Albumin, ALT, AST, Bili, Alk Phos, TP)  - Nutrition Referral    Major depressive disorder, recurrent episode, mild (H24)  Stable not on  selective serotonin reuptake inhibitor and feels she is doing OK.    Generalized anxiety disorder  See above    Fibromyalgia  At baseline.    Malignant neoplasm of overlapping sites of right breast in female, estrogen receptor positive (H)  Followed by breast clinic    Fatty liver  Elevated LFT's and lipids,  follow with Nutrition, consider GI referral  - Hepatic panel (Albumin, ALT, AST, Bili, Alk Phos, TP)  - Hepatic panel (Albumin, ALT, AST, Bili, Alk Phos, TP)  - Nutrition Referral    Loud snoring  Getting sleep study  - Adult Sleep Eval & Management  Referral    Blood typing encounter    - ABO and Rh  - ABO and Rh    Screen for colon cancer    - Fecal colorectal cancer screen (FIT)  - Fecal colorectal cancer screen (FIT)    COVID-19 vaccination refused  Patient declined    Refused influenza vaccine  Patient declined      Ordering of each unique test  Prescription drug management        BMI  Estimated body mass index is 32.97 kg/m  as calculated from the following:    Height as of this encounter: 1.54 m (5' 0.63\").    Weight as of this encounter: 78.2 kg (172 lb 6.4 oz).   Weight management plan: Discussed healthy diet and exercise guidelines      Work on weight loss  Regular exercise  See Patient Instructions    Robbie Flood is a 48 year old, presenting for the following health issues:  Skin Tags, Abdominal Pain, and Vaginal Bleeding      4/2/2024     1:49 PM   Additional Questions "   Roomed by Salome   Accompanied by self     History of Present Illness       Reason for visit:  Abdominal pain and vaginal bleeding follow up,skin tags  Symptom onset:  More than a month  Symptoms include:  Abdominal pain and bleeding,skin tags  Symptom intensity:  Mild  Symptom progression:  Improving  Had these symptoms before:  No    She eats 4 or more servings of fruits and vegetables daily.She consumes 1 sweetened beverage(s) daily.She exercises with enough effort to increase her heart rate 10 to 19 minutes per day.  She exercises with enough effort to increase her heart rate 4 days per week.   She is taking medications regularly.     She reports having scheduled her appt for abdominal pain but this is now resolved and she is not concerned about it. She prefers to address her chronic medical issues today.        Hyperlipidemia Follow-Up    Are you regularly taking any medication or supplement to lower your cholesterol?   No  Are you having muscle aches or other side effects that you think could be caused by your cholesterol lowering medication?      Depression   How are you doing with your depression since your last visit? No change  Are you having other symptoms that might be associated with depression? No  Have you had a significant life event?  Health Concerns   Are you feeling anxious or having panic attacks?   No  Do you have any concerns with your use of alcohol or other drugs? No    Social History     Tobacco Use    Smoking status: Never    Smokeless tobacco: Never   Vaping Use    Vaping status: Never Used   Substance Use Topics    Alcohol use: Yes    Drug use: No         5/9/2022     3:42 PM 6/5/2023    12:26 PM 4/2/2024     1:47 PM   PHQ   PHQ-9 Total Score 2 1 3   Q9: Thoughts of better off dead/self-harm past 2 weeks Not at all Not at all Not at all         11/1/2019     5:00 PM 10/26/2020     6:06 PM 4/27/2021     8:18 AM   VENKAT-7 SCORE   Total Score 1 1 0         4/2/2024     1:47 PM   Last PHQ-9    1.  Little interest or pleasure in doing things 0   2.  Feeling down, depressed, or hopeless 0   3.  Trouble falling or staying asleep, or sleeping too much 1   4.  Feeling tired or having little energy 1   5.  Poor appetite or overeating 1   6.  Feeling bad about yourself 0   7.  Trouble concentrating 0   8.  Moving slowly or restless 0   Q9: Thoughts of better off dead/self-harm past 2 weeks 0   PHQ-9 Total Score 3         4/27/2021     8:18 AM   VENKAT-7    1. Feeling nervous, anxious, or on edge 0   2. Not being able to stop or control worrying 0   3. Worrying too much about different things 0   4. Trouble relaxing 0   5. Being so restless that it is hard to sit still 0   6. Becoming easily annoyed or irritable 0   7. Feeling afraid, as if something awful might happen 0   VENKAT-7 Total Score 0   If you checked any problems, how difficult have they made it for you to do your work, take care of things at home, or get along with other people? Not difficult at all       Suicide Assessment Five-step Evaluation and Treatment (SAFE-T)      Right breast cancer- followed by breast clinic    Fatty liver- no abdominal pian, food intolerances, weight has been stable, reports is working on weight loss but has gained 4# since October 2023.    Loud snoring- denies apneic spells, sleep is restorative  and she denies excessive daytime drowsiness.    She also requests blood typing.        How many servings of fruits and vegetables do you eat daily?  2-3  On average, how many sweetened beverages do you drink each day (Examples: soda, juice, sweet tea, etc.  Do NOT count diet or artificially sweetened beverages)?   1  How many days per week do you exercise enough to make your heart beat faster? 3 or less  How many minutes a day do you exercise enough to make your heart beat faster? 10 - 19  How many days per week do you miss taking your medication? 0        Objective    /69 (BP Location: Left arm, Patient Position: Sitting, Cuff  "Size: Adult Regular)   Pulse 75   Temp 97.5  F (36.4  C) (Tympanic)   Resp 16   Ht 1.54 m (5' 0.63\")   Wt 78.2 kg (172 lb 6.4 oz)   LMP 03/01/2016 (Approximate)   SpO2 98%   BMI 32.97 kg/m    Body mass index is 32.97 kg/m .  Physical Exam   GENERAL: alert and no distress  EYES: Eyes grossly normal to inspection, PERRL and conjunctivae and sclerae normal  HENT: ear canals and TM's normal, nose and mouth without ulcers or lesions  NECK: no adenopathy, no asymmetry, masses, or scars  RESP: lungs clear to auscultation - no rales, rhonchi or wheezes  CV: regular rate and rhythm, normal S1 S2, no S3 or S4, no murmur, click or rub, no peripheral edema  ABDOMEN: soft, nontender, no hepatosplenomegaly, no masses and bowel sounds normal  MS: no gross musculoskeletal defects noted, no edema  SKIN: no suspicious lesions or rashes  NEURO: Normal strength and tone, mentation intact and speech normal  PSYCH: mentation appears normal, affect normal/bright  LYMPH: normal ant/post cervical, supraclavicular nodes  inguinal: no adenopathy    Results for orders placed or performed in visit on 04/02/24   Lipid panel reflex to direct LDL Non-fasting     Status: Abnormal   Result Value Ref Range    Cholesterol 221 (H) <200 mg/dL    Triglycerides 366 (H) <150 mg/dL    Direct Measure HDL 32 (L) >=50 mg/dL    LDL Cholesterol Calculated 116 (H) <=100 mg/dL    Non HDL Cholesterol 189 (H) <130 mg/dL    Patient Fasting > 8hrs? Unknown     Narrative    Cholesterol  Desirable:  <200 mg/dL    Triglycerides  Normal:  Less than 150 mg/dL  Borderline High:  150-199 mg/dL  High:  200-499 mg/dL  Very High:  Greater than or equal to 500 mg/dL    Direct Measure HDL  Female:  Greater than or equal to 50 mg/dL   Male:  Greater than or equal to 40 mg/dL    LDL Cholesterol  Desirable:  <100mg/dL  Above Desirable:  100-129 mg/dL   Borderline High:  130-159 mg/dL   High:  160-189 mg/dL   Very High:  >= 190 mg/dL    Non HDL Cholesterol  Desirable:  130 " mg/dL  Above Desirable:  130-159 mg/dL  Borderline High:  160-189 mg/dL  High:  190-219 mg/dL  Very High:  Greater than or equal to 220 mg/dL   Hepatic panel (Albumin, ALT, AST, Bili, Alk Phos, TP)     Status: Abnormal   Result Value Ref Range    Protein Total 8.0 6.4 - 8.3 g/dL    Albumin 4.7 3.5 - 5.2 g/dL    Bilirubin Total 0.4 <=1.2 mg/dL    Alkaline Phosphatase 100 40 - 150 U/L    AST 62 (H) 0 - 45 U/L    ALT 98 (H) 0 - 50 U/L    Bilirubin Direct <0.20 0.00 - 0.30 mg/dL   ABO and Rh     Status: None   Result Value Ref Range    ABO/RH(D) O POS     SPECIMEN EXPIRATION DATE [id number]            Signed Electronically by: PIA Edmond CNP

## 2024-04-03 LAB
ALBUMIN SERPL BCG-MCNC: 4.7 G/DL (ref 3.5–5.2)
ALP SERPL-CCNC: 100 U/L (ref 40–150)
ALT SERPL W P-5'-P-CCNC: 98 U/L (ref 0–50)
AST SERPL W P-5'-P-CCNC: 62 U/L (ref 0–45)
BILIRUB DIRECT SERPL-MCNC: <0.2 MG/DL (ref 0–0.3)
BILIRUB SERPL-MCNC: 0.4 MG/DL
CHOLEST SERPL-MCNC: 221 MG/DL
FASTING STATUS PATIENT QL REPORTED: ABNORMAL
HDLC SERPL-MCNC: 32 MG/DL
LDLC SERPL CALC-MCNC: 116 MG/DL
NONHDLC SERPL-MCNC: 189 MG/DL
PROT SERPL-MCNC: 8 G/DL (ref 6.4–8.3)
TRIGL SERPL-MCNC: 366 MG/DL

## 2024-04-06 RX ORDER — ROSUVASTATIN CALCIUM 10 MG/1
10 TABLET, COATED ORAL DAILY
Qty: 90 TABLET | Refills: 3 | Status: SHIPPED | OUTPATIENT
Start: 2024-04-06

## 2024-04-09 DIAGNOSIS — N93.9 ABNORMAL UTERINE BLEEDING (AUB): Primary | ICD-10-CM

## 2024-04-09 NOTE — PROGRESS NOTES
Patient has concerns with AUB and would like to get an ultrasound before seeing. Placing transvaginal US for patient. Patient will follow up with OBGYN in clinic 4/11/2024.      Aide Champion CMA 4/9/2024 2:30 PM

## 2024-04-10 ENCOUNTER — ANCILLARY PROCEDURE (OUTPATIENT)
Dept: ULTRASOUND IMAGING | Facility: CLINIC | Age: 49
End: 2024-04-10
Payer: COMMERCIAL

## 2024-04-10 DIAGNOSIS — N93.9 ABNORMAL UTERINE BLEEDING (AUB): ICD-10-CM

## 2024-04-10 PROCEDURE — 76856 US EXAM PELVIC COMPLETE: CPT | Mod: TC | Performed by: RADIOLOGY

## 2024-04-10 PROCEDURE — 76830 TRANSVAGINAL US NON-OB: CPT | Mod: TC | Performed by: RADIOLOGY

## 2024-04-10 NOTE — PATIENT INSTRUCTIONS
If you have labs or imaging done, the results will automatically release in [facility] without an interpretation.  Your health care professional will review those results and send an interpretation with recommendations as soon as possible, but this may be 1-3 business days.    If you have any questions regarding your visit, please contact your care team.     [facility]: [phone]  [facility] CLINIC HOURS TELEPHONE NUMBER   Symone Singletary, MARAL Espinoza-BABAR Jeronimo-BABAR Fajardo-Surgery Scheduler  Tri-       Monday- [place]  8:00 am-4:00 pm    Tuesday- [place]  8:00 am-4:00 pm    Wednesday- [place] 8:00 am-4:00 pm    Thursday- [place] 8:00 am-4:00 pm    Friday- [place]  8:00 am-4:00 pm [facility]  [address]  [place] [address]  PH: [phone]  Fax: [phone]    Imaging Scheduling all locations  PH: [phone]     [facility] Labor and Delivery  [address] [place] [address] [address].  [place], [place] [address] [phone]    [facility]  [address] [place] [address]  PH: [phone]     **Surgeries** Our Surgery Schedulers will contact you to schedule. If you do not receive a call within 3 business days, please call [phone].  Urgent Care locations:  [facility]       Monday-Friday   10 am - 8 pm    Saturday and Sunday   9 am - 5 pm   [phone]   If you need a medication refill, please contact your pharmacy. Please allow 3 business days for your refill to be completed.  As always, Thank you for trusting us with your healthcare needs!

## 2024-04-11 ENCOUNTER — OFFICE VISIT (OUTPATIENT)
Dept: OBGYN | Facility: CLINIC | Age: 49
End: 2024-04-11
Payer: COMMERCIAL

## 2024-04-11 VITALS
WEIGHT: 172 LBS | DIASTOLIC BLOOD PRESSURE: 72 MMHG | BODY MASS INDEX: 32.9 KG/M2 | SYSTOLIC BLOOD PRESSURE: 112 MMHG | OXYGEN SATURATION: 99 % | HEART RATE: 70 BPM

## 2024-04-11 DIAGNOSIS — N95.0 POST-MENOPAUSAL BLEEDING: Primary | ICD-10-CM

## 2024-04-11 DIAGNOSIS — R10.2 PELVIC PAIN IN FEMALE: ICD-10-CM

## 2024-04-11 DIAGNOSIS — Z12.4 SCREENING FOR CERVICAL CANCER: ICD-10-CM

## 2024-04-11 LAB — TSH SERPL DL<=0.005 MIU/L-ACNC: 2.02 UIU/ML (ref 0.3–4.2)

## 2024-04-11 PROCEDURE — G0145 SCR C/V CYTO,THINLAYER,RESCR: HCPCS

## 2024-04-11 PROCEDURE — 84443 ASSAY THYROID STIM HORMONE: CPT

## 2024-04-11 PROCEDURE — 99459 PELVIC EXAMINATION: CPT

## 2024-04-11 PROCEDURE — 36415 COLL VENOUS BLD VENIPUNCTURE: CPT

## 2024-04-11 PROCEDURE — 99213 OFFICE O/P EST LOW 20 MIN: CPT | Mod: 25

## 2024-04-11 PROCEDURE — 87624 HPV HI-RISK TYP POOLED RSLT: CPT

## 2024-04-11 PROCEDURE — 58100 BIOPSY OF UTERUS LINING: CPT

## 2024-04-11 PROCEDURE — 88305 TISSUE EXAM BY PATHOLOGIST: CPT | Performed by: STUDENT IN AN ORGANIZED HEALTH CARE EDUCATION/TRAINING PROGRAM

## 2024-04-11 NOTE — PROGRESS NOTES
Subjective:  Aleena is a 48 year old   is here today with the following concerns:    Post-menopausal bleeding: she had bilateral oophorectomy and has not had a period in 13 years. 1 mo ago she found blood with wiping and on the toilet while at work and this persisted for 2 weeks and then stopped. Bleeding was bright red and she feels it was coming from the vagina. She reports she had one similar episode to this many years ago and work up at that time was negative. She does feel some cramping and dull pain that wraps around her L side and is also talking with GI for work up but would like to make sure it is not gynecological in nature. She was seen  in the ED and UC and work up was negative. Wet prep and UA negative on 3/27/24. Imaging results below:    TVUS 24  FINDINGS:     UTERUS: 5.2 x 3.1 x 2.3 cm. Normal in size and position with no  masses.     ENDOMETRIUM: 3 mm. Linear echogenic endometrium     Bilateral oophorectomy. No adnexal masses. No significant free fluid.                                                                      IMPRESSION:  1.  Normal uterus. Bilateral oophorectomy. No adnexal masses    CT 3/27/24  IMPRESSION:     Suspected slight degree of diffuse fatty filtration of the liver without hepatomegaly or hepatic cirrhosis. No other solid abdominal organ abnormality.     Gallbladder is relatively distended with no obvious gallstones or changes of cholecystitis. Normal bile ducts.     No urinary tract abnormality.     No bowel abnormality. Normal appendix. Normal stomach.     No adnexal lesion.     ROS: Pertinent ROS as above.    Medical history  OB History    Para Term  AB Living   2 2 0 0 0 2   SAB IAB Ectopic Multiple Live Births   0 0 0 0 2      # Outcome Date GA Lbr Mario/2nd Weight Sex Type Anes PTL Lv   2 Para     F Vag-Spont   MELYSSA   1 Para     M Vag-Spont   MELYSSA      Past Medical History:   Diagnosis Date    Breast cancer (H)     Chronic pelvic pain in  female     Malignant neoplasm of right breast, stage 2 (H) 01/01/2016    1/4 lymph nodes positive, Oncotype DX = 25.  Chemotherapy and radiation. Letrozole.    Migraine headaches       Past Surgical History:   Procedure Laterality Date    BIOPSY BREAST NEEDLE LOCALIZATION Right 1/19/2016    Procedure: BIOPSY BREAST NEEDLE LOCALIZATION;  Surgeon: Adelina Demarco MD;  Location: [facility] OR    GYN SURGERY  2-    bilateral salpingectomy, left oophrectomy    LAPAROSCOPIC SALPINGO-OOPHORECTOMY Right 10/26/2016    Procedure: LAPAROSCOPIC SALPINGO-OOPHORECTOMY;  Surgeon: Bouchra Mayo MD;  Location: [facility] OR    LUMPECTOMY BREAST WITH SENTINEL NODE, COMBINED Right 1/19/2016    Procedure: COMBINED LUMPECTOMY BREAST WITH SENTINEL NODE;  Surgeon: Adelina Demarco MD;  Location: [facility] OR    PELVIS LAPAROSCOPY,DX  12/28/1998    RELEASE CARPAL TUNNEL Left 12/23/2016    Procedure: RELEASE CARPAL TUNNEL;  Surgeon: Sam Florence MD;  Location: [facility] OR     ALL/Meds: Her medication and allergy histories were reviewed and are documented in their appropriate chart areas.    SH: Reviewed and documented in the appropriate area of the chart.  FH:  Her family history is reviewed and updated in the chart, today.  PMH: Her past medical, surgical, and obstetric histories were reviewed and updated today in the appropriate chart areas.    Objective:  PE: /72 (BP Location: Left arm, Patient Position: Sitting, Cuff Size: Adult Regular)   Pulse 70   Wt 78 kg (172 lb)   LMP 03/01/2016 (Approximate)   SpO2 99%   BMI 32.90 kg/m    Body mass index is 32.9 kg/m .    Pertinent Physical exam findings:    General Appearance:  healthy, alert, active, no distress   Pelvic:       - Ext: Vulva and perineum are normal without lesion, mass or discharge        - Urethra: normal without discharge or scarring        - Bladder: no tenderness, no masses       - Vagina: atrophic and without discharge       - Cervix: normal and  multiparous    A/P:  Procedure: ENDOMETRIAL BIOPSY   I discussed her TVUS results with her and informed that because her lining is <4mm that EMB is not warranted and that her chance of malignancy is very low. She has hx of Breast CA and would really like EMB done today just to make sure.  Risks benefits and alternatives of the procedure were discussed with the patient.  All questions were answered and consent was signed.  A speculum was inserted with visualization of the cervix. Cervix was cleaned with Betadine solution.  The anterior lip of the cervix was grasped with a single-toothed tenaculum.  The EMB Pipelle was inserted to the uterine fundus without difficulty and the uterus was sounded to 7cm. The Pipelle was withdrawn as it was rotated 3-4 times to obtain the sample.  3 passes were made with appropriate sampling.  Endometrial specimen was sent to pathology in formalin.  The biopsy was performed without complication.    The patient tolerated the procedure well.   We will contact her with her pathology result.    Aleena Escamilla is a 48 year old  here today with the following concerns:  (N95.0) Post-menopausal bleeding  (primary encounter diagnosis)  Plan: Pap diagnostic with HPV, ENDOMETRIAL BIOPSY W/O        CERVICAL DILATION, Surgical Pathology Exam, TSH        with free T4 reflex          (Z12.4) Screening for cervical cancer  Plan: Pap diagnostic with HPV    (R10.2) Pelvic pain in female  Comment: informed that if all negative, follow up with GI  Plan: Physical Therapy  Referral          PIA Rabago CNP

## 2024-04-12 LAB
PATH REPORT.COMMENTS IMP SPEC: NORMAL
PATH REPORT.FINAL DX SPEC: NORMAL
PATH REPORT.GROSS SPEC: NORMAL
PATH REPORT.MICROSCOPIC SPEC OTHER STN: NORMAL
PATH REPORT.RELEVANT HX SPEC: NORMAL
PHOTO IMAGE: NORMAL

## 2024-04-16 LAB
BKR LAB AP GYN ADEQUACY: NORMAL
BKR LAB AP GYN INTERPRETATION: NORMAL
BKR LAB AP HPV REFLEX: NORMAL
BKR LAB AP PREVIOUS ABNORMAL: NORMAL
PATH REPORT.COMMENTS IMP SPEC: NORMAL
PATH REPORT.COMMENTS IMP SPEC: NORMAL
PATH REPORT.RELEVANT HX SPEC: NORMAL

## 2024-04-17 LAB
HUMAN PAPILLOMA VIRUS 16 DNA: NEGATIVE
HUMAN PAPILLOMA VIRUS 18 DNA: NEGATIVE
HUMAN PAPILLOMA VIRUS FINAL DIAGNOSIS: NORMAL
HUMAN PAPILLOMA VIRUS OTHER HR: NEGATIVE

## 2024-04-29 DIAGNOSIS — L70.0 ACNE VULGARIS: ICD-10-CM

## 2024-04-29 RX ORDER — CLINDAMYCIN PHOSPHATE 10 UG/ML
LOTION TOPICAL
Qty: 60 ML | Refills: 3 | Status: SHIPPED | OUTPATIENT
Start: 2024-04-29

## 2024-04-29 RX ORDER — TRETINOIN 0.5 MG/G
CREAM TOPICAL
Qty: 45 G | Refills: 3 | Status: SHIPPED | OUTPATIENT
Start: 2024-04-29

## 2024-05-23 NOTE — PROGRESS NOTES
"  SUBJECTIVE:   Aleena Escamilla is a 42 year old female who presents to clinic today for the following health issues:    URINARY TRACT SYMPTOMS      Duration: started approximately two weeks ago    Description  frequency, odor and back pain    Intensity:  moderate    Accompanying signs and symptoms:  Fever/chills: no   Flank pain no   Nausea and vomiting: YES  Vaginal symptoms: odor  Abdominal/Pelvic Pain: YES    History  History of frequent UTI's: YES  History of kidney stones: no   Sexually Active: yes  Possibility of pregnancy: No    Precipitating or alleviating factors: None    Therapies tried and outcome: cranberry juice   Outcome: none  Patient treated for UTI  4/10/18 with 3 day course of Bactrim and notes no improvement in symptoms since taking the Bactrim.  She continues to have frequency, urgency, and dysuria. that wakes her up at night, approximately five times during the night, ten times during the day consistently for the last two weeks.Drinking lots of fluids, 3-4 16 oz. bottles of water during the day and 3-4 cups of cranberry juice. Denies episodes of incontinence/leaking, voiding \"normal amounts\", urine was cloudy last week, now clear yellow, but states that it is malodorous and \"fishy\". Also endorses pelvic pain/pressure and itching - started two days ago.      Headache  Duration of complaint: Started about a month ago, worsening in the past two weeks (possibly related to sleep), no migraine history   Description:   Location: bilateral in the temporal area & occipital region  Character: stabbing  Frequency:  Comes and goes a couple times day   Duration:  Yesterday was the whole day, otherwise couple times a day comes and goes  Intensity: moderate-severe  Progression of Symptoms:  worsening and intermittent  Accompanying Signs & Symptoms:  Stiff neck: no   Neck or upper back pain: no   Fever: no   Sinus pressure: no   Nausea or vomiting: yes - no vomiting  Dizziness: YES - worse with standing; only " ELSIE CM removed from care team.    "occurs with headache  Numbness: no   Weakness: no   Visual changes: YES - blurriness  History:   Head trauma: no   Family history of migraines: no   Previous tests for headaches: no   Neurologist evaluations: YES, but for carpel tunnel about 3 years ago  Able to do daily activities: YES  Wake with a headaches: YES  Do headaches wake you up: YES - couple times in the night  Daily pain medication use: no   Work/school stressors/changes: no   Precipitating factors:   Does light make it worse: no   Does sound make it worse: no   Alleviating factors:  Does sleep help: no   Therapies Tried and outcome: 400 mg iburprofen - once a day x 5 days    HPI:   Additionally, Aleena notes that she's had headaches and has felt \"unwell\" for about the last month, with headaches worsening in the last two weeks corresponding with urinary symptoms. Aleena does confirm that her Effexor dose was cut in half at her last oncology visit 3/15/18 due to intolerance and acknowledges that her headaches started around that time as well. Aleena states she is compliant with the new dose and does not miss any doses. She is concerned about new malignancy after her breast cancer diagnosis in 2015 - last saw oncologist 3/15/18 at the [facility].  She also has issues with TMJ dysfunction and has been told to get a mouthguard which she has not yet done.    Problem list and histories reviewed & adjusted, as indicated.  Additional history: as documented    Patient Active Problem List   Diagnosis     CARDIOVASCULAR SCREENING; LDL GOAL LESS THAN 160     Chronic pelvic pain in female     Chronic salpingitis and oophoritis     Major depressive disorder, recurrent episode, mild (H)     Generalized anxiety disorder     Arthritis, multiple joint involvement     Primary osteoarthritis of both hands     Fibromyalgia     Malignant neoplasm of overlapping sites of right female breast (H)     BRCA gene positive     Carpal tunnel syndrome     Non morbid " drug-induced obesity     Chronic right shoulder pain     Bunion, right     Onychomycosis     Elevated LFTs     Past Surgical History:   Procedure Laterality Date     BIOPSY BREAST NEEDLE LOCALIZATION Right 1/19/2016    Procedure: BIOPSY BREAST NEEDLE LOCALIZATION;  Surgeon: Adelina Demarco MD;  Location: [facility] OR     GYN SURGERY  2-    bilateral salpingectomy, left oophrectomy     LAPAROSCOPIC SALPINGO-OOPHORECTOMY Right 10/26/2016    Procedure: LAPAROSCOPIC SALPINGO-OOPHORECTOMY;  Surgeon: Bouchra Mayo MD;  Location: [facility]     LUMPECTOMY BREAST WITH SENTINEL NODE, COMBINED Right 1/19/2016    Procedure: COMBINED LUMPECTOMY BREAST WITH SENTINEL NODE;  Surgeon: Adelina Demarco MD;  Location: [facility]     PELVIS LAPAROSCOPY,DX  12/28/1998     RELEASE CARPAL TUNNEL Left 12/23/2016    Procedure: RELEASE CARPAL TUNNEL;  Surgeon: Sam Florence MD;  Location: [facility] OR       Social History   Substance Use Topics     Smoking status: Never Smoker     Smokeless tobacco: Never Used     Alcohol use Yes      Comment: 1 drink per week.     Family History   Problem Relation Age of Onset     DIABETES Mother      Hypertension Mother      CANCER Maternal Grandmother      cervical CA     Cardiovascular Father      MI     CANCER Maternal Aunt      cervical cancer     Pancreatic Cancer Maternal Aunt      COD         Current Outpatient Prescriptions   Medication Sig Dispense Refill     acetaminophen (TYLENOL) 325 MG tablet Take 2 tablets (650 mg) by mouth every 4 hours as needed for other (mild pain) 100 tablet 0     Cholecalciferol (VITAMIN D) 2000 UNITS tablet Take 2,000 Units by mouth daily 100 tablet 3     letrozole (FEMARA) 2.5 MG tablet Take 1 tablet (2.5 mg) by mouth daily 90 tablet 3     venlafaxine (EFFEXOR-XR) 75 MG 24 hr capsule Take 1 capsule (75 mg) by mouth daily 90 capsule 1     Allergies   Allergen Reactions     Latex Rash     Recent Labs   Lab Test  03/15/18   1101  02/05/18   1658  11/16/17   0947    12/19/16   1021   10/26/16   0753  09/27/16   1212   11/07/11   1226  10/17/11   0809  01/07/11   0834   A1C   --    --    --    --   5.7   --    --    --    --    --    --    --    LDL   --    --    --    --   82   --    --    --    --    --   86  116   HDL   --    --    --    --   30*   --    --    --    --    --   33*  36*   TRIG   --    --    --    --   301*   --    --    --    --    --   212*  127   ALT  102*  112*  103*   < >   --    < >   --   70*   < >   --    --    --    CR   --   0.60   --    --    --    --    --   0.55   < >   --    --    --    GFRESTIMATED   --   >90   --    --    --    --    --   >90  Non  GFR Calc     < >   --    --    --    GFRESTBLACK   --   >90   --    --    --    --    --   >90   GFR Calc     < >   --    --    --    POTASSIUM   --   3.5   --    --    --    --   3.4  3.7   < >   --    --    --    TSH   --    --    --    --    --    --    --    --    --   0.83   --    --     < > = values in this interval not displayed.      BP Readings from Last 3 Encounters:   04/18/18 125/83   04/10/18 113/77   03/15/18 97/67    Wt Readings from Last 3 Encounters:   04/18/18 162 lb 6.4 oz (73.7 kg)   03/15/18 160 lb (72.6 kg)   02/05/18 157 lb (71.2 kg)           Labs reviewed in EPIC    Reviewed and updated as needed this visit by clinical staff  Tobacco  Allergies  Meds  Med Hx  Surg Hx  Fam Hx  Soc Hx      Reviewed and updated as needed this visit by Provider         ROS:  Constitutional, HEENT, cardiovascular, pulmonary, gi and gu systems are negative, except as otherwise noted.    OBJECTIVE:     /83 (BP Location: Left arm, Patient Position: Chair, Cuff Size: Adult Large)  Pulse 85  Temp 98.4  F (36.9  C) (Oral)  Ht 5' (1.524 m)  Wt 162 lb 6.4 oz (73.7 kg)  LMP 03/01/2016  SpO2 95%  BMI 31.72 kg/m2  Body mass index is 31.72 kg/(m^2).  GENERAL: healthy, alert and no distress  EYES: Eyes grossly normal to inspection, crisp optic discs, PERRL and  conjunctivae and sclerae normal  HENT: ear canals and TM's normal, nose and mouth without ulcers or lesions  NECK: no adenopathy, no asymmetry, masses, or scars and thyroid normal to palpation  RESP: lungs clear to auscultation - no rales, rhonchi or wheezes  CV: regular rate and rhythm, normal S1 S2, no S3 or S4, no murmur, click or rub, no peripheral edema and peripheral pulses strong  ABDOMEN: soft, mild suprapubic tenderness with deep palpation; without hepatosplenomegaly or masses, bowel sounds normal and no palpable or pulsatile masses   (female): patient declined  MS: no gross musculoskeletal defects noted, no edema, no CVA tenderness  NEURO: Normal strength and tone, mentation intact and speech normal  PSYCH: mentation appears normal, affect normal/bright  LYMPH: normal ant/post cervical, supraclavicular nodes      Diagnostic Test Results:  Results for orders placed or performed in visit on 04/18/18 (from the past 24 hour(s))   UA reflex to Microscopic and Culture   Result Value Ref Range    Color Urine Yellow     Appearance Urine Clear     Glucose Urine Negative NEG^Negative mg/dL    Bilirubin Urine Small (A) NEG^Negative    Ketones Urine Negative NEG^Negative mg/dL    Specific Gravity Urine >1.030 1.003 - 1.035    Blood Urine Trace (A) NEG^Negative    pH Urine 5.5 5.0 - 7.0 pH    Protein Albumin Urine Trace (A) NEG^Negative mg/dL    Urobilinogen Urine 1.0 0.2 - 1.0 EU/dL    Nitrite Urine Negative NEG^Negative    Leukocyte Esterase Urine Negative NEG^Negative    Source Midstream Urine    Urine Microscopic   Result Value Ref Range    WBC Urine 0 - 5 OTO5^0 - 5 /HPF    RBC Urine O - 2 OTO2^O - 2 /HPF    Bacteria Urine Moderate (A) NEG^Negative /HPF    Comment Urine unconcentrated, urine was QNS for concentration    Wet prep   Result Value Ref Range    Specimen Description Vagina     Wet Prep No Trichomonas seen     Wet Prep No clue cells seen     Wet Prep No yeast seen        ASSESSMENT/PLAN:     BP  Screening:   Last 3 BP Readings:    BP Readings from Last 3 Encounters:   04/18/18 125/83   04/10/18 113/77   03/15/18 97/67     The following was recommended to the patient:  Re-screen BP within a year and recommended lifestyle modifications    1. Dysuria - persistent, worsening  UA not consist with UTI, wet prep negative for vaginal infection, negative for CVA tenderness, flank pain, fevers. Urethral irritation vs. renal dysfunction vs. ? Advised to try OTC Replens cream as patient endorses vaginal pain/irritaiton r/t dryness and cannot use topical estrogen d/t breast ca. Will screen for diabetes as well and if negative with persistent symptoms, will refer to urology. Advised patient to continue drinking lots of fluids. Patient in agreement with plan.    - UA reflex to Microscopic and Culture  - Wet prep  - Urine Microscopic    2. Major depressive disorder, recurrent episode, mild (H) - chronic, stable  PHQ-9 = 4 on 4/10/18, stable - continue to monitor    3. Urinary frequency - persistent, worsening  See above - patient endorses dry mouth and excessive thirst as well. Will screen for diabetes and if normal, refer to urology per above as negative for UTI and vaginal infection.     - Glucose  - JUST IN CASE    4. Nonintractable migraine, unspecified migraine type - new  Neuro exam normal, crisp optic discs, no exam findings suggestive of intracranial lesion. As migraine onset corresponds with Effexor dosage change, this is the most likely etiology at this time. Advised patient to use Ibuprofen (400-600 mg every 4-6 hours, no more than 3days/week to avoid rebound headaches) and Tylenol as needed (1000 mg up to 3x daily) - symptoms should improve as she adjusts to dosage change from 3/15/18. Educuated on the importance of medication adherence and to follow-up for if symptoms persist or worsen. Patient repeated back that she will not abruptly stop the medication and will follow-up to titrate dose if needed. Patient  also in agreement with plan to see if symptoms improve in the next couple weeks before pursuing additional imaging/neurology.   Also recommended she see her Dentist for a mouthguard to see if this helps with headaches.    Approximately 30 minutes were spent face-to-face with patient and greater than 50% of that time was spent on counseling and coordination of care.   See Patient Instructions    The above evaluation was completed by PIA Weber CNP and transcribed by MATTHEW Flowers student.    PIA Edmond CNP  [facility]

## 2024-06-05 ENCOUNTER — THERAPY VISIT (OUTPATIENT)
Dept: PHYSICAL THERAPY | Facility: CLINIC | Age: 49
End: 2024-06-05
Payer: COMMERCIAL

## 2024-06-05 DIAGNOSIS — M62.89 PELVIC FLOOR DYSFUNCTION: Primary | ICD-10-CM

## 2024-06-05 DIAGNOSIS — R10.2 PELVIC PAIN IN FEMALE: ICD-10-CM

## 2024-06-05 PROCEDURE — 97161 PT EVAL LOW COMPLEX 20 MIN: CPT | Mod: GP | Performed by: PHYSICAL THERAPIST

## 2024-06-05 PROCEDURE — 97535 SELF CARE MNGMENT TRAINING: CPT | Mod: GP | Performed by: PHYSICAL THERAPIST

## 2024-06-05 PROCEDURE — 97530 THERAPEUTIC ACTIVITIES: CPT | Mod: GP | Performed by: PHYSICAL THERAPIST

## 2024-06-05 NOTE — PROGRESS NOTES
PHYSICAL THERAPY EVALUATION  Type of Visit: Evaluation    See electronic medical record for Abuse and Falls Screening details.    Subjective   Patient reports to physical therapy for pelvic pain that started after early menopause about 13 years ago.  Has had laparoscopic salpingo-oophorectomy in 2011 and 2016, history of breast cancer with lumpectomy, chemo, and radiation in 2016.  Pelvic pain comes and goes, gets worse for 2-3 weeks at a time.  Reports not having pain right now, has some heaviness all of the time.  Is unable to have intercourse due to pain, despite using lubrication and changing positions.  Pain feels like burning and is worse with initial penetration, has tried lots of different lubricants.  Has been able to have intercourse 4 times in the last three years, does have some effect on relationship.  She does report that she is now urinating 2-3 times at night.  Has some urinary urgency, feels like she goes 10 times per day; has UI with cough or sneeze hard ~1/week.  Bowel movements are usually once per day, type 4-5.  Work is sitting at computer, doesn't do a lot of exercise.        Presenting condition or subjective complaint: Pelvic pain  Date of onset:      Relevant medical history:     Dates & types of surgery:      Prior diagnostic imaging/testing results: CT scan; Other Biopsy   Prior therapy history for the same diagnosis, illness or injury: No      Prior Level of Function  Transfers: Independent  Ambulation: Independent  ADL: Independent  IADL: Driving, Finances, Housekeeping, Laundry, Meal preparation, Medication management, Work    Living Environment  Social support: With a significant other or spouse   Type of home: House   Stairs to enter the home: No       Ramp: No   Stairs inside the home: No       Help at home:    Equipment owned:       Employment: Yes Maintenance   Hobbies/Interests:      Patient goals for therapy:      Pain assessment: See objective evaluation for  additional pain details     Objective      PELVIC EVALUATION  ADDITIONAL HISTORY:  Sex assigned at birth: Female  Gender identity: Female    Pronouns: She/Her Hers      Bladder History:  Feels bladder filling: Yes  Triggers for feeling of inability to wait to go to the bathroom: No    How long can you wait to urinate:    Gets up at night to urinate: Yes Between 2 or 3  Can stop the flow of urine when urinating: Sometimes  Volume of urine usually released: Medium   Other issues:    Number of bladder infections in last 12 months:    Fluid intake per day: 1500 mL 600 mL    Medications taken for bladder: No     Activities causing urine leak: Cough; Sneeze; Hurrying to the bathroom due to a strong urge to urinate (pee)    Amount of urine typically leaked: Small amount (drops)  Pads used to help with leaking: No        Bowel History:  Frequency of bowel movement: Daily twice/day  Consistency of stool: Soft-formed    Ignores the urge to defecate: Sometimes  Other bowel issues: Pain when pooping  Length of time spent trying to have a bowel movement:      Sexual Function History:  Sexual orientation: Straight    Sexually active: Yes  Lubrication used: Yes Yes  Pelvic pain: Initial penetration (rectal or vaginal); Deep penetration (rectal or vaginal); Pelvic exams    Pain or difficulty with orgasms/erection/ejaculation: No    State of menopause: Early menopause  Hormone medications: No      Are you currently pregnant: No, Number of previous pregnancies: 2, Number of deliveries: 2, If you have delivered before, did you have any of these issues during delivery: Tearing; Vaginal delivery, Have you been diagnosed with pelvic prolapse or abdominal separation: No, Do you get regular exercise: No, Have you tried pelvic floor strengthening exercises for 4 weeks: No, Do you have any history of trauma that is relevant to your care that you d like to share: No    Discussed reason for referral regarding pelvic health needs and  external/internal pelvic floor muscle examination with patient/guardian.  Opportunity provided to ask questions and verbal consent for assessment and intervention was given.    PAIN: Pain Level at Rest: 0/10  Pain Level with Use: 8/10  Pain Location: pelvis  Pain Quality: Burning  Pain Frequency: intermittent  Pain is Worst: daytime  Pain is Exacerbated By: penetration  Pain is Relieved By: n/a  Pain Progression: unchanged   POSTURE: Standing Posture: Lordosis increased  LUMBAR SCREEN:   (Degrees) Left AROM Left PROM  Right AROM Right PROM   Hip Flexion       Hip Extension       Hip Abduction       Hip Adduction       Hip Internal Rotation       Hip External Rotation       Knee Flexion       Knee Extension       Lumbar Side glide Mod rest Mod rest   Lumbar Flexion 75%   Lumbar Extension 25% + pain    Pain:   End feel:   HIP SCREEN:  Strength:   Pain: - none + mild ++ moderate +++ severe  Strength Scale: 0-5/5 Left Right   Hip Flexion 5- 4   Hip Extension     Hip Abduction 4+ 4   Hip Adduction 5- 4-   Hip Internal Rotation 5- 4   Hip External Rotation     Knee Flexion     Knee Extension        Functional Strength Testing: SLS: WNL     PELVIC/SI SCREEN:     PAIN PROVOCATION TEST:   PELVIS/SI SPECIAL TESTS:   BREATHING SYMMETRY:  breathing in to anterior rib, minimal abdominal excursion     PELVIC EXAM  External Visual Inspection:      Integumentary:       External Digital Palpation per Perineum:       Scar:   Location/Type:   Mobility:     Internal Digital Palpation:  Per Vagina:      Per Rectum:        Pelvic Organ Prolapse:       ABDOMINAL ASSESSMENT  Diastasis Rectus Abdominis (THERESA):      Abdominal Activation/Strength:     Scar:   Location/Type:   Mobility:     Fascial Tension/Restriction:     BIOFEEDBACK:  Position:   Surface Electrodes:     Abdominals:     Perianals:       DERMATOMES:   DTR S:     Assessment & Plan   CLINICAL IMPRESSIONS  Medical Diagnosis:      Treatment Diagnosis:     Impression/Assessment:  Patient is a 48 year old female with pelvic floor complaints.  The following significant findings have been identified: Pain, Decreased ROM/flexibility, Decreased joint mobility, Decreased strength, Impaired muscle performance, and Decreased activity tolerance. These impairments interfere with their ability to perform self care tasks and recreational activities as compared to previous level of function.     Clinical Decision Making (Complexity):  Clinical Presentation: Stable/Uncomplicated  Clinical Presentation Rationale: based on medical and personal factors listed in PT evaluation  Clinical Decision Making (Complexity): Low complexity    PLAN OF CARE  Treatment Interventions:  Modalities: Biofeedback  Interventions: Manual Therapy, Neuromuscular Re-education, Therapeutic Activity, Therapeutic Exercise, Self-Care/Home Management    Long Term Goals            Frequency of Treatment:    Duration of Treatment:      Recommended Referrals to Other Professionals:  No further referral needed   Education Assessment:        Risks and benefits of evaluation/treatment have been explained.   Patient/Family/caregiver agrees with Plan of Care.     Evaluation Time:         Present: Not applicable     Signing Clinician: Ursula Bunch, PT

## 2024-06-17 ENCOUNTER — TRANSFERRED RECORDS (OUTPATIENT)
Dept: HEALTH INFORMATION MANAGEMENT | Facility: CLINIC | Age: 49
End: 2024-06-17
Payer: COMMERCIAL

## 2024-07-16 ENCOUNTER — OFFICE VISIT (OUTPATIENT)
Dept: FAMILY MEDICINE | Facility: CLINIC | Age: 49
End: 2024-07-16
Payer: COMMERCIAL

## 2024-07-16 VITALS
RESPIRATION RATE: 18 BRPM | WEIGHT: 172 LBS | TEMPERATURE: 97.7 F | HEIGHT: 61 IN | OXYGEN SATURATION: 98 % | DIASTOLIC BLOOD PRESSURE: 64 MMHG | BODY MASS INDEX: 32.47 KG/M2 | SYSTOLIC BLOOD PRESSURE: 96 MMHG | HEART RATE: 71 BPM

## 2024-07-16 DIAGNOSIS — C50.811 MALIGNANT NEOPLASM OF OVERLAPPING SITES OF RIGHT BREAST IN FEMALE, ESTROGEN RECEPTOR POSITIVE (H): ICD-10-CM

## 2024-07-16 DIAGNOSIS — Z12.11 SCREEN FOR COLON CANCER: ICD-10-CM

## 2024-07-16 DIAGNOSIS — R79.89 ELEVATED LFTS: ICD-10-CM

## 2024-07-16 DIAGNOSIS — Z17.0 MALIGNANT NEOPLASM OF OVERLAPPING SITES OF RIGHT BREAST IN FEMALE, ESTROGEN RECEPTOR POSITIVE (H): ICD-10-CM

## 2024-07-16 DIAGNOSIS — L29.9 ITCHING: Primary | ICD-10-CM

## 2024-07-16 PROCEDURE — 99213 OFFICE O/P EST LOW 20 MIN: CPT | Performed by: NURSE PRACTITIONER

## 2024-07-16 ASSESSMENT — PAIN SCALES - GENERAL: PAINLEVEL: NO PAIN (1)

## 2024-07-16 NOTE — PROGRESS NOTES
Assessment & Plan     Itching  OK to continue with po Benadryl or topical Benadryl, avoid  very hot showers, stay well hydrated, continue to use emollient cream, referring to Derm   - Adult Dermatology  Referral    Malignant neoplasm of overlapping sites of right breast in female, estrogen receptor positive (H)  Followed by Oncology, due for follow up in January 2025.    Elevated LFTs  Has fatty liver, due for repeat LFT's (previously ordered) and asked her to complete these today.    Screen for colon cancer    - Colonoscopy Screening  Referral              Work on weight loss  Regular exercise  See Patient Instructions    Subjective   Aleena is a 48 year old, presenting for the following health issues:  Derm Problem (Itching for 3 month follow up /)      7/16/2024     9:16 AM   Additional Questions   Roomed by urbano   Accompanied by self     History of Present Illness       Reason for visit:  Itching, irritation and pain al over the body and scalp. Also I need a referral for a Colonoscopy    She eats 4 or more servings of fruits and vegetables daily.She consumes 0 sweetened beverage(s) daily.She exercises with enough effort to increase her heart rate 30 to 60 minutes per day.  She exercises with enough effort to increase her heart rate 3 or less days per week.   She is taking medications regularly.         Rash  Onset/Duration: 3 months ago   Description  Location: all over   Character: burning  Itching: severe  Intensity:  moderate  Progression of Symptoms:  worsening  Accompanying signs and symptoms:   Fever: No  Body aches or joint pain: No  Sore throat symptoms: No  Recent cold symptoms: No  History:           Previous episodes of similar rash: yes   New exposures:  None  Recent travel: No  Exposure to similar rash: No  Precipitating or alleviating factors: none   Therapies tried and outcome: Benadryl/diphenhydramine -  effective  She takes hot showers, uses emollient skin cream without  improvement . No specific food intolerances and she is unable to  identify triggering events. No swallowing or breathing difficulty, no obvious skin rashes. PMH significant for fatty liver with elevated AST/ALT. Also has history of right breast cancer. NO new medications, has history of Latex allegy.  CT ABDOMEN PELVIS W/O & W CONTRAST  Order: [id number]  Impression    IMPRESSION:    Suspected slight degree of diffuse fatty filtration of the liver without hepatomegaly or hepatic cirrhosis. No other solid abdominal organ abnormality.    Gallbladder is relatively distended with no obvious gallstones or changes of cholecystitis. Normal bile ducts.    No urinary tract abnormality.    No bowel abnormality. Normal appendix. Normal stomach.    No adnexal lesion.    REPORT SIGNED BY DR. Guillermo Raya  Narrative    EXAM: CT SCAN OF ABDOMEN AND PELVIS WITH IV CONTRAST MATERIAL ENHANCEMENT    DATE: 3/27/2024 1:45 PM    CLINICAL: Generalized abdominal pain.    COMPARISON: None.    TECHNIQUE: Helical data acquisition through the abdomen and pelvis after administration of intravenous contrast material. No oral enteric contrast material administered. Axial, coronal and sagittal images were reconstructed off the helical data set.  IV contrast material administered: IOHEXOL 350 MGI/ML    Dose Given: 77 mL.    FINDINGS:    Liver: Suspected slight degree of diffuse fatty infiltration. No hepatomegaly. No evidence of cirrhosis or mass. Patent portal vein.  Gallbladder and Bile Ducts: Gallbladder is distended, without obvious gallstones or changes in or around the wall of the gallbladder suggestive of cholecystitis. Bile ducts are normal.  Spleen: Normal.  Pancreas: Normal.  Adrenals: Normal.  Kidneys: Normal.  Urinary Bladder: Normal.  Aorta: Normal.  No adenopathy.  No ascites.  No significant  bowel abnormality. No dilated bowel. No bowel wall thickening or mass. No inflammation associated with large or small bowel.  Normal  "stomach.  Normal appendix.  No adnexal lesion.  No hernia.  Negative for fracture or destructive bone lesion.  Exam End: 03/27/24  1:51 PM    Specimen Collected: 03/27/24  1:59 PM Last Resulted: 03/27/24  2:04 PM   Received From: [facility]  Result Received: 04/02/24  1:45 PM         Objective    BP (!) 166/74 (BP Location: Left arm, Patient Position: Chair, Cuff Size: Adult Regular)   Pulse 71   Temp 97.7  F (36.5  C) (Tympanic)   Resp 18   Ht 1.543 m (5' 0.75\")   Wt 78 kg (172 lb)   LMP 03/01/2016 (Approximate)   SpO2 98%   BMI 32.77 kg/m    Body mass index is 32.77 kg/m .  Physical Exam   GENERAL: alert and no distress  EYES: Eyes grossly normal to inspection, PERRL and conjunctivae and sclerae normal  HENT: ear canals and TM's normal, nose and mouth without ulcers or lesions  NECK: no adenopathy, no asymmetry, masses, or scars  RESP: lungs clear to auscultation - no rales, rhonchi or wheezes  CV: regular rate and rhythm, normal S1 S2, no S3 or S4, no murmur, click or rub, no peripheral edema  ABDOMEN: soft, nontender, no hepatosplenomegaly, no masses and bowel sounds normal  MS: no gross musculoskeletal defects noted, no edema  SKIN: Generally dry, excoriation on arms and legs, abdomen and flanks but no obvious rash or lesions,  LYMPH:  NO cervical adenopathy          Signed Electronically by: PIA Edmond CNP    "

## 2024-07-31 NOTE — TELEPHONE ENCOUNTER
Left message for patient to call  [employer] [place] in [place] back at [phone]. Please help schedule with Dr. Tiwari for cyst.     Sandra Hill LPN     SUBJECTIVE/OVERNIGHT EVENTS  Today is hospital day 6d. This morning patient was seen and examined at bedside, resting comfortably in bed. No acute or major events overnight.    HOSPITAL COURSE  Day 1:   Day 2:   Day 3:     CODE STATUS:    FAMILY COMMUNICATION  Contact date:  Name of person contacted:  Relationship to patient:  Communication details:    MEDICATIONS  STANDING MEDICATIONS  cefTRIAXone   IVPB 2000 milliGRAM(s) IV Intermittent every 24 hours  pantoprazole    Tablet 40 milliGRAM(s) Oral before breakfast  polyethylene glycol 3350 17 Gram(s) Oral two times a day  senna 2 Tablet(s) Oral at bedtime    PRN MEDICATIONS  acetaminophen     Tablet .. 650 milliGRAM(s) Oral every 6 hours PRN  aluminum hydroxide/magnesium hydroxide/simethicone Suspension 30 milliLiter(s) Oral every 4 hours PRN  melatonin 3 milliGRAM(s) Oral at bedtime PRN  ondansetron Injectable 4 milliGRAM(s) IV Push every 8 hours PRN    VITALS  T(F): 97.6 (07-31-24 @ 00:00), Max: 97.6 (07-31-24 @ 00:00)  HR: 77 (07-31-24 @ 00:00) (77 - 100)  BP: 127/66 (07-31-24 @ 00:00) (94/63 - 127/66)  RR: 18 (07-31-24 @ 00:00) (18 - 18)  SpO2: 100% (07-30-24 @ 15:00) (100% - 100%)    PHYSICAL EXAM  GENERAL  (  ) NAD, lying in bed comfortably     (  ) obtunded     (  ) lethargic     (  ) somnolent    HEAD  (  ) Atraumatic     (  ) hematoma     (  ) laceration (specify location:       )     NECK  (  ) Supple     (  ) neck stiffness     (  ) nuchal rigidity     (  )  no JVD     (  ) JVD present ( -- cm)    HEART  Rate -->  (  ) normal rate    (  ) bradycardic    (  ) tachycardic  Rhythm -->  (  ) regular    (  ) regularly irregular    (  ) irregularly irregular  Murmurs -->  (  ) normal s1/s2    (  ) systolic murmur    (  ) diastolic murmur    (  ) continuous murmur     (  ) S3 present    (  ) S4 present    LUNGS  (  )Unlabored respirations     (  ) tachypnea  (  ) B/L air entry     (  ) decreased breath sounds in:  (location     )    (  ) no adventitious sound     (  ) crackles     (  ) wheezing      (  ) rhonchi      (specify location:       )  (  ) chest wall tenderness (specify location:       )    ABDOMEN  (  ) Soft     (  ) tense   |   (  ) nondistended     (  ) distended   |   (  ) +BS     (  ) hypoactive bowel sounds     (  ) hyperactive bowel sounds  (  ) nontender     (  ) RUQ tenderness     (  ) RLQ tenderness     (  ) LLQ tenderness     (  ) epigastric tenderness     (  ) diffuse tenderness  (  ) Splenomegaly      (  ) Hepatomegaly      (  ) Jaundice     (  ) ecchymosis     EXTREMITIES  (  ) Normal     (  ) Rash     (  ) ecchymosis     (  ) varicose veins      (  ) pitting edema     (  ) non-pitting edema   (  ) ulceration     (  ) gangrene:     (location:     )    NERVOUS SYSTEM  (  ) A&Ox3     (  ) confused     (  ) lethargic  CN II-XII:     (  ) Intact     (  ) focal deficits  (Specify:     )   Upper extremities:     (  ) strength X/5     (  ) focal deficit (specify:    )  Lower extremities:     (  ) strength  X/5    (  ) focal deficit (specify:    )    SKIN  (  ) No rashes or lesions     (  ) maculopapular rash     (  ) pustules     (  ) vesicles     (  ) ulcer     (  ) ecchymosis     (specify location:     )    (  ) Indwelling Parker Catheter   Date insterted:    Reason (  ) Critical illness     (  ) urinary retention    (  ) Accurate Ins/Outs Monitoring     (  ) CMO patient    (  ) Central Line  Date inserted:  Location: (  ) Right IJ   (  ) Left IJ   (  ) Right Fem   (  ) Left Fem    (  ) SPC  (  ) pigtail  (  ) PEG tube  (  ) colostomy  (  ) jejunostomy  (  ) U-Dall    LABS             11.1   11.13 )-----------( 426      ( 07-30-24 @ 09:07 )             36.0     137  |  99  |  15  -------------------------<  96   07-30-24 @ 09:07  4.4  |  26  |  0.9    Ca      8.9     07-30-24 @ 09:07  Phos   4.2     07-29-24 @ 09:10  Mg     2.2     07-30-24 @ 09:07    TPro  8.4  /  Alb  4.1  /  TBili  0.3  /  DBili  x   /  AST  20  /  ALT  18  /  AlkPhos  94  /  GGT  x     07-30-24 @ 09:07        Urinalysis Basic - ( 30 Jul 2024 09:07 )    Color: x / Appearance: x / SG: x / pH: x  Gluc: 96 mg/dL / Ketone: x  / Bili: x / Urobili: x   Blood: x / Protein: x / Nitrite: x   Leuk Esterase: x / RBC: x / WBC x   Sq Epi: x / Non Sq Epi: x / Bacteria: x          Culture - Acid Fast - Urine (collected 29 Jul 2024 13:07)  Source: .Urine Urine    Culture - Acid Fast - Urine (collected 28 Jul 2024 14:39)  Source: .Urine Urine      IMAGING  < from: Xray Chest 1 View- PORTABLE-Routine (Xray Chest 1 View- PORTABLE-Routine .) (07.30.24 @ 09:27) >    ACC: [id number] EXAM:  XR CHEST PORTABLE ROUTINE 1V   ORDERED BY: ORI MARINA     PROCEDURE DATE:  07/30/2024          INTERPRETATION:  Clinical History / Reason for exam: Evaluate for opacity    Comparison : Chest radiograph None.    Technique/Positioning: AP chest.    Findings:    Support devices: None.    Cardiac/mediastinum/hilum: Unremarkable.    Lung parenchyma/Pleura: Within normal limits.    Skeleton/soft tissues: Unremarkable.    Impression:    No radiographic evidence of acute cardiopulmonary disease.        --- End of Report ---            JUANJOSE ROSSI MD; Attending Radiologist  This document has been electronically signed. Jul 30 2024  3:34PM    < end of copied text >   SUBJECTIVE/OVERNIGHT EVENTS  Today is hospital day 6d. This morning patient was seen and examined at bedside, resting comfortably in bed. No acute or major events overnight.    MEDICATIONS  STANDING MEDICATIONS  cefTRIAXone   IVPB 2000 milliGRAM(s) IV Intermittent every 24 hours  pantoprazole    Tablet 40 milliGRAM(s) Oral before breakfast  polyethylene glycol 3350 17 Gram(s) Oral two times a day  senna 2 Tablet(s) Oral at bedtime    PRN MEDICATIONS  acetaminophen     Tablet .. 650 milliGRAM(s) Oral every 6 hours PRN  aluminum hydroxide/magnesium hydroxide/simethicone Suspension 30 milliLiter(s) Oral every 4 hours PRN  melatonin 3 milliGRAM(s) Oral at bedtime PRN  ondansetron Injectable 4 milliGRAM(s) IV Push every 8 hours PRN    VITALS  T(F): 97.6 (07-31-24 @ 00:00), Max: 97.6 (07-31-24 @ 00:00)  HR: 77 (07-31-24 @ 00:00) (77 - 100)  BP: 127/66 (07-31-24 @ 00:00) (94/63 - 127/66)  RR: 18 (07-31-24 @ 00:00) (18 - 18)  SpO2: 100% (07-30-24 @ 15:00) (100% - 100%)    PHYSICAL EXAM  GENERAL  NAD, lying in bed comfortably      ABDOMEN  soft nTND      LABS             11.1   11.13 )-----------( 426      ( 07-30-24 @ 09:07 )             36.0     137  |  99  |  15  -------------------------<  96   07-30-24 @ 09:07  4.4  |  26  |  0.9    Ca      8.9     07-30-24 @ 09:07  Phos   4.2     07-29-24 @ 09:10  Mg     2.2     07-30-24 @ 09:07    TPro  8.4  /  Alb  4.1  /  TBili  0.3  /  DBili  x   /  AST  20  /  ALT  18  /  AlkPhos  94  /  GGT  x     07-30-24 @ 09:07        Urinalysis Basic - ( 30 Jul 2024 09:07 )    Color: x / Appearance: x / SG: x / pH: x  Gluc: 96 mg/dL / Ketone: x  / Bili: x / Urobili: x   Blood: x / Protein: x / Nitrite: x   Leuk Esterase: x / RBC: x / WBC x   Sq Epi: x / Non Sq Epi: x / Bacteria: x          Culture - Acid Fast - Urine (collected 29 Jul 2024 13:07)  Source: .Urine Urine    Culture - Acid Fast - Urine (collected 28 Jul 2024 14:39)  Source: .Urine Urine      IMAGING  < from: Xray Chest 1 View- PORTABLE-Routine (Xray Chest 1 View- PORTABLE-Routine .) (07.30.24 @ 09:27) >    ACC: [id number] EXAM:  XR CHEST PORTABLE ROUTINE 1V   ORDERED BY: ORI MARINA     PROCEDURE DATE:  07/30/2024          INTERPRETATION:  Clinical History / Reason for exam: Evaluate for opacity    Comparison : Chest radiograph None.    Technique/Positioning: AP chest.    Findings:    Support devices: None.    Cardiac/mediastinum/hilum: Unremarkable.    Lung parenchyma/Pleura: Within normal limits.    Skeleton/soft tissues: Unremarkable.    Impression:    No radiographic evidence of acute cardiopulmonary disease.        --- End of Report ---            JUANJOSE ROSSI MD; Attending Radiologist  This document has been electronically signed. Jul 30 2024  3:34PM    < end of copied text >

## 2024-08-06 ENCOUNTER — TELEPHONE (OUTPATIENT)
Dept: GASTROENTEROLOGY | Facility: CLINIC | Age: 49
End: 2024-08-06
Payer: COMMERCIAL

## 2024-08-06 NOTE — TELEPHONE ENCOUNTER
"Endoscopy Scheduling Screen    Have you had a positive Covid test in the last 14 days?  No    What is your communication preference for Instructions and/or Bowel Prep?   MyChart    What insurance is in the chart?  Other:  [facility]    Ordering/Referring Provider:   VIKTOR PENG        (If ordering provider performs procedure, schedule with ordering provider unless otherwise instructed. )    BMI: Estimated body mass index is 32.77 kg/m  as calculated from the following:    Height as of 7/16/24: 1.543 m (5' 0.75\").    Weight as of 7/16/24: 78 kg (172 lb).     Sedation Ordered  moderate sedation.   If patient BMI > 50 do not schedule in ASC.    If patient BMI > 45 do not schedule at [facility].    Are you taking methadone or Suboxone?  No    Have you had difficulties, pain, or discomfort during past endoscopy procedures?  No    Are you taking any prescription medications for pain 3 or more times per week?   NO, No RN review required.    Do you have a history of malignant hyperthermia?  No    (Females) Are you currently pregnant?   No     Have you been diagnosed or told you have pulmonary hypertension?   No    Do you have an LVAD?  No    Have you been told you have moderate to severe sleep apnea?  No    Have you been told you have COPD, asthma, or any other lung disease?  No    Do you have any heart conditions?  No     Have you ever had or are you waiting for an organ transplant?  No. Continue scheduling, no site restrictions.    Have you had a stroke or transient ischemic attack (TIA aka \"mini stroke\" in the last 6 months?   No    Have you been diagnosed with or been told you have cirrhosis of the liver?   No    Are you currently on dialysis?   No    Do you need assistance transferring?   No    BMI: Estimated body mass index is 32.77 kg/m  as calculated from the following:    Height as of 7/16/24: 1.543 m (5' 0.75\").    Weight as of 7/16/24: 78 kg (172 lb).     Is patients BMI > 40 and scheduling location [facility]?  No    Do you " take an injectable medication for weight loss or diabetes (excluding insulin)?  No    Do you take the medication Naltrexone?  No    Do you take blood thinners?  No       Prep   Are you currently on dialysis or do you have chronic kidney disease?  No    Do you have a diagnosis of diabetes?  No    Do you have a diagnosis of cystic fibrosis (CF)?  No    On a regular basis do you go 3 -5 days between bowel movements?  No    BMI > 40?  No    Preferred Pharmacy:    [id number] [address] [facility]  [address] [facility] [address] [facility]  [place] [facility] [address]  Phone: [phone] Fax: [phone]      Final Scheduling Details     Procedure scheduled  Colonoscopy    Surgeon:  René     Date of procedure:  8/23     Pre-OP / PAC:   No - Not required for this site.    Location  [facility] - [facility] - Patient preference.    Sedation   Moderate Sedation - Per order.      Patient Reminders:   You will receive a call from a Nurse to review instructions and health history.  This assessment must be completed prior to your procedure.  Failure to complete the Nurse assessment may result in the procedure being cancelled.      On the day of your procedure, please designate an adult(s) who can drive you home stay with you for the next 24 hours. The medicines used in the exam will make you sleepy. You will not be able to drive.      You cannot take public transportation, ride share services, or non-medical taxi service without a responsible caregiver.  Medical transport services are allowed with the requirement that a responsible caregiver will receive you at your destination.  We require that drivers and caregivers are confirmed prior to your procedure.

## 2024-08-07 ENCOUNTER — ANCILLARY PROCEDURE (OUTPATIENT)
Dept: BONE DENSITY | Facility: CLINIC | Age: 49
End: 2024-08-07
Attending: NURSE PRACTITIONER
Payer: COMMERCIAL

## 2024-08-07 ENCOUNTER — ANCILLARY PROCEDURE (OUTPATIENT)
Dept: MAMMOGRAPHY | Facility: CLINIC | Age: 49
End: 2024-08-07
Attending: NURSE PRACTITIONER
Payer: COMMERCIAL

## 2024-08-07 DIAGNOSIS — Z12.31 ENCOUNTER FOR SCREENING MAMMOGRAM FOR BREAST CANCER: ICD-10-CM

## 2024-08-07 DIAGNOSIS — Z78.0 POST-MENOPAUSAL: ICD-10-CM

## 2024-08-07 DIAGNOSIS — M85.80 OSTEOPENIA, UNSPECIFIED LOCATION: ICD-10-CM

## 2024-08-07 PROCEDURE — 77080 DXA BONE DENSITY AXIAL: CPT | Performed by: RADIOLOGY

## 2024-08-07 PROCEDURE — 77063 BREAST TOMOSYNTHESIS BI: CPT | Performed by: RADIOLOGY

## 2024-08-07 PROCEDURE — 77067 SCR MAMMO BI INCL CAD: CPT | Performed by: RADIOLOGY

## 2024-08-13 ENCOUNTER — TRANSFERRED RECORDS (OUTPATIENT)
Dept: HEALTH INFORMATION MANAGEMENT | Facility: CLINIC | Age: 49
End: 2024-08-13

## 2024-08-15 ENCOUNTER — TELEPHONE (OUTPATIENT)
Dept: GASTROENTEROLOGY | Facility: CLINIC | Age: 49
End: 2024-08-15
Payer: COMMERCIAL

## 2024-08-15 NOTE — TELEPHONE ENCOUNTER
Performed STOP/BANG. Scores a 3 (snores, tired during the daytime, observed stop breathing during sleep by her ). Will send message to Dr. Palmer to review.     Pre assessment completed for upcoming procedure.   (Please see previous telephone encounter notes for complete details)      Procedure details:    Arrival time and facility location reviewed.    Pre op exam needed? No.    Designated  policy reviewed. Instructed to have someone stay 6  hours post procedure.       Medication review:    NSAID medication(s): Ibuprofen (Advil, Motrin): HOLD 1 day before procedure.    Pt not currently taking Diclofenac.       Prep for procedure:     Procedure prep instructions reviewed.        Any additional information needed:  N/A      Patient  verbalized understanding and had no questions or concerns at this time.      Annika Aguilera RN  Endoscopy Procedure Pre Assessment   [phone] option 4

## 2024-08-15 NOTE — TELEPHONE ENCOUNTER
Order for sleep study not completed - please complete STOP/BANG with PA.  If moderate - severe will need to be reviewed by Dr Palmer.    --------------------------------------------------------------------------------------------------------------------        Pre visit planning completed.      Procedure details:    Patient scheduled for Colonoscopy on 8/23/24.     Arrival time: 0730. Procedure time 0815    Facility location: [facility]; [address], [place] [address]. Check in location: 2nd Floor at Surgery desk.    Sedation type: Conscious sedation     Pre op exam needed? No.    Indication for procedure:   Screen for colon cancer             Chart review:     Electronic implanted devices? No    Recent diagnosis of diverticulitis within the last 6 weeks? No      Medication review:    Diabetic? No    Anticoagulants? No    Weight loss medication/injectable? No GLP-1 medication per patient's medication list.  RN will verify with pre-assessment call.    NSAIDS? Yes.  Diclofenac (Voltaren).  Holding interval of 1 day before procedure. and Ibuprofen (Advil, Motrin).  Holding interval of 1 day before procedure.    Other medication HOLDING recommendations:  N/A      Prep for procedure:     Bowel prep recommendation: Standard Miralax  Due to: standard bowel prep.    Prep instructions sent via [employer]         Corinne Kliber, RN  Endoscopy Procedure Pre Assessment RN  [phone] option 4

## 2024-08-16 ENCOUNTER — TRANSFERRED RECORDS (OUTPATIENT)
Dept: HEALTH INFORMATION MANAGEMENT | Facility: CLINIC | Age: 49
End: 2024-08-16

## 2024-08-19 NOTE — TELEPHONE ENCOUNTER
Pt approved for CS at . Mychart sent letting the Pt know. PA complete.     Annika Aguilera, RN   Endoscopy Procedure Pre Assessment RN

## 2024-08-20 ENCOUNTER — TELEPHONE (OUTPATIENT)
Dept: GASTROENTEROLOGY | Facility: CLINIC | Age: 49
End: 2024-08-20
Payer: COMMERCIAL

## 2024-08-20 NOTE — TELEPHONE ENCOUNTER
Caller: Aleena    Reason for Reschedule/Cancellation   (please be detailed, any staff messages or encounters to note?): Didn't follow food instructions with prep      Prior to reschedule please review:  Ordering Provider: VIKTOR PENG   Sedation Determined: moderate  Does patient have any ASC Exclusions, please identify?: no      Notes on Cancelled Procedure:  Procedure: Lower Endoscopy [Colonoscopy]   Date: 8/23/24  Location: [facility]; [address], [place] [address]   Surgeon: DAGOBERTO      Rescheduled: Yes,   Procedure: Lower Endoscopy [Colonoscopy]    Date: 10/11/24   Location: [facility]; [address], [place] [address]    Surgeon: DAGOBERTO   Sedation Level Scheduled  moderate ,  Reason for Sedation Level per order   Instructions updated and sent: yes, mychart     Does patient need PAC or Pre -Op Rescheduled? : no       Did you cancel or rescheduled an EUS procedure? No.

## 2024-08-22 ENCOUNTER — TRANSFERRED RECORDS (OUTPATIENT)
Dept: HEALTH INFORMATION MANAGEMENT | Facility: CLINIC | Age: 49
End: 2024-08-22
Payer: COMMERCIAL

## 2024-10-01 NOTE — TELEPHONE ENCOUNTER
Attempted to contact patient in order to complete pre assessment questions.     No answer. Left message to return call to [phone] option 2.    Callback communication sent via [employer].    Corinne Kliber, RN

## 2024-10-01 NOTE — TELEPHONE ENCOUNTER
Rescheduled Colonoscopy  Due to patient requested another day/time.    Pre visit planning completed.      Procedure details:    Patient scheduled for Colonoscopy on 10/11/24.     Arrival time: 0815. Procedure time 0900    Facility location: [facility] [place]; [address], [place] [address]. Check in location: 2nd Floor at Surgery desk.    Sedation type: Conscious sedation     Pre op exam needed? No.    Indication for procedure: screening      Chart review:     Electronic implanted devices? No    Recent diagnosis of diverticulitis within the last 6 weeks? No      Medication review:    Diabetic? No    Anticoagulants? No    Weight loss medication/injectable? No GLP-1 medication per patient's medication list.  RN will verify with pre-assessment call.    Other medication HOLDING recommendations:  N/A      Prep for procedure:     Bowel prep recommendation: Standard Miralax  Due to: standard bowel prep.    Prep instructions sent via [employer]         Corinne Kliber, RN  Endoscopy Procedure Pre Assessment   [phone] option 2

## 2024-10-11 ENCOUNTER — HOSPITAL ENCOUNTER (OUTPATIENT)
Facility: AMBULATORY SURGERY CENTER | Age: 49
Discharge: HOME | End: 2024-10-11
Attending: FAMILY MEDICINE | Admitting: FAMILY MEDICINE
Payer: COMMERCIAL

## 2024-10-11 VITALS
OXYGEN SATURATION: 94 % | TEMPERATURE: 97 F | SYSTOLIC BLOOD PRESSURE: 123 MMHG | RESPIRATION RATE: 14 BRPM | HEART RATE: 74 BPM | DIASTOLIC BLOOD PRESSURE: 84 MMHG

## 2024-10-11 DIAGNOSIS — Z12.11 SCREEN FOR COLON CANCER: Primary | ICD-10-CM

## 2024-10-11 LAB — COLONOSCOPY: NORMAL

## 2024-10-11 PROCEDURE — G8907 PT DOC NO EVENTS ON DISCHARG: HCPCS

## 2024-10-11 PROCEDURE — 45385 COLONOSCOPY W/LESION REMOVAL: CPT | Mod: PT | Performed by: FAMILY MEDICINE

## 2024-10-11 PROCEDURE — 99153 MOD SED SAME PHYS/QHP EA: CPT | Mod: PT | Performed by: FAMILY MEDICINE

## 2024-10-11 PROCEDURE — G8918 PT W/O PREOP ORDER IV AB PRO: HCPCS

## 2024-10-11 PROCEDURE — 99152 MOD SED SAME PHYS/QHP 5/>YRS: CPT | Mod: 59 | Performed by: FAMILY MEDICINE

## 2024-10-11 PROCEDURE — 88305 TISSUE EXAM BY PATHOLOGIST: CPT | Performed by: PATHOLOGY

## 2024-10-11 PROCEDURE — 45385 COLONOSCOPY W/LESION REMOVAL: CPT

## 2024-10-11 RX ORDER — LIDOCAINE 40 MG/G
CREAM TOPICAL
Status: DISCONTINUED | OUTPATIENT
Start: 2024-10-11 | End: 2024-10-12 | Stop reason: HOSPADM

## 2024-10-11 RX ORDER — FENTANYL CITRATE 50 UG/ML
INJECTION, SOLUTION INTRAMUSCULAR; INTRAVENOUS PRN
Status: DISCONTINUED | OUTPATIENT
Start: 2024-10-11 | End: 2024-10-11 | Stop reason: HOSPADM

## 2024-10-11 RX ORDER — ONDANSETRON 2 MG/ML
4 INJECTION INTRAMUSCULAR; INTRAVENOUS
Status: DISCONTINUED | OUTPATIENT
Start: 2024-10-11 | End: 2024-10-12 | Stop reason: HOSPADM

## 2024-10-11 NOTE — H&P
Pre-Endoscopy History and Physical     Aleena Escamilla MRN# [id number]   YOB: 1975 Age: 48 year old     Date of Procedure: 10/11/2024  Primary care provider: Doris Luna  Type of Endoscopy: colonoscopy  Reason for Procedure: screening  Type of Anesthesia Anticipated: Moderate Sedation    HPI:    Aleena is a 48 year old female who will be undergoing the above procedure.      A history and physical has been performed. The patient's medications and allergies have been reviewed. The risks and benefits of the procedure and the sedation options and risks were discussed with the patient.  All questions were answered and informed consent was obtained.      She denies a personal or family history of anesthesia complications or bleeding disorders.     Allergies   Allergen Reactions    Latex Rash        Cannot display prior to admission medications because the patient has not been admitted in this contact.       Patient Active Problem List   Diagnosis    CARDIOVASCULAR SCREENING; LDL GOAL LESS THAN 160    Chronic pelvic pain in female    Chronic salpingitis and oophoritis    Major depressive disorder, recurrent episode, mild (H)    Generalized anxiety disorder    Arthritis, multiple joint involvement    Primary osteoarthritis of both hands    Fibromyalgia    Malignant neoplasm of overlapping sites of right female breast (H)    BRCA gene positive    Carpal tunnel syndrome    Non morbid drug-induced obesity    Chronic right shoulder pain    Bunion, right    Onychomycosis    Elevated LFTs    Fatty liver    Hyperlipidemia LDL goal <130    High risk medication use    Upper back pain, chronic    Pelvic floor dysfunction        Past Medical History:   Diagnosis Date    Breast cancer (H)     Chronic pelvic pain in female     Malignant neoplasm of right breast, stage 2 (H) 01/01/2016    1/4 lymph nodes positive, Oncotype DX = 25.  Chemotherapy and radiation. Letrozole.    Migraine headaches         Past Surgical  "History:   Procedure Laterality Date    BIOPSY BREAST NEEDLE LOCALIZATION Right 1/19/2016    Procedure: BIOPSY BREAST NEEDLE LOCALIZATION;  Surgeon: Adelina Demarco MD;  Location: [facility] OR    GYN SURGERY  2-    bilateral salpingectomy, left oophrectomy    IR CHEST PORT PLACEMENT > 5 YRS OF AGE  2/19/2016    LAPAROSCOPIC SALPINGO-OOPHORECTOMY Right 10/26/2016    Procedure: LAPAROSCOPIC SALPINGO-OOPHORECTOMY;  Surgeon: Bouchra Mayo MD;  Location: [facility] OR    LUMPECTOMY BREAST WITH SENTINEL NODE, COMBINED Right 1/19/2016    Procedure: COMBINED LUMPECTOMY BREAST WITH SENTINEL NODE;  Surgeon: Adelina Demarco MD;  Location: [facility] OR    PELVIS LAPAROSCOPY,DX  12/28/1998    RELEASE CARPAL TUNNEL Left 12/23/2016    Procedure: RELEASE CARPAL TUNNEL;  Surgeon: Sam Florence MD;  Location:  OR       Social History     Tobacco Use    Smoking status: Never     Passive exposure: Never    Smokeless tobacco: Never   Substance Use Topics    Alcohol use: Yes       Family History   Problem Relation Age of Onset    Diabetes Mother     Hypertension Mother     Cancer Maternal Grandmother         cervical CA    Cardiovascular Father         MI    Cancer Maternal Aunt         cervical cancer    Pancreatic Cancer Maternal Aunt         COD       REVIEW OF SYSTEMS:     5 point ROS negative except as noted above in HPI, including Gen., Resp., CV, GI &  system review.      PHYSICAL EXAM:   /68   Temp 97  F (36.1  C)   LMP 03/01/2016 (Approximate)   SpO2 95%  Estimated body mass index is 32.77 kg/m  as calculated from the following:    Height as of 7/16/24: 1.543 m (5' 0.75\").    Weight as of 7/16/24: 78 kg (172 lb).   GENERAL APPEARANCE: healthy, alert, and no distress  MENTAL STATUS: alert and oriented x 3  AIRWAY EXAM: Mallampatti Class II (visualization of the soft palate, fauces, and uvula)  RESP: lungs clear to auscultation - no rales, rhonchi or wheezes  CV: regular rates and rhythm and normal S1 S2, no S3 " or S4      DIAGNOSTICS:    Not indicated      IMPRESSION   ASA Class 2 - Mild systemic disease        PLAN:       Plan for colonoscopy. We discussed the risks, benefits and alternatives and the patient wished to proceed.    The above has been forwarded to the consulting provider.      Signed Electronically by: Meredith Merritt MD  October 11, 2024

## 2024-10-14 LAB
PATH REPORT.COMMENTS IMP SPEC: NORMAL
PATH REPORT.COMMENTS IMP SPEC: NORMAL
PATH REPORT.FINAL DX SPEC: NORMAL
PATH REPORT.GROSS SPEC: NORMAL
PATH REPORT.MICROSCOPIC SPEC OTHER STN: NORMAL
PATH REPORT.RELEVANT HX SPEC: NORMAL
PHOTO IMAGE: NORMAL

## 2024-10-25 ENCOUNTER — PATIENT OUTREACH (OUTPATIENT)
Dept: GASTROENTEROLOGY | Facility: CLINIC | Age: 49
End: 2024-10-25
Payer: COMMERCIAL

## 2024-10-29 NOTE — PROGRESS NOTES
Chief Complaint:   Chief Complaint   Patient presents with    Left Elbow - Pain     Onset: about 3 months. NKI. Patient notes one day she moved her elbow and had a sharp pain. Pain has gotten worse since the initial onset. She doesn't have pain all the time but with certain movements, like twisting her arm clockwise. Pain can travel from the hand to the shoulder. Denies any N/T. She has not had any tx.         HPI: Aleena Escamilla is a 49 year old female , right -hand dominant, who presents for evaluation and management of a left elbow pain, without specific injury. Pain has been present for 3 months. Notes onset one day at the elbow, aggravated with rotation. Sharp pains. Getting worse recentl. Pain is not constant, but with certain movements. Pain can travel from the hand to the shoulder. Denies numbness and tingling. No treatments.      She reports having mild pain/discomfort at the elbow. She denies numbness or tingling.   Pain severity: 4/10  Pain quality: dull, aching, sharp, and stabbing  Frequency of symptoms: frequently  Associated symptoms: radiating pain up and down the arm.  Aggravated by: certain movements, rotating forearm, lifting, reaching  Relieved by: rest.    Treatment up to this point:nothing     Past medical history:  has a past medical history of Breast cancer (H), Chronic pelvic pain in female, Malignant neoplasm of right breast, stage 2 (H) (01/01/2016), and Migraine headaches.   Patient Active Problem List   Diagnosis    CARDIOVASCULAR SCREENING; LDL GOAL LESS THAN 160    Chronic pelvic pain in female    Chronic salpingitis and oophoritis    Major depressive disorder, recurrent episode, mild (H)    Generalized anxiety disorder    Arthritis, multiple joint involvement    Primary osteoarthritis of both hands    Fibromyalgia    Malignant neoplasm of overlapping sites of right female breast (H)    BRCA gene positive    Carpal tunnel syndrome    Non morbid drug-induced obesity    Chronic right  shoulder pain    Bunion, right    Onychomycosis    Elevated LFTs    Fatty liver    Hyperlipidemia LDL goal <130    High risk medication use    Upper back pain, chronic    Pelvic floor dysfunction       Past surgical history:  has a past surgical history that includes pelvis laparoscopy,dx (12/28/1998); GYN surgery (2-); Biopsy breast needle localization (Right, 1/19/2016); Laparoscopic salpingo-oophorectomy (Right, 10/26/2016); Release carpal tunnel (Left, 12/23/2016); Lumpectomy breast with sentinel node, combined (Right, 1/19/2016); IR Chest Port Placement > 5 Yrs of Age (2/19/2016); Colonoscopy with CO2 insufflation (N/A, 10/11/2024); and Colonoscopy (N/A, 10/11/2024).     Medications:   Current Outpatient Medications:     CALCIUM PO, , Disp: , Rfl:     Cholecalciferol (VITAMIN D3) 2000 units TABS, Take 1 tablet by mouth daily, Disp: 100 tablet, Rfl: 3    clindamycin (CLEOCIN T) 1 % external lotion, APPLY TOPICALLY TO FACE EVERY MORNING, Disp: 60 mL, Rfl: 3    cyclobenzaprine (FLEXERIL) 5 MG tablet, Take 1-2 tablets (5-10 mg) by mouth nightly as needed for muscle spasms, Disp: 40 tablet, Rfl: 0    Evening Primrose Oil 1000 MG CAPS, , Disp: , Rfl:     fish oil-omega-3 fatty acids 1000 MG capsule, Take 1 g by mouth daily, Disp: , Rfl:     gabapentin (NEURONTIN) 300 MG capsule, take 300mg in the morning and afternoon and 600mg (2 capsules) at bedtime. If side effects, reduce to last tolerable dosage. If side effects, then reduce to last tolerable dosage., Disp: 120 capsule, Rfl: 1    ibuprofen (ADVIL/MOTRIN) 600 MG tablet, Take 1 tablet (600 mg) by mouth every 8 hours as needed for moderate pain, Disp: 20 tablet, Rfl: 0    rosuvastatin (CRESTOR) 10 MG tablet, Take 1 tablet (10 mg) by mouth daily, Disp: 90 tablet, Rfl: 3    tretinoin (RETIN-A) 0.05 % external cream, APPLY TOPICALLY TO FACE AT BEDTIME FOR ACNE, Disp: 45 g, Rfl: 3    diclofenac (VOLTAREN) 50 MG EC tablet, Take 1 tablet (50 mg) by mouth 2 times  "daily for 7 days, Disp: 28 tablet, Rfl: 1     Allergies:     Allergies   Allergen Reactions    Latex Rash        Family History: family history includes Cancer in her maternal aunt and maternal grandmother; Cardiovascular in her father; Diabetes in her mother; Hypertension in her mother; Pancreatic Cancer in her maternal aunt.     Social History:  reports that she has never smoked. She has never been exposed to tobacco smoke. She has never used smokeless tobacco. She reports current alcohol use. She reports that she does not use drugs.    Review of Systems:  ROS: 10 point ROS neg other than the symptoms noted above in the HPI and past medical history.    Physical Exam  GENERAL APPEARANCE: healthy, alert, no distress.   SKIN: no suspicious lesions or rashes  RESPIRATORY: No increased work of breathing.  NEURO: Normal strength and tone, mentation intact and speech normal  PSYCH:  mentation appears normal and affect normal. Not anxious.  Hands: no clubbing, nail pitting or nodes.    /73   Ht 1.543 m (5' 0.75\")   Wt 77.2 kg (170 lb 4.8 oz)   LMP 03/01/2016 (Approximate)   BMI 32.44 kg/m         left UPPER EXTREMITY:    Sensation intact to light touch in median, radial, ulnar and axillary nerve distributions  Palpable 2+ radial pulse, brisk capillary refill to all fingers, wwp  Intact epl fpl fdp edc wrist flexion/extension biceps triceps deltoid  DTR's normal biceps and brachioradialis    ELBOW:  Skin intact. No open wounds. No skin maceration.  Inspection: no swelling, no ecchymosis, no olecranon bursa swelling, no distal bicep tendon defect  Tender: lateral epicondyle, common extensor tendon, medial epicondyle, common flexor tendon, and extensor muscle of forearm  Non-tender: olecranon bursa, distal bicep tendon, and radial head/neck  Range of Motion: all normal  Strength: elbow strength full  Special tests: normal stability, pain with resisted wrist extension, pain with resisted middle finger extension, no " pain with resisted wrist flexion:   There is no deformity in the area.           X-rays:  3 views left elbow from 10/31/2024 were reviewed in clinic today.  No obvious fractures or dislocations..    Assessment: 49 year old female , right -hand dominant, with chronic left elbow pain. Lateral epicondylitis.    Plan:   * discussed with patient history and clinical exam consistent with tennis elbow, or lateral epicondylitis, which is tendonitis at the lateral aspect of the elbow.  * treatment is nonoperative, with surgical treatment reserved for recalcitrant symptoms despite long-term nonperative treatment regimen. Most cases expected to resolve over 1-2 years based on natural history.    Treatment includes:  * rest  * activity modification - avoid aggravating activities and reduce strenuous activities for 6-8 weeks  * ice, ice massage  * Physical therapy, modalities as indicated including ultrasound, massage, iontophoresis  * counterforce elbow brace/strap, available OTC  * wrist brace to prevent wrist extension  * modify lifting technique, palm upwards with lifting to relieve stress on extensor tendons of wrist.  * NDAIDS regularly three times daily x2-3 weeks  * cortisone injection discussed, placed at point of maximal tenderness.  Discussed PRP injections, out of pocket expense.  * return to clinic as needed.    ** risks of surgery include, but not limited to: bleeding, infection, pain, scar, damage to adjacent structures (nerves, vessels, tendons, ligaments, bone), temporary versus permanent nerve injury, failure to relieve symptoms, worsening of symptoms, recurrence of symptoms, elbow stiffness and decreased range of motion, instability, arthrosis, need for further surgery, risks of anesthesia, blood clots, death.  .      Sam Florence M.D., M.S.  Dept. of Orthopaedic Surgery  [facility]

## 2024-10-31 ENCOUNTER — ANCILLARY PROCEDURE (OUTPATIENT)
Dept: GENERAL RADIOLOGY | Facility: CLINIC | Age: 49
End: 2024-10-31
Attending: ORTHOPAEDIC SURGERY
Payer: COMMERCIAL

## 2024-10-31 ENCOUNTER — OFFICE VISIT (OUTPATIENT)
Dept: ORTHOPEDICS | Facility: CLINIC | Age: 49
End: 2024-10-31
Payer: COMMERCIAL

## 2024-10-31 VITALS
DIASTOLIC BLOOD PRESSURE: 73 MMHG | BODY MASS INDEX: 32.15 KG/M2 | HEIGHT: 61 IN | SYSTOLIC BLOOD PRESSURE: 108 MMHG | WEIGHT: 170.3 LBS

## 2024-10-31 DIAGNOSIS — M25.522 LEFT ELBOW PAIN: ICD-10-CM

## 2024-10-31 DIAGNOSIS — M77.12 LATERAL EPICONDYLITIS OF LEFT ELBOW: Primary | ICD-10-CM

## 2024-10-31 PROCEDURE — 73080 X-RAY EXAM OF ELBOW: CPT | Mod: TC | Performed by: STUDENT IN AN ORGANIZED HEALTH CARE EDUCATION/TRAINING PROGRAM

## 2024-10-31 PROCEDURE — 99213 OFFICE O/P EST LOW 20 MIN: CPT | Performed by: ORTHOPAEDIC SURGERY

## 2024-10-31 ASSESSMENT — PAIN SCALES - GENERAL: PAINLEVEL_OUTOF10: MODERATE PAIN (4)

## 2024-10-31 NOTE — LETTER
10/31/2024      Aleena Escamilla  [address] [address] [facility] [id number]      Dear Colleague,    Thank you for referring your patient, Aleena Escamilla, to the [facility]. Please see a copy of my visit note below.    Chief Complaint:   Chief Complaint   Patient presents with     Left Elbow - Pain     Onset: about 3 months. NKI. Patient notes one day she moved her elbow and had a sharp pain. Pain has gotten worse since the initial onset. She doesn't have pain all the time but with certain movements, like twisting her arm clockwise. Pain can travel from the hand to the shoulder. Denies any N/T. She has not had any tx.         HPI: Aleena Escamilla is a 49 year old female , right -hand dominant, who presents for evaluation and management of a left elbow pain, without specific injury. Pain has been present for 3 months. Notes onset one day at the elbow, aggravated with rotation. Sharp pains. Getting worse recentl. Pain is not constant, but with certain movements. Pain can travel from the hand to the shoulder. Denies numbness and tingling. No treatments.      She reports having mild pain/discomfort at the elbow. She denies numbness or tingling.   Pain severity: 4/10  Pain quality: dull, aching, sharp, and stabbing  Frequency of symptoms: frequently  Associated symptoms: radiating pain up and down the arm.  Aggravated by: certain movements, rotating forearm, lifting, reaching  Relieved by: rest.    Treatment up to this point:nothing     Past medical history:  has a past medical history of Breast cancer (H), Chronic pelvic pain in female, Malignant neoplasm of right breast, stage 2 (H) (01/01/2016), and Migraine headaches.   Patient Active Problem List   Diagnosis     CARDIOVASCULAR SCREENING; LDL GOAL LESS THAN 160     Chronic pelvic pain in female     Chronic salpingitis and oophoritis     Major depressive disorder, recurrent episode, mild (H)     Generalized anxiety disorder      Arthritis, multiple joint involvement     Primary osteoarthritis of both hands     Fibromyalgia     Malignant neoplasm of overlapping sites of right female breast (H)     BRCA gene positive     Carpal tunnel syndrome     Non morbid drug-induced obesity     Chronic right shoulder pain     Bunion, right     Onychomycosis     Elevated LFTs     Fatty liver     Hyperlipidemia LDL goal <130     High risk medication use     Upper back pain, chronic     Pelvic floor dysfunction       Past surgical history:  has a past surgical history that includes pelvis laparoscopy,dx (12/28/1998); GYN surgery (2-); Biopsy breast needle localization (Right, 1/19/2016); Laparoscopic salpingo-oophorectomy (Right, 10/26/2016); Release carpal tunnel (Left, 12/23/2016); Lumpectomy breast with sentinel node, combined (Right, 1/19/2016); IR Chest Port Placement > 5 Yrs of Age (2/19/2016); Colonoscopy with CO2 insufflation (N/A, 10/11/2024); and Colonoscopy (N/A, 10/11/2024).     Medications:   Current Outpatient Medications:      CALCIUM PO, , Disp: , Rfl:      Cholecalciferol (VITAMIN D3) 2000 units TABS, Take 1 tablet by mouth daily, Disp: 100 tablet, Rfl: 3     clindamycin (CLEOCIN T) 1 % external lotion, APPLY TOPICALLY TO FACE EVERY MORNING, Disp: 60 mL, Rfl: 3     cyclobenzaprine (FLEXERIL) 5 MG tablet, Take 1-2 tablets (5-10 mg) by mouth nightly as needed for muscle spasms, Disp: 40 tablet, Rfl: 0     Evening Primrose Oil 1000 MG CAPS, , Disp: , Rfl:      fish oil-omega-3 fatty acids 1000 MG capsule, Take 1 g by mouth daily, Disp: , Rfl:      gabapentin (NEURONTIN) 300 MG capsule, take 300mg in the morning and afternoon and 600mg (2 capsules) at bedtime. If side effects, reduce to last tolerable dosage. If side effects, then reduce to last tolerable dosage., Disp: 120 capsule, Rfl: 1     ibuprofen (ADVIL/MOTRIN) 600 MG tablet, Take 1 tablet (600 mg) by mouth every 8 hours as needed for moderate pain, Disp: 20 tablet, Rfl: 0      "rosuvastatin (CRESTOR) 10 MG tablet, Take 1 tablet (10 mg) by mouth daily, Disp: 90 tablet, Rfl: 3     tretinoin (RETIN-A) 0.05 % external cream, APPLY TOPICALLY TO FACE AT BEDTIME FOR ACNE, Disp: 45 g, Rfl: 3     diclofenac (VOLTAREN) 50 MG EC tablet, Take 1 tablet (50 mg) by mouth 2 times daily for 7 days, Disp: 28 tablet, Rfl: 1     Allergies:     Allergies   Allergen Reactions     Latex Rash        Family History: family history includes Cancer in her maternal aunt and maternal grandmother; Cardiovascular in her father; Diabetes in her mother; Hypertension in her mother; Pancreatic Cancer in her maternal aunt.     Social History:  reports that she has never smoked. She has never been exposed to tobacco smoke. She has never used smokeless tobacco. She reports current alcohol use. She reports that she does not use drugs.    Review of Systems:  ROS: 10 point ROS neg other than the symptoms noted above in the HPI and past medical history.    Physical Exam  GENERAL APPEARANCE: healthy, alert, no distress.   SKIN: no suspicious lesions or rashes  RESPIRATORY: No increased work of breathing.  NEURO: Normal strength and tone, mentation intact and speech normal  PSYCH:  mentation appears normal and affect normal. Not anxious.  Hands: no clubbing, nail pitting or nodes.    /73   Ht 1.543 m (5' 0.75\")   Wt 77.2 kg (170 lb 4.8 oz)   LMP 03/01/2016 (Approximate)   BMI 32.44 kg/m         left UPPER EXTREMITY:    Sensation intact to light touch in median, radial, ulnar and axillary nerve distributions  Palpable 2+ radial pulse, brisk capillary refill to all fingers, wwp  Intact epl fpl fdp edc wrist flexion/extension biceps triceps deltoid  DTR's normal biceps and brachioradialis    ELBOW:  Skin intact. No open wounds. No skin maceration.  Inspection: no swelling, no ecchymosis, no olecranon bursa swelling, no distal bicep tendon defect  Tender: lateral epicondyle, common extensor tendon, medial epicondyle, common " flexor tendon, and extensor muscle of forearm  Non-tender: olecranon bursa, distal bicep tendon, and radial head/neck  Range of Motion: all normal  Strength: elbow strength full  Special tests: normal stability, pain with resisted wrist extension, pain with resisted middle finger extension, no pain with resisted wrist flexion:   There is no deformity in the area.           X-rays:  3 views left elbow from 10/31/2024 were reviewed in clinic today.  No obvious fractures or dislocations..    Assessment: 49 year old female , right -hand dominant, with chronic left elbow pain. Lateral epicondylitis.    Plan:   * discussed with patient history and clinical exam consistent with tennis elbow, or lateral epicondylitis, which is tendonitis at the lateral aspect of the elbow.  * treatment is nonoperative, with surgical treatment reserved for recalcitrant symptoms despite long-term nonperative treatment regimen. Most cases expected to resolve over 1-2 years based on natural history.    Treatment includes:  * rest  * activity modification - avoid aggravating activities and reduce strenuous activities for 6-8 weeks  * ice, ice massage  * Physical therapy, modalities as indicated including ultrasound, massage, iontophoresis  * counterforce elbow brace/strap, available OTC  * wrist brace to prevent wrist extension  * modify lifting technique, palm upwards with lifting to relieve stress on extensor tendons of wrist.  * NDAIDS regularly three times daily x2-3 weeks  * cortisone injection discussed, placed at point of maximal tenderness.  Discussed PRP injections, out of pocket expense.  * return to clinic as needed.    ** risks of surgery include, but not limited to: bleeding, infection, pain, scar, damage to adjacent structures (nerves, vessels, tendons, ligaments, bone), temporary versus permanent nerve injury, failure to relieve symptoms, worsening of symptoms, recurrence of symptoms, elbow stiffness and decreased range of motion,  instability, arthrosis, need for further surgery, risks of anesthesia, blood clots, death.  .      Sam Florence M.D., M.S.  Dept. of Orthopaedic Surgery  [facility]       Again, thank you for allowing me to participate in the care of your patient.        Sincerely,        Sam Florence MD

## 2024-11-14 ENCOUNTER — OFFICE VISIT (OUTPATIENT)
Dept: URGENT CARE | Facility: URGENT CARE | Age: 49
End: 2024-11-14
Payer: COMMERCIAL

## 2024-11-14 VITALS
OXYGEN SATURATION: 97 % | HEART RATE: 99 BPM | RESPIRATION RATE: 16 BRPM | DIASTOLIC BLOOD PRESSURE: 81 MMHG | TEMPERATURE: 99.1 F | WEIGHT: 169.8 LBS | SYSTOLIC BLOOD PRESSURE: 116 MMHG | BODY MASS INDEX: 32.35 KG/M2

## 2024-11-14 DIAGNOSIS — J02.9 PHARYNGITIS, UNSPECIFIED ETIOLOGY: Primary | ICD-10-CM

## 2024-11-14 LAB
DEPRECATED S PYO AG THROAT QL EIA: NEGATIVE
GROUP A STREP BY PCR: NOT DETECTED

## 2024-11-14 PROCEDURE — 87651 STREP A DNA AMP PROBE: CPT | Performed by: PHYSICIAN ASSISTANT

## 2024-11-14 PROCEDURE — 99213 OFFICE O/P EST LOW 20 MIN: CPT | Performed by: PHYSICIAN ASSISTANT

## 2024-11-14 RX ORDER — LIDOCAINE HYDROCHLORIDE 20 MG/ML
15 SOLUTION OROPHARYNGEAL
Qty: 100 ML | Refills: 0 | Status: SHIPPED | OUTPATIENT
Start: 2024-11-14

## 2024-11-14 ASSESSMENT — ENCOUNTER SYMPTOMS
SINUS PAIN: 0
COUGH: 0
RHINORRHEA: 0
DIARRHEA: 0
NAUSEA: 0
CARDIOVASCULAR NEGATIVE: 1
SINUS PRESSURE: 0
SHORTNESS OF BREATH: 0
ADENOPATHY: 1
FEVER: 0
FATIGUE: 0
SORE THROAT: 1
WHEEZING: 0
ABDOMINAL PAIN: 0
CHILLS: 0
VOMITING: 0
PALPITATIONS: 0

## 2024-11-14 NOTE — LETTER
November 14, 2024      Aleena Escamilla  [address] [address] [id number]        To Whom It May Concern:    Aleena Escamilla was seen in urgent care clinic today and was unable to work today due to her symptoms.  Please feel free to contact me via phone if you have any questions or concerns.        Sincerely,      Mayte See DRAGAN Braga

## 2024-11-14 NOTE — PROGRESS NOTES
Robbie Flood is a 49 year old, presenting for the following health issues:  Pharyngitis (Started today; lymph nodes swelling), Otalgia (Right), and Eye Problem (burning)    JOSE Escamilla is a 49 year old year old female presenting to the urgent care for evaluation and treatment of right ear and throat pain. The patient reported she woke up this morning with a sore throat, R ear pain and tender cervical lymph nodes. She did not try any treatments herself. She denies any hearing loss or discharge, fever, cough, sinus congestion, SOB, wheezing, or HA.       Patient Active Problem List   Diagnosis    CARDIOVASCULAR SCREENING; LDL GOAL LESS THAN 160    Chronic pelvic pain in female    Chronic salpingitis and oophoritis    Major depressive disorder, recurrent episode, mild (H)    Generalized anxiety disorder    Arthritis, multiple joint involvement    Primary osteoarthritis of both hands    Fibromyalgia    Malignant neoplasm of overlapping sites of right female breast (H)    BRCA gene positive    Carpal tunnel syndrome    Non morbid drug-induced obesity    Chronic right shoulder pain    Bunion, right    Onychomycosis    Elevated LFTs    Fatty liver    Hyperlipidemia LDL goal <130    High risk medication use    Upper back pain, chronic    Pelvic floor dysfunction     Current Outpatient Medications   Medication Sig Dispense Refill    CALCIUM PO       Cholecalciferol (VITAMIN D3) 2000 units TABS Take 1 tablet by mouth daily 100 tablet 3    clindamycin (CLEOCIN T) 1 % external lotion APPLY TOPICALLY TO FACE EVERY MORNING 60 mL 3    cyclobenzaprine (FLEXERIL) 5 MG tablet Take 1-2 tablets (5-10 mg) by mouth nightly as needed for muscle spasms 40 tablet 0    Evening Primrose Oil 1000 MG CAPS       fish oil-omega-3 fatty acids 1000 MG capsule Take 1 g by mouth daily      gabapentin (NEURONTIN) 300 MG capsule take 300mg in the morning and afternoon and 600mg (2 capsules) at bedtime. If side effects, reduce to last  tolerable dosage. If side effects, then reduce to last tolerable dosage. 120 capsule 1    ibuprofen (ADVIL/MOTRIN) 600 MG tablet Take 1 tablet (600 mg) by mouth every 8 hours as needed for moderate pain 20 tablet 0    lidocaine, viscous, (XYLOCAINE) 2 % solution Swish and spit 15 mLs in mouth every 3 hours as needed for moderate pain. ; Max 8 doses/24 hour period. 100 mL 0    rosuvastatin (CRESTOR) 10 MG tablet Take 1 tablet (10 mg) by mouth daily 90 tablet 3    tretinoin (RETIN-A) 0.05 % external cream APPLY TOPICALLY TO FACE AT BEDTIME FOR ACNE 45 g 3    diclofenac (VOLTAREN) 50 MG EC tablet Take 1 tablet (50 mg) by mouth 2 times daily for 7 days 28 tablet 1     No current facility-administered medications for this visit.        Allergies   Allergen Reactions    Latex Rash     Review of Systems   Constitutional:  Negative for chills, fatigue and fever.   HENT:  Positive for ear pain and sore throat. Negative for congestion, rhinorrhea, sinus pressure and sinus pain.    Respiratory:  Negative for cough, shortness of breath and wheezing.    Cardiovascular: Negative.  Negative for chest pain, palpitations and leg swelling.   Gastrointestinal:  Negative for abdominal pain, diarrhea, nausea and vomiting.   Hematological:  Positive for adenopathy.   All other systems reviewed and are negative.          Objective    /81   Pulse 99   Temp 99.1  F (37.3  C) (Oral)   Resp 16   Wt 77 kg (169 lb 12.8 oz)   LMP 03/01/2016 (Approximate)   SpO2 97%   BMI 32.35 kg/m    Body mass index is 32.35 kg/m .    Physical Exam  Vitals and nursing note reviewed.   Constitutional:       General: She is not in acute distress.     Appearance: Normal appearance. She is well-developed and normal weight. She is not ill-appearing.   HENT:      Head: Normocephalic and atraumatic.      Comments: TMs are intact without any erythema or bulging bilaterally.  Airway is patent.     Left Ear: Tympanic membrane normal.      Nose: Nose normal.  No congestion or rhinorrhea.      Mouth/Throat:      Lips: Pink.      Mouth: Mucous membranes are moist.      Pharynx: Oropharynx is clear. Uvula midline. No pharyngeal swelling, oropharyngeal exudate or posterior oropharyngeal erythema.      Tonsils: No tonsillar exudate or tonsillar abscesses.   Eyes:      General: No scleral icterus.     Extraocular Movements: Extraocular movements intact.      Conjunctiva/sclera: Conjunctivae normal.      Pupils: Pupils are equal, round, and reactive to light.   Neck:      Thyroid: No thyromegaly.   Cardiovascular:      Rate and Rhythm: Normal rate and regular rhythm.      Pulses: Normal pulses.      Heart sounds: Normal heart sounds, S1 normal and S2 normal. No murmur heard.     No friction rub. No gallop.   Pulmonary:      Effort: Pulmonary effort is normal. No accessory muscle usage, respiratory distress or retractions.      Breath sounds: Normal breath sounds and air entry. No stridor. No decreased breath sounds, wheezing, rhonchi or rales.   Musculoskeletal:      Cervical back: Normal range of motion and neck supple.   Lymphadenopathy:      Head:      Right side of head: No tonsillar adenopathy.      Left side of head: Tonsillar adenopathy present.   Skin:     General: Skin is warm and dry.      Nails: There is no clubbing.   Neurological:      Mental Status: She is alert and oriented to person, place, and time.   Psychiatric:         Mood and Affect: Mood normal.         Behavior: Behavior normal.         Thought Content: Thought content normal.         Judgment: Judgment normal.        Results for orders placed or performed in visit on 11/14/24 (from the past 24 hours)   Streptococcus A Rapid Screen w/Reflex to PCR - Clinic Collect    Specimen: Throat; Swab   Result Value Ref Range    Group A Strep antigen Negative Negative           Assessment/Plan:  Pharyngitis, unspecified etiology:  RST is negative, will send for strep PCR testing.  Will give lidocaine oral viscous as  needed for sore throat.  Recommend tylenol/ibuprofen prn pain/fever, warm salt water gargles, lozenges or cough drops.  Recheck in clinic if symptoms worsen or if symptoms do not improve.    -     Streptococcus A Rapid Screen w/Reflex to PCR - Clinic Collect  -     Group A Streptococcus PCR Throat Swab  -     lidocaine, viscous, (XYLOCAINE) 2 % solution; Swish and spit 15 mLs in mouth every 3 hours as needed for moderate pain. ; Max 8 doses/24 hour period.        Physician Attestation   I, Mayte Braga PA-C, was present with the medical/CH[employer] student, Thiago MONTGOMERY   who participated in the service and in the documentation of the note.  I have verified the history and personally performed the physical exam and medical decision making.  I agree with the assessment and plan of care as documented in the note.        Mayte Braga PA-C

## 2024-12-30 NOTE — PROGRESS NOTES
Oncology Survivorship Visit: January 2, 2025      Oncologist: / CHRISS  PCP: Doris Luna    Diagnosis: Stage II Right Breast  Aleena Escamilla is a 48 yo female that felt she has mass to right beast in 2014 but was told she was not eligible for mammogram but in 11/2015 felt breast was swollen and received imaging that did show a 1.2 x 1.3 x 0.7 cm mass at 12 oclock to breast and later found a secondary mass behind first mass. Biopsy proved invasive lobular carcinoma at 12 oclock and 9 oclock positions. Final pathology from partial mastectomy showed multifocal invasive lobular carcinoma, grade 2 with foci, the first one 36 x 16 x 8 mm and the second one 21 x 14 x 11 mm. LCIS classical type present, lymphovascular invasion not identified. Margins negative, ER/NH positive by IHC and HER-2 not amplified performed on prior core biopsy.[id number]  Oncotype Dx score =25.  Genetics - told pt she has variant of of uncertain significance in the BRCA 2 gene.  Treatment-   1/19/2016 Partial Right  Mastectomy  2/24/2016 - 6/2016 Weekly Taxol x 12 weeks followed by Dose Dense AC x4 cycles  8-9/2016 completed right breast XRT  10/2016 - 10/2021 use of Femara  10/26/2016 right laparoscopic salpingo-oophorectomy    Interval History: Ms Escamilla is seen in survivorship clinic for follow up to her right breast cancer.   Noting no new breast issues - No new masses or tenderness.   Hot flashes have resolved over last year.   No new lymphedema  Trying to loose weight and is walking more and changing some dietary habits but no loss yet at this time. .  Denies new skin changes, new pains or recent illness.    Daughter and son and grandkids live with them.   Had colonoscopy in 10/2024  Rest of complete and comprehensive ROS is reviewed and is negative.   Past Medical History:   Diagnosis Date    Breast cancer (H)     Chronic pelvic pain in female     Malignant neoplasm of right breast, stage 2 (H) 01/01/2016 1/4 lymph nodes  "positive, Oncotype DX = 25.  Chemotherapy and radiation. Letrozole.    Migraine headaches      Current Outpatient Medications   Medication Sig Dispense Refill    CALCIUM PO       Cholecalciferol (VITAMIN D3) 2000 units TABS Take 1 tablet by mouth daily 100 tablet 3    clindamycin (CLEOCIN T) 1 % external lotion APPLY TOPICALLY TO FACE EVERY MORNING 60 mL 3    cyclobenzaprine (FLEXERIL) 5 MG tablet Take 1-2 tablets (5-10 mg) by mouth nightly as needed for muscle spasms 40 tablet 0    Evening Primrose Oil 1000 MG CAPS       fish oil-omega-3 fatty acids 1000 MG capsule Take 1 g by mouth daily      gabapentin (NEURONTIN) 300 MG capsule take 300mg in the morning and afternoon and 600mg (2 capsules) at bedtime. If side effects, reduce to last tolerable dosage. If side effects, then reduce to last tolerable dosage. 120 capsule 1    ibuprofen (ADVIL/MOTRIN) 600 MG tablet Take 1 tablet (600 mg) by mouth every 8 hours as needed for moderate pain 20 tablet 0    lidocaine, viscous, (XYLOCAINE) 2 % solution Swish and spit 15 mLs in mouth every 3 hours as needed for moderate pain. ; Max 8 doses/24 hour period. 100 mL 0    rosuvastatin (CRESTOR) 10 MG tablet Take 1 tablet (10 mg) by mouth daily 90 tablet 3    tretinoin (RETIN-A) 0.05 % external cream APPLY TOPICALLY TO FACE AT BEDTIME FOR ACNE 45 g 3    diclofenac (VOLTAREN) 50 MG EC tablet Take 1 tablet (50 mg) by mouth 2 times daily for 7 days 28 tablet 1     No current facility-administered medications for this visit.     Allergies   Allergen Reactions    Latex Rash     Physical Exam:  /77 (BP Location: Right arm, Patient Position: Chair, Cuff Size: Adult Regular)   Pulse 75   Resp 16   Ht 1.543 m (5' 0.75\")   Wt 77.6 kg (171 lb)   LMP 03/01/2016 (Approximate)   SpO2 97%   BMI 32.58 kg/m   - continues with mild weight gain from last year.   Constitutional: Alert and in no distress. Obese.  ENT: Eyes bright, No mouth sores  Neck: Supple, No adenopathy.  Cardiac: " Heart rate and rhythm is regular and strong without murmur  Respiratory: Breathing easy. Lung sounds clear to auscultation  Breasts: Bilaterally no new masses with no sign of lymphedema. Mild tenderness over surgical site only with palpation.   Abdomen: Soft, rounded, non-tender, BS normal. No masses or organomegaly  MS: Muscle tone normal, extremities normal with no edema.   Skin: No suspicious lesions or rashes  Neuro: Sensory grossly WNL, gait normal.   Lymph: Normal ant/post cervical, axillary, supraclavicular nodes  Psych: Mentation appears normal and affect normal/bright with good conversation.     Laboratory Results:   No results found for any visits on 01/02/25.    Reviewed labs from 4/2024:   Latest Reference Range & Units 04/02/24 15:06   Albumin 3.5 - 5.2 g/dL 4.7   Protein Total 6.4 - 8.3 g/dL 8.0   Alkaline Phosphatase 40 - 150 U/L 100   ALT 0 - 50 U/L 98 (H)   AST 0 - 45 U/L 62 (H)   Bilirubin Direct 0.00 - 0.30 mg/dL <0.20   Bilirubin Total <=1.2 mg/dL 0.4   Cholesterol <200 mg/dL 221 (H)   Patient Fasting?  Unknown   HDL Cholesterol >=50 mg/dL 32 (L)   LDL Cholesterol Calculated <=100 mg/dL 116 (H)   Non HDL Cholesterol <130 mg/dL 189 (H)   Triglycerides <150 mg/dL 366 (H)   ABO/Rh(D)  O POS   SPECIMEN EXPIRATION DATE  [id number]   (H): Data is abnormally high  (L): Data is abnormally low  HISTORY: history of osteopenia; Osteopenia, unspecified location;  Post-menopausal     COMPARISON:   1/25/2019     [age]  years.  Height: 60 inches  Weight: 150 pounds  Sex: Female  Ethnicity: White     Image quality: Adequate     Lumbar spine T-score in region of L1-L4 = -0.9   L1-4 percent change: 5.7%      HIPS:  Mean total hip T-score: -0.7  Mean total hip percent change: 4.4%      Left femoral neck T-score = -1.6  Right femoral neck T-score= -1.5      Radius 33% T-score = -0.7  Radius 33% percent change: Not significant%      FRAX:  10 year probability of major osteoporotic fracture: 4%  10 year  probability of hip fracture: 0.4%  The 10 year probability of fracture may be lower than reported if the  patient has received treatment. FRAX data should be disregarded in  patient's taking bisphosphonates.     World Health Organization definition of osteoporosis and osteopenia  for  women:   Normal: T-score at or above -1.0  Low Bone Mass (Osteopenia): T-score between -1.0 and -2.5.   Osteoporosis: T-score at or below -2.5   T-scores are reported for postmenopausal women and men over 50 years  of age.                                                                      IMPRESSION:  Osteopenia.     THIERNO JOHNSON MD   Assessment and Plan:   It was my pleasure to see Aleena Escamilla in the Cancer Survivorship Clinic for her diagnosis of Right Breast Cancer. Given her diagnosis and treatment, she was given a survivorship care plan previously and here are the areas of discussion today:  Right breast cancer- stage II- Pt completed right partial mastectomy, Taxol weekly ×12, AC ×4 cycles, and XRT as well as laparoscopic salpingo-oophorectomy. She completed use of Femara from 10/2016 to 10/2021.     - no new complaints noted with review or new findings with exam to show signs of return of her cancer.   Last mammogram was completed 8/2024 and was normal-Next mammogram due annually-8/2025- order given  She will be excused from oncology clinic   Will need annual clinical breast exam by PCP and annual mammogram for review for recurrence of disease.   Would also expect yearly CBC due to history f chemotherapy.       Genetics- previously told pt she has variant of of uncertain significance in the BRCA 2 gene [id number].  with 1/2016 information- discussed with genetic counselor and still has same classification- no additional information or recommendations for this variant but could have retest for more testing if wishes.  - No new family history of cancer today.     Bone health/ Osteopenia- DEXA scan from 2/5/2021 T  score of -1.7= mild osteopenia. Pt had refused Prolia.   Repeat DEXA 8/7/2024 showing consistent osteopenia. Reviewed need for calcium and vitamin D and weight exercise to keep bones strong.     Cardiac complications- Given her exposure to Doxorubicin, an anthracycline, she is at risk for cardiomyopathy, arythmias, left ventricular dysfunction. No new cardiac symptoms..       Chronic Elevated liver enzymes- followed by PCP- 4/2024  results showing continued elevation but mildly improved.     Sexuality concerns- Vaginal dryness and painful intercourse is common after treatment or with use of aromatase inhibitors. May need to see gynecology if dryness progressing but discussed lubricants.    Urinary toxicity- due to her exposure to Cytoxan, should she develop hematuria, dysuria, urinary urgency or frequency, she is to contact clinic.No current symptoms.    Risk of developing blood cancers- due to exposure to Doxorubicin she is a a small risk for Leukemia or MDS. For this reason she should have a CBC completed yearly.    Radiation side effects- Radiation to the right breast would have included the skin, and she should watch for any new skin lesions in the area of the radiation and have them evaluated.  Her lung was in the field of radiation, but as long as she is asymptomatic there is no routine screening tests that needs to be obtained.  Should she develop hemoptysis, cough or shortness of breath, we could consider a CT scan and/or PFTs.  Her bones are at risk for fracture in the area of her radiation.      Cancer screening-  She should continue to undergo cancer screening for women of her age group.  She will continue to see Gynecology as directed by guidelines.   Colonoscopy- completed 10/2024 - repeat in 10 years    Healthy lifestyle-  She is not a smoker. She should increase her exercise program to  150 minutes of cardiovascular exercise per week.  She should work toward a healthy weight with a BMI between 20 and  25 with low fat diet.   -She should see her primary care provider annually for screening and, if needed, treatment of her cholesterol, blood pressure and glucose.  She should receive the annual influenza vaccine.    When age appropriate, she should receive the pneumococcal vaccine.  She should see her eye doctor and dentist routinely. She should limit her sun exposure and use sunscreens.     The total time of this encounter amounted to 45 minutes. This time included face to face time spent with the patient, prep work, ordering tests, and performing post visit documentation.  Marisol Solitario,Cnp

## 2025-01-02 ENCOUNTER — ONCOLOGY VISIT (OUTPATIENT)
Dept: ONCOLOGY | Facility: CLINIC | Age: 50
End: 2025-01-02
Attending: NURSE PRACTITIONER
Payer: COMMERCIAL

## 2025-01-02 VITALS
DIASTOLIC BLOOD PRESSURE: 77 MMHG | BODY MASS INDEX: 32.28 KG/M2 | OXYGEN SATURATION: 97 % | HEIGHT: 61 IN | SYSTOLIC BLOOD PRESSURE: 127 MMHG | HEART RATE: 75 BPM | RESPIRATION RATE: 16 BRPM | WEIGHT: 171 LBS

## 2025-01-02 DIAGNOSIS — N95.1 MENOPAUSAL SYNDROME (HOT FLASHES): ICD-10-CM

## 2025-01-02 DIAGNOSIS — C50.811 MALIGNANT NEOPLASM OF OVERLAPPING SITES OF RIGHT BREAST IN FEMALE, ESTROGEN RECEPTOR POSITIVE (H): ICD-10-CM

## 2025-01-02 DIAGNOSIS — Z17.0 MALIGNANT NEOPLASM OF OVERLAPPING SITES OF RIGHT BREAST IN FEMALE, ESTROGEN RECEPTOR POSITIVE (H): ICD-10-CM

## 2025-01-02 DIAGNOSIS — M85.80 OSTEOPENIA, UNSPECIFIED LOCATION: ICD-10-CM

## 2025-01-02 DIAGNOSIS — N64.4 BREAST PAIN: ICD-10-CM

## 2025-01-02 DIAGNOSIS — Z78.0 POST-MENOPAUSAL: ICD-10-CM

## 2025-01-02 DIAGNOSIS — Z12.31 ENCOUNTER FOR SCREENING MAMMOGRAM FOR BREAST CANCER: ICD-10-CM

## 2025-01-02 DIAGNOSIS — R74.8 ELEVATED LIVER ENZYMES: ICD-10-CM

## 2025-01-02 DIAGNOSIS — N93.9 VAGINAL BLEEDING: ICD-10-CM

## 2025-01-02 PROCEDURE — 99213 OFFICE O/P EST LOW 20 MIN: CPT | Performed by: NURSE PRACTITIONER

## 2025-01-02 ASSESSMENT — PAIN SCALES - GENERAL: PAINLEVEL_OUTOF10: NO PAIN (0)

## 2025-01-02 NOTE — LETTER
1/2/2025      Aleena Escamilla  [address] [address] [address]      Dear Colleague,    Thank you for referring your patient, Aleena Escamilla, to the [facility]. Please see a copy of my visit note below.    Oncology Survivorship Visit: January 2, 2025      Oncologist: / CHRISS  PCP: Doris Luna    Diagnosis: Stage II Right Breast  Aleena Escamilla is a 48 yo female that felt she has mass to right beast in 2014 but was told she was not eligible for mammogram but in 11/2015 felt breast was swollen and received imaging that did show a 1.2 x 1.3 x 0.7 cm mass at 12 oclock to breast and later found a secondary mass behind first mass. Biopsy proved invasive lobular carcinoma at 12 oclock and 9 oclock positions. Final pathology from partial mastectomy showed multifocal invasive lobular carcinoma, grade 2 with foci, the first one 36 x 16 x 8 mm and the second one 21 x 14 x 11 mm. LCIS classical type present, lymphovascular invasion not identified. Margins negative, ER/ND positive by IHC and HER-2 not amplified performed on prior core biopsy.[id number]  Oncotype Dx score =25.  Genetics - told pt she has variant of of uncertain significance in the BRCA 2 gene.  Treatment-   1/19/2016 Partial Right  Mastectomy  2/24/2016 - 6/2016 Weekly Taxol x 12 weeks followed by Dose Dense AC x4 cycles  8-9/2016 completed right breast XRT  10/2016 - 10/2021 use of Femara  10/26/2016 right laparoscopic salpingo-oophorectomy    Interval History: Ms Escamilla is seen in survivorship clinic for follow up to her right breast cancer.   Noting no new breast issues - No new masses or tenderness.   Hot flashes have resolved over last year.   No new lymphedema  Trying to loose weight and is walking more and changing some dietary habits but no loss yet at this time. .  Denies new skin changes, new pains or recent illness.    Daughter and son and grandkids live with them.   Had colonoscopy in  10/2024  Rest of complete and comprehensive ROS is reviewed and is negative.   Past Medical History:   Diagnosis Date     Breast cancer (H)      Chronic pelvic pain in female      Malignant neoplasm of right breast, stage 2 (H) 01/01/2016 1/4 lymph nodes positive, Oncotype DX = 25.  Chemotherapy and radiation. Letrozole.     Migraine headaches      Current Outpatient Medications   Medication Sig Dispense Refill     CALCIUM PO        Cholecalciferol (VITAMIN D3) 2000 units TABS Take 1 tablet by mouth daily 100 tablet 3     clindamycin (CLEOCIN T) 1 % external lotion APPLY TOPICALLY TO FACE EVERY MORNING 60 mL 3     cyclobenzaprine (FLEXERIL) 5 MG tablet Take 1-2 tablets (5-10 mg) by mouth nightly as needed for muscle spasms 40 tablet 0     Evening Primrose Oil 1000 MG CAPS        fish oil-omega-3 fatty acids 1000 MG capsule Take 1 g by mouth daily       gabapentin (NEURONTIN) 300 MG capsule take 300mg in the morning and afternoon and 600mg (2 capsules) at bedtime. If side effects, reduce to last tolerable dosage. If side effects, then reduce to last tolerable dosage. 120 capsule 1     ibuprofen (ADVIL/MOTRIN) 600 MG tablet Take 1 tablet (600 mg) by mouth every 8 hours as needed for moderate pain 20 tablet 0     lidocaine, viscous, (XYLOCAINE) 2 % solution Swish and spit 15 mLs in mouth every 3 hours as needed for moderate pain. ; Max 8 doses/24 hour period. 100 mL 0     rosuvastatin (CRESTOR) 10 MG tablet Take 1 tablet (10 mg) by mouth daily 90 tablet 3     tretinoin (RETIN-A) 0.05 % external cream APPLY TOPICALLY TO FACE AT BEDTIME FOR ACNE 45 g 3     diclofenac (VOLTAREN) 50 MG EC tablet Take 1 tablet (50 mg) by mouth 2 times daily for 7 days 28 tablet 1     No current facility-administered medications for this visit.     Allergies   Allergen Reactions     Latex Rash     Physical Exam:  /77 (BP Location: Right arm, Patient Position: Chair, Cuff Size: Adult Regular)   Pulse 75   Resp 16   Ht 1.543 m (5'  "0.75\")   Wt 77.6 kg (171 lb)   LMP 03/01/2016 (Approximate)   SpO2 97%   BMI 32.58 kg/m   - continues with mild weight gain from last year.   Constitutional: Alert and in no distress. Obese.  ENT: Eyes bright, No mouth sores  Neck: Supple, No adenopathy.  Cardiac: Heart rate and rhythm is regular and strong without murmur  Respiratory: Breathing easy. Lung sounds clear to auscultation  Breasts: Bilaterally no new masses with no sign of lymphedema. Mild tenderness over surgical site only with palpation.   Abdomen: Soft, rounded, non-tender, BS normal. No masses or organomegaly  MS: Muscle tone normal, extremities normal with no edema.   Skin: No suspicious lesions or rashes  Neuro: Sensory grossly WNL, gait normal.   Lymph: Normal ant/post cervical, axillary, supraclavicular nodes  Psych: Mentation appears normal and affect normal/bright with good conversation.     Laboratory Results:   No results found for any visits on 01/02/25.    Reviewed labs from 4/2024:   Latest Reference Range & Units 04/02/24 15:06   Albumin 3.5 - 5.2 g/dL 4.7   Protein Total 6.4 - 8.3 g/dL 8.0   Alkaline Phosphatase 40 - 150 U/L 100   ALT 0 - 50 U/L 98 (H)   AST 0 - 45 U/L 62 (H)   Bilirubin Direct 0.00 - 0.30 mg/dL <0.20   Bilirubin Total <=1.2 mg/dL 0.4   Cholesterol <200 mg/dL 221 (H)   Patient Fasting?  Unknown   HDL Cholesterol >=50 mg/dL 32 (L)   LDL Cholesterol Calculated <=100 mg/dL 116 (H)   Non HDL Cholesterol <130 mg/dL 189 (H)   Triglycerides <150 mg/dL 366 (H)   ABO/Rh(D)  O POS   SPECIMEN EXPIRATION DATE  [id number]   (H): Data is abnormally high  (L): Data is abnormally low  HISTORY: history of osteopenia; Osteopenia, unspecified location;  Post-menopausal     COMPARISON:   1/25/2019     [age]  years.  Height: 60 inches  Weight: 150 pounds  Sex: Female  Ethnicity: White     Image quality: Adequate     Lumbar spine T-score in region of L1-L4 = -0.9   L1-4 percent change: 5.7%      HIPS:  Mean total hip T-score: " -0.7  Mean total hip percent change: 4.4%      Left femoral neck T-score = -1.6  Right femoral neck T-score= -1.5      Radius 33% T-score = -0.7  Radius 33% percent change: Not significant%      FRAX:  10 year probability of major osteoporotic fracture: 4%  10 year probability of hip fracture: 0.4%  The 10 year probability of fracture may be lower than reported if the  patient has received treatment. FRAX data should be disregarded in  patient's taking bisphosphonates.     World Health Organization definition of osteoporosis and osteopenia  for  women:   Normal: T-score at or above -1.0  Low Bone Mass (Osteopenia): T-score between -1.0 and -2.5.   Osteoporosis: T-score at or below -2.5   T-scores are reported for postmenopausal women and men over 50 years  of age.                                                                      IMPRESSION:  Osteopenia.     THIERNO JOHNSON MD   Assessment and Plan:   It was my pleasure to see Aleena Escamilla in the Cancer Survivorship Clinic for her diagnosis of Right Breast Cancer. Given her diagnosis and treatment, she was given a survivorship care plan previously and here are the areas of discussion today:  Right breast cancer- stage II- Pt completed right partial mastectomy, Taxol weekly ×12, AC ×4 cycles, and XRT as well as laparoscopic salpingo-oophorectomy. She completed use of Femara from 10/2016 to 10/2021.     - no new complaints noted with review or new findings with exam to show signs of return of her cancer.   Last mammogram was completed 8/2024 and was normal-Next mammogram due annually-8/2025- order given  She will be excused from oncology clinic   Will need annual clinical breast exam by PCP and annual mammogram for review for recurrence of disease.   Would also expect yearly CBC due to history f chemotherapy.       Genetics- previously told pt she has variant of of uncertain significance in the BRCA 2 gene [id number].  with 1/2016 information- discussed  with genetic counselor and still has same classification- no additional information or recommendations for this variant but could have retest for more testing if wishes.  - No new family history of cancer today.     Bone health/ Osteopenia- DEXA scan from 2/5/2021 T score of -1.7= mild osteopenia. Pt had refused Prolia.   Repeat DEXA 8/7/2024 showing consistent osteopenia. Reviewed need for calcium and vitamin D and weight exercise to keep bones strong.     Cardiac complications- Given her exposure to Doxorubicin, an anthracycline, she is at risk for cardiomyopathy, arythmias, left ventricular dysfunction. No new cardiac symptoms..       Chronic Elevated liver enzymes- followed by PCP- 4/2024  results showing continued elevation but mildly improved.     Sexuality concerns- Vaginal dryness and painful intercourse is common after treatment or with use of aromatase inhibitors. May need to see gynecology if dryness progressing but discussed lubricants.    Urinary toxicity- due to her exposure to Cytoxan, should she develop hematuria, dysuria, urinary urgency or frequency, she is to contact clinic.No current symptoms.    Risk of developing blood cancers- due to exposure to Doxorubicin she is a a small risk for Leukemia or MDS. For this reason she should have a CBC completed yearly.    Radiation side effects- Radiation to the right breast would have included the skin, and she should watch for any new skin lesions in the area of the radiation and have them evaluated.  Her lung was in the field of radiation, but as long as she is asymptomatic there is no routine screening tests that needs to be obtained.  Should she develop hemoptysis, cough or shortness of breath, we could consider a CT scan and/or PFTs.  Her bones are at risk for fracture in the area of her radiation.      Cancer screening-  She should continue to undergo cancer screening for women of her age group.  She will continue to see Gynecology as directed by  guidelines.   Colonoscopy- completed 10/2024 - repeat in 10 years    Healthy lifestyle-  She is not a smoker. She should increase her exercise program to  150 minutes of cardiovascular exercise per week.  She should work toward a healthy weight with a BMI between 20 and 25 with low fat diet.   -She should see her primary care provider annually for screening and, if needed, treatment of her cholesterol, blood pressure and glucose.  She should receive the annual influenza vaccine.    When age appropriate, she should receive the pneumococcal vaccine.  She should see her eye doctor and dentist routinely. She should limit her sun exposure and use sunscreens.     The total time of this encounter amounted to 45 minutes. This time included face to face time spent with the patient, prep work, ordering tests, and performing post visit documentation.  Marisol Solitario Cnp                Again, thank you for allowing me to participate in the care of your patient.        Sincerely,        Marisol Solitario NP, APRN CNP    Electronically signed

## 2025-01-02 NOTE — NURSING NOTE
"Oncology Rooming Note    January 2, 2025 2:55 PM   Aleena Escamilla is a 49 year old female who presents for:    Chief Complaint   Patient presents with    Oncology Clinic Visit     Follow up     Initial Vitals: /77 (BP Location: Right arm, Patient Position: Chair, Cuff Size: Adult Regular)   Pulse 75   Resp 16   Ht 1.543 m (5' 0.75\")   Wt 77.6 kg (171 lb)   LMP 03/01/2016 (Approximate)   SpO2 97%   BMI 32.58 kg/m   Estimated body mass index is 32.58 kg/m  as calculated from the following:    Height as of this encounter: 1.543 m (5' 0.75\").    Weight as of this encounter: 77.6 kg (171 lb). Body surface area is 1.82 meters squared.  No Pain (0) Comment: Data Unavailable   Patient's last menstrual period was 03/01/2016 (approximate).  Allergies reviewed: Yes  Medications reviewed: Yes    Medications: Medication refills not needed today.  Pharmacy name entered into [employer]: [id number] - [place] - [address]    Frailty Screening:   Is the patient here for a new oncology consult visit in cancer care? 2. No      Clinical concerns: NO       Ling Delgado CMA              "

## 2025-01-05 ENCOUNTER — HEALTH MAINTENANCE LETTER (OUTPATIENT)
Age: 50
End: 2025-01-05

## 2025-03-16 ENCOUNTER — OFFICE VISIT (OUTPATIENT)
Dept: URGENT CARE | Facility: URGENT CARE | Age: 50
End: 2025-03-16
Payer: COMMERCIAL

## 2025-03-16 VITALS
BODY MASS INDEX: 31.24 KG/M2 | TEMPERATURE: 98.3 F | OXYGEN SATURATION: 98 % | RESPIRATION RATE: 18 BRPM | DIASTOLIC BLOOD PRESSURE: 80 MMHG | HEART RATE: 91 BPM | SYSTOLIC BLOOD PRESSURE: 119 MMHG | WEIGHT: 164 LBS

## 2025-03-16 DIAGNOSIS — N30.00 ACUTE CYSTITIS WITHOUT HEMATURIA: Primary | ICD-10-CM

## 2025-03-16 DIAGNOSIS — R30.0 DYSURIA: ICD-10-CM

## 2025-03-16 DIAGNOSIS — Z85.3 HISTORY OF RIGHT BREAST CANCER: ICD-10-CM

## 2025-03-16 LAB
ALBUMIN UR-MCNC: 100 MG/DL
APPEARANCE UR: CLEAR
BACTERIA #/AREA URNS HPF: ABNORMAL /HPF
BILIRUB UR QL STRIP: NEGATIVE
COLOR UR AUTO: YELLOW
GLUCOSE UR STRIP-MCNC: NEGATIVE MG/DL
HGB UR QL STRIP: ABNORMAL
KETONES UR STRIP-MCNC: ABNORMAL MG/DL
LEUKOCYTE ESTERASE UR QL STRIP: ABNORMAL
MUCOUS THREADS #/AREA URNS LPF: PRESENT /LPF
NITRATE UR QL: NEGATIVE
PH UR STRIP: 6 [PH] (ref 5–7)
RBC #/AREA URNS AUTO: ABNORMAL /HPF
SP GR UR STRIP: 1.02 (ref 1–1.03)
SQUAMOUS #/AREA URNS AUTO: ABNORMAL /LPF
UROBILINOGEN UR STRIP-ACNC: 2 E.U./DL
WBC #/AREA URNS AUTO: ABNORMAL /HPF
WBC CLUMPS #/AREA URNS HPF: PRESENT /HPF

## 2025-03-16 PROCEDURE — 1125F AMNT PAIN NOTED PAIN PRSNT: CPT | Performed by: PHYSICIAN ASSISTANT

## 2025-03-16 PROCEDURE — 87086 URINE CULTURE/COLONY COUNT: CPT | Performed by: PHYSICIAN ASSISTANT

## 2025-03-16 PROCEDURE — 99214 OFFICE O/P EST MOD 30 MIN: CPT | Performed by: PHYSICIAN ASSISTANT

## 2025-03-16 PROCEDURE — 3079F DIAST BP 80-89 MM HG: CPT | Performed by: PHYSICIAN ASSISTANT

## 2025-03-16 PROCEDURE — 3074F SYST BP LT 130 MM HG: CPT | Performed by: PHYSICIAN ASSISTANT

## 2025-03-16 PROCEDURE — 81001 URINALYSIS AUTO W/SCOPE: CPT | Performed by: PHYSICIAN ASSISTANT

## 2025-03-16 RX ORDER — NITROFURANTOIN 25; 75 MG/1; MG/1
100 CAPSULE ORAL 2 TIMES DAILY
Qty: 14 CAPSULE | Refills: 0 | Status: SHIPPED | OUTPATIENT
Start: 2025-03-16 | End: 2025-03-23

## 2025-03-16 ASSESSMENT — PAIN SCALES - GENERAL: PAINLEVEL_OUTOF10: SEVERE PAIN (7)

## 2025-03-16 NOTE — PROGRESS NOTES
Chief Complaint   Patient presents with    Urinary Problem     Cloudy urine, urine smell, pain with urination    Fever     Fever, lower back pain       Results for orders placed or performed in visit on 03/16/25   UA Macroscopic with reflex to Microscopic and Culture - Lab Collect     Status: Abnormal    Specimen: Urine, Clean Catch   Result Value Ref Range    Color Urine Yellow Colorless, Straw, Light Yellow, Yellow    Appearance Urine Clear Clear    Glucose Urine Negative Negative mg/dL    Bilirubin Urine Negative Negative    Ketones Urine Trace (A) Negative mg/dL    Specific Gravity Urine 1.025 1.003 - 1.035    Blood Urine Trace (A) Negative    pH Urine 6.0 5.0 - 7.0    Protein Albumin Urine 100 (A) Negative mg/dL    Urobilinogen Urine 2.0 (A) 0.2, 1.0 E.U./dL    Nitrite Urine Negative Negative    Leukocyte Esterase Urine Moderate (A) Negative   Urine Microscopic Exam     Status: Abnormal   Result Value Ref Range    Bacteria Urine Many (A) None Seen /HPF    RBC Urine 10-25 (A) 0-2 /HPF /HPF    WBC Urine  (A) 0-5 /HPF /HPF    Squamous Epithelials Urine Few (A) None Seen /LPF    WBC Clumps Urine Present (A) None Seen /HPF    Mucus Urine Present (A) None Seen /LPF         ASSESSMENT:    ICD-10-CM    1. Acute cystitis without hematuria  N30.00 nitroFURantoin macrocrystal-monohydrate (MACROBID) 100 MG capsule      2. History of right breast cancer  Z85.3       3. Dysuria  R30.0 UA Macroscopic with reflex to Microscopic and Culture - Lab Collect     UA Macroscopic with reflex to Microscopic and Culture - Lab Collect     Urine Microscopic Exam     Urine Culture     nitroFURantoin macrocrystal-monohydrate (MACROBID) 100 MG capsule              PLAN: Acute cystitis, Macrobid.  Urine culture pending.  As per ordered above.  Drink plenty of fluids.  Prevention and treatment of UTI's discussed. Follow up with primary care physician if not improving.  Advised about symptoms which might herald more serious problems.           Tana Norton PA-C        Subjective:   49-year-old female presents for 48 hours of dysuria, frequency, cloudy urine, odorous urine.  Felt feverish yesterday.  Some diffuse lower back discomfort.  No vomiting.  No rash.  History of right breast cancer.      Allergies   Allergen Reactions    Latex Rash       Past Medical History:   Diagnosis Date    Breast cancer (H)     Chronic pelvic pain in female     Malignant neoplasm of right breast, stage 2 (H) 01/01/2016    1/4 lymph nodes positive, Oncotype DX = 25.  Chemotherapy and radiation. Letrozole.    Migraine headaches        Current Outpatient Medications   Medication Sig Dispense Refill    CALCIUM PO       Cholecalciferol (VITAMIN D3) 2000 units TABS Take 1 tablet by mouth daily 100 tablet 3    clindamycin (CLEOCIN T) 1 % external lotion APPLY TOPICALLY TO FACE EVERY MORNING 60 mL 3    cyclobenzaprine (FLEXERIL) 5 MG tablet Take 1-2 tablets (5-10 mg) by mouth nightly as needed for muscle spasms 40 tablet 0    Evening Primrose Oil 1000 MG CAPS       fish oil-omega-3 fatty acids 1000 MG capsule Take 1 g by mouth daily      gabapentin (NEURONTIN) 300 MG capsule take 300mg in the morning and afternoon and 600mg (2 capsules) at bedtime. If side effects, reduce to last tolerable dosage. If side effects, then reduce to last tolerable dosage. 120 capsule 1    ibuprofen (ADVIL/MOTRIN) 600 MG tablet Take 1 tablet (600 mg) by mouth every 8 hours as needed for moderate pain 20 tablet 0    lidocaine, viscous, (XYLOCAINE) 2 % solution Swish and spit 15 mLs in mouth every 3 hours as needed for moderate pain. ; Max 8 doses/24 hour period. 100 mL 0    rosuvastatin (CRESTOR) 10 MG tablet Take 1 tablet (10 mg) by mouth daily 90 tablet 3    tretinoin (RETIN-A) 0.05 % external cream APPLY TOPICALLY TO FACE AT BEDTIME FOR ACNE 45 g 3    diclofenac (VOLTAREN) 50 MG EC tablet Take 1 tablet (50 mg) by mouth 2 times daily for 7 days 28 tablet 1     No current facility-administered  medications for this visit.       Social History     Tobacco Use    Smoking status: Never     Passive exposure: Never    Smokeless tobacco: Never   Vaping Use    Vaping status: Never Used   Substance Use Topics    Alcohol use: Yes    Drug use: No       ROS:  General: negative for fever  ABD: Denies abd pain  : as above    OBJECTIVE:  /80 (BP Location: Left arm, Patient Position: Sitting, Cuff Size: Adult Regular)   Pulse 91   Temp 98.3  F (36.8  C) (Oral)   Resp 18   Wt 74.4 kg (164 lb)   LMP 03/01/2016 (Approximate)   SpO2 98%   BMI 31.24 kg/m     General:   awake, alert, and cooperative.  NAD.   Head: Normocephalic, atraumatic.  Eyes: Conjunctiva clear, non icteric.   ABD: soft, no tenderness to palpation , no rigidity, guarding or rebound . No CVAT  Neuro: Alert and oriented - normal speech.

## 2025-03-17 ENCOUNTER — TELEPHONE (OUTPATIENT)
Dept: URGENT CARE | Facility: URGENT CARE | Age: 50
End: 2025-03-17
Payer: COMMERCIAL

## 2025-03-17 LAB — BACTERIA UR CULT: NO GROWTH

## 2025-03-17 NOTE — TELEPHONE ENCOUNTER
.  General Call      Reason for Call:  Pt called back to get results     What are your questions or concerns:  Called to discuss results    Date of last appointment with provider: 3/16/25 in     Could we send this information to you in [employer] or would you prefer to receive a phone call?:   No preference   Okay to leave a detailed message?: Yes at Cell number on file:    Telephone Information:   Mobile [phone]

## 2025-03-17 NOTE — TELEPHONE ENCOUNTER
Called patient and provided message below from provider    Jyoti Sykes PA-C  3/17/2025 12:37 PM CDT       Please call and notify that urine culture showed no bacterial growth, there is no UTI. She should follow-up with her PCP if she still has symptoms.     Patient said she was almost going to come back to urgent care because she started having a severe headache and dizziness wants to know if she needs to go to ED or come back to UC she did not have these symptoms when she was in [facility]. States UTI symptoms are getting a little better but still there.    Informed patient I would route message to providers.    Please advice.    Halley Mccabe, Lead MA

## 2025-03-17 NOTE — TELEPHONE ENCOUNTER
Foster, Jyoti, PA-C  [facility] Support Staff18 minutes ago (2:47 PM)     RF  Should go to ER with severe headache and dizziness     Called patient and provided information.  Patient said she will go to ED.  Ac Michel MA

## 2025-04-14 ENCOUNTER — OFFICE VISIT (OUTPATIENT)
Dept: FAMILY MEDICINE | Facility: CLINIC | Age: 50
End: 2025-04-14
Payer: COMMERCIAL

## 2025-04-14 VITALS
TEMPERATURE: 97 F | SYSTOLIC BLOOD PRESSURE: 104 MMHG | DIASTOLIC BLOOD PRESSURE: 69 MMHG | BODY MASS INDEX: 30.8 KG/M2 | RESPIRATION RATE: 16 BRPM | HEIGHT: 62 IN | WEIGHT: 167.4 LBS | OXYGEN SATURATION: 97 % | HEART RATE: 75 BPM

## 2025-04-14 DIAGNOSIS — Z15.09 BRCA GENE POSITIVE: ICD-10-CM

## 2025-04-14 DIAGNOSIS — E78.5 HYPERLIPIDEMIA LDL GOAL <130: ICD-10-CM

## 2025-04-14 DIAGNOSIS — R79.89 ELEVATED LFTS: ICD-10-CM

## 2025-04-14 DIAGNOSIS — Z00.00 ENCOUNTER FOR ROUTINE HISTORY AND PHYSICAL EXAM IN FEMALE: Primary | ICD-10-CM

## 2025-04-14 DIAGNOSIS — L98.9 SKIN LESION: ICD-10-CM

## 2025-04-14 DIAGNOSIS — C50.811 MALIGNANT NEOPLASM OF OVERLAPPING SITES OF RIGHT BREAST IN FEMALE, ESTROGEN RECEPTOR POSITIVE (H): ICD-10-CM

## 2025-04-14 DIAGNOSIS — E55.9 VITAMIN D DEFICIENCY: ICD-10-CM

## 2025-04-14 DIAGNOSIS — Z17.0 MALIGNANT NEOPLASM OF OVERLAPPING SITES OF RIGHT BREAST IN FEMALE, ESTROGEN RECEPTOR POSITIVE (H): ICD-10-CM

## 2025-04-14 DIAGNOSIS — K76.0 FATTY LIVER: ICD-10-CM

## 2025-04-14 DIAGNOSIS — Z28.20 VACCINE REFUSED BY PATIENT: ICD-10-CM

## 2025-04-14 DIAGNOSIS — Z15.01 BRCA GENE POSITIVE: ICD-10-CM

## 2025-04-14 PROCEDURE — 99214 OFFICE O/P EST MOD 30 MIN: CPT | Mod: 25 | Performed by: NURSE PRACTITIONER

## 2025-04-14 PROCEDURE — G2211 COMPLEX E/M VISIT ADD ON: HCPCS | Performed by: NURSE PRACTITIONER

## 2025-04-14 PROCEDURE — 3074F SYST BP LT 130 MM HG: CPT | Performed by: NURSE PRACTITIONER

## 2025-04-14 PROCEDURE — 3078F DIAST BP <80 MM HG: CPT | Performed by: NURSE PRACTITIONER

## 2025-04-14 PROCEDURE — 1126F AMNT PAIN NOTED NONE PRSNT: CPT | Performed by: NURSE PRACTITIONER

## 2025-04-14 PROCEDURE — 99396 PREV VISIT EST AGE 40-64: CPT | Performed by: NURSE PRACTITIONER

## 2025-04-14 ASSESSMENT — PAIN SCALES - GENERAL: PAINLEVEL_OUTOF10: NO PAIN (0)

## 2025-04-14 NOTE — PROGRESS NOTES
"Answers submitted by the patient for this visit:  Patient Health Questionnaire (Submitted on 4/14/2025)  If you checked off any problems, how difficult have these problems made it for you to do your work, take care of things at home, or get along with other people?: Not difficult at all  PHQ9 TOTAL SCORE: 2    Assessment & Plan     Encounter for routine history and physical exam in female  Appropriate preventive services were addressed with this patient via screening, questionnaire, or discussion as appropriate for fall prevention, nutrition, physical activity, tobacco-use cessation, social engagement, weight loss and cognition.  Checklist reviewing preventive services available has been given to the patient.        Malignant neoplasm of overlapping sites of right breast in female, estrogen receptor positive (H)  Due for clinical breast exam, mammo in August- has appt scheduled    BRCA gene positive      Hyperlipidemia LDL goal <130  Checking labs, on Crestor 10 mg daily, adjust dose as indicatd.  - Lipid panel reflex to direct LDL Fasting  - Hepatic panel (Albumin, ALT, AST, Bili, Alk Phos, TP)    Fatty liver  Referring to GI andrew abnormal   - Adult GI  Referral - Consult Only  - Hepatic panel (Albumin, ALT, AST, Bili, Alk Phos, TP)    Elevated LFTs  Referring to GI, recheck LFT's. Avoid alcohol, continue to work on weight loss, regular exercise, heart healthy diet.  - Adult GI  Referral - Consult Only  - Hepatic panel (Albumin, ALT, AST, Bili, Alk Phos, TP)    Skin lesion  Referriong to derm per patient request  - Adult Dermatology  Referral    Vitamin D deficiency    - Vitamin D Deficiency    Vaccine refused by patient  She declined all vaccines today.          MED REC REQUIRED  Post Medication Reconciliation Status:     BMI  Estimated body mass index is 31.12 kg/m  as calculated from the following:    Height as of this encounter: 1.562 m (5' 1.5\").    Weight as of this encounter: 75.9 " kg (167 lb 6.4 oz).   Weight management plan: Discussed healthy diet and exercise guidelines      Work on weight loss  Regular exercise  See Patient Instructions    Robbie Flood is a 49 year old, presenting for the following health issues:  Hospital F/U      4/14/2025    12:50 PM   Additional Questions   Roomed by jose GARCIA        ED/UC Followup:    Facility:  [facility]  Date of visit: 03/19/2025  Reason for visit: Urinary problem   Current Status:   Hyperlipidemia Follow-Up    Are you regularly taking any medication or supplement to lower your cholesterol?   Yes- Crestor 10 mg daily  Are you having muscle aches or other side effects that you think could be caused by your cholesterol lowering medication?  No  Impression    IMPRESSION:    No acute bladder process. Normal appendix.    Subtle lobulations along the liver capsule could reflect early cirrhosis. No evidence of portal hypertension. No mass.      REPORT SIGNED BY DR. Sandoval Amaro  Narrative    EXAM: CT ABDOMEN AND PELVIS (with intravenous contrast)    DATE: 3/19/2025 18:51    COMPARISON: CT abdomen and pelvis with contrast March 27, 2024    CLINICAL DATA: RLQ Pain.    TECHNIQUE: Thin-section contiguous transaxial images were obtained through the abdomen and pelvis with intravenous contrast.  No oral contrast was given.  Coronal reformations were also obtained through the abdomen and pelvis.  A total of 100 cc of Omnipaque 350 were administered intravenously for this study.    FINDINGS:    Lung bases: No suspicious nodule or mass identified in the visualized portions of the lung bases.    Osseous structures: Osseous degenerative changes are identified.    Liver: Somewhat lobulated somewhat lobulated contour along the inferior right hepatic lobe and along the left hepatic lobe suggesting the possibility of early cirrhosis.    Gallbladder: No calcified gallstone or pericholecystic fluid identified.    Spleen: Normal.    Pancreas:  Normal.      Adrenal  Glands:  Normal.    Kidneys:  Normal.    Urinary Bladder:  Normal.    Nodes: No enlarged lymph nodes.    Fluid: No free fluid.    Aorta: No abdominal aortic aneurysm.    Bowel: The stomach and duodenum are unremarkable.  Ligament of Treitz is normal in position.  The jejunum and ileum are unremarkable.  There is no colonic wall thickening or pericolonic inflammation. Normal appendix  Exam End: 03/19/25  6:57 PM    Specimen Collected: 03/19/25  7:07 PM Last Resulted: 03/19/25  7:14 PM   Received From: [facility]  Result Received: 04/14/25 12:45 PM       Skin lesion left 3rd finger tip, has had it frozen with LN in the past but noted no change in lesion.    Right breast cancer- has been followed by Oncology but they now fee;l she can be evaluated by [facility] annually for annual CBC,  clinical breast exam and mammogram (due in August).    How many servings of fruits and vegetables do you eat daily?  4 or more  On average, how many sweetened beverages do you drink each day (Examples: soda, juice, sweet tea, etc.  Do NOT count diet or artificially sweetened beverages)?   1  How many days per week do you exercise enough to make your heart beat faster? 7  How many minutes a day do you exercise enough to make your heart beat faster? 30 - 60  How many days per week do you miss taking your medication? 0      Review of Systems  Constitutional, HEENT, cardiovascular, pulmonary, gi and gu systems are negative, except as otherwise noted.      Objective    LMP 03/01/2016 (Approximate)   There is no height or weight on file to calculate BMI.  Physical Exam   GENERAL: alert and no distress  EYES: Eyes grossly normal to inspection, PERRL and conjunctivae and sclerae normal  HENT: ear canals and TM's normal, nose and mouth without ulcers or lesions  NECK: no adenopathy, no asymmetry, masses, or scars  RESP: lungs clear to auscultation - no rales, rhonchi or wheezes  CV: regular rate and rhythm, normal S1 S2, no S3 or  S4, no murmur, click or rub, no peripheral edema  ABDOMEN: soft, nontender, no hepatosplenomegaly, no masses and bowel sounds normal  MS: no gross musculoskeletal defects noted, no edema  SKIN: thickened plaque with scales on left 3rd fingertip, otherwise, no suspicious lesions or rashes  NEURO: Normal strength and tone, mentation intact and speech normal  PSYCH: mentation appears normal, affect normal/bright  LYMPH: normal ant/post cervical, supraclavicular nodes  inguinal: no adenopathy          Signed Electronically by: PIA Edmond CNP

## 2025-04-14 NOTE — PATIENT INSTRUCTIONS
At [facility], we strive to deliver an exceptional experience to you, every time we see you. If you receive a survey, please let us know what we are doing well and/or what we could improve upon, as we do value your feedback.  If you have MyChart, you can expect to receive results automatically within 24 hours of their completion.  Your provider will send a note interpreting your results as well.   If you do not have MyChart, you should receive your results in about a week by mail.    Your care team:                            Family Medicine Internal Medicine   MD Antonio Mistry, MD Amanda Chaudhry, MD Luis Grande, MD Kathryn Camp, PA-C    Narinder Yap, MD Pediatrics   Nida Alcazar, MD Shahla Cifuentes, PIA Quinteros CNP Cira Diggs, MD Haydee Morales, MD Zoë Ko, CNP     Lucrecia Manning, PA-C Same-Day Provider (No follow-up visits)   PIA Mckinney, MARAL Lebron, PA-C    Annika Valentine PA-C     Clinic hours: Monday - Thursday 7 am-6 pm; Fridays 7 am-5 pm.   Urgent care: Monday - Friday 10 am- 8 pm; Saturday and Sunday 9 am-5 pm.    Clinic: [phone]       [facility]: Monday - Thursday 8 am - 7 pm; Friday 8 am - 6 pm  [facility]: [phone]

## 2025-04-21 ENCOUNTER — LAB (OUTPATIENT)
Dept: LAB | Facility: CLINIC | Age: 50
End: 2025-04-21
Payer: COMMERCIAL

## 2025-04-21 DIAGNOSIS — K76.0 FATTY LIVER: ICD-10-CM

## 2025-04-21 DIAGNOSIS — E55.9 VITAMIN D DEFICIENCY: ICD-10-CM

## 2025-04-21 DIAGNOSIS — E78.5 HYPERLIPIDEMIA LDL GOAL <130: ICD-10-CM

## 2025-04-21 DIAGNOSIS — R79.89 ELEVATED LFTS: ICD-10-CM

## 2025-04-21 LAB
ALBUMIN SERPL BCG-MCNC: 4.5 G/DL (ref 3.5–5.2)
ALP SERPL-CCNC: 78 U/L (ref 40–150)
ALT SERPL W P-5'-P-CCNC: 41 U/L (ref 0–50)
AST SERPL W P-5'-P-CCNC: 28 U/L (ref 0–45)
BILIRUB DIRECT SERPL-MCNC: 0.2 MG/DL (ref 0–0.3)
BILIRUB SERPL-MCNC: 0.5 MG/DL
CHOLEST SERPL-MCNC: 181 MG/DL
FASTING STATUS PATIENT QL REPORTED: YES
HDLC SERPL-MCNC: 45 MG/DL
LDLC SERPL CALC-MCNC: 109 MG/DL
NONHDLC SERPL-MCNC: 136 MG/DL
PROT SERPL-MCNC: 7.7 G/DL (ref 6.4–8.3)
TRIGL SERPL-MCNC: 136 MG/DL
VIT D+METAB SERPL-MCNC: 36 NG/ML (ref 20–50)

## 2025-04-21 PROCEDURE — 80076 HEPATIC FUNCTION PANEL: CPT

## 2025-04-21 PROCEDURE — 80061 LIPID PANEL: CPT

## 2025-04-21 PROCEDURE — 82306 VITAMIN D 25 HYDROXY: CPT

## 2025-04-21 PROCEDURE — 36415 COLL VENOUS BLD VENIPUNCTURE: CPT

## 2025-04-23 NOTE — PATIENT INSTRUCTIONS
"Warts:    For at home treatment of warts I suggest getting a salicylic acid 40% liquid or stick that you can apply to each individual wart.  Apply at night before bed, cover them up overnight with duct tape or bandage.  You can remove the bandage in the morning and pare down any dead skin that is on the surface with a pumice stone, plastic knife, or nail file.      Remember to only use that tool for your warts so that you are not spreading warts to other parts of your body especially under your nails.  You may also leave the occlusive bandage like the duct tape or Band-Aid on throughout the day and remove it at night and reapply.  If the skin gets tender you can take a day or two break and then restart.    Prior to seeing me back in clinic please stop the salicylic acid about 2 to 3 days prior to seeing me so that I can see the wart without it being white from the acid.    I usually like to see you back in 1 month intervals until the wart is gone.  The at home treatment can help the wart resolve faster is the goal.    Some examples of 40% salicylic acid on Amazon is \"Wart Stick\".     "

## 2025-04-24 ENCOUNTER — OFFICE VISIT (OUTPATIENT)
Dept: DERMATOLOGY | Facility: CLINIC | Age: 50
End: 2025-04-24
Payer: COMMERCIAL

## 2025-04-24 NOTE — PROGRESS NOTES
[facility] Dermatology Note  Encounter Date: Apr 24, 2025  Office Visit     Reviewed patients past medical history and pertinent chart review prior to patients visit today.     Dermatology Problem List:  wart    ____________________________________________    Assessment & Plan:     # Verruca vulgaris, ***. Counseled on natural history and viral etiology of this condition. Counseled that these are often recalcitrant to treatment. Discussed all treatment options that we offer as well as side effects of each. Advised that highest rates of success can be observed with combination monthly treatment in office and home treatments as well    - Cryotherapy: Patient elects to treat warts today with cryotherapy. After verbal consent and discussion of risks and benefits including but no limited to dyspigmentation/scar, blister, and pain. A total of *** warts were treated with 1-2mm freeze border for 3 cycle with liquid nitrogen. Post cryotherapy instructions were provided.     - Start salicylic acid 40% (such as wart stick) nightly followed by duct tape or bandage.  Pare down after removal of occlusive cover for deeper penetration of medication.     -Candida injection: patient has failed successive treatment with cryotherapy so I have recommended a series of intralesional immunotherapy with candida antigen.  This is a series of 3-4 injections and is about 70% effective.  Most patients don't see any improvement until after at least 2 injections.  After verbal consent was obtained, the skin was cleaned with an alcohol wipe and 0.4 ml of candida was injected under lesions on the ***  The patient tolerated the procedure well.     -WartPeel: patient would like to try WartPeel. Prescription faxed to [facility]. Patient given written handout instructions. Apply nightly to warts. Stop 2-3 days prior to follow up so we can visualize the base of the wart more clearly at appointemt.     Follow-up: 1 month(s)  in-person, or earlier for new or changing lesions    Any Lawrence CNP  Dermatology   _______________________________________    CC: Lesion (On fingers, growing. Wart?)    HPI:  Ms. Aleena Escamilla is a(n) 49 year old female who presents today {kknew/return:[id number]} for warts on the ***. Patient has tried *** without successful resolution and is here to discuss further treatment options. The warts are symptomatic and bothersome.       Patient is otherwise feeling well, without additional skin concerns.      Physical Exam:  SKIN: Focused examination of *** was performed.  - There is/are verrucous papule(s) with thrombosed capillaries and interruption of dermatoglyphics on the ***.      - No other lesions of concern on areas examined.     Medications:  Current Outpatient Medications   Medication Sig Dispense Refill    CALCIUM PO       Cholecalciferol (VITAMIN D3) 2000 units TABS Take 1 tablet by mouth daily 100 tablet 3    clindamycin (CLEOCIN T) 1 % external lotion APPLY TOPICALLY TO FACE EVERY MORNING 60 mL 3    diclofenac (VOLTAREN) 50 MG EC tablet Take 1 tablet (50 mg) by mouth 2 times daily for 7 days 28 tablet 1    Evening Primrose Oil 1000 MG CAPS       fish oil-omega-3 fatty acids 1000 MG capsule Take 1 g by mouth daily      ibuprofen (ADVIL/MOTRIN) 600 MG tablet Take 1 tablet (600 mg) by mouth every 8 hours as needed for moderate pain 20 tablet 0    lidocaine, viscous, (XYLOCAINE) 2 % solution Swish and spit 15 mLs in mouth every 3 hours as needed for moderate pain. ; Max 8 doses/24 hour period. 100 mL 0    rosuvastatin (CRESTOR) 10 MG tablet Take 1 tablet (10 mg) by mouth daily 90 tablet 3    tretinoin (RETIN-A) 0.05 % external cream APPLY TOPICALLY TO FACE AT BEDTIME FOR ACNE 45 g 3     No current facility-administered medications for this visit.      Past Medical History:   Patient Active Problem List   Diagnosis    CARDIOVASCULAR SCREENING; LDL GOAL LESS THAN 160    Chronic pelvic pain in female    Chronic  salpingitis and oophoritis    Major depressive disorder, recurrent episode, mild    Generalized anxiety disorder    Arthritis, multiple joint involvement    Primary osteoarthritis of both hands    Fibromyalgia    Malignant neoplasm of overlapping sites of right female breast (H)    BRCA gene positive    Carpal tunnel syndrome    Non morbid drug-induced obesity    Chronic right shoulder pain    Bunion, right    Onychomycosis    Elevated LFTs    Fatty liver    Hyperlipidemia LDL goal <130    High risk medication use    Upper back pain, chronic    Pelvic floor dysfunction     Past Medical History:   Diagnosis Date    Breast cancer (H)     Chronic pelvic pain in female     Malignant neoplasm of right breast, stage 2 (H) 01/01/2016 1/4 lymph nodes positive, Oncotype DX = 25.  Chemotherapy and radiation. Letrozole.    Migraine headaches        CC PIA Suárez CNP  [id number] [phone] on close of this encounter.

## 2025-04-30 ENCOUNTER — OFFICE VISIT (OUTPATIENT)
Dept: URGENT CARE | Facility: URGENT CARE | Age: 50
End: 2025-04-30
Payer: COMMERCIAL

## 2025-04-30 VITALS
RESPIRATION RATE: 16 BRPM | WEIGHT: 162 LBS | OXYGEN SATURATION: 98 % | TEMPERATURE: 98.6 F | HEART RATE: 87 BPM | BODY MASS INDEX: 29.81 KG/M2 | SYSTOLIC BLOOD PRESSURE: 110 MMHG | DIASTOLIC BLOOD PRESSURE: 79 MMHG | HEIGHT: 62 IN

## 2025-04-30 DIAGNOSIS — R10.32 LLQ ABDOMINAL PAIN: ICD-10-CM

## 2025-04-30 DIAGNOSIS — K52.9 GASTROENTERITIS: Primary | ICD-10-CM

## 2025-04-30 LAB
ALBUMIN SERPL-MCNC: 4.1 G/DL (ref 3.4–5)
ALBUMIN UR-MCNC: NEGATIVE MG/DL
ALP SERPL-CCNC: 75 U/L (ref 40–150)
ALT SERPL W P-5'-P-CCNC: 30 U/L (ref 0–50)
ANION GAP SERPL CALCULATED.3IONS-SCNC: 2 MMOL/L (ref 3–14)
APPEARANCE UR: CLEAR
AST SERPL W P-5'-P-CCNC: 29 U/L (ref 0–45)
BACTERIA #/AREA URNS HPF: ABNORMAL /HPF
BASOPHILS # BLD AUTO: 0 10E3/UL (ref 0–0.2)
BASOPHILS NFR BLD AUTO: 0 %
BILIRUB SERPL-MCNC: 0.7 MG/DL (ref 0.2–1.3)
BILIRUB UR QL STRIP: NEGATIVE
BUN SERPL-MCNC: 13 MG/DL (ref 7–30)
CALCIUM SERPL-MCNC: 9.1 MG/DL (ref 8.5–10.1)
CHLORIDE BLD-SCNC: 106 MMOL/L (ref 94–109)
CO2 SERPL-SCNC: 28 MMOL/L (ref 20–32)
COLOR UR AUTO: YELLOW
CREAT SERPL-MCNC: 0.7 MG/DL (ref 0.52–1.04)
EGFRCR SERPLBLD CKD-EPI 2021: >90 ML/MIN/1.73M2
EOSINOPHIL # BLD AUTO: 0 10E3/UL (ref 0–0.7)
EOSINOPHIL NFR BLD AUTO: 1 %
ERYTHROCYTE [DISTWIDTH] IN BLOOD BY AUTOMATED COUNT: 13.8 % (ref 10–15)
GLUCOSE BLD-MCNC: 104 MG/DL (ref 70–99)
GLUCOSE UR STRIP-MCNC: NEGATIVE MG/DL
HCT VFR BLD AUTO: 44.3 % (ref 35–47)
HGB BLD-MCNC: 15.3 G/DL (ref 11.7–15.7)
HGB UR QL STRIP: ABNORMAL
IMM GRANULOCYTES # BLD: 0 10E3/UL
IMM GRANULOCYTES NFR BLD: 0 %
KETONES UR STRIP-MCNC: ABNORMAL MG/DL
LEUKOCYTE ESTERASE UR QL STRIP: NEGATIVE
LIPASE SERPL-CCNC: 22 U/L (ref 13–60)
LYMPHOCYTES # BLD AUTO: 1.1 10E3/UL (ref 0.8–5.3)
LYMPHOCYTES NFR BLD AUTO: 23 %
MCH RBC QN AUTO: 29.4 PG (ref 26.5–33)
MCHC RBC AUTO-ENTMCNC: 34.5 G/DL (ref 31.5–36.5)
MCV RBC AUTO: 85 FL (ref 78–100)
MONOCYTES # BLD AUTO: 0.4 10E3/UL (ref 0–1.3)
MONOCYTES NFR BLD AUTO: 9 %
NEUTROPHILS # BLD AUTO: 3.2 10E3/UL (ref 1.6–8.3)
NEUTROPHILS NFR BLD AUTO: 67 %
NITRATE UR QL: NEGATIVE
PH UR STRIP: 6 [PH] (ref 5–7)
PLATELET # BLD AUTO: 175 10E3/UL (ref 150–450)
POTASSIUM BLD-SCNC: 4.5 MMOL/L (ref 3.4–5.3)
PROT SERPL-MCNC: 7.9 G/DL (ref 6.8–8.8)
RBC # BLD AUTO: 5.21 10E6/UL (ref 3.8–5.2)
RBC #/AREA URNS AUTO: ABNORMAL /HPF
SODIUM SERPL-SCNC: 136 MMOL/L (ref 135–145)
SP GR UR STRIP: 1.01 (ref 1–1.03)
SQUAMOUS #/AREA URNS AUTO: ABNORMAL /LPF
UROBILINOGEN UR STRIP-ACNC: 0.2 E.U./DL
WBC # BLD AUTO: 4.8 10E3/UL (ref 4–11)
WBC #/AREA URNS AUTO: ABNORMAL /HPF

## 2025-04-30 PROCEDURE — 83690 ASSAY OF LIPASE: CPT | Performed by: PHYSICIAN ASSISTANT

## 2025-04-30 PROCEDURE — 80053 COMPREHEN METABOLIC PANEL: CPT | Performed by: PHYSICIAN ASSISTANT

## 2025-04-30 PROCEDURE — 3074F SYST BP LT 130 MM HG: CPT | Performed by: PHYSICIAN ASSISTANT

## 2025-04-30 PROCEDURE — 36415 COLL VENOUS BLD VENIPUNCTURE: CPT | Performed by: PHYSICIAN ASSISTANT

## 2025-04-30 PROCEDURE — 85025 COMPLETE CBC W/AUTO DIFF WBC: CPT | Performed by: PHYSICIAN ASSISTANT

## 2025-04-30 PROCEDURE — 99214 OFFICE O/P EST MOD 30 MIN: CPT | Performed by: PHYSICIAN ASSISTANT

## 2025-04-30 PROCEDURE — 3078F DIAST BP <80 MM HG: CPT | Performed by: PHYSICIAN ASSISTANT

## 2025-04-30 PROCEDURE — 81001 URINALYSIS AUTO W/SCOPE: CPT | Performed by: PHYSICIAN ASSISTANT

## 2025-04-30 NOTE — PROGRESS NOTES
Assessment & Plan     Gastroenteritis  Symptoms often start 1-3 days after you swallow the virus.  Symptoms often last 12- 60 hours  It is most important that you maintain hydration- water, sports drinks, diluted fruit juice and broths are all good options.  Eat as tolerated- smaller meals more often may reduce chance of vomiting  Start by eating bland foods. Some good options include potatoes, noodles, rice, crackers, bananas, yogurt, soups and boiled vegetables.  BRAT- Bananas, Rice, Applesauce, Toast and Tea  Ginger (such as ginger ale) and peppermint may help settle your stomach and reduce nausea.  Imodium (loperamide) is available over the counter to reduce diarrhea. Do not take this if you have blood in your stool, a fever or you suspect food poisoning.    Present to ER if symptoms worsen, you develop a high fever, severe weakness or fainting, increased abdominal pain, blood in stool or vomit, it has been more than 24 hours since you kept fluids down or failure to improve in the next 2-3 days.    LLQ abdominal pain  - CBC with platelets and differential; Future  - Comprehensive metabolic panel (BMP + Alb, Alk Phos, ALT, AST, Total. Bili, TP); Future  - Lipase; Future  - UA Macroscopic with reflex to Microscopic and Culture - Lab Collect; Future  - CBC with platelets and differential  - Comprehensive metabolic panel (BMP + Alb, Alk Phos, ALT, AST, Total. Bili, TP)  - Lipase  - UA Macroscopic with reflex to Microscopic and Culture - Lab Collect  - UA Microscopic with Reflex to Culture    Return in about 1 week (around 5/7/2025) for visit with primary care provider if not improving.     Bessy Mar PA-C  [facility]    Subjective   Aleena Escamilla is a 49 year old who presents for the following health issues     Patient presents with:  Abdominal Cramping: Feel sick since Sunday night, cramps in left lower abdomen and diarrhea(just water), took pepto bismol yesterday and don't see  "any improvement, threw up one time yesterday   Nausea, Vomiting, & Diarrhea      HPI    Aleena presents for evaluation of nausea, vomiting, diarrhea and abdominal cramping  Symptoms began 2 days ago  Diarrhea x 4-5 time a day, watery  Vomiting x 1 yesterday  No fever, no blood or mucus in stool  No dysuria  Had a UTI 1 month ago and has been sensitive to spice and sour foods since UTI  Colonoscopy 1 year ago    Review of Systems   ROS negative except as stated above.      Objective    /79 (BP Location: Left arm, Patient Position: Sitting, Cuff Size: Adult Regular)   Pulse 87   Temp 98.6  F (37  C) (Oral)   Resp 16   Ht 1.562 m (5' 1.5\")   Wt 73.5 kg (162 lb)   LMP 03/01/2016 (Approximate)   SpO2 98%   BMI 30.11 kg/m    Physical Exam   GENERAL: alert and no distress  NECK: no adenopathy, no asymmetry, masses, or scars  RESP: lungs clear to auscultation - no rales, rhonchi or wheezes  CV: regular rate and rhythm, normal S1 S2, no S3 or S4, no murmur, click or rub, no peripheral edema  ABDOMEN: soft, diffusely tender but worst in LLQ, no palpable hepatosplenomegaly, no masses and bowel sounds normal  BACK: no CVA tenderness, no paralumbar tenderness    Results for orders placed or performed in visit on 04/30/25   Comprehensive metabolic panel (BMP + Alb, Alk Phos, ALT, AST, Total. Bili, TP)     Status: Abnormal   Result Value Ref Range    Sodium 136 135 - 145 mmol/L    Potassium 4.5 3.4 - 5.3 mmol/L    Chloride 106 94 - 109 mmol/L    Carbon Dioxide (CO2) 28 20 - 32 mmol/L    Anion Gap 2 (L) 3 - 14 mmol/L    Urea Nitrogen 13 7 - 30 mg/dL    Creatinine 0.70 0.52 - 1.04 mg/dL    GFR Estimate >90 >60 mL/min/1.73m2    Calcium 9.1 8.5 - 10.1 mg/dL    Glucose 104 (H) 70 - 99 mg/dL    Alkaline Phosphatase 75 40 - 150 U/L    AST 29 0 - 45 U/L    ALT 30 0 - 50 U/L    Protein Total 7.9 6.8 - 8.8 g/dL    Albumin 4.1 3.4 - 5.0 g/dL    Bilirubin Total 0.7 0.2 - 1.3 mg/dL   UA Macroscopic with reflex to Microscopic and " Culture - Lab Collect     Status: Abnormal    Specimen: Urine, Clean Catch   Result Value Ref Range    Color Urine Yellow Colorless, Straw, Light Yellow, Yellow    Appearance Urine Clear Clear    Glucose Urine Negative Negative mg/dL    Bilirubin Urine Negative Negative    Ketones Urine Trace (A) Negative mg/dL    Specific Gravity Urine 1.010 1.003 - 1.035    Blood Urine Trace (A) Negative    pH Urine 6.0 5.0 - 7.0    Protein Albumin Urine Negative Negative mg/dL    Urobilinogen Urine 0.2 0.2, 1.0 E.U./dL    Nitrite Urine Negative Negative    Leukocyte Esterase Urine Negative Negative   CBC with platelets and differential     Status: Abnormal   Result Value Ref Range    WBC Count 4.8 4.0 - 11.0 10e3/uL    RBC Count 5.21 (H) 3.80 - 5.20 10e6/uL    Hemoglobin 15.3 11.7 - 15.7 g/dL    Hematocrit 44.3 35.0 - 47.0 %    MCV 85 78 - 100 fL    MCH 29.4 26.5 - 33.0 pg    MCHC 34.5 31.5 - 36.5 g/dL    RDW 13.8 10.0 - 15.0 %    Platelet Count 175 150 - 450 10e3/uL    % Neutrophils 67 %    % Lymphocytes 23 %    % Monocytes 9 %    % Eosinophils 1 %    % Basophils 0 %    % Immature Granulocytes 0 %    Absolute Neutrophils 3.2 1.6 - 8.3 10e3/uL    Absolute Lymphocytes 1.1 0.8 - 5.3 10e3/uL    Absolute Monocytes 0.4 0.0 - 1.3 10e3/uL    Absolute Eosinophils 0.0 0.0 - 0.7 10e3/uL    Absolute Basophils 0.0 0.0 - 0.2 10e3/uL    Absolute Immature Granulocytes 0.0 <=0.4 10e3/uL   UA Microscopic with Reflex to Culture     Status: Abnormal   Result Value Ref Range    Bacteria Urine Few (A) None Seen /HPF    RBC Urine 0-2 0-2 /HPF /HPF    WBC Urine 0-5 0-5 /HPF /HPF    Squamous Epithelials Urine Few (A) None Seen /LPF    Narrative    Urine Culture not indicated   CBC with platelets and differential     Status: Abnormal    Narrative    The following orders were created for panel order CBC with platelets and differential.  Procedure                               Abnormality         Status                     ---------                                -----------         ------                     CBC with platelets and ...[[id number]]  Abnormal            Final result                 Please view results for these tests on the individual orders.

## 2025-04-30 NOTE — PROGRESS NOTES
Urgent Care Clinic Visit    Chief Complaint   Patient presents with    Abdominal Cramping     Feel sick since Sunday night, cramps in left lower abdomen and diarrhea(just water), took pepto bismol yesterday and don't see any improvement, threw up one time yesterday     Nausea, Vomiting, & Diarrhea               4/30/2025    10:29 AM   Additional Questions   Roomed by constance leija   Accompanied by self

## 2025-04-30 NOTE — LETTER
2025    Aleena Escamilla   1975        To Whom it May Concern;    Please excuse Aleena Escamilla from work 2025.    Sincerely,        Bessy Mar PA-C

## 2025-04-30 NOTE — PATIENT INSTRUCTIONS
Symptoms often start 1-3 days after you swallow the virus.  Symptoms often last 12- 60 hours  It is most important that you maintain hydration- water, sports drinks, diluted fruit juice and broths are all good options.  Eat as tolerated- smaller meals more often may reduce chance of vomiting  Start by eating bland foods. Some good options include potatoes, noodles, rice, crackers, bananas, yogurt, soups and boiled vegetables.  BRAT- Bananas, Rice, Applesauce, Toast and Tea  Ginger (such as ginger ale) and peppermint may help settle your stomach and reduce nausea.    Imodium (loperamide) is available over the counter to reduce diarrhea. Do not take this if you have blood in your stool, a fever or you suspect food poisoning.    Present to ER if symptoms worsen, you develop a high fever, severe weakness or fainting, increased abdominal pain, blood in stool or vomit, it has been more than 24 hours since you kept fluids down or failure to improve in the next 2-3 days.

## 2025-06-16 ENCOUNTER — PATIENT OUTREACH (OUTPATIENT)
Dept: CARE COORDINATION | Facility: CLINIC | Age: 50
End: 2025-06-16
Payer: COMMERCIAL

## 2025-08-11 ENCOUNTER — ANCILLARY PROCEDURE (OUTPATIENT)
Dept: MAMMOGRAPHY | Facility: CLINIC | Age: 50
End: 2025-08-11
Attending: NURSE PRACTITIONER
Payer: COMMERCIAL

## 2025-08-11 DIAGNOSIS — Z12.31 ENCOUNTER FOR SCREENING MAMMOGRAM FOR BREAST CANCER: ICD-10-CM

## 2025-08-11 PROCEDURE — 77067 SCR MAMMO BI INCL CAD: CPT | Mod: GC | Performed by: RADIOLOGY

## 2025-08-11 PROCEDURE — 77063 BREAST TOMOSYNTHESIS BI: CPT | Mod: GC | Performed by: RADIOLOGY

## (undated) DEVICE — SOL WATER IRRIG 1000ML BOTTLE 07139-09

## (undated) RX ORDER — FENTANYL CITRATE 50 UG/ML
INJECTION, SOLUTION INTRAMUSCULAR; INTRAVENOUS
Status: DISPENSED
Start: 2024-10-11